# Patient Record
Sex: MALE | Race: WHITE | NOT HISPANIC OR LATINO | Employment: OTHER | URBAN - NONMETROPOLITAN AREA
[De-identification: names, ages, dates, MRNs, and addresses within clinical notes are randomized per-mention and may not be internally consistent; named-entity substitution may affect disease eponyms.]

---

## 2008-09-03 DEVICE — IMP LEAD PACING BIPOLAR CAPSUREFIX NOVUS 52CM 5076-52: Type: IMPLANTABLE DEVICE | Site: RIGHT ATRIUM | Status: NON-FUNCTIONAL

## 2017-01-21 ENCOUNTER — COMMUNICATION - GICH (OUTPATIENT)
Dept: INTERNAL MEDICINE | Facility: OTHER | Age: 72
End: 2017-01-21

## 2017-01-21 DIAGNOSIS — M10.9 GOUT: ICD-10-CM

## 2017-01-24 ENCOUNTER — ANTICOAGULATION - GICH (OUTPATIENT)
Dept: INTERNAL MEDICINE | Facility: OTHER | Age: 72
End: 2017-01-24

## 2017-01-24 DIAGNOSIS — I48.91 ATRIAL FIBRILLATION (H): ICD-10-CM

## 2017-01-24 DIAGNOSIS — Z79.01 LONG TERM CURRENT USE OF ANTICOAGULANT: ICD-10-CM

## 2017-01-24 DIAGNOSIS — I48.0 PAROXYSMAL ATRIAL FIBRILLATION (H): ICD-10-CM

## 2017-01-24 LAB — INR - HISTORICAL: 1.9

## 2017-02-01 ENCOUNTER — COMMUNICATION - GICH (OUTPATIENT)
Dept: INTERNAL MEDICINE | Facility: OTHER | Age: 72
End: 2017-02-01

## 2017-02-01 DIAGNOSIS — Z79.01 LONG TERM CURRENT USE OF ANTICOAGULANT: ICD-10-CM

## 2017-02-01 DIAGNOSIS — I48.0 PAROXYSMAL ATRIAL FIBRILLATION (H): ICD-10-CM

## 2017-02-03 ENCOUNTER — COMMUNICATION - GICH (OUTPATIENT)
Dept: INTERNAL MEDICINE | Facility: OTHER | Age: 72
End: 2017-02-03

## 2017-02-20 ENCOUNTER — AMBULATORY - GICH (OUTPATIENT)
Dept: SCHEDULING | Facility: OTHER | Age: 72
End: 2017-02-20

## 2017-02-24 ENCOUNTER — ANTICOAGULATION - GICH (OUTPATIENT)
Dept: INTERNAL MEDICINE | Facility: OTHER | Age: 72
End: 2017-02-24

## 2017-02-24 DIAGNOSIS — Z79.01 LONG TERM CURRENT USE OF ANTICOAGULANT: ICD-10-CM

## 2017-02-24 LAB — INR - HISTORICAL: 2.9

## 2017-03-04 ENCOUNTER — COMMUNICATION - GICH (OUTPATIENT)
Dept: INTERNAL MEDICINE | Facility: OTHER | Age: 72
End: 2017-03-04

## 2017-03-04 DIAGNOSIS — I50.33 ACUTE ON CHRONIC DIASTOLIC CONGESTIVE HEART FAILURE (H): ICD-10-CM

## 2017-03-04 DIAGNOSIS — I10 ESSENTIAL (PRIMARY) HYPERTENSION: ICD-10-CM

## 2017-03-20 ENCOUNTER — COMMUNICATION - GICH (OUTPATIENT)
Dept: INTERNAL MEDICINE | Facility: OTHER | Age: 72
End: 2017-03-20

## 2017-03-20 DIAGNOSIS — E78.00 PURE HYPERCHOLESTEROLEMIA: ICD-10-CM

## 2017-03-23 ENCOUNTER — OFFICE VISIT - GICH (OUTPATIENT)
Dept: FAMILY MEDICINE | Facility: OTHER | Age: 72
End: 2017-03-23

## 2017-03-23 ENCOUNTER — HISTORY (OUTPATIENT)
Dept: FAMILY MEDICINE | Facility: OTHER | Age: 72
End: 2017-03-23

## 2017-03-23 DIAGNOSIS — I10 ESSENTIAL (PRIMARY) HYPERTENSION: ICD-10-CM

## 2017-03-23 DIAGNOSIS — E78.00 PURE HYPERCHOLESTEROLEMIA: ICD-10-CM

## 2017-03-23 DIAGNOSIS — I50.33 ACUTE ON CHRONIC DIASTOLIC CONGESTIVE HEART FAILURE (H): ICD-10-CM

## 2017-03-23 DIAGNOSIS — M10.9 GOUT: ICD-10-CM

## 2017-03-23 DIAGNOSIS — I48.0 PAROXYSMAL ATRIAL FIBRILLATION (H): ICD-10-CM

## 2017-03-23 LAB
ABSOLUTE BASOPHILS - HISTORICAL: 0.2 THOU/CU MM
ABSOLUTE EOSINOPHILS - HISTORICAL: 0.4 THOU/CU MM
ABSOLUTE LYMPHOCYTES - HISTORICAL: 1.5 THOU/CU MM (ref 0.9–2.9)
ABSOLUTE MONOCYTES - HISTORICAL: 0.7 THOU/CU MM
ABSOLUTE NEUTROPHILS - HISTORICAL: 4.1 THOU/CU MM (ref 1.7–7)
ANION GAP - HISTORICAL: 9 (ref 5–18)
BASOPHILS # BLD AUTO: 2.2 %
BUN SERPL-MCNC: 25 MG/DL (ref 7–25)
BUN/CREAT RATIO - HISTORICAL: 23
CALCIUM SERPL-MCNC: 8.7 MG/DL (ref 8.6–10.3)
CHLORIDE SERPLBLD-SCNC: 106 MMOL/L (ref 98–107)
CO2 SERPL-SCNC: 22 MMOL/L (ref 21–31)
CREAT SERPL-MCNC: 1.11 MG/DL (ref 0.7–1.3)
EOSINOPHIL NFR BLD AUTO: 5.4 %
ERYTHROCYTE [DISTWIDTH] IN BLOOD BY AUTOMATED COUNT: 13.9 % (ref 11.5–15.5)
GFR IF NOT AFRICAN AMERICAN - HISTORICAL: >60 ML/MIN/1.73M2
GLUCOSE SERPL-MCNC: 106 MG/DL (ref 70–105)
HCT VFR BLD AUTO: 48.5 % (ref 37–53)
HEMOGLOBIN: 15.6 G/DL (ref 13.5–17.5)
LYMPHOCYTES NFR BLD AUTO: 22.2 % (ref 20–44)
MCH RBC QN AUTO: 30.5 PG (ref 26–34)
MCHC RBC AUTO-ENTMCNC: 32.1 G/DL (ref 32–36)
MCV RBC AUTO: 95 FL (ref 80–100)
MONOCYTES NFR BLD AUTO: 10.7 %
NEUTROPHILS NFR BLD AUTO: 59.5 % (ref 42–72)
PLATELET # BLD AUTO: 191 THOU/CU MM (ref 140–440)
PMV BLD: 9 FL (ref 6.5–11)
POTASSIUM SERPL-SCNC: 4.5 MMOL/L (ref 3.5–5.1)
RED BLOOD COUNT - HISTORICAL: 5.1 MIL/CU MM (ref 4.3–5.9)
SODIUM SERPL-SCNC: 137 MMOL/L (ref 133–143)
URATE SERPL-MCNC: 5.8 MG/DL (ref 4.4–7.6)
WHITE BLOOD COUNT - HISTORICAL: 6.9 THOU/CU MM (ref 4.5–11)

## 2017-04-04 ENCOUNTER — ANTICOAGULATION - GICH (OUTPATIENT)
Dept: INTERNAL MEDICINE | Facility: OTHER | Age: 72
End: 2017-04-04

## 2017-04-04 DIAGNOSIS — Z79.01 LONG TERM CURRENT USE OF ANTICOAGULANT: ICD-10-CM

## 2017-04-04 DIAGNOSIS — I48.91 ATRIAL FIBRILLATION (H): ICD-10-CM

## 2017-04-04 LAB — INR - HISTORICAL: 1.6

## 2017-04-25 ENCOUNTER — ANTICOAGULATION - GICH (OUTPATIENT)
Dept: INTERNAL MEDICINE | Facility: OTHER | Age: 72
End: 2017-04-25

## 2017-04-25 DIAGNOSIS — Z79.01 LONG TERM CURRENT USE OF ANTICOAGULANT: ICD-10-CM

## 2017-04-25 DIAGNOSIS — I48.91 ATRIAL FIBRILLATION (H): ICD-10-CM

## 2017-04-25 LAB — INR - HISTORICAL: 2

## 2017-05-24 ENCOUNTER — ANTICOAGULATION - GICH (OUTPATIENT)
Dept: INTERNAL MEDICINE | Facility: OTHER | Age: 72
End: 2017-05-24

## 2017-05-24 DIAGNOSIS — I48.91 ATRIAL FIBRILLATION (H): ICD-10-CM

## 2017-05-24 DIAGNOSIS — Z79.01 LONG TERM CURRENT USE OF ANTICOAGULANT: ICD-10-CM

## 2017-05-24 LAB — INR - HISTORICAL: 2.2

## 2017-06-07 ENCOUNTER — COMMUNICATION - GICH (OUTPATIENT)
Dept: FAMILY MEDICINE | Facility: OTHER | Age: 72
End: 2017-06-07

## 2017-06-07 DIAGNOSIS — I48.20 CHRONIC ATRIAL FIBRILLATION (H): ICD-10-CM

## 2017-06-20 ENCOUNTER — AMBULATORY - GICH (OUTPATIENT)
Dept: SCHEDULING | Facility: OTHER | Age: 72
End: 2017-06-20

## 2017-06-28 ENCOUNTER — ANTICOAGULATION - GICH (OUTPATIENT)
Dept: INTERNAL MEDICINE | Facility: OTHER | Age: 72
End: 2017-06-28

## 2017-06-28 DIAGNOSIS — I48.91 ATRIAL FIBRILLATION (H): ICD-10-CM

## 2017-06-28 DIAGNOSIS — Z79.01 LONG TERM CURRENT USE OF ANTICOAGULANT: ICD-10-CM

## 2017-06-28 LAB — INR - HISTORICAL: 2.7

## 2017-07-03 ENCOUNTER — COMMUNICATION - GICH (OUTPATIENT)
Dept: INTERNAL MEDICINE | Facility: OTHER | Age: 72
End: 2017-07-03

## 2017-07-03 DIAGNOSIS — I48.20 CHRONIC ATRIAL FIBRILLATION (H): ICD-10-CM

## 2017-07-22 ENCOUNTER — COMMUNICATION - GICH (OUTPATIENT)
Dept: INTERNAL MEDICINE | Facility: OTHER | Age: 72
End: 2017-07-22

## 2017-07-22 DIAGNOSIS — I10 ESSENTIAL (PRIMARY) HYPERTENSION: ICD-10-CM

## 2017-07-22 DIAGNOSIS — I48.0 PAROXYSMAL ATRIAL FIBRILLATION (H): ICD-10-CM

## 2017-07-24 ENCOUNTER — COMMUNICATION - GICH (OUTPATIENT)
Dept: INTERNAL MEDICINE | Facility: OTHER | Age: 72
End: 2017-07-24

## 2017-07-24 DIAGNOSIS — Z79.01 LONG TERM CURRENT USE OF ANTICOAGULANT: ICD-10-CM

## 2017-07-24 DIAGNOSIS — I48.0 PAROXYSMAL ATRIAL FIBRILLATION (H): ICD-10-CM

## 2017-07-28 ENCOUNTER — ANTICOAGULATION - GICH (OUTPATIENT)
Dept: INTERNAL MEDICINE | Facility: OTHER | Age: 72
End: 2017-07-28

## 2017-07-28 DIAGNOSIS — Z79.01 LONG TERM CURRENT USE OF ANTICOAGULANT: ICD-10-CM

## 2017-07-28 DIAGNOSIS — I48.91 ATRIAL FIBRILLATION (H): ICD-10-CM

## 2017-07-28 LAB — INR - HISTORICAL: 2.1

## 2017-09-11 ENCOUNTER — ANTICOAGULATION - GICH (OUTPATIENT)
Dept: INTERNAL MEDICINE | Facility: OTHER | Age: 72
End: 2017-09-11

## 2017-09-11 DIAGNOSIS — I48.91 ATRIAL FIBRILLATION (H): ICD-10-CM

## 2017-09-11 DIAGNOSIS — Z79.01 LONG TERM CURRENT USE OF ANTICOAGULANT: ICD-10-CM

## 2017-09-11 LAB — INR - HISTORICAL: 2.6

## 2017-10-19 ENCOUNTER — ANTICOAGULATION - GICH (OUTPATIENT)
Dept: INTERNAL MEDICINE | Facility: OTHER | Age: 72
End: 2017-10-19

## 2017-10-19 DIAGNOSIS — Z79.01 LONG TERM CURRENT USE OF ANTICOAGULANT: ICD-10-CM

## 2017-10-19 DIAGNOSIS — I48.91 ATRIAL FIBRILLATION (H): ICD-10-CM

## 2017-10-19 LAB — INR - HISTORICAL: 2.4

## 2017-12-21 ENCOUNTER — COMMUNICATION - GICH (OUTPATIENT)
Dept: INTERNAL MEDICINE | Facility: OTHER | Age: 72
End: 2017-12-21

## 2017-12-21 DIAGNOSIS — I48.0 PAROXYSMAL ATRIAL FIBRILLATION (H): ICD-10-CM

## 2017-12-21 DIAGNOSIS — Z79.01 LONG TERM CURRENT USE OF ANTICOAGULANT: ICD-10-CM

## 2017-12-28 ENCOUNTER — ANTICOAGULATION - GICH (OUTPATIENT)
Dept: INTERNAL MEDICINE | Facility: OTHER | Age: 72
End: 2017-12-28

## 2017-12-28 DIAGNOSIS — I48.91 ATRIAL FIBRILLATION (H): ICD-10-CM

## 2017-12-28 DIAGNOSIS — Z79.01 LONG TERM CURRENT USE OF ANTICOAGULANT: ICD-10-CM

## 2017-12-28 LAB — INR - HISTORICAL: 3

## 2017-12-28 NOTE — PATIENT INSTRUCTIONS
Patient Information     Patient Name MRN Kg Harding [id number] Male 1945      Patient Instructions by Cheri Prater RN at 2017 12:30 PM     Author:  Cheri Prater RN Service:  (none) Author Type:  NURS- Registered Nurse     Filed:  2017 11:21 AM Encounter Date:  2017 Status:  Signed     :  Cheri Prater RN (NURS- Registered Nurse)            2017 Details    Sun Mon Tue Wed Thu Fri Sat          1               2                 3               4               5               6               7               8               9                 10               11      6 mg   See details      12      4 mg         13      6 mg         14      4 mg         15      6 mg         16      4 mg           17      4 mg         18      6 mg         19      4 mg         20      6 mg         21      4 mg         22      6 mg         23      4 mg           24      4 mg         25      6 mg         26      4 mg         27      6 mg         28      4 mg         29      6 mg         30      4 mg          Date Details    This INR check               How to take your warfarin dose     To take:  4 mg Take one of the 4 mg tablets.    To take:  6 mg Take one and a half of the 4 mg tablets.           2017 Details    Sun Mon Tue Wed Thu Fri Sat     1      4 mg         2      6 mg         3      4 mg         4      6 mg         5      4 mg         6      6 mg         7      4 mg           8      4 mg         9      6 mg         10      4 mg         11      6 mg         12      4 mg         13      6 mg         14      4 mg           15      4 mg         16      6 mg         17               18               19               20               21                 22               23               24               25               26               27               28                 29               30               31                    Date Details   No additional details     Date of next INR:  10/16/2017         How to take your warfarin dose     To take:  4 mg Take one of the 4 mg tablets.    To take:  6 mg Take one and a half of the 4 mg tablets.             Description          Continue same Warfarin dose and recheck in 1 month. VIOLA YEN RN ....................  9/11/2017   11:21 AM          Anticoagulation Summary as of 9/11/2017     INR goal 2.0-3.0    Today's INR 2.6    Next INR check 10/16/2017          Call your Anticoagulation Clinic at Dept: [phone]   if:   1. Any medications are started, stopped, or there is a change in dose.  2. You experience any bleeding that is not easily stopped or if it is recurrent.  3. You notice an increase in bruising or any bruising that does not heal.  4. You are scheduled for surgery, colonoscopy, dental extraction or any other procedure where you may need to stop your Coumadin (warfarin).

## 2017-12-28 NOTE — TELEPHONE ENCOUNTER
Patient Information     Patient Name MRN Kg Harding [id number] Male 1945      Telephone Encounter by Aurelio Solano RN at 2017 10:22 AM     Author:  Aurelio Solano RN Service:  (none) Author Type:  NURS- Registered Nurse     Filed:  2017 10:23 AM Encounter Date:  7/3/2017 Status:  Signed     :  Aurelio Solano RN (NURS- Registered Nurse)            Medication is not on refill protocol. Unable to complete prescription refill per RN Medication Refill Policy.................... AURELIO SOLANO RN ....................  2017   10:22 AM        This is a Refill request from: [facility]  Name of Medication: tambocor 150 mg  Quantity requested: 60  Last fill date: 17  Last visit with REMI PARKER was on: 01/15/2016 in [facility]  PCP:  Remi Parker MD  Controlled Substance Agreement:  na   Diagnosis r/t this medication request: atrial fibrillation

## 2017-12-28 NOTE — TELEPHONE ENCOUNTER
Patient Information     Patient Name MRN Sex Kg Curtis [id number] Male 1945      Telephone Encounter by Viola Yen RN at 2017  2:11 PM     Author:  Viola Yen RN Service:  (none) Author Type:  NURS- Registered Nurse     Filed:  2017  2:15 PM Encounter Date:  2017 Status:  Signed     :  Viola Yen RN (NURS- Registered Nurse)            Anticoagulant    Office visit in the past 12 months.    Last visit with Dr. Vasquez for medication manaqement was on: 17 [facility]  Next visit with REMI CISNEROS is on: No future appointment listed with this provider  Next visit with Internal Medicine is on: No future appointment listed in this department    Lab tests:  PT/INR at least monthly    INR (no units)    Date Value   2017 2.7 (H)     HEMOGLOBIN                (g/dL)    Date Value   2017 15.6     Prescription refilled per RN Medication Refill Policy.................... VIOLA YEN RN ....................  2017   2:13 PM

## 2017-12-28 NOTE — TELEPHONE ENCOUNTER
Patient Information     Patient Name MRN Sex Kg Curtis [id number] Male 1945      Telephone Encounter by Shellie Schmidt RN at 2017 10:45 AM     Author:  Shellie Schmidt RN Service:  (none) Author Type:  NURS- Registered Nurse     Filed:  2017 10:50 AM Encounter Date:  2017 Status:  Signed     :  Shellie Schmidt RN (NURS- Registered Nurse)            Has not seen PCP in over 1 year. To Caleb Parker MD for consideration.    This is a Refill request from: [employer]   Name of Medication: Flecainide  Quantity requested: 60  Last fill date: 2016  Due for refill: yes  Last visit with BRIANNA BROWNE was on: 2016 in [facility]  PCP:  Caleb Parker MD  Controlled Substance Agreement:  n/a   Diagnosis r/t this medication request: Chronic A Fib     Medication not on RN refill protocol. Unable to complete prescription refill per RN Medication Refill Policy.................... Shellie Schmidt RN ....................  2017   10:47 AM

## 2017-12-29 NOTE — PATIENT INSTRUCTIONS
Patient Information     Patient Name MRN Kg Harding [id number] Male 1945      Patient Instructions by Emani Pereira RN at 2017  1:30 PM     Author:  Emani Pereira RN Service:  (none) Author Type:  NURS- Registered Nurse     Filed:  2017 12:35 PM Encounter Date:  2017 Status:  Signed     :  Emani Pereira RN (NURS- Registered Nurse)            2017 Details    Sun Mon Tue Wed Thu Fri Sat         1               2               3                 4               5               6               7               8               9               10                 11               12               13               14               15               16               17                 18               19               20               21               22               23               24                 25               26               27               28      6 mg   See details      29      4 mg         30      6 mg           Date Details    This INR check               How to take your warfarin dose     To take:  4 mg Take one of the 4 mg tablets.    To take:  6 mg Take one and a half of the 4 mg tablets.           2017 Details    Sun Mon Tue Wed Thu Fri Sat           1      4 mg           2      4 mg         3      6 mg         4      4 mg         5      6 mg         6      4 mg         7      6 mg         8      4 mg           9      4 mg         10      6 mg         11      4 mg         12      6 mg         13      4 mg         14      6 mg         15      4 mg           16      4 mg         17      6 mg         18      4 mg         19      6 mg         20      4 mg         21      6 mg         22      4 mg           23      4 mg         24      6 mg         25      4 mg         26      6 mg         27               28               29                 30               31                     Date Details   No additional details    Date of next INR:   7/26/2017         How to take your warfarin dose     To take:  4 mg Take one of the 4 mg tablets.    To take:  6 mg Take one and a half of the 4 mg tablets.             Description          Continue same Warfarin dose and recheck in 1 month. Emani Pereira RN    6/28/2017  12:35 PM                Anticoagulation Summary as of 6/28/2017     INR goal 2.0-3.0    Today's INR 2.7    Next INR check 7/26/2017          Call your Anticoagulation Clinic at Dept: [phone]   if:   1. Any medications are started, stopped, or there is a change in dose.  2. You experience any bleeding that is not easily stopped or if it is recurrent.  3. You notice an increase in bruising or any bruising that does not heal.  4. You are scheduled for surgery, colonoscopy, dental extraction or any other procedure where you may need to stop your Coumadin (warfarin).

## 2017-12-29 NOTE — PATIENT INSTRUCTIONS
Patient Information     Patient Name MRN Kg Harding [id number] Male 1945      Patient Instructions by Cheri Prater RN at 10/19/2017 12:15 PM     Author:  Cheri Prater RN Service:  (none) Author Type:  NURS- Registered Nurse     Filed:  10/19/2017 11:42 AM Encounter Date:  10/19/2017 Status:  Signed     :  Cheri Prater RN (NURS- Registered Nurse)            2017 Details    Sun Mon Tue Wed Thu Fri Sat     1               2               3               4               5               6               7                 8               9               10               11               12               13               14                 15               16               17               18               19      4 mg   See details      20      6 mg         21      4 mg           22      4 mg         23      6 mg         24      4 mg         25      6 mg         26      4 mg         27      6 mg         28      4 mg           29      4 mg         30      6 mg         31      4 mg              Date Details   10/19 This INR check               How to take your warfarin dose     To take:  4 mg Take one of the 4 mg tablets.    To take:  6 mg Take one and a half of the 4 mg tablets.           2017 Details    Sun Mon Tue Wed Thu Fri Sat        1      6 mg         2      4 mg         3      6 mg         4      4 mg           5      4 mg         6      6 mg         7      4 mg         8      6 mg         9      4 mg         10      6 mg         11      4 mg           12      4 mg         13      6 mg         14      4 mg         15      6 mg         16      4 mg         17      6 mg         18      4 mg           19      4 mg         20      6 mg         21      4 mg         22      6 mg         23      4 mg         24      6 mg         25      4 mg           26      4 mg         27      6 mg         28      4 mg         29      6 mg         30      4 mg            Date  Details   No additional details            How to take your warfarin dose     To take:  4 mg Take one of the 4 mg tablets.    To take:  6 mg Take one and a half of the 4 mg tablets.           December 2017 Details    Sun Mon Tue Wed Thu Fri Sat          1      6 mg         2      4 mg           3      4 mg         4      6 mg         5      4 mg         6      6 mg         7      4 mg         8      6 mg         9      4 mg           10      4 mg         11      6 mg         12      4 mg         13      6 mg         14      4 mg         15               16                 17               18               19               20               21               22               23                 24               25               26               27               28               29               30                 31                      Date Details   No additional details    Date of next INR:  12/14/2017         How to take your warfarin dose     To take:  4 mg Take one of the 4 mg tablets.    To take:  6 mg Take one and a half of the 4 mg tablets.             Description          Continue same Warfarin dose and recheck in 8 weeks. VIOLA YEN RN ....................  10/19/2017   11:42 AM  Reviewed reasons to check INR between appointments, such as medication changes.  New Warfarin/Coumadin handbook given to the patient.          Anticoagulation Summary as of 10/19/2017     INR goal 2.0-3.0    Today's INR 2.4    Next INR check 12/14/2017          Call your Anticoagulation Clinic at Dept: [phone]   if:   1. Any medications are started, stopped, or there is a change in dose.  2. You experience any bleeding that is not easily stopped or if it is recurrent.  3. You notice an increase in bruising or any bruising that does not heal.  4. You are scheduled for surgery, colonoscopy, dental extraction or any other procedure where you may need to stop your Coumadin (warfarin).

## 2017-12-29 NOTE — PATIENT INSTRUCTIONS
Patient Information     Patient Name MRN Kg Harding [id number] Male 1945      Patient Instructions by Cheri Prater RN at 2017  8:45 AM     Author:  Cheri Prater RN Service:  (none) Author Type:  NURS- Registered Nurse     Filed:  2017  8:12 AM Encounter Date:  2017 Status:  Signed     :  Cheri Prater RN (NURS- Registered Nurse)            2017 Details    Sun Mon Tue Wed Thu Fri Sat           1                 2               3               4               5               6               7               8                 9               10               11               12               13               14               15                 16               17               18               19               20               21               22                 23               24               25               26               27               28      6 mg   See details      29      4 mg           30      4 mg         31      6 mg               Date Details    This INR check               How to take your warfarin dose     To take:  4 mg Take one of the 4 mg tablets.    To take:  6 mg Take one and a half of the 4 mg tablets.           2017 Details    Sun Mon Tue Wed Thu Fri Sat       1      4 mg         2      6 mg         3      4 mg         4      6 mg         5      4 mg           6      4 mg         7      6 mg         8      4 mg         9      6 mg         10      4 mg         11      6 mg         12      4 mg           13      4 mg         14      6 mg         15      4 mg         16      6 mg         17      4 mg         18      6 mg         19      4 mg           20      4 mg         21      6 mg         22      4 mg         23      6 mg         24      4 mg         25      6 mg         26                 27               28               29               30               31                  Date Details   No additional details    Date  of next INR:  8/25/2017         How to take your warfarin dose     To take:  4 mg Take one of the 4 mg tablets.    To take:  6 mg Take one and a half of the 4 mg tablets.             Description          Continue same Warfarin dose and recheck in 1 month. VIOLA YEN RN ....................  7/28/2017   8:12 AM          Anticoagulation Summary as of 7/28/2017     INR goal 2.0-3.0    Today's INR 2.1    Next INR check 8/25/2017          Call your Anticoagulation Clinic at Dept: [phone]   if:   1. Any medications are started, stopped, or there is a change in dose.  2. You experience any bleeding that is not easily stopped or if it is recurrent.  3. You notice an increase in bruising or any bruising that does not heal.  4. You are scheduled for surgery, colonoscopy, dental extraction or any other procedure where you may need to stop your Coumadin (warfarin).

## 2018-01-03 NOTE — TELEPHONE ENCOUNTER
Patient Information     Patient Name MRN Kg Harding [id number] Male 1945      Telephone Encounter by Jailyn Becerra RN at 2/3/2017 11:55 AM     Author:  Jailyn Becerra RN Service:  (none) Author Type:  NURS- Registered Nurse     Filed:  2/3/2017 12:02 PM Encounter Date:  2/3/2017 Status:  Signed     :  Jailyn Becerra RN (NURS- Registered Nurse)            Ernestina calling from [facility] to inform us of a correction for Kg's Warfarin Rx.  The current order states he should be taking 6 mg x 3 days and 4 g x 4 days.  After checking his last INR appt he should be taking 6 mg x 3 days and 4 mg x 4 days.  Informed Ernestina of this and she corrected Rx on the pharmacy side. Jailyn Becerra RN 2/3/2017 12:01 PM

## 2018-01-03 NOTE — TELEPHONE ENCOUNTER
Patient Information     Patient Name MRN Kg Harding [id number] Male 1945      Telephone Encounter by Aurelio Solano RN at 3/20/2017  2:13 PM     Author:  Aurelio Solano RN Service:  (none) Author Type:  NURS- Registered Nurse     Filed:  3/20/2017  2:18 PM Encounter Date:  3/20/2017 Status:  Signed     :  Aurelio Solano RN (NURS- Registered Nurse)            Statins    Office visit in the past 12 months.    Last visit with REMI CISNEROS was on: 01/15/2016 in [facility]  Next visit with REMI CISNEROS is on: No future appointment listed with this provider  Next visit with Internal Medicine is on: No future appointment listed in this department    Lab testing requirements:  Lipids annually.  Repeat lipids 6-8 weeks after dosage or drug change.    Last Lipids:  Chol: 156    1/15/2016  T    1/15/2016  HDL:   42    1/15/2016  LDL:  76    1/15/2016  LDL DIRECT:  No results found in past 5 years    .    Concommitant use of fibrates and statins-If it is an addition to the medication list, review note and/or discuss with provider.  If already on medication list, refill.  Patient had a canceled appointment on 17, needs to schedule appointment for refills.    Max refills 12 months from last office visit.    Unable to complete prescription refill per RN Medication Refill Policy.................... AURELIO SOLANO RN ....................  3/20/2017   2:18 PM

## 2018-01-03 NOTE — TELEPHONE ENCOUNTER
Patient Information     Patient Name MRN Kg Harding [id number] Male 1945      Telephone Encounter by Agata Gutierrez RN at 3/6/2017 10:22 AM     Author:  Agata Gutierrez RN Service:  (none) Author Type:  NURS- Registered Nurse     Filed:  3/6/2017 10:24 AM Encounter Date:  3/4/2017 Status:  Signed     :  Agata Gutierrez RN (NURS- Registered Nurse)            Office visit in the past 12 months or per provider note.    Last visit with REMI CISNEROS was on: 01/15/2016 in [facility]  Next visit with REMI CISNEROS is on: No future appointment listed with this provider  Next visit with Internal Medicine is on: No future appointment listed in this department    Lab test requirements:  Annual potassium level.  POTASSIUM (mmol/L)    Date Value   01/15/2016 4.5       Max refill for 12 months from last office visit or per provider note.    Due for exam and lab.  Limited refill per protocol and letter mailed.  AGATA GUTIERREZ RN ........   3/6/2017    10:22 AM

## 2018-01-03 NOTE — PATIENT INSTRUCTIONS
Patient Information     Patient Name MRN Kg Harding [id number] Male 1945      Patient Instructions by Cheri Prater RN at 2017 12:00 PM     Author:  Cheri Prater RN Service:  (none) Author Type:  NURS- Registered Nurse     Filed:  2017 11:32 AM Encounter Date:  2017 Status:  Signed     :  Cheri Prater RN (NURS- Registered Nurse)            2017 Details    Sun Mon Tue Wed Thu Fri Sat        1               2               3               4                 5               6               7               8               9               10               11                 12               13               14               15               16               17               18                 19               20               21               22               23               24      6 mg   See details      25      4 mg           26      4 mg         27      6 mg         28      4 mg              Date Details    This INR check               How to take your warfarin dose     To take:  4 mg Take one of the 4 mg tablets.    To take:  6 mg Take one and a half of the 4 mg tablets.           2017 Details    Sun Mon Tue Wed Thu Fri Sat        1      6 mg         2      4 mg         3      6 mg         4      4 mg           5      4 mg         6      6 mg         7      4 mg         8      6 mg         9      4 mg         10      6 mg         11      4 mg           12      4 mg         13      6 mg         14      4 mg         15      6 mg         16      4 mg         17      6 mg         18      4 mg           19      4 mg         20      6 mg         21      4 mg         22      6 mg         23      4 mg         24      6 mg         25                 26               27               28               29               30               31                 Date Details   No additional details    Date of next INR:  3/24/2017         How to take your  warfarin dose     To take:  4 mg Take one of the 4 mg tablets.    To take:  6 mg Take one and a half of the 4 mg tablets.             Description          Increase dose and recheck in one month.  VIOLA YEN RN ....................  2/24/2017   11:32 AM          Anticoagulation Summary as of 2/24/2017     INR goal 2.0-3.0    Today's INR 2.9    Next INR check 3/24/2017          Call your Anticoagulation Clinic at Dept: [phone]   if:   1. Any medications are started, stopped, or there is a change in dose.  2. You experience any bleeding that is not easily stopped or if it is recurrent.  3. You notice an increase in bruising or any bruising that does not heal.  You are scheduled for surgery, colonoscopy, dental extraction or any other procedure where you may need to stop your Coumadin (warfarin).

## 2018-01-03 NOTE — PATIENT INSTRUCTIONS
Patient Information     Patient Name MRN Kg Harding [id number] Male 1945      Patient Instructions by Emani Pereira RN at 2017  9:00 AM     Author:  Emani Pereira RN Service:  (none) Author Type:  NURS- Registered Nurse     Filed:  2017  8:24 AM Encounter Date:  2017 Status:  Signed     :  Emani Pereira RN (NURS- Registered Nurse)            2017 Details    Sun Mon Tue Wed Thu Fri Sat     1               2               3               4               5               6               7                 8               9               10               11               12               13               14                 15               16               17               18               19               20               21                 22               23               24      4 mg   See details      25      6 mg         26      4 mg         27      6 mg         28      4 mg           29      4 mg         30      6 mg         31      4 mg              Date Details    This INR check               How to take your warfarin dose     To take:  4 mg Take one of the 4 mg tablets.    To take:  6 mg Take one and a half of the 4 mg tablets.           2017 Details    Sun Mon Tue Wed Thu Fri Sat        1      6 mg         2      4 mg         3      6 mg         4      4 mg           5      4 mg         6      6 mg         7      4 mg         8      6 mg         9      4 mg         10      6 mg         11      4 mg           12      4 mg         13      6 mg         14      4 mg         15               16               17               18                 19               20               21               22               23               24               25                 26               27               28                    Date Details   No additional details    Date of next INR:  2017         How to take your warfarin dose     To take:  4  mg Take one of the 4 mg tablets.    To take:  6 mg Take one and a half of the 4 mg tablets.             Description          Increase dose and recheck in 3 weeks. .............Emani Pereira RN.......... 1/24/2017  8:23 AM            Anticoagulation Summary as of 1/24/2017     INR goal 2.0-3.0    Today's INR 1.9    Next INR check 2/14/2017          Call your Anticoagulation Clinic at Dept: [phone]   if:   1. Any medications are started, stopped, or there is a change in dose.  2. You experience any bleeding that is not easily stopped or if it is recurrent.  3. You notice an increase in bruising or any bruising that does not heal.  You are scheduled for surgery, colonoscopy, dental extraction or any other procedure where you may need to stop your Coumadin (warfarin).

## 2018-01-03 NOTE — TELEPHONE ENCOUNTER
Patient Information     Patient Name MRN Kg Harding [id number] Male 1945      Telephone Encounter by Aurelio Solano RN at 2017  8:31 AM     Author:  Aurelio Solano RN Service:  (none) Author Type:  NURS- Registered Nurse     Filed:  2017  8:37 AM Encounter Date:  2017 Status:  Signed     :  Aurelio Solano RN (NURS- Registered Nurse)            Anticoagulant    Office visit in the past 12 months.    Last visit with REMI CISNEROS was on: 01/15/2016 in [facility]  Next visit with REMI CISNEROS is on: No future appointment listed with this provider  Next visit with Internal Medicine is on: No future appointment listed in this department    Lab tests:  PT/INR at least monthly    INR (no units)    Date Value   2017 1.9 (H)     HEMOGLOBIN                (g/dL)    Date Value   01/15/2016 17.0     No components found for: CBC    Patient is due for appointment, letter sent.   Max refills 6 months.  Prescription refilled per RN Medication Refill Policy.................... AURELIO SOLANO RN ....................  2017   8:31 AM

## 2018-01-03 NOTE — TELEPHONE ENCOUNTER
Patient Information     Patient Name MRN Kg Harding [id number] Male 1945      Telephone Encounter by Aurelio Solano RN at 2017  9:26 AM     Author:  Aurelio Solano RN Service:  (none) Author Type:  NURS- Registered Nurse     Filed:  2017  9:27 AM Encounter Date:  2017 Status:  Signed     :  Aurelio Solano RN (NURS- Registered Nurse)            GOUT    Office visit in the past 12 months or per provider note.    Last visit with REMI CISNEROS was on: 01/15/2016 in [facility]  Next visit with REMI CISNEROS is on: No future appointment listed with this provider  Next visit with Internal Medicine is on: 2017 in [facility]    Max refill for 12 months from last office visit or per provider note.  Prescription refilled per RN Medication Refill Policy.................... AURELIO SOLANO RN ....................  2017   9:26 AM

## 2018-01-04 NOTE — PATIENT INSTRUCTIONS
Patient Information     Patient Name MRN Kg Harding [id number] Male 1945      Patient Instructions by Emani Pereira RN at 2017  9:45 AM     Author:  Emani Pereira RN Service:  (none) Author Type:  NURS- Registered Nurse     Filed:  2017  9:10 AM Encounter Date:  2017 Status:  Signed     :  Emani Pereira RN (NURS- Registered Nurse)            2017 Details    Sun  Thu Fri Sat           1                 2               3               4      4 mg   See details      5      6 mg         6      4 mg         7      6 mg         8      4 mg           9      4 mg         10      6 mg         11      4 mg         12      6 mg         13      4 mg         14      6 mg         15      4 mg           16      4 mg         17      6 mg         18      4 mg         19               20               21               22                 23               24               25               26               27               28               29                 30                      Date Details    This INR check       Date of next INR:  2017         How to take your warfarin dose     To take:  4 mg Take one of the 4 mg tablets.    To take:  6 mg Take one and a half of the 4 mg tablets.             Description          Continue same Warfarin dose and recheck in 2 weeks.  Emani Pereira RN   2017    9:09 AM            Anticoagulation Summary as of 2017     INR goal 2.0-3.0    Today's INR 1.6    Next INR check 2017          Call your Anticoagulation Clinic at Dept: [phone]   if:   1. Any medications are started, stopped, or there is a change in dose.  2. You experience any bleeding that is not easily stopped or if it is recurrent.  3. You notice an increase in bruising or any bruising that does not heal.  4. You are scheduled for surgery, colonoscopy, dental extraction or any other procedure where you may need to stop your Coumadin  (warfarin).

## 2018-01-04 NOTE — NURSING NOTE
Patient Information     Patient Name MRN Kg Harding [id number] Male 1945      Nursing Note by Geovanna Singer at 3/23/2017 10:45 AM     Author:  Geovanna Singer Service:  (none) Author Type:  (none)     Filed:  3/23/2017 10:18 AM Encounter Date:  3/23/2017 Status:  Signed     :  Geovanna Singer            Needs to be seen for refill of medication simvastatin  Geovanna Singer ....................  3/23/2017   10:12 AM  \

## 2018-01-04 NOTE — PATIENT INSTRUCTIONS
Patient Information     Patient Name MRN Kg Harding [id number] Male 1945      Patient Instructions by Emani Pereira RN at 2017  8:30 AM     Author:  Emani Pereira RN Service:  (none) Author Type:  NURS- Registered Nurse     Filed:  2017  8:01 AM Encounter Date:  2017 Status:  Signed     :  Emani Pereira RN (NURS- Registered Nurse)            2017 Details    Sun Mon Tue Wed Thu Fri Sat           1                 2               3               4               5               6               7               8                 9               10               11               12               13               14               15                 16               17               18               19               20               21               22                 23               24               25      4 mg   See details      26      6 mg         27      4 mg         28      6 mg         29      4 mg           30      4 mg                Date Details    This INR check               How to take your warfarin dose     To take:  4 mg Take one of the 4 mg tablets.    To take:  6 mg Take one and a half of the 4 mg tablets.           May 2017 Details    Sun Mon Tue Wed Thu Fri Sat      1      6 mg         2      4 mg         3      6 mg         4      4 mg         5      6 mg         6      4 mg           7      4 mg         8      6 mg         9      4 mg         10      6 mg         11      4 mg         12      6 mg         13      4 mg           14      4 mg         15      6 mg         16      4 mg         17      6 mg         18      4 mg         19      6 mg         20      4 mg           21      4 mg         22      6 mg         23      4 mg         24               25               26               27                 28               29               30               31                   Date Details   No additional details    Date of next INR:   5/23/2017         How to take your warfarin dose     To take:  4 mg Take one of the 4 mg tablets.    To take:  6 mg Take one and a half of the 4 mg tablets.             Description          Continue same Warfarin dose and recheck in 1 month.  Emani Pereira RN   4/25/2017    8:00 AM              Anticoagulation Summary as of 4/25/2017     INR goal 2.0-3.0    Today's INR 2.0    Next INR check 5/23/2017          Call your Anticoagulation Clinic at Dept: [phone]   if:   1. Any medications are started, stopped, or there is a change in dose.  2. You experience any bleeding that is not easily stopped or if it is recurrent.  3. You notice an increase in bruising or any bruising that does not heal.  4. You are scheduled for surgery, colonoscopy, dental extraction or any other procedure where you may need to stop your Coumadin (warfarin).

## 2018-01-04 NOTE — PROGRESS NOTES
Patient Information     Patient Name MRN Sex Kg Curtis [id number] Male 1945      Progress Notes by Delmar Vasquez MD at 3/23/2017 10:45 AM     Author:  Delmar Vasquez MD Service:  (none) Author Type:  Physician     Filed:  3/23/2017  1:47 PM Encounter Date:  3/23/2017 Status:  Signed     :  Delmar Vasquez MD (Physician)            SUBJECTIVE:    Kg Ferraro is a 72 y.o. male who presents for follow up lipids, atrial fibrillation and hypertension with med refills requested.    HPI    He is feeling good.  Says the 2nd dose of toprol has stopped his pal.  Rarely now feels the fluttering,  Will last just a few minutes now, perhaps once to twice a month.  Has a follow up with primary care physician in 3 weeks.  Has not met with Cardiology for over 1 year per patient.  Last labs here were over 1 year ago.  No lightheadedness, chest pain or dizziness.  Is on warfarin, no known bleeding complications.  Patient says he has no lower extremity edema at all.  Has stopped his lasix.    He takes allopurinol for gout as well as kidney stones.  Says he has no acute distress neither for well over 1 year.    Allergies      Allergen   Reactions     Pcn [Penicillins]  Hives     Rash or hives - can't remember    ,   Current Outpatient Prescriptions on File Prior to Visit       Medication  Sig Dispense Refill     Blood Pressure Monitor Medium Cuff 1 Device 0     flecainide (TAMBOCOR) 150 mg tablet 1 tablet twice daily for chronic atrial fibrillation 60 tablet 11     warfarin (COUMADIN) 4 mg tablet Take 6 mg x 3 days and 4g x 4 days/week 300 tablet 0     No current facility-administered medications on file prior to visit.    ,   Past Medical History     Diagnosis  Date     ANXIETY 2010     ARTHRITIS, RHEUMATOID, CCP POSITIVE 2011     Atrial fibrillation (HC) 3/8/2012     AV BLOCK, COMPLETE      DEGENERATIVE ARTHRITIS 2010     DEGENERATIVE JOINT DISEASE, RIGHT HIP 2010      HEMATURIA UNSPECIFIED 7/10/2012     HYPERTENSION      PACEMAKER, PERMANENT 9/3/2008     PSVT      RENAL CALCULUS      RENAL FAILURE, ACUTE      RENAL FAILURE, ACUTE      SICK SINUS SYNDROME     and   Past Surgical History       Procedure   Laterality Date     Nasal surgery        Repeated nasoseptal repair       Ureteral stents        Ureteral stents for stones       Atrial fibrillation   2003     Catheter ablation for atrial fibrillation, failed        Hip replacement   12/1/2010     right hip replacement,  [place]         REVIEW OF SYSTEMS:  Review of Systems   Constitutional: Negative for chills and fever.   Respiratory: Negative for cough and shortness of breath.    Cardiovascular: Positive for palpitations. Negative for chest pain and leg swelling.   Gastrointestinal: Negative for abdominal pain, blood in stool and melena.   Neurological: Negative for headaches.       OBJECTIVE:  /78  Resp 16  Wt 115.2 kg (254 lb)  BMI 39.4 kg/m2    EXAM:   Physical Exam   Constitutional: He is oriented to person, place, and time and well-developed, well-nourished, and in no distress. No distress.   Cardiovascular: Normal rate, regular rhythm and normal heart sounds.  Exam reveals no gallop and no friction rub.    No murmur heard.  Pulmonary/Chest: Effort normal. No respiratory distress. He has no wheezes. He has no rales.   Musculoskeletal:   No lower extremity edema   Neurological: He is alert and oriented to person, place, and time.   Skin: He is not diaphoretic.   Psychiatric: Memory, affect and judgment normal.       ASSESSMENT/PLAN:    ICD-10-CM    1. Paroxysmal atrial fibrillation (HC) I48.0 metoprolol succinate (TOPROL XL) 100 mg Sustained-Release tablet      CBC AND DIFFERENTIAL   2. HYPERCHOLESTEROLEMIA E78.00 simvastatin (ZOCOR) 40 mg tablet   3. Gout, unspecified cause, unspecified chronicity, unspecified site M10.9 allopurinol (ZYLOPRIM) 100 mg tablet      URIC ACID   4. Acute on chronic diastolic congestive  heart failure (HC) I50.33 potassium chloride (KLOR-CON M20) 20 mEq Extended-Release tablet   5. Essential hypertension I10 potassium chloride (KLOR-CON M20) 20 mEq Extended-Release tablet   6. Hypertension I10 metoprolol succinate (TOPROL XL) 100 mg Sustained-Release tablet      BASIC METABOLIC PANEL        Plan:  Is in normal sinus rhythm on exam currently.  Medications refilled, no changes indicated since he is doing well.  BP is mildly elevated, but still under 140/90.    Delmar Vasquez MD ....................  3/23/2017   10:26 AM

## 2018-01-05 NOTE — PATIENT INSTRUCTIONS
Patient Information     Patient Name MRN Kg Harding [id number] Male 1945      Patient Instructions by Jailyn Becerra RN at 2017 10:00 AM     Author:  Jailyn Becerra RN Service:  (none) Author Type:  NURS- Registered Nurse     Filed:  2017  9:17 AM Encounter Date:  2017 Status:  Signed     :  Jailyn Becerra RN (NURS- Registered Nurse)            May 2017 Details    Sun Mon Tue Wed Thu Fri Sat      1               2               3               4               5               6                 7               8               9               10               11               12               13                 14               15               16               17               18               19               20                 21               22               23               24      6 mg   See details      25      4 mg         26      6 mg         27      4 mg           28      4 mg         29      6 mg         30      4 mg         31      6 mg             Date Details    This INR check               How to take your warfarin dose     To take:  4 mg Take one of the 4 mg tablets.    To take:  6 mg Take one and a half of the 4 mg tablets.           2017 Details    Sun Mon Tue Wed Thu Fri Sat         1      4 mg         2      6 mg         3      4 mg           4      4 mg         5      6 mg         6      4 mg         7      6 mg         8      4 mg         9      6 mg         10      4 mg           11      4 mg         12      6 mg         13      4 mg         14      6 mg         15      4 mg         16      6 mg         17      4 mg           18      4 mg         19      6 mg         20      4 mg         21      6 mg         22               23               24                 25               26               27               28               29               30                 Date Details   No additional details    Date of next INR:   6/21/2017         How to take your warfarin dose     To take:  4 mg Take one of the 4 mg tablets.    To take:  6 mg Take one and a half of the 4 mg tablets.             Description          Continue same Warfarin dose and recheck in 1 month.  Jailyn Becerra RN 5/24/2017 9:16 AM              Anticoagulation Summary as of 5/24/2017     INR goal 2.0-3.0    Today's INR 2.2    Next INR check 6/21/2017          Call your Anticoagulation Clinic at Dept: [phone]   if:   1. Any medications are started, stopped, or there is a change in dose.  2. You experience any bleeding that is not easily stopped or if it is recurrent.  3. You notice an increase in bruising or any bruising that does not heal.  4. You are scheduled for surgery, colonoscopy, dental extraction or any other procedure where you may need to stop your Coumadin (warfarin).

## 2018-01-18 PROBLEM — I44.2 AV BLOCK, COMPLETE (H): Status: ACTIVE | Noted: 2018-01-18

## 2018-01-18 PROBLEM — E78.00 HYPERCHOLESTEROLEMIA: Status: ACTIVE | Noted: 2018-01-18

## 2018-01-18 PROBLEM — N20.0 RENAL CALCULUS: Status: ACTIVE | Noted: 2018-01-18

## 2018-01-18 PROBLEM — I50.9 CHF (CONGESTIVE HEART FAILURE) (H): Status: ACTIVE | Noted: 2018-01-18

## 2018-01-18 PROBLEM — I49.5 SICK SINUS SYNDROME (H): Status: ACTIVE | Noted: 2018-01-18

## 2018-01-18 PROBLEM — I47.10 PAROXYSMAL SUPRAVENTRICULAR TACHYCARDIA (H): Status: ACTIVE | Noted: 2018-01-18

## 2018-01-18 PROBLEM — I10 HYPERTENSION: Status: ACTIVE | Noted: 2018-01-18

## 2018-01-18 RX ORDER — BLOOD PRESSURE TEST KIT-MEDIUM
KIT MISCELLANEOUS
COMMUNITY
Start: 2014-12-11 | End: 2022-11-23

## 2018-01-18 RX ORDER — WARFARIN SODIUM 4 MG/1
TABLET ORAL
COMMUNITY
Start: 2017-12-21 | End: 2018-03-14

## 2018-01-18 RX ORDER — METOPROLOL SUCCINATE 100 MG/1
TABLET, EXTENDED RELEASE ORAL
COMMUNITY
Start: 2017-03-23 | End: 2018-04-06

## 2018-01-18 RX ORDER — SIMVASTATIN 40 MG
TABLET ORAL
COMMUNITY
Start: 2017-03-23 | End: 2018-03-17

## 2018-01-18 RX ORDER — POTASSIUM CHLORIDE 1500 MG/1
TABLET, EXTENDED RELEASE ORAL
COMMUNITY
Start: 2017-03-23 | End: 2018-06-01

## 2018-01-18 RX ORDER — FLECAINIDE ACETATE 150 MG/1
TABLET ORAL
COMMUNITY
Start: 2017-07-06 | End: 2018-07-03

## 2018-01-18 RX ORDER — ALLOPURINOL 100 MG/1
TABLET ORAL
COMMUNITY
Start: 2017-03-23 | End: 2018-05-04

## 2018-01-26 VITALS — DIASTOLIC BLOOD PRESSURE: 78 MMHG | WEIGHT: 254 LBS | RESPIRATION RATE: 16 BRPM | SYSTOLIC BLOOD PRESSURE: 138 MMHG

## 2018-02-01 DIAGNOSIS — I48.91 ATRIAL FIBRILLATION (H): Primary | ICD-10-CM

## 2018-02-11 PROBLEM — I48.91 UNSPECIFIED ATRIAL FIBRILLATION: Status: ACTIVE | Noted: 2018-02-11

## 2018-02-12 NOTE — PATIENT INSTRUCTIONS
Patient Information     Patient Name MRN Kg Harding [id number] Male 1945      Patient Instructions by Emani Pereira RN at 2017  8:45 AM     Author:  Emani Pereira RN Service:  (none) Author Type:  NURS- Registered Nurse     Filed:  2017  7:57 AM Encounter Date:  2017 Status:  Signed     :  Emani Pereira RN (NURS- Registered Nurse)            2017 Details    Sun   Fri Sat          1               2                 3               4               5               6               7               8               9                 10               11               12               13               14               15               16                 17               18               19               20               21               22               23                 24               25               26               27               28      4 mg   See details      29      6 mg         30      4 mg           31      4 mg                Date Details    This INR check               How to take your warfarin dose     To take:  4 mg Take one of the 4 mg tablets.    To take:  6 mg Take one and a half of the 4 mg tablets.           2018 Details    Sun  Fri Sat      1      6 mg         2      4 mg         3      6 mg         4      4 mg         5      6 mg         6      4 mg           7      4 mg         8      6 mg         9      4 mg         10      6 mg         11      4 mg         12      6 mg         13      4 mg           14      4 mg         15      6 mg         16      4 mg         17      6 mg         18      4 mg         19      6 mg         20      4 mg           21      4 mg         22      6 mg         23      4 mg         24      6 mg         25      4 mg         26      6 mg         27      4 mg           28      4 mg         29      6 mg         30      4 mg         31      6 mg             Date Details    No additional details            How to take your warfarin dose     To take:  4 mg Take one of the 4 mg tablets.    To take:  6 mg Take one and a half of the 4 mg tablets.           February 2018 Details    Sun Mon Tue Wed Thu Fri Sat         1      4 mg         2      6 mg         3      4 mg           4      4 mg         5      6 mg         6      4 mg         7      6 mg         8      4 mg         9      6 mg         10      4 mg           11      4 mg         12      6 mg         13      4 mg         14      6 mg         15      4 mg         16      6 mg         17      4 mg           18      4 mg         19      6 mg         20      4 mg         21      6 mg         22      4 mg         23               24                 25               26               27               28                   Date Details   No additional details    Date of next INR:  2/22/2018         How to take your warfarin dose     To take:  4 mg Take one of the 4 mg tablets.    To take:  6 mg Take one and a half of the 4 mg tablets.             Description          Continue same Warfarin dose and recheck in 8 weeks. Emani Pereira RN    12/28/2017  7:56 AM            Anticoagulation Summary as of 12/28/2017     INR goal 2.0-3.0    Today's INR 3.0    Next INR check 2/22/2018          Call your Anticoagulation Clinic at Dept: [phone]   if:   1. Any medications are started, stopped, or there is a change in dose.  2. You experience any bleeding that is not easily stopped or if it is recurrent.  3. You notice an increase in bruising or any bruising that does not heal.  4. You are scheduled for surgery, colonoscopy, dental extraction or any other procedure where you may need to stop your Coumadin (warfarin).

## 2018-02-12 NOTE — TELEPHONE ENCOUNTER
Patient Information     Patient Name MRN Kg Harding [id number] Male 1945      Telephone Encounter by Emani Pereira RN at 2017  1:29 PM     Author:  Emani Pereira RN Service:  (none) Author Type:  NURS- Registered Nurse     Filed:  2017  1:33 PM Encounter Date:  2017 Status:  Signed     :  Emani Pereira RN (NURS- Registered Nurse)            Anticoagulant    Office visit in the past 12 months.    Last visit with REMI CISNEROS was on: 01/15/2016 in [facility]  Next visit with REMI CISNEROS is on: No future appointment listed with this provider  Next visit with Internal Medicine is on: 2017 in [facility]    Lab tests:  PT/INR at least monthly    INR (no units)    Date Value   10/19/2017 2.4 (H)     HEMOGLOBIN                (g/dL)    Date Value   2017 15.6           Prescription refilled per RN Medication Refill Policy.................... Emani Pereira RN ....................  2017   1:29 PM

## 2018-02-28 ENCOUNTER — ANTICOAGULATION THERAPY VISIT (OUTPATIENT)
Dept: ANTICOAGULATION | Facility: OTHER | Age: 73
End: 2018-02-28
Attending: INTERNAL MEDICINE
Payer: MEDICARE

## 2018-02-28 DIAGNOSIS — Z79.01 LONG-TERM (CURRENT) USE OF ANTICOAGULANTS: ICD-10-CM

## 2018-02-28 LAB — INR POINT OF CARE: 5.2 (ref 2–3)

## 2018-02-28 PROCEDURE — 99207 ZZC NO CHARGE NURSE ONLY: CPT

## 2018-02-28 PROCEDURE — 36416 COLLJ CAPILLARY BLOOD SPEC: CPT | Mod: ZL

## 2018-02-28 PROCEDURE — 85610 PROTHROMBIN TIME: CPT | Mod: QW,ZL

## 2018-02-28 NOTE — MR AVS SNAPSHOT
Kg Ferraro   2/28/2018 11:30 AM   Anticoagulation Therapy Visit    Description:  73 year old male   Provider:  [facility]   Department:  [facility]           INR as of 2/28/2018     Today's INR 5.2!      Anticoagulation Summary as of 2/28/2018     INR goal 2.0-3.0   Today's INR 5.2!   Full instructions 2/28: Hold; Otherwise 6 mg on Mon, Wed, Fri; 4 mg all other days   Next INR check 3/7/2018    Indications   Unspecified atrial fibrillation [I48.91]  Long-term (current) use of anticoagulants [Z79.01] [Z79.01]         Description     Hold dose today then resume and recheck in 1 week. Emani Pereira RN    2/28/2018  10:40 AM        Your next Anticoagulation Clinic appointment(s)     Feb 28, 2018 11:30 AM CST   Anticoagulation Visit with [facility]   [facility] ([facility])    [address]  [facility] [address]   [phone]              February 2018 Details    Sun Mon Tue Wed Thu Fri Sat         1               2               3                 4               5               6               7               8               9               10                 11               12               13               14               15               16               17                 18               19               20               21               22               23               24                 25               26               27               28      Hold   See details          Date Details   02/28 This INR check               How to take your warfarin dose     Hold Do not take your warfarin dose. See the Details table to the right for additional instructions.                March 2018 Details    Sun Mon Tue Wed Thu Fri Sat         1      4 mg         2      6 mg         3      4 mg           4      4 mg         5      6 mg         6      4 mg         7            8               9               10                 11               12                13               14               15               16               17                 18               19               20               21               22               23               24                 25               26               27               28               29               30               31                Date Details   No additional details    Date of next INR:  3/7/2018         How to take your warfarin dose     To take:  4 mg Take 1 of the 4 mg tablets.    To take:  6 mg Take 1.5 of the 4 mg tablets.

## 2018-03-07 ENCOUNTER — ANTICOAGULATION THERAPY VISIT (OUTPATIENT)
Dept: ANTICOAGULATION | Facility: OTHER | Age: 73
End: 2018-03-07
Attending: INTERNAL MEDICINE
Payer: MEDICARE

## 2018-03-07 DIAGNOSIS — Z79.01 LONG-TERM (CURRENT) USE OF ANTICOAGULANTS: ICD-10-CM

## 2018-03-07 LAB — INR POINT OF CARE: 3.1 (ref 2–3)

## 2018-03-07 PROCEDURE — 36416 COLLJ CAPILLARY BLOOD SPEC: CPT | Mod: ZL

## 2018-03-07 PROCEDURE — 85610 PROTHROMBIN TIME: CPT | Mod: QW,ZL

## 2018-03-07 PROCEDURE — 99207 ZZC NO CHARGE NURSE ONLY: CPT

## 2018-03-07 NOTE — PROGRESS NOTES
ANTICOAGULATION FOLLOW-UP CLINIC VISIT    Patient Name:  Kg Ferraro  Date:  3/7/2018  Contact Type:  Face to Face    SUBJECTIVE:     Patient Findings     Positives No Problem Findings           OBJECTIVE    INR Protime   Date Value Ref Range Status   03/07/2018 3.1 (A) 2 - 3 Final       ASSESSMENT / PLAN  INR assessment THER    Recheck INR In: 2 WEEKS    INR Location Clinic      Anticoagulation Summary as of 3/7/2018     INR goal 2.0-3.0   Today's INR 3.1!   Maintenance plan 6 mg (4 mg x 1.5) on Mon, Wed, Fri; 4 mg (4 mg x 1) all other days   Full instructions 6 mg on Mon, Wed, Fri; 4 mg all other days   Weekly total 34 mg   No change documented Emani Pereira RN   Next INR check 3/21/2018   Priority INR   Target end date Indefinite    Indications   Unspecified atrial fibrillation [I48.91]  Long-term (current) use of anticoagulants [Z79.01] [Z79.01]         Anticoagulation Episode Summary     INR check location     Preferred lab     Send INR reminders to  INR    Comments       Anticoagulation Care Providers     Provider Role Specialty Phone number    Caleb Parker MD [facility] [phone]            See the Encounter Report to view Anticoagulation Flowsheet and Dosing Calendar (Go to Encounters tab in chart review, and find the Anticoagulation Therapy Visit)        Emani Pereira RN

## 2018-03-07 NOTE — MR AVS SNAPSHOT
Kg Ferraro   3/7/2018 8:45 AM   Anticoagulation Therapy Visit    Description:  73 year old male   Provider:  [facility]   Department:  [facility]           INR as of 3/7/2018     Today's INR 3.1!      Anticoagulation Summary as of 3/7/2018     INR goal 2.0-3.0   Today's INR 3.1!   Full instructions 6 mg on Mon, Wed, Fri; 4 mg all other days   Next INR check 3/21/2018    Indications   Unspecified atrial fibrillation [I48.91]  Long-term (current) use of anticoagulants [Z79.01] [Z79.01]         Description     Continue same dose and recheck in 2-3 weeks. ...............Emani Pereira RN    3/7/2018    8:08 AM      Your next Anticoagulation Clinic appointment(s)     Mar 07, 2018  8:45 AM CST   Anticoagulation Visit with [facility]   [facility] ([facility])    [address]  [facility] [address]   [phone]              March 2018 Details    Sun Mon Tue Wed Thu Fri Sat         1               2               3                 4               5               6               7      6 mg   See details      8      4 mg         9      6 mg         10      4 mg           11      4 mg         12      6 mg         13      4 mg         14      6 mg         15      4 mg         16      6 mg         17      4 mg           18      4 mg         19      6 mg         20      4 mg         21            22               23               24                 25               26               27               28               29               30               31                Date Details   03/07 This INR check       Date of next INR:  3/21/2018         How to take your warfarin dose     To take:  4 mg Take 1 of the 4 mg tablets.    To take:  6 mg Take 1.5 of the 4 mg tablets.

## 2018-03-14 DIAGNOSIS — Z79.01 ANTICOAGULATION MONITORING, INR RANGE 2-3: ICD-10-CM

## 2018-03-14 DIAGNOSIS — I48.91 ATRIAL FIBRILLATION (H): Primary | ICD-10-CM

## 2018-03-14 RX ORDER — WARFARIN SODIUM 4 MG/1
TABLET ORAL
Qty: 100 TABLET | Refills: 0 | Status: SHIPPED | OUTPATIENT
Start: 2018-03-14 | End: 2018-04-11

## 2018-03-14 NOTE — LETTER
Kg Ferraro     [facility] [address]      March 14, 2018      Dear Kg:    A refill of Warfarin has be sent to your pharmacy.   This letter is to remind you that you need to see your primary doctor yearly for your prescription refills.  It has been over one year since you have seen Dr. Parker.    Please call the clinic at your convenience to schedule a medication review appointment with your primary care provider.  Thank you!    We appreciate the opportunity to participate in your health care.    Sincerely,      RN refill nurse  [facility]

## 2018-03-17 DIAGNOSIS — E78.00 HYPERCHOLESTEROLEMIA: Primary | ICD-10-CM

## 2018-03-17 NOTE — LETTER
March 21, 2018      Kg Ferraro     [facility] [address]        Dear Kg,       This letter is to remind you that you are due for your annual exam and labs with Caleb Parker. Your last comprehensive visit was more than 12 months ago.    A LIMITED refill of simvastatin has been called into your pharmacy. Additional refills require you to complete this appointment.    Please call the clinic at [phone] to schedule your appointment.    If you should require additional refills before your scheduled appointment, please contact your pharmacy and we will refill your medication until the date of your appointment.    If you are no longer seeing Caleb Parker for primary care, please call to let us know. Doing so will remove you from our call/contact list.      Thank you for choosing [facility] for your health care needs.    Sincerely,    Refill RN  [facility]        Sincerely,        Delmar Vasquez MD

## 2018-03-21 ENCOUNTER — ANTICOAGULATION THERAPY VISIT (OUTPATIENT)
Dept: ANTICOAGULATION | Facility: OTHER | Age: 73
End: 2018-03-21
Attending: INTERNAL MEDICINE
Payer: MEDICARE

## 2018-03-21 DIAGNOSIS — Z79.01 LONG-TERM (CURRENT) USE OF ANTICOAGULANTS: ICD-10-CM

## 2018-03-21 LAB — INR POINT OF CARE: 3.5 (ref 2–3)

## 2018-03-21 PROCEDURE — 85610 PROTHROMBIN TIME: CPT | Mod: QW,ZL

## 2018-03-21 PROCEDURE — 36416 COLLJ CAPILLARY BLOOD SPEC: CPT | Mod: ZL

## 2018-03-21 RX ORDER — SIMVASTATIN 40 MG
TABLET ORAL
Qty: 90 TABLET | Refills: 0 | Status: SHIPPED | OUTPATIENT
Start: 2018-03-21 | End: 2018-06-19

## 2018-03-21 NOTE — MR AVS SNAPSHOT
Kg Ferraro   3/21/2018 11:15 AM   Anticoagulation Therapy Visit    Description:  73 year old male   Provider:  [facility]   Department:  [facility]           INR as of 3/21/2018     Today's INR 3.5!      Anticoagulation Summary as of 3/21/2018     INR goal 2.0-3.0   Today's INR 3.5!   Full instructions 6 mg on Mon, Fri; 4 mg all other days   Next INR check 4/11/2018    Indications   Unspecified atrial fibrillation [I48.91]  Long-term (current) use of anticoagulants [Z79.01] [Z79.01]         Description     Decrease dose and recheck in 3 weeks.  ..............Emani Pereira RN.............  3/21/2018    10:08 AM        Your next Anticoagulation Clinic appointment(s)     Mar 21, 2018 11:15 AM CDT   Anticoagulation Visit with [facility] 1   [facility] ([facility])    [address]  [facility] [address]   [phone]              March 2018 Details    Sun Mon Tue Wed Thu Fri Sat         1               2               3                 4               5               6               7               8               9               10                 11               12               13               14               15               16               17                 18               19               20               21      4 mg   See details      22      4 mg         23      6 mg         24      4 mg           25      4 mg         26      6 mg         27      4 mg         28      4 mg         29      4 mg         30      6 mg         31      4 mg          Date Details   03/21 This INR check               How to take your warfarin dose     To take:  4 mg Take 1 of the 4 mg tablets.    To take:  6 mg Take 1.5 of the 4 mg tablets.           April 2018 Details    Sun Mon Tue Wed Thu Fri Sat     1      4 mg         2      6 mg         3      4 mg         4      4 mg         5      4 mg         6      6 mg         7      4 mg           8      4  mg         9      6 mg         10      4 mg         11            12               13               14                 15               16               17               18               19               20               21                 22               23               24               25               26               27               28                 29               30                     Date Details   No additional details    Date of next INR:  4/11/2018         How to take your warfarin dose     To take:  4 mg Take 1 of the 4 mg tablets.    To take:  6 mg Take 1.5 of the 4 mg tablets.

## 2018-03-21 NOTE — TELEPHONE ENCOUNTER
Medication is being filled for 1 time refill only due to:  Patient needs labs .  Lipids ordered and letter sent today..   Raya Jones RN.............................3/21/2018 1:42 PM

## 2018-04-06 DIAGNOSIS — I10 HYPERTENSION: ICD-10-CM

## 2018-04-06 DIAGNOSIS — I48.0 PAROXYSMAL ATRIAL FIBRILLATION (H): Primary | ICD-10-CM

## 2018-04-10 RX ORDER — METOPROLOL SUCCINATE 100 MG/1
TABLET, EXTENDED RELEASE ORAL
Qty: 270 TABLET | Refills: 0 | Status: SHIPPED | OUTPATIENT
Start: 2018-04-10 | End: 2018-07-03

## 2018-04-10 NOTE — TELEPHONE ENCOUNTER
Medication is being filled for 1 time refill only due to:  Patient needs to be seen because it has been more than one year since last visit. patient has appt scheduled with Caleb Parker for 4/25/18    Agata Gutierrez RN on 4/10/2018 at 2:22 PM

## 2018-04-11 ENCOUNTER — ANTICOAGULATION THERAPY VISIT (OUTPATIENT)
Dept: ANTICOAGULATION | Facility: OTHER | Age: 73
End: 2018-04-11
Attending: INTERNAL MEDICINE
Payer: MEDICARE

## 2018-04-11 DIAGNOSIS — Z79.01 ANTICOAGULATION MONITORING, INR RANGE 2-3: ICD-10-CM

## 2018-04-11 DIAGNOSIS — I48.91 ATRIAL FIBRILLATION (H): ICD-10-CM

## 2018-04-11 DIAGNOSIS — Z79.01 LONG-TERM (CURRENT) USE OF ANTICOAGULANTS: ICD-10-CM

## 2018-04-11 LAB — INR POINT OF CARE: 3.1 (ref 2–3)

## 2018-04-11 PROCEDURE — 36416 COLLJ CAPILLARY BLOOD SPEC: CPT | Mod: ZL

## 2018-04-11 PROCEDURE — 85610 PROTHROMBIN TIME: CPT | Mod: QW,ZL

## 2018-04-11 RX ORDER — WARFARIN SODIUM 4 MG/1
TABLET ORAL
Qty: 100 TABLET | Refills: 0 | COMMUNITY
Start: 2018-04-11 | End: 2018-07-16 | Stop reason: DRUGHIGH

## 2018-04-11 NOTE — PROGRESS NOTES
ANTICOAGULATION FOLLOW-UP CLINIC VISIT    Patient Name:  Kg Ferraro  Date:  4/11/2018  Contact Type:  Face to Face    SUBJECTIVE:     Patient Findings     Positives No Problem Findings           OBJECTIVE    INR Protime   Date Value Ref Range Status   04/11/2018 3.1 (A) 2.0 - 3.0 Final       ASSESSMENT / PLAN  INR assessment THER    Recheck INR In: 4 WEEKS    INR Location Clinic      Anticoagulation Summary as of 4/11/2018     INR goal 2.0-3.0   Today's INR 3.1!   Maintenance plan 6 mg (4 mg x 1.5) on Mon, Fri; 4 mg (4 mg x 1) all other days   Full instructions 6 mg on Mon, Fri; 4 mg all other days   Weekly total 32 mg   No change documented Cheri Prater RN   Plan last modified Emani Pereira RN (3/21/2018)   Next INR check 5/9/2018   Priority INR   Target end date Indefinite    Indications   Unspecified atrial fibrillation [I48.91]  Long-term (current) use of anticoagulants [Z79.01] [Z79.01]         Anticoagulation Episode Summary     INR check location     Preferred lab     Send INR reminders to  INR    Comments       Anticoagulation Care Providers     Provider Role Specialty Phone number    Caleb Parker MD [facility] Internal Medicine [phone]            See the Encounter Report to view Anticoagulation Flowsheet and Dosing Calendar (Go to Encounters tab in chart review, and find the Anticoagulation Therapy Visit)        Cheri Prater RN

## 2018-05-04 DIAGNOSIS — M10.9 GOUT, UNSPECIFIED CAUSE, UNSPECIFIED CHRONICITY, UNSPECIFIED SITE: Primary | ICD-10-CM

## 2018-05-08 RX ORDER — ALLOPURINOL 100 MG/1
TABLET ORAL
Qty: 180 TABLET | Refills: 3 | Status: SHIPPED | OUTPATIENT
Start: 2018-05-08 | End: 2018-07-03

## 2018-05-08 NOTE — TELEPHONE ENCOUNTER
Hi ,  Routing refill request to provider for review/approval because:  Labs out of range: Protocol requires normal creatinine, uric acid equal or greater than 6 and a current CBC and ALT on file.    Please review and advise or sign if appropriate, pt last seen 03/23/2017 by . Pt last visit with primary appears to have been 01/2016  Raya Jones RN.............................5/8/2018 11:21 AM

## 2018-05-15 ENCOUNTER — ANTICOAGULATION THERAPY VISIT (OUTPATIENT)
Dept: ANTICOAGULATION | Facility: OTHER | Age: 73
End: 2018-05-15
Attending: INTERNAL MEDICINE
Payer: MEDICARE

## 2018-05-15 DIAGNOSIS — Z79.01 LONG-TERM (CURRENT) USE OF ANTICOAGULANTS: ICD-10-CM

## 2018-05-15 LAB — INR POINT OF CARE: 3.3 (ref 0.86–1.14)

## 2018-05-15 PROCEDURE — 85610 PROTHROMBIN TIME: CPT | Mod: QW

## 2018-05-15 NOTE — MR AVS SNAPSHOT
Kg Ferraro   5/15/2018 10:15 AM   Anticoagulation Therapy Visit    Description:  73 year old male   Provider:  [facility]   Department:  [facility]           INR as of 5/15/2018     Today's INR 3.3!      Anticoagulation Summary as of 5/15/2018     INR goal 2.0-3.0   Today's INR 3.3!   Full instructions 6 mg on Mon; 4 mg all other days   Next INR check 6/12/2018    Indications   Unspecified atrial fibrillation [I48.91]  Long-term (current) use of anticoagulants [Z79.01] [Z79.01]         Description     Decrease dose and recheck in 4 weeks.  ..............Emani Pereira RN.............  5/15/2018    9:27 AM        Your next Anticoagulation Clinic appointment(s)     May 15, 2018 10:15 AM CDT   Anticoagulation Visit with [facility] 1   [facility] ([facility])    [address]  [place] [address]   [phone]              May 2018 Details    Sun Mon Tue Wed Thu Fri Sat       1               2               3               4               5                 6               7               8               9               10               11               12                 13               14               15      4 mg   See details      16      4 mg         17      4 mg         18      4 mg         19      4 mg           20      4 mg         21      6 mg         22      4 mg         23      4 mg         24      4 mg         25      4 mg         26      4 mg           27      4 mg         28      6 mg         29      4 mg         30      4 mg         31      4 mg            Date Details   05/15 This INR check               How to take your warfarin dose     To take:  4 mg Take 1 of the 4 mg tablets.    To take:  6 mg Take 1.5 of the 4 mg tablets.           June 2018 Details    Sun Mon Tue Wed Thu Fri Sat          1      4 mg         2      4 mg           3      4 mg         4      6 mg         5      4 mg         6      4 mg         7      4  mg         8      4 mg         9      4 mg           10      4 mg         11      6 mg         12            13               14               15               16                 17               18               19               20               21               22               23                 24               25               26               27               28               29               30                Date Details   No additional details    Date of next INR:  6/12/2018         How to take your warfarin dose     To take:  4 mg Take 1 of the 4 mg tablets.    To take:  6 mg Take 1.5 of the 4 mg tablets.

## 2018-05-15 NOTE — PROGRESS NOTES
ANTICOAGULATION FOLLOW-UP CLINIC VISIT    Patient Name:  Kg Ferraro  Date:  5/15/2018  Contact Type:  Face to Face    SUBJECTIVE:     Patient Findings     Positives Other complaints (says he still has problems with pain in his left shoulder takes tylenol on/off)           OBJECTIVE    INR Protime   Date Value Ref Range Status   05/15/2018 3.3 (A) 0.86 - 1.14 Final       ASSESSMENT / PLAN  INR assessment SUPRA    Recheck INR In: 4 WEEKS    INR Location Clinic      Anticoagulation Summary as of 5/15/2018     INR goal 2.0-3.0   Today's INR 3.3!   Maintenance plan 6 mg (4 mg x 1.5) on Mon; 4 mg (4 mg x 1) all other days   Full instructions 6 mg on Mon; 4 mg all other days   Weekly total 30 mg   Plan last modified Emani Pereira RN (5/15/2018)   Next INR check 6/12/2018   Priority INR   Target end date Indefinite    Indications   Unspecified atrial fibrillation [I48.91]  Long-term (current) use of anticoagulants [Z79.01] [Z79.01]         Anticoagulation Episode Summary     INR check location     Preferred lab     Send INR reminders to  INR    Comments       Anticoagulation Care Providers     Provider Role Specialty Phone number    [facility] MD [facility] [phone]            See the Encounter Report to view Anticoagulation Flowsheet and Dosing Calendar (Go to Encounters tab in chart review, and find the Anticoagulation Therapy Visit)        Emani Pereira RN

## 2018-06-01 DIAGNOSIS — I50.33 ACUTE ON CHRONIC DIASTOLIC CONGESTIVE HEART FAILURE (H): Primary | ICD-10-CM

## 2018-06-01 DIAGNOSIS — I10 ESSENTIAL HYPERTENSION: ICD-10-CM

## 2018-06-06 RX ORDER — POTASSIUM CHLORIDE 1500 MG/1
TABLET, EXTENDED RELEASE ORAL
Qty: 90 TABLET | Refills: 3 | Status: SHIPPED | OUTPATIENT
Start: 2018-06-06 | End: 2018-07-03

## 2018-06-06 NOTE — TELEPHONE ENCOUNTER
This is a Refill request from: [facility]  Name of Medication and Dose:  potassium chloride (KLOR-CON M20) 20 mEq Extended-Release tablet    Quantity requested:  90  Last fill date: 3/23/2017  Last Office Visit:  3/23/2017  Narcotic Agreement Signed:  [place]  PCP:  Caleb Parker MD   Associated Diagnosis: Acute on chronic diastolic congestive heart failure (HC), Essential hypertension    Lydia Hammer LPN Supervisor 6/6/2018   12:29 PM

## 2018-06-12 DIAGNOSIS — Z79.01 ANTICOAGULATION MONITORING, INR RANGE 2-3: ICD-10-CM

## 2018-06-12 DIAGNOSIS — I48.91 ATRIAL FIBRILLATION (H): Primary | ICD-10-CM

## 2018-06-12 RX ORDER — WARFARIN SODIUM 4 MG/1
TABLET ORAL
Qty: 100 TABLET | Refills: 1 | Status: SHIPPED | OUTPATIENT
Start: 2018-06-12 | End: 2018-07-16

## 2018-06-12 NOTE — TELEPHONE ENCOUNTER
Prescription approved per [facility] [place].  Emani Pereira RN    6/12/2018  4:00 PM

## 2018-06-18 ENCOUNTER — ANTICOAGULATION THERAPY VISIT (OUTPATIENT)
Dept: ANTICOAGULATION | Facility: OTHER | Age: 73
End: 2018-06-18
Attending: INTERNAL MEDICINE
Payer: MEDICARE

## 2018-06-18 DIAGNOSIS — Z79.01 LONG-TERM (CURRENT) USE OF ANTICOAGULANTS: ICD-10-CM

## 2018-06-18 LAB — INR POINT OF CARE: 2.4 (ref 0.86–1.14)

## 2018-06-18 PROCEDURE — 36416 COLLJ CAPILLARY BLOOD SPEC: CPT | Mod: ZL

## 2018-06-18 NOTE — MR AVS SNAPSHOT
Kg Ferraro   6/18/2018 9:15 AM   Anticoagulation Therapy Visit    Description:  73 year old male   Provider:  [facility]   Department:  [facility]           INR as of 6/18/2018     Today's INR 2.4      Anticoagulation Summary as of 6/18/2018     INR goal 2.0-3.0   Today's INR 2.4   Full warfarin instructions 6 mg on Mon; 4 mg all other days   Next INR check 7/16/2018    Indications   Unspecified atrial fibrillation [I48.91]  Long-term (current) use of anticoagulants [Z79.01] [Z79.01]         Description     Continue same Warfarin dose and recheck in 1 month.  Emani Pereira RN   6/18/2018    8:11 AM      Your next Anticoagulation Clinic appointment(s)     Jun 18, 2018  9:15 AM CDT   Anticoagulation Visit with [facility]   [facility] ([facility])    [address]  [facility] [address]   [phone]              June 2018 Details    Sun Mon Tue Wed Thu Fri Sat          1               2                 3               4               5               6               7               8               9                 10               11               12               13               14               15               16                 17               18      6 mg   See details      19      4 mg         20      4 mg         21      4 mg         22      4 mg         23      4 mg           24      4 mg         25      6 mg         26      4 mg         27      4 mg         28      4 mg         29      4 mg         30      4 mg          Date Details   06/18 This INR check               How to take your warfarin dose     To take:  4 mg Take 1 of the 4 mg tablets.    To take:  6 mg Take 1.5 of the 4 mg tablets.           July 2018 Details    Sun Mon Tue Wed Thu Fri Sat     1      4 mg         2      6 mg         3      4 mg         4      4 mg         5      4 mg         6      4 mg         7      4 mg           8      4 mg         9      6 mg          10      4 mg         11      4 mg         12      4 mg         13      4 mg         14      4 mg           15      4 mg         16            17               18               19               20               21                 22               23               24               25               26               27               28                 29               30               31                    Date Details   No additional details    Date of next INR:  7/16/2018         How to take your warfarin dose     To take:  4 mg Take 1 of the 4 mg tablets.    To take:  6 mg Take 1.5 of the 4 mg tablets.

## 2018-06-18 NOTE — PROGRESS NOTES
ANTICOAGULATION FOLLOW-UP CLINIC VISIT    Patient Name:  Kg Ferraro  Date:  6/18/2018  Contact Type:  Face to Face    SUBJECTIVE:     Patient Findings     Positives No Problem Findings           OBJECTIVE    INR Protime   Date Value Ref Range Status   06/18/2018 2.4 (A) 0.86 - 1.14 Final       ASSESSMENT / PLAN  INR assessment THER    Recheck INR In: 4 WEEKS    INR Location Clinic      Anticoagulation Summary as of 6/18/2018     INR goal 2.0-3.0   Today's INR 2.4   Warfarin maintenance plan 6 mg (4 mg x 1.5) on Mon; 4 mg (4 mg x 1) all other days   Full warfarin instructions 6 mg on Mon; 4 mg all other days   Weekly warfarin total 30 mg   No change documented Emani Pereira RN   Plan last modified Emani Pereira RN (5/15/2018)   Next INR check 7/16/2018   Priority INR   Target end date Indefinite    Indications   Unspecified atrial fibrillation [I48.91]  Long-term (current) use of anticoagulants [Z79.01] [Z79.01]         Anticoagulation Episode Summary     INR check location     Preferred lab     Send INR reminders to  INR    Comments       Anticoagulation Care Providers     Provider Role Specialty Phone number    Calbe Parker MD Responsible Internal Medicine [phone]            See the Encounter Report to view Anticoagulation Flowsheet and Dosing Calendar (Go to Encounters tab in chart review, and find the Anticoagulation Therapy Visit)        Emani Pereira RN

## 2018-06-19 DIAGNOSIS — E78.00 HYPERCHOLESTEROLEMIA: ICD-10-CM

## 2018-06-19 RX ORDER — SIMVASTATIN 40 MG
TABLET ORAL
Qty: 90 TABLET | Refills: 0 | Status: SHIPPED | OUTPATIENT
Start: 2018-06-19 | End: 2018-06-20

## 2018-06-19 NOTE — LETTER
June 19, 2018      Kg Ferraro     [facility] [address]        Dear Kg,     This letter is to remind you that you are due for your annual exam. Your last comprehensive visit was more than 12 months ago.    A LIMITED refill of Simvastatin has been called into your pharmacy. Additional refills require you to complete this appointment.    Please call the clinic at [phone] to schedule your appointment.    If you should require additional refills before your scheduled appointment, please contact your pharmacy and we will refill your medication until the date of your appointment.    If you are no longer seeing Caleb Parker for primary care, please call to let us know. Doing so will remove you from our call/contact list.      Thank you for choosing [facility] for your health care needs.    Sincerely,    Refill RN  [facility]

## 2018-06-19 NOTE — TELEPHONE ENCOUNTER
Medication is being filled for 1 time refill only due to:  Patient needs to be seen because it has been more than one year since last visit.   Patient is due for medication management appointment. Limited refill provided at this time. [employer] message and/or letter sent for reminder to patient. Prescription refilled per RN Medication Refill Policy.................... Raya Jones ....................  6/19/2018   11:50 AM

## 2018-06-20 DIAGNOSIS — E78.00 HYPERCHOLESTEROLEMIA: ICD-10-CM

## 2018-06-20 RX ORDER — SIMVASTATIN 40 MG
40 TABLET ORAL DAILY
Qty: 90 TABLET | Refills: 0 | Status: SHIPPED | OUTPATIENT
Start: 2018-06-20 | End: 2018-07-03

## 2018-07-03 ENCOUNTER — OFFICE VISIT (OUTPATIENT)
Dept: INTERNAL MEDICINE | Facility: OTHER | Age: 73
End: 2018-07-03
Attending: INTERNAL MEDICINE
Payer: MEDICARE

## 2018-07-03 VITALS
BODY MASS INDEX: 38.53 KG/M2 | DIASTOLIC BLOOD PRESSURE: 76 MMHG | WEIGHT: 248.38 LBS | HEART RATE: 80 BPM | SYSTOLIC BLOOD PRESSURE: 124 MMHG

## 2018-07-03 DIAGNOSIS — L57.0 ACTINIC KERATOSIS: ICD-10-CM

## 2018-07-03 DIAGNOSIS — E78.2 MIXED HYPERLIPIDEMIA: ICD-10-CM

## 2018-07-03 DIAGNOSIS — I10 ESSENTIAL HYPERTENSION: ICD-10-CM

## 2018-07-03 DIAGNOSIS — M10.9 GOUT, UNSPECIFIED CAUSE, UNSPECIFIED CHRONICITY, UNSPECIFIED SITE: ICD-10-CM

## 2018-07-03 DIAGNOSIS — I47.10 PAROXYSMAL SUPRAVENTRICULAR TACHYCARDIA (H): Primary | ICD-10-CM

## 2018-07-03 DIAGNOSIS — I48.0 PAROXYSMAL ATRIAL FIBRILLATION (H): ICD-10-CM

## 2018-07-03 DIAGNOSIS — I50.33 ACUTE ON CHRONIC DIASTOLIC CONGESTIVE HEART FAILURE (H): ICD-10-CM

## 2018-07-03 PROCEDURE — 99214 OFFICE O/P EST MOD 30 MIN: CPT | Mod: 25 | Performed by: INTERNAL MEDICINE

## 2018-07-03 PROCEDURE — G0463 HOSPITAL OUTPT CLINIC VISIT: HCPCS

## 2018-07-03 PROCEDURE — 17110 DESTRUCTION B9 LES UP TO 14: CPT | Performed by: INTERNAL MEDICINE

## 2018-07-03 PROCEDURE — 17000 DESTRUCT PREMALG LESION: CPT | Performed by: INTERNAL MEDICINE

## 2018-07-03 PROCEDURE — G0463 HOSPITAL OUTPT CLINIC VISIT: HCPCS | Mod: 25

## 2018-07-03 RX ORDER — ALLOPURINOL 100 MG/1
100 TABLET ORAL 2 TIMES DAILY
Qty: 180 TABLET | Refills: 3 | Status: SHIPPED | OUTPATIENT
Start: 2018-07-03 | End: 2019-08-05

## 2018-07-03 RX ORDER — SIMVASTATIN 40 MG
40 TABLET ORAL DAILY
Qty: 90 TABLET | Refills: 3 | Status: SHIPPED | OUTPATIENT
Start: 2018-07-03 | End: 2019-09-18

## 2018-07-03 RX ORDER — METOPROLOL SUCCINATE 100 MG/1
TABLET, EXTENDED RELEASE ORAL
Qty: 270 TABLET | Refills: 3 | Status: SHIPPED | OUTPATIENT
Start: 2018-07-03 | End: 2019-07-05

## 2018-07-03 RX ORDER — FLECAINIDE ACETATE 150 MG/1
150 TABLET ORAL 2 TIMES DAILY
Qty: 180 TABLET | Refills: 3 | Status: SHIPPED | OUTPATIENT
Start: 2018-07-03 | End: 2019-07-04

## 2018-07-03 RX ORDER — POTASSIUM CHLORIDE 1500 MG/1
20 TABLET, EXTENDED RELEASE ORAL DAILY
Qty: 90 TABLET | Refills: 3 | Status: SHIPPED | OUTPATIENT
Start: 2018-07-03 | End: 2019-09-03

## 2018-07-03 ASSESSMENT — ENCOUNTER SYMPTOMS
ROS GI COMMENTS: + OBESITY
ABDOMINAL PAIN: 0
MYALGIAS: 0
ARTHRALGIAS: 1
FATIGUE: 0
NAUSEA: 0
VOMITING: 0
DIARRHEA: 0
PALPITATIONS: 0
DIZZINESS: 0
HEMATURIA: 0
COUGH: 0
WHEEZING: 0
LIGHT-HEADEDNESS: 0
CHILLS: 0
DYSURIA: 0
CONFUSION: 0
FEVER: 0
AGITATION: 0
BRUISES/BLEEDS EASILY: 0
EYE PAIN: 0

## 2018-07-03 ASSESSMENT — PAIN SCALES - GENERAL: PAINLEVEL: NO PAIN (0)

## 2018-07-03 ASSESSMENT — ANXIETY QUESTIONNAIRES
IF YOU CHECKED OFF ANY PROBLEMS ON THIS QUESTIONNAIRE, HOW DIFFICULT HAVE THESE PROBLEMS MADE IT FOR YOU TO DO YOUR WORK, TAKE CARE OF THINGS AT HOME, OR GET ALONG WITH OTHER PEOPLE: NOT DIFFICULT AT ALL
6. BECOMING EASILY ANNOYED OR IRRITABLE: NOT AT ALL
7. FEELING AFRAID AS IF SOMETHING AWFUL MIGHT HAPPEN: NOT AT ALL
5. BEING SO RESTLESS THAT IT IS HARD TO SIT STILL: NOT AT ALL
2. NOT BEING ABLE TO STOP OR CONTROL WORRYING: NOT AT ALL
3. WORRYING TOO MUCH ABOUT DIFFERENT THINGS: NOT AT ALL
1. FEELING NERVOUS, ANXIOUS, OR ON EDGE: NOT AT ALL
GAD7 TOTAL SCORE: 0

## 2018-07-03 ASSESSMENT — PATIENT HEALTH QUESTIONNAIRE - PHQ9: 5. POOR APPETITE OR OVEREATING: NOT AT ALL

## 2018-07-03 NOTE — PROGRESS NOTES
Nursing Notes:   Lima Cordova, LPN  7/3/2018 10:41 AM  Signed  Patient presents to the clinic for medication management.      Lima Cordova LPN 7/3/2018 10:28 AM      Nursing note reviewed with patient.  Accurracy and completeness verified.   Mr. Ferraro is a 73 year old male who:  Patient presents with:  Recheck Medication    HPI     ICD-10-CM    1. Paroxysmal supraventricular tachycardia (H) I47.1 flecainide acetate 150 MG TABS   2. Paroxysmal atrial fibrillation (H) I48.0 metoprolol succinate (TOPROL-XL) 100 MG 24 hr tablet   3. Essential hypertension I10 metoprolol succinate (TOPROL-XL) 100 MG 24 hr tablet     potassium chloride SA (KLOR-CON) 20 MEQ CR tablet   4. Acute on chronic diastolic congestive heart failure (H) I50.33 potassium chloride SA (KLOR-CON) 20 MEQ CR tablet   5. Gout, unspecified cause, unspecified chronicity, unspecified site M10.9 allopurinol (ZYLOPRIM) 100 MG tablet   6. Mixed hyperlipidemia E78.2 simvastatin (ZOCOR) 40 MG tablet   7. Actinic keratosis L57.0 DESTRUCT PREMALIGNANT LESION, FIRST     Paroxysmal supraventricular tachycardia with history of paroxysmal atrial fibrillation.  Doing well with current medication regimen.  Continue flecainide.  Continue metoprolol.    Hypertension, well controlled with current medication regimen.  Needs metoprolol and potassium refills.    Chronic diastolic heart failure, currently stable.  Continue potassium replacement.    Gout, stable, continue allopurinol.    Hyperlipidemia, continue simvastatin.    Actinic keratoses, has had a lesion on his nose that he has noticed now for the past couple of months.  Examination today is consistent with actinic keratoses.  Treatment options discussed.  He would like to proceed with cryotherapy.  See below.    Functional Capacity: > or about 4 METS.   Reports that he can climb a flight of stairs without any chest pain/heaviness.     + some shortness of breath on exertion if going fast (jogging) or walking up  deyanira noted - reports stable.     No orthopnea/paroxysmal nocturnal dyspnea  Review of Systems   Constitutional: Negative for chills, fatigue and fever.   HENT: Negative for congestion and hearing loss.    Eyes: Negative for pain and visual disturbance.   Respiratory: Negative for cough and wheezing.    Cardiovascular: Negative for chest pain and palpitations.   Gastrointestinal: Negative for abdominal pain, diarrhea, nausea and vomiting.        + obesity   Endocrine: Negative for cold intolerance and heat intolerance.   Genitourinary: Negative for dysuria and hematuria.   Musculoskeletal: Positive for arthralgias. Negative for myalgias.   Skin: Positive for rash. Negative for pallor.        Tip of nose, skin lesion noted about 2 months ago - non-healing.    Allergic/Immunologic: Negative for immunocompromised state.   Neurological: Negative for dizziness and light-headedness.   Hematological: Does not bruise/bleed easily.   Psychiatric/Behavioral: Negative for agitation and confusion.      PRASAD:   PRASAD-7 SCORE 7/3/2018   Total Score 0     PHQ9:  PHQ-9 SCORE 6/9/2016 7/3/2018   Total Score 0 0       I have personally reviewed the past medical history, past surgical history, medications, allergies, family and social history as listed below, on 7/3/2018.    Allergies   Allergen Reactions     Penicillins Hives     Rash or hives - can't remember       Current Outpatient Prescriptions   Medication Sig Dispense Refill     allopurinol (ZYLOPRIM) 100 MG tablet Take 1 tablet (100 mg) by mouth 2 times daily 180 tablet 3     Blood Pressure Monitoring (RA BLOOD PRESSURE CUFF MONITOR) MISC Medium Cuff       flecainide acetate 150 MG TABS Take 1 tablet (150 mg) by mouth 2 times daily 180 tablet 3     metoprolol succinate (TOPROL-XL) 100 MG 24 hr tablet 100 mg in the am and 200 mg in the pm by mouth 270 tablet 3     potassium chloride SA (KLOR-CON) 20 MEQ CR tablet Take 1 tablet (20 mEq) by mouth daily 90 tablet 3     simvastatin  (ZOCOR) 40 MG tablet Take 1 tablet (40 mg) by mouth daily 90 tablet 3     warfarin (COUMADIN) 4 MG tablet Take 6 mg x 1 day and 4 mg x 6 days/week or as directed by [facility] clinic 100 tablet 1     warfarin (COUMADIN) 4 MG tablet Take 4 mg x 6 days and 6 mg x 1 day/week or as directed by [facility] clinic 100 tablet 0     [DISCONTINUED] flecainide acetate 150 MG TABS        [DISCONTINUED] metoprolol succinate (TOPROL-XL) 100 MG 24 hr tablet TAKE ONE TABLET BY MOUTH IN THE MORNING AND TWO IN THE EVENING 270 tablet 0     [DISCONTINUED] simvastatin (ZOCOR) 40 MG tablet Take 1 tablet (40 mg) by mouth daily 90 tablet 0        Patient Active Problem List    Diagnosis Date Noted     Essential hypertension 07/03/2018     Priority: Medium     Long-term (current) use of anticoagulants [Z79.01] 02/28/2018     Priority: Medium     Unspecified atrial fibrillation 02/11/2018     Priority: Medium     AV block, 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     Priority: Medium     Overview:   paced       CHF (congestive heart failure) (H) 01/18/2018     Priority: Medium     Mixed hyperlipidemia 01/18/2018     Priority: Medium     Paroxysmal supraventricular tachycardia (H) 01/18/2018     Priority: Medium     Renal calculus 01/18/2018     Priority: Medium     Sick sinus syndrome (H) 01/18/2018     Priority: Medium     CKD (chronic kidney disease) stage 3, GFR 30-59 ml/min 01/15/2016     Priority: Medium     Health care maintenance 01/15/2016     Priority: Medium     Anticoagulation monitoring, INR range 2-3 08/07/2015     Priority: Medium     Biatrial enlargement 04/10/2014     Priority: Medium     LVH (left ventricular hypertrophy) 04/10/2014     Priority: Medium     Bilateral leg edema 12/23/2013     Priority: Medium     Warfarin anticoagulation 12/23/2013     Priority: Medium     Hematuria 07/10/2012     Priority: Medium     Atrial fibrillation (H) 03/08/2012     Priority: Medium     Rheumatoid arthritis (H) 05/19/2011     Priority: Medium     Overview:    Acute       Degenerative arthritis 09/14/2010     Priority: Medium     Anxiety state 05/11/2010     Priority: Medium     Osteoarthrosis, unspecified whether generalized or localized, pelvic region and thigh 05/11/2010     Priority: Medium     Cardiac pacemaker in situ 09/03/2008     Priority: Medium     Overview:   [employer]       Past Medical History:   Diagnosis Date     Acute kidney failure (H)     No Comments Provided     Acute kidney failure (H)     No Comments Provided     Atrial fibrillation (H)     3/8/2012     Atrioventricular block, complete (H)     No Comments Provided     Calculus of kidney     No Comments Provided     Essential (primary) hypertension     No Comments Provided     Generalized anxiety disorder     5/11/2010     Hematuria     7/10/2012     Osteoarthritis     9/14/2010     Osteoarthritis of hip     5/11/2010     Presence of cardiac pacemaker     9/3/2008     Rheumatoid arthritis (H)     5/19/2011     Sick sinus syndrome (H)     No Comments Provided     Supraventricular tachycardia (H)     No Comments Provided     Past Surgical History:   Procedure Laterality Date     ARTHROPLASTY HIP      12/1/2010,right hip replacement,  [facility]     OTHER SURGICAL HISTORY      60667,NASAL SURGERY,Repeated nasoseptal repair     OTHER SURGICAL HISTORY      899637,OTHER,Ureteral stents for stones     OTHER SURGICAL HISTORY      2003,[id number],OTHER,Catheter ablation for atrial fibrillation, failed     Social History     Social History     Marital status:      Spouse name: N/A     Number of children: N/A     Years of education: N/A     Social History Main Topics     Smoking status: Former Smoker     Packs/day: 1.00     Types: Cigarettes     Quit date: 1/1/1980     Smokeless tobacco: Former User     Quit date: 1/1/1980     Alcohol use 0.0 oz/week     Drug use: None      Comment: Drug use: No     Sexual activity: Not Asked     Other Topics Concern     None     Social History Narrative    Lives in [place]  "and works here at [employer]     Smoked age [age].  Coffee - none.  Soda - 4-5 daily.  Alcohol - none.  Exercise - none.  Works regularly.    Preloaded 11-     Family History   Problem Relation Age of Onset     HEART DISEASE Mother      Heart Disease,heart failure.     HEART DISEASE Father      Heart Disease,heart failure.       EXAM:   Vitals:    07/03/18 1046   BP: 124/76   BP Location: Left arm   Patient Position: Sitting   Cuff Size: Adult Regular   Pulse: 80   Weight: 248 lb 6 oz (112.7 kg)       Current Pain Score: No Pain (0)     BP Readings from Last 3 Encounters:   07/03/18 124/76   03/23/17 138/78   06/09/16 112/70    Wt Readings from Last 3 Encounters:   07/03/18 248 lb 6 oz (112.7 kg)   03/23/17 254 lb (115.2 kg)   06/09/16 249 lb (112.9 kg)      Estimated body mass index is 38.53 kg/(m^2) as calculated from the following:    Height as of 6/9/16: 5' 7.32\" (1.71 m).    Weight as of this encounter: 248 lb 6 oz (112.7 kg).     Physical Exam   Constitutional: He appears well-developed and well-nourished. No distress.   HENT:   Head: Normocephalic and atraumatic.   Eyes: Conjunctivae are normal. No scleral icterus.   Neck: No thyromegaly present.   Cardiovascular: Normal rate and regular rhythm.    Pulmonary/Chest: Effort normal. No respiratory distress. He has no wheezes.   Abdominal: Soft. There is no tenderness.   Musculoskeletal: He exhibits deformity. He exhibits no tenderness.   + rheumatoid arthritis joint degeneration noted   Lymphadenopathy:     He has no cervical adenopathy.   Neurological: He is alert. No cranial nerve deficit.   Skin: Skin is warm and dry.   actinic keratosis x1 on tip of nose.     PROCEDURE:  Reviewed risks and benefits of cryotherapy. After informed consent was obtained, patient elected to proceed. These 1 lesions were treated today.  Performed cryotherapy to #1 lesions x 2 rounds of 30 second freeze/thaw cycles.  Tolerated well. No obvious complications.  Return for signs " of infection.    The patient and I reviewed safe sun practices today: the importance of staying out of the sun; especially between 10AM-2PM, wearing sun screen SPF > 30, and protective clothing.  We also discussed the ABC's of skin cancers including: changes in size, uniformity, borders, coloration, bleeding, or any irregularity or changes. If these occur, seek medical attention.  The patient should have a complete skin exam by a medical professional every 6-12 months, and any questionable/suspicious, or changing lesions should be removed.     Psychiatric: He has a normal mood and affect.      INVESTIGATIONS:  Results for orders placed or performed in visit on 06/18/18   INR point of care   Result Value Ref Range    INR [facility] 2.4 (A) 0.86 - 1.14       ASSESSMENT AND PLAN:  Problem List Items Addressed This Visit        Endocrine    Mixed hyperlipidemia    Relevant Medications    simvastatin (ZOCOR) 40 MG tablet       Circulatory    Atrial fibrillation (H)    Relevant Medications    metoprolol succinate (TOPROL-XL) 100 MG 24 hr tablet    CHF (congestive heart failure) (H)    Relevant Medications    potassium chloride SA (KLOR-CON) 20 MEQ CR tablet    Paroxysmal supraventricular tachycardia (H) - Primary    Relevant Medications    flecainide acetate 150 MG TABS    Essential hypertension    Relevant Medications    metoprolol succinate (TOPROL-XL) 100 MG 24 hr tablet    potassium chloride SA (KLOR-CON) 20 MEQ CR tablet      Other Visit Diagnoses     Gout, unspecified cause, unspecified chronicity, unspecified site        Relevant Medications    allopurinol (ZYLOPRIM) 100 MG tablet    Actinic keratosis        Relevant Orders    DESTRUCT PREMALIGNANT LESION, FIRST (Completed)        reviewed diet, exercise and weight control, recommended sodium restriction  -- Expected clinical course discussed    -- Medications and their side effects discussed    Patient Instructions   Glad you are doing well!    Medications refilled.        What to Expect Following Cryosurgery (Liquid Nitrogen)   What is Cryosurgery?   Cryosurgery is a technique for removing skin lesions that primarily involve the surface of the skin, such as warts, seborrheic keratosis, or actinic keratosis. It is a quick method of removing the lesion with minimal scarring.   The liquid nitrogen needs to be applied long enough tofreeze the affected skin. By freezing the skin, a blister is created underneath the lesion. Ideally, as the new skin forms underneath the blister, the abnormal skin on the roof of the blister peels off. Occasionally, if thelesion is very thick (such as a large wart), only the surface is blistered off. The base or residual lesion may need to be frozen at another visit.   It takes about one to two weeks for the scab to fall off, which is whenthe new layer of skin has formed under the blister. Areas of thinner skin, such as the face, may heal a little faster.      What to Expect Over the Next Few Weeks     During Treatment - Area beingtreated will sting, burn, and then possibly itch.     Immediately After Treatment - Area will be red, sore, and swollen.     Next Day - Blister or blood blister has formed, tenderness starts to subside. Apply aBand-Aid if   necessary.     7 Days - Surface is dark red/brown and scab-like. Apply Vaseline  or an antibacterial ointment, such as Polysporin , if necessary.     2 to 4 Weeks - The surface startsto peel off. This may be encouraged gently during bathing, when the scab is softened.     No makeup shouldbe applied until area is fully healed.      Howto Take Care of the Skin after Cryosurgery     ABand-Aid can be used for larger blisters or blisters in areas that are more likely to be traumatized -such as fingers and toes. If the area becomes dry or crusted, an ointment (Vaseline , Bacitracin , or Polysporin ) can also be applied.     Cleanse area with a mild cleanser and cool water.     Pat the area dry with a lint-free  cloth and apply an ointment (Vaseline , Bacitracin , or Polysporin ).     Avoid glycolic acids, Vitamin C, scrubs, Tretinoin (Retin-A), and Retinol creams for 7 to 10 days.     If approved by your Provider, you may bathe, swim, exercise, and otherwise follow all of your normalactivities.     The area may get wet while bathing, but swimming or hot tub use should be avoided for oneweek following a treatment or while the skin is open.     Within 24 hours, you can expect the area to beswollen and/or blistered. The blister may not be visible to the naked eye.     Within one week, theswelling goes down. The top becomes dark red and scab-like. The scab will loosen over the next weeks, and should fall off within one month.      Adverse Effects     The most common adverse effects are pain, swelling/blistering, potential for infection, and pale discoloration of the skin after it heals.      Blisters        Anytime a blister surfaces, whether from ill-fitting shoes, an oven burn, or liquid nitrogen cryosurgery, it will be a bit painful. For most patients, the pain is a temporary stingwith some discomfort periodically over the next day as the blister forms.     The goal is to achieve ablister. This means, most commonly, patients will have a blister form following treatment. Sometimes, the blister is so thin that it can't be seen and may have minimal swelling. Occasionally, a blood blister forms that canbe quite dramatic but is harmless.     Rarely, the blister may become infected. When this happens, theblister becomes unusually tender, the fluid becomes cloudy, and the redness around it becomes more extensive (and may even form streaks). If this happens, contact our office.     Some lesions, especially those on theface, may leave a slight pale discoloration.     True scarring,involving deeper layers of the skin is unlikely.       Return in approximately 1 year, or sooner as needed for follow-up with Dr. Parker.    Clinic  : [phone]  Appointment line: [phone]      Caleb Parker MD  Internal Medicine  [facility]

## 2018-07-03 NOTE — MR AVS SNAPSHOT
After Visit Summary   7/3/2018    Kg Ferraro    MRN: [id number]           Patient Information     Date Of Birth          1945        Visit Information        Provider Department      7/3/2018 10:40 AM Caleb Parker MD [facility]        Today's Diagnoses     Paroxysmal supraventricular tachycardia (H)    -  1    Paroxysmal atrial fibrillation (H)        Essential hypertension        Acute on chronic diastolic congestive heart failure (H)        Gout, unspecified cause, unspecified chronicity, unspecified site        Mixed hyperlipidemia        Actinic keratosis          Care Instructions    Glad you are doing well!    Medications refilled.       What to Expect Following Cryosurgery (Liquid Nitrogen)   What is Cryosurgery?   Cryosurgery is a technique for removing skin lesions that primarily involve the surface of the skin, such as warts, seborrheic keratosis, or actinic keratosis. It is a quick method of removing the lesion with minimal scarring.   The liquid nitrogen needs to be applied long enough tofreeze the affected skin. By freezing the skin, a blister is created underneath the lesion. Ideally, as the new skin forms underneath the blister, the abnormal skin on the roof of the blister peels off. Occasionally, if thelesion is very thick (such as a large wart), only the surface is blistered off. The base or residual lesion may need to be frozen at another visit.   It takes about one to two weeks for the scab to fall off, which is whenthe new layer of skin has formed under the blister. Areas of thinner skin, such as the face, may heal a little faster.      What to Expect Over the Next Few Weeks     During Treatment - Area beingtreated will sting, burn, and then possibly itch.     Immediately After Treatment - Area will be red, sore, and swollen.     Next Day - Blister or blood blister has formed, tenderness starts to subside. Apply aBand-Aid if   necessary.     7  Days - Surface is dark red/brown and scab-like. Apply Vaseline  or an antibacterial ointment, such as Polysporin , if necessary.     2 to 4 Weeks - The surface startsto peel off. This may be encouraged gently during bathing, when the scab is softened.     No makeup shouldbe applied until area is fully healed.      Howto Take Care of the Skin after Cryosurgery     ABand-Aid can be used for larger blisters or blisters in areas that are more likely to be traumatized -such as fingers and toes. If the area becomes dry or crusted, an ointment (Vaseline , Bacitracin , or Polysporin ) can also be applied.     Cleanse area with a mild cleanser and cool water.     Pat the area dry with a lint-free cloth and apply an ointment (Vaseline , Bacitracin , or Polysporin ).     Avoid glycolic acids, Vitamin C, scrubs, Tretinoin (Retin-A), and Retinol creams for 7 to 10 days.     If approved by your Provider, you may bathe, swim, exercise, and otherwise follow all of your normalactivities.     The area may get wet while bathing, but swimming or hot tub use should be avoided for oneweek following a treatment or while the skin is open.     Within 24 hours, you can expect the area to beswollen and/or blistered. The blister may not be visible to the naked eye.     Within one week, theswelling goes down. The top becomes dark red and scab-like. The scab will loosen over the next weeks, and should fall off within one month.      Adverse Effects     The most common adverse effects are pain, swelling/blistering, potential for infection, and pale discoloration of the skin after it heals.      Blisters        Anytime a blister surfaces, whether from ill-fitting shoes, an oven burn, or liquid nitrogen cryosurgery, it will be a bit painful. For most patients, the pain is a temporary stingwith some discomfort periodically over the next day as the blister forms.     The goal is to achieve ablister. This means, most commonly, patients will have a  blister form following treatment. Sometimes, the blister is so thin that it can't be seen and may have minimal swelling. Occasionally, a blood blister forms that canbe quite dramatic but is harmless.     Rarely, the blister may become infected. When this happens, theblister becomes unusually tender, the fluid becomes cloudy, and the redness around it becomes more extensive (and may even form streaks). If this happens, contact our office.     Some lesions, especially those on theface, may leave a slight pale discoloration.     True scarring,involving deeper layers of the skin is unlikely.       Return in approximately 1 year, or sooner as needed for follow-up with Dr. Parker.    Clinic : [phone]  Appointment line: [phone]            Follow-ups after your visit        Follow-up notes from your care team     Return in about 1 year (around 7/3/2019) for - Annual follow-up.      Your next 10 appointments already scheduled     Jul 03, 2018 10:40 AM CDT   Office Visit with Caleb Parker MD   [facility] ([facility])    [address]  [facility] [id number] [phone]           Bring a current list of meds and any records pertaining to this visit. For Physicals, please bring immunization records and any forms needing to be filled out. Please arrive 10 minutes early to complete paperwork.            Jul 16, 2018  8:30 AM CDT   Anticoagulation Visit with S[facility] COAG 1   [facility] ([facility])    [address]  [place] [id number] [phone]              Who to contact     If you have questions or need follow up information about today's clinic visit or your schedule please contact [facility] directly at [phone].  Normal or non-critical lab and imaging results will be communicated to you by MyChart, letter or phone within 4 business days after the clinic has  received the results. If you do not hear from us within 7 days, please contact the clinic through orderbolt or phone. If you have a critical or abnormal lab result, we will notify you by phone as soon as possible.  Submit refill requests through orderbolt or call your pharmacy and they will forward the refill request to us. Please allow 3 business days for your refill to be completed.          Additional Information About Your Visit        Care EveryWhere ID     This is your Care EveryWhere ID. This could be used by other organizations to access your [place] medical records  [id number]         Blood Pressure from Last 3 Encounters:   03/23/17 138/78   06/09/16 112/70   01/15/16 126/82    Weight from Last 3 Encounters:   03/23/17 254 lb (115.2 kg)   06/09/16 249 lb (112.9 kg)   01/15/16 241 lb (109.3 kg)              We Performed the Following     DESTRUCT PREMALIGNANT LESION, FIRST          Today's Medication Changes          These changes are accurate as of 7/3/18 10:35 AM.  If you have any questions, ask your nurse or doctor.               These medicines have changed or have updated prescriptions.        Dose/Directions    allopurinol 100 MG tablet   Commonly known as:  ZYLOPRIM   This may have changed:  See the new instructions.   Used for:  Gout, unspecified cause, unspecified chronicity, unspecified site   Changed by:  Caleb Parker MD        Dose:  100 mg   Take 1 tablet (100 mg) by mouth 2 times daily   Quantity:  180 tablet   Refills:  3       flecainide acetate 150 MG Tabs   This may have changed:    - how much to take  - when to take this   Used for:  Paroxysmal supraventricular tachycardia (H)   Changed by:  Caleb Parker MD        Dose:  150 mg   Take 1 tablet (150 mg) by mouth 2 times daily   Quantity:  180 tablet   Refills:  3       metoprolol succinate 100 MG 24 hr tablet   Commonly known as:  TOPROL-XL   This may have changed:  See the new instructions.   Used for:  Paroxysmal atrial fibrillation  (H), Essential hypertension   Changed by:  Caleb Parker MD        100 mg in the am and 200 mg in the pm by mouth   Quantity:  270 tablet   Refills:  3       potassium chloride SA 20 MEQ CR tablet   Commonly known as:  KLOR-CON   This may have changed:  See the new instructions.   Used for:  Acute on chronic diastolic congestive heart failure (H), Essential hypertension   Changed by:  Caleb Parker MD        Dose:  20 mEq   Take 1 tablet (20 mEq) by mouth daily   Quantity:  90 tablet   Refills:  3            Where to get your medicines      These medications were sent to [facility] [address] [facility] [address] [address]     Phone:  [phone]     allopurinol 100 MG tablet    flecainide acetate 150 MG Tabs    metoprolol succinate 100 MG 24 hr tablet    potassium chloride SA 20 MEQ CR tablet    simvastatin 40 MG tablet                Primary Care Provider Office Phone # Fax #    Caleb Parker [phone]       [address] [facility] [address]        Equal Access to Services     [facility] AH: Hadii charlene ku hadasho Soomaali, waaxda luqadaha, qaybta kaalmada adeegyada, yuriy juarez . So [facility] [phone].    ATENCIÓN: Si habla español, tiene a rubio disposición servicios gratuitos de asistencia lingüística. Llame al [phone].    We comply with applicable federal civil rights laws and [place] laws. We do not discriminate on the basis of race, color, national origin, age, disability, sex, sexual orientation, or gender identity.            Thank you!     Thank you for choosing [facility]  for your care. Our goal is always to provide you with excellent care. Hearing back from our patients is one way we can continue to improve our services. Please take a few minutes to complete the written survey that you may receive in the mail after your visit with us. Thank you!              Your Updated Medication List - Protect others around you: Learn how to safely use, store and throw away your medicines at www.disposemymeds.org.          This list is accurate as of 7/3/18 10:35 AM.  Always use your most recent med list.                   Brand Name Dispense Instructions for use Diagnosis    allopurinol 100 MG tablet    ZYLOPRIM    180 tablet    Take 1 tablet (100 mg) by mouth 2 times daily    Gout, unspecified cause, unspecified chronicity, unspecified site       flecainide acetate 150 MG Tabs     180 tablet    Take 1 tablet (150 mg) by mouth 2 times daily    Paroxysmal supraventricular tachycardia (H)       metoprolol succinate 100 MG 24 hr tablet    TOPROL-XL    270 tablet    100 mg in the am and 200 mg in the pm by mouth    Paroxysmal atrial fibrillation (H), Essential hypertension       potassium chloride SA 20 MEQ CR tablet    KLOR-CON    90 tablet    Take 1 tablet (20 mEq) by mouth daily    Acute on chronic diastolic congestive heart failure (H), Essential hypertension       RA BLOOD PRESSURE CUFF MONITOR Misc      Medium Cuff        simvastatin 40 MG tablet    ZOCOR    90 tablet    Take 1 tablet (40 mg) by mouth daily    Mixed hyperlipidemia       * warfarin 4 MG tablet    COUMADIN    100 tablet    Take 4 mg x 6 days and 6 mg x 1 day/week or as directed by protime clinic    Anticoagulation monitoring, INR range 2-3, Atrial fibrillation (H)       * warfarin 4 MG tablet    COUMADIN    100 tablet    Take 6 mg x 1 day and 4 mg x 6 days/week or as directed by protime clinic    Anticoagulation monitoring, INR range 2-3, Atrial fibrillation (H)       * Notice:  This list has 2 medication(s) that are the same as other medications prescribed for you. Read the directions carefully, and ask your doctor or other care provider to review them with you.

## 2018-07-03 NOTE — PATIENT INSTRUCTIONS
Glad you are doing well!    Medications refilled.       What to Expect Following Cryosurgery (Liquid Nitrogen)   What is Cryosurgery?   Cryosurgery is a technique for removing skin lesions that primarily involve the surface of the skin, such as warts, seborrheic keratosis, or actinic keratosis. It is a quick method of removing the lesion with minimal scarring.   The liquid nitrogen needs to be applied long enough tofreeze the affected skin. By freezing the skin, a blister is created underneath the lesion. Ideally, as the new skin forms underneath the blister, the abnormal skin on the roof of the blister peels off. Occasionally, if thelesion is very thick (such as a large wart), only the surface is blistered off. The base or residual lesion may need to be frozen at another visit.   It takes about one to two weeks for the scab to fall off, which is whenthe new layer of skin has formed under the blister. Areas of thinner skin, such as the face, may heal a little faster.      What to Expect Over the Next Few Weeks     During Treatment - Area beingtreated will sting, burn, and then possibly itch.     Immediately After Treatment - Area will be red, sore, and swollen.     Next Day - Blister or blood blister has formed, tenderness starts to subside. Apply aBand-Aid if   necessary.     7 Days - Surface is dark red/brown and scab-like. Apply Vaseline  or an antibacterial ointment, such as Polysporin , if necessary.     2 to 4 Weeks - The surface startsto peel off. This may be encouraged gently during bathing, when the scab is softened.     No makeup shouldbe applied until area is fully healed.      Howto Take Care of the Skin after Cryosurgery     ABand-Aid can be used for larger blisters or blisters in areas that are more likely to be traumatized -such as fingers and toes. If the area becomes dry or crusted, an ointment (Vaseline , Bacitracin , or Polysporin ) can also be applied.     Cleanse area with a mild cleanser and  cool water.     Pat the area dry with a lint-free cloth and apply an ointment (Vaseline , Bacitracin , or Polysporin ).     Avoid glycolic acids, Vitamin C, scrubs, Tretinoin (Retin-A), and Retinol creams for 7 to 10 days.     If approved by your Provider, you may bathe, swim, exercise, and otherwise follow all of your normalactivities.     The area may get wet while bathing, but swimming or hot tub use should be avoided for oneweek following a treatment or while the skin is open.     Within 24 hours, you can expect the area to beswollen and/or blistered. The blister may not be visible to the naked eye.     Within one week, theswelling goes down. The top becomes dark red and scab-like. The scab will loosen over the next weeks, and should fall off within one month.      Adverse Effects     The most common adverse effects are pain, swelling/blistering, potential for infection, and pale discoloration of the skin after it heals.      Blisters        Anytime a blister surfaces, whether from ill-fitting shoes, an oven burn, or liquid nitrogen cryosurgery, it will be a bit painful. For most patients, the pain is a temporary stingwith some discomfort periodically over the next day as the blister forms.     The goal is to achieve ablister. This means, most commonly, patients will have a blister form following treatment. Sometimes, the blister is so thin that it can't be seen and may have minimal swelling. Occasionally, a blood blister forms that canbe quite dramatic but is harmless.     Rarely, the blister may become infected. When this happens, theblister becomes unusually tender, the fluid becomes cloudy, and the redness around it becomes more extensive (and may even form streaks). If this happens, contact our office.     Some lesions, especially those on theface, may leave a slight pale discoloration.     True scarring,involving deeper layers of the skin is unlikely.       Return in approximately 1 year, or sooner as  needed for follow-up with Dr. Parker.    Clinic : [phone]  Appointment line: [phone]

## 2018-07-03 NOTE — NURSING NOTE
Patient presents to the clinic for medication management.      Lima Cordova LPN 7/3/2018 10:28 AM

## 2018-07-04 ASSESSMENT — PATIENT HEALTH QUESTIONNAIRE - PHQ9: SUM OF ALL RESPONSES TO PHQ QUESTIONS 1-9: 0

## 2018-07-04 ASSESSMENT — ANXIETY QUESTIONNAIRES: GAD7 TOTAL SCORE: 0

## 2018-07-16 ENCOUNTER — ANTICOAGULATION THERAPY VISIT (OUTPATIENT)
Dept: ANTICOAGULATION | Facility: OTHER | Age: 73
End: 2018-07-16
Attending: INTERNAL MEDICINE
Payer: MEDICARE

## 2018-07-16 DIAGNOSIS — Z79.01 ANTICOAGULATION MONITORING, INR RANGE 2-3: ICD-10-CM

## 2018-07-16 DIAGNOSIS — I48.91 ATRIAL FIBRILLATION, UNSPECIFIED TYPE (H): ICD-10-CM

## 2018-07-16 DIAGNOSIS — Z79.01 LONG-TERM (CURRENT) USE OF ANTICOAGULANTS: ICD-10-CM

## 2018-07-16 LAB — INR POINT OF CARE: 3.3 (ref 0.86–1.14)

## 2018-07-16 PROCEDURE — 85610 PROTHROMBIN TIME: CPT | Mod: QW,ZL

## 2018-07-16 RX ORDER — WARFARIN SODIUM 4 MG/1
TABLET ORAL
Qty: 100 TABLET | Refills: 1 | COMMUNITY
Start: 2018-07-16 | End: 2018-12-07

## 2018-07-16 NOTE — MR AVS SNAPSHOT
Kg Ferraro   7/16/2018 11:45 AM   Anticoagulation Therapy Visit    Description:  73 year old male   Provider:  SIX[facility] ANTI COAG 2   Department:  [facility]           INR as of 7/16/2018     Today's INR 3.3!      Anticoagulation Summary as of 7/16/2018     INR goal 2.0-3.0   Today's INR 3.3!   Full warfarin instructions 4 mg every day   Next INR check 8/13/2018    Indications   Unspecified atrial fibrillation [I48.91]  Long-term (current) use of anticoagulants [Z79.01] [Z79.01]         Description     Decrease dose and recheck in 4 weeks.  ..............Emani Pereira RN.............  7/16/2018    11:13 AM        Your next Anticoagulation Clinic appointment(s)     Jul 16, 2018 11:45 AM CDT   Anticoagulation Visit with SIXTO ANTI COAG 2   [facility] ([facility])    [address]   [facility] [address]   [phone]              July 2018 Details    Sun Mon Tue Wed Thu Fri Sat     1               2               3               4               5               6               7                 8               9               10               11               12               13               14                 15               16      4 mg   See details      17      4 mg         18      4 mg         19      4 mg         20      4 mg         21      4 mg           22      4 mg         23      4 mg         24      4 mg         25      4 mg         26      4 mg         27      4 mg         28      4 mg           29      4 mg         30      4 mg         31      4 mg              Date Details   07/16 This INR check               How to take your warfarin dose     To take:  4 mg Take 1 of the 4 mg tablets.           August 2018 Details    Sun Mon Tue Wed Thu Fri Sat        1      4 mg         2      4 mg         3      4 mg         4      4 mg           5      4 mg         6      4 mg         7      4 mg         8      4 mg         9      4 mg         10      4  mg         11      4 mg           12      4 mg         13            14               15               16               17               18                 19               20               21               22               23               24               25                 26               27               28               29               30               31                 Date Details   No additional details    Date of next INR:  8/13/2018         How to take your warfarin dose     To take:  4 mg Take 1 of the 4 mg tablets.

## 2018-07-16 NOTE — PROGRESS NOTES
ANTICOAGULATION FOLLOW-UP CLINIC VISIT    Patient Name:  Kg Ferraro  Date:  7/16/2018  Contact Type:  Face to Face    SUBJECTIVE:     Patient Findings     Positives OTC meds (continues to take tylenol of shoulder pain)           OBJECTIVE    INR Protime   Date Value Ref Range Status   07/16/2018 3.3 (A) 0.86 - 1.14 Final       ASSESSMENT / PLAN  INR assessment SUPRA    Recheck INR In: 4 WEEKS    INR Location Clinic      Anticoagulation Summary as of 7/16/2018     INR goal 2.0-3.0   Today's INR 3.3!   Warfarin maintenance plan 4 mg (4 mg x 1) every day   Full warfarin instructions 4 mg every day   Weekly warfarin total 28 mg   Plan last modified Emani Pereira, TOM (7/16/2018)   Next INR check 8/13/2018   Priority INR   Target end date Indefinite    Indications   Unspecified atrial fibrillation [I48.91]  Long-term (current) use of anticoagulants [Z79.01] [Z79.01]         Anticoagulation Episode Summary     INR check location     Preferred lab     Send INR reminders to  INR    Comments       Anticoagulation Care Providers     Provider Role Specialty Phone number    Caleb Parker MD [facility] Internal Medicine [phone]            See the Encounter Report to view Anticoagulation Flowsheet and Dosing Calendar (Go to Encounters tab in chart review, and find the Anticoagulation Therapy Visit)        Emani Pereira, RN

## 2018-07-23 NOTE — PROGRESS NOTES
Patient Information     Patient Name  Kg Ferraro MRN  [id number] Sex  Male   1945      Letter by Caleb Parker MD at      Author:  Caleb Parker MD Service:  (none) Author Type:  (none)    Filed:   Encounter Date:  2017 Status:  (Other)           Kg Ferraro  [address] [place] [id number]          2017    Dear Mr. Ferraro:    A refill of     warfarin (COUMADIN) 4 mg tablet has been called into your pharmacy.    Additional refills require an appointment with Caleb Parker MD  Please call the clinic at [phone] to schedule your appointment.    Thank you,    The Refill Nurse  [facility]

## 2018-07-23 NOTE — PROGRESS NOTES
Patient Information     Patient Name  Kg Ferraro MRN  [id number] Sex  Male   1945      Letter by Caleb Parker MD at      Author:  Caleb Parker MD Service:  (none) Author Type:  (none)    Filed:   Encounter Date:  3/4/2017 Status:  (Other)           Kg Ferraro  [address]  [facility] [address]          2017    Dear Mr. Ferraro:    A refill of        KLOR-CON M20 20 mEq Extended-Release tablet has been called into your pharmacy.    Additional refills require an office visit with Caleb Parker MD and annual labs .   Please call the clinic at [phone] to schedule your appointment.    If you should require additional refills before your scheduled appointment, please contact your pharmacy and we will refill your medication until that date.      Thank you,    The Refill Nurse  [facility]

## 2018-07-23 NOTE — PROGRESS NOTES
Patient Information     Patient Name  Kg Ferraro MRN  [id number] Sex  Male   1945      Letter by Delmar Vasquez MD at      Author:  Delmar Vasquez MD Service:  (none) Author Type:  (none)    Filed:   Encounter Date:  3/23/2017 Status:  (Other)           Kg Ferraro  [address]  [facility] [address]          2017    Dear Mr. Ferraro:    Following are the tests completed during your last clinic visit.  The results of these tests are normal and require no further attention unless otherwise noted.    Results for orders placed or performed in visit on 17      URIC ACID      Result  Value Ref Range    URIC ACID 5.8 4.4 - 7.6 mg/dL   BASIC METABOLIC PANEL      Result  Value Ref Range    SODIUM 137 133 - 143 mmol/L    POTASSIUM 4.5 3.5 - 5.1 mmol/L    CHLORIDE 106 98 - 107 mmol/L    CO2,TOTAL 22 21 - 31 mmol/L    ANION GAP 9 5 - 18                    GLUCOSE 106 (H) 70 - 105 mg/dL    CALCIUM 8.7 8.6 - 10.3 mg/dL    BUN 25 7 - 25 mg/dL    CREATININE 1.11 0.70 - 1.30 mg/dL    BUN/CREAT RATIO           23                    GFR if African American >60 >60 ml/min/1.73m2    GFR if not African American >60 >60 ml/min/1.73m2   CBC WITH AUTO DIFFERENTIAL      Result  Value Ref Range    WHITE BLOOD COUNT         6.9 4.5 - 11.0 thou/cu mm    RED BLOOD COUNT           5.10 4.30 - 5.90 mil/cu mm    HEMOGLOBIN                15.6 13.5 - 17.5 g/dL    HEMATOCRIT                48.5 37.0 - 53.0 %    MCV                       95 80 - 100 fL    MCH                       30.5 26.0 - 34.0 pg    MCHC                      32.1 32.0 - 36.0 g/dL    RDW                       13.9 11.5 - 15.5 %    PLATELET COUNT            191 140 - 440 thou/cu mm    MPV                       9.0 6.5 - 11.0 fL    NEUTROPHILS               59.5 42.0 - 72.0 %    LYMPHOCYTES               22.2 20.0 - 44.0 %    MONOCYTES                 10.7 <12.0 %    EOSINOPHILS               5.4 <8.0 %    BASOPHILS                 2.2 <3.0 %     ABSOLUTE NEUTROPHILS      4.1 1.7 - 7.0 thou/cu mm    ABSOLUTE LYMPHOCYTES      1.5 0.9 - 2.9 thou/cu mm    ABSOLUTE MONOCYTES        0.7 <0.9 thou/cu mm    ABSOLUTE EOSINOPHILS      0.4 <0.5 thou/cu mm    ABSOLUTE BASOPHILS        0.2 <0.3 thou/cu mm         If you have any further questions or problems contact my office at  [phone].    Thank you,    Delmar Vasquez MD

## 2018-08-15 ENCOUNTER — ANTICOAGULATION THERAPY VISIT (OUTPATIENT)
Dept: ANTICOAGULATION | Facility: OTHER | Age: 73
End: 2018-08-15
Attending: INTERNAL MEDICINE
Payer: MEDICARE

## 2018-08-15 DIAGNOSIS — Z79.01 LONG-TERM (CURRENT) USE OF ANTICOAGULANTS: ICD-10-CM

## 2018-08-15 LAB — INR POINT OF CARE: 2.4 (ref 2–3)

## 2018-08-15 PROCEDURE — 36416 COLLJ CAPILLARY BLOOD SPEC: CPT | Mod: ZL

## 2018-08-15 NOTE — MR AVS SNAPSHOT
Kg Ferraro   8/15/2018 10:45 AM   Anticoagulation Therapy Visit    Description:  73 year old male   Provider:  [facility] 2   Department:  [facility]           INR as of 8/15/2018     Today's INR 2.4      Anticoagulation Summary as of 8/15/2018     INR goal 2.0-3.0   Today's INR 2.4   Full warfarin instructions 4 mg every day   Next INR check 9/26/2018    Indications   Unspecified atrial fibrillation [I48.91]  Long-term (current) use of anticoagulants [Z79.01] [Z79.01]         Description     Continue same dose of Coumadin/Warfarinand recheck INR in 6 weeks.  Cheri Prater ....................  8/15/2018   9:57 AM          Your next Anticoagulation Clinic appointment(s)     Aug 15, 2018 10:45 AM CDT   Anticoagulation Visit with [facility]AG 2   [facility] ([facility])    [address]   [facility] [address]   [phone]              August 2018 Details    Sun Mon Tue Wed Thu Fri Sat        1               2               3               4                 5               6               7               8               9               10               11                 12               13               14               15      4 mg   See details      16      4 mg         17      4 mg         18      4 mg           19      4 mg         20      4 mg         21      4 mg         22      4 mg         23      4 mg         24      4 mg         25      4 mg           26      4 mg         27      4 mg         28      4 mg         29      4 mg         30      4 mg         31      4 mg           Date Details   08/15 This INR check               How to take your warfarin dose     To take:  4 mg Take 1 of the 4 mg tablets.           September 2018 Details    Sun Mon Tue Wed Thu Fri Sat           1      4 mg           2      4 mg         3      4 mg         4      4 mg         5      4 mg         6      4 mg         7      4 mg         8      4 mg            9      4 mg         10      4 mg         11      4 mg         12      4 mg         13      4 mg         14      4 mg         15      4 mg           16      4 mg         17      4 mg         18      4 mg         19      4 mg         20      4 mg         21      4 mg         22      4 mg           23      4 mg         24      4 mg         25      4 mg         26            27               28               29                 30                      Date Details   No additional details    Date of next INR:  9/26/2018         How to take your warfarin dose     To take:  4 mg Take 1 of the 4 mg tablets.

## 2018-08-15 NOTE — PROGRESS NOTES
ANTICOAGULATION FOLLOW-UP CLINIC VISIT    Patient Name:  Kg Ferraro  Date:  8/15/2018  Contact Type:  Face to Face    SUBJECTIVE:     Patient Findings     Positives No Problem Findings           OBJECTIVE    INR Protime   Date Value Ref Range Status   08/15/2018 2.4 2.0 - 3.0 Final       ASSESSMENT / PLAN  INR assessment THER    Recheck INR In: 6 WEEKS    INR Location Clinic      Anticoagulation Summary as of 8/15/2018     INR goal 2.0-3.0   Today's INR 2.4   Warfarin maintenance plan 4 mg (4 mg x 1) every day   Full warfarin instructions 4 mg every day   Weekly warfarin total 28 mg   No change documented Cheri Prater RN   Plan last modified Emani Pereira RN (7/16/2018)   Next INR check 9/26/2018   Priority INR   Target end date Indefinite    Indications   Unspecified atrial fibrillation [I48.91]  Long-term (current) use of anticoagulants [Z79.01] [Z79.01]         Anticoagulation Episode Summary     INR check location     Preferred lab     Send INR reminders to  INR    Comments       Anticoagulation Care Providers     Provider Role Specialty Phone number    Caleb Parker MD [facility] Internal Medicine [phone]            See the Encounter Report to view Anticoagulation Flowsheet and Dosing Calendar (Go to Encounters tab in chart review, and find the Anticoagulation Therapy Visit)        Cheri Prater RN

## 2018-10-03 ENCOUNTER — ANTICOAGULATION THERAPY VISIT (OUTPATIENT)
Dept: ANTICOAGULATION | Facility: OTHER | Age: 73
End: 2018-10-03
Payer: MEDICARE

## 2018-10-03 LAB — INR POINT OF CARE: 3.5 (ref 0.86–1.14)

## 2018-10-03 PROCEDURE — 85610 PROTHROMBIN TIME: CPT | Mod: QW,ZL

## 2018-10-03 NOTE — MR AVS SNAPSHOT
Kg Ferraro   10/3/2018 2:45 PM   Anticoagulation Therapy Visit    Description:  73 year old male   Provider:  [facility] ANTI [facility]AG 1   Department:  [facility]           INR as of 10/3/2018     Today's INR 3.5!      Anticoagulation Summary as of 10/3/2018     INR goal 2.0-3.0   Today's INR 3.5!   Full warfarin instructions 2 mg on Wed; 4 mg all other days   Next INR check 10/17/2018    Indications   Unspecified atrial fibrillation [I48.91]  Long-term (current) use of anticoagulants [Z79.01] [Z79.01]         Description     Decrease dose and recheck in 2 weeks.  ..............Emani Pereira RN.............  10/3/2018    12:59 PM        Your next Anticoagulation Clinic appointment(s)     Oct 03, 2018  2:45 PM CDT   Anticoagulation Visit with [facility] ANTI COAG 1   [facility] ([facility])    [address]  [facility] [address]   [phone]              October 2018 Details    Sun Mon Tue Wed Thu Fri Sat      1               2               3      2 mg   See details      4      4 mg         5      4 mg         6      4 mg           7      4 mg         8      4 mg         9      4 mg         10      2 mg         11      4 mg         12      4 mg         13      4 mg           14      4 mg         15      4 mg         16      4 mg         17            18               19               20                 21               22               23               24               25               26               27                 28               29               30               31                   Date Details   10/03 This INR check       Date of next INR:  10/17/2018         How to take your warfarin dose     To take:  2 mg Take 0.5 of a 4 mg tablet.    To take:  4 mg Take 1 of the 4 mg tablets.

## 2018-10-03 NOTE — PROGRESS NOTES
ANTICOAGULATION FOLLOW-UP CLINIC VISIT    Patient Name:  Kg Ferraro  Date:  10/3/2018  Contact Type:  Face to Face    SUBJECTIVE:     Patient Findings     Positives Unexplained INR or factor level change           OBJECTIVE    INR Protime   Date Value Ref Range Status   10/03/2018 3.5 (A) 0.86 - 1.14 Final       ASSESSMENT / PLAN  INR assessment SUPRA    Recheck INR In: 2 WEEKS    INR Location Clinic      Anticoagulation Summary as of 10/3/2018     INR goal 2.0-3.0   Today's INR 3.5!   Warfarin maintenance plan 2 mg (4 mg x 0.5) on Wed; 4 mg (4 mg x 1) all other days   Full warfarin instructions 2 mg on Wed; 4 mg all other days   Weekly warfarin total 26 mg   Plan last modified Emani Pereira, RN (10/3/2018)   Next INR check 10/17/2018   Priority INR   Target end date Indefinite    Indications   Unspecified atrial fibrillation [I48.91]  Long-term (current) use of anticoagulants [Z79.01] [Z79.01]         Anticoagulation Episode Summary     INR check location     Preferred lab     Send INR reminders to  INR    Comments       Anticoagulation Care Providers     Provider Role Specialty Phone number    Caleb Parker MD [facility] Internal Medicine [phone]            See the Encounter Report to view Anticoagulation Flowsheet and Dosing Calendar (Go to Encounters tab in chart review, and find the Anticoagulation Therapy Visit)        Emani Pereira, RN

## 2018-10-19 ENCOUNTER — ANTICOAGULATION THERAPY VISIT (OUTPATIENT)
Dept: ANTICOAGULATION | Facility: OTHER | Age: 73
End: 2018-10-19
Attending: INTERNAL MEDICINE
Payer: MEDICARE

## 2018-10-19 LAB — INR POINT OF CARE: 1.8 (ref 0.86–1.14)

## 2018-10-19 PROCEDURE — 85610 PROTHROMBIN TIME: CPT | Mod: QW

## 2018-10-19 NOTE — MR AVS SNAPSHOT
Kg Ferraro   10/19/2018 1:45 PM   Anticoagulation Therapy Visit    Description:  73 year old male   Provider:  [facility] ANTI COAG 1   Department:  [facility]           INR as of 10/19/2018     Today's INR 1.8!      Anticoagulation Summary as of 10/19/2018     INR goal 2.0-3.0   Today's INR 1.8!   Full warfarin instructions 4 mg every day   Next INR check 11/16/2018    Indications   Unspecified atrial fibrillation [I48.91]  Long-term (current) use of anticoagulants [Z79.01] [Z79.01]         Description     Increase dose and recheck in 4 weeks. .............Emani Pereira RN.......... 10/19/2018  11:16 AM          Your next Anticoagulation Clinic appointment(s)     Oct 19, 2018  1:45 PM CDT   Anticoagulation Visit with SIXTO ANTI COAG 1   [facility] ([facility])    [address]  [facility] [address]   [phone]              October 2018 Details    Sun Mon Tue Wed Thu Fri Sat      1               2               3               4               5               6                 7               8               9               10               11               12               13                 14               15               16               17               18               19      4 mg   See details      20      4 mg           21      4 mg         22      4 mg         23      4 mg         24      4 mg         25      4 mg         26      4 mg         27      4 mg           28      4 mg         29      4 mg         30      4 mg         31      4 mg             Date Details   10/19 This INR check               How to take your warfarin dose     To take:  4 mg Take 1 of the 4 mg tablets.           November 2018 Details    Sun Mon Tue Wed Thu Fri Sat         1      4 mg         2      4 mg         3      4 mg           4      4 mg         5      4 mg         6      4 mg         7      4 mg         8      4 mg         9      4 mg         10      4 mg            11      4 mg         12      4 mg         13      4 mg         14      4 mg         15      4 mg         16            17                 18               19               20               21               22               23               24                 25               26               27               28               29               30                 Date Details   No additional details    Date of next INR:  11/16/2018         How to take your warfarin dose     To take:  4 mg Take 1 of the 4 mg tablets.

## 2018-10-19 NOTE — PROGRESS NOTES
ANTICOAGULATION FOLLOW-UP CLINIC VISIT    Patient Name:  Kg Ferraro  Date:  10/19/2018  Contact Type:  Face to Face    SUBJECTIVE:     Patient Findings     Positives Unexplained INR or factor level change           OBJECTIVE    INR Protime   Date Value Ref Range Status   10/19/2018 1.8 (A) 0.86 - 1.14 Final       ASSESSMENT / PLAN  INR assessment SUB    Recheck INR In: 4 WEEKS    INR Location Clinic      Anticoagulation Summary as of 10/19/2018     INR goal 2.0-3.0   Today's INR 1.8!   Warfarin maintenance plan 4 mg (4 mg x 1) every day   Full warfarin instructions 4 mg every day   Weekly warfarin total 28 mg   Plan last modified Emani Pereira, TOM (10/19/2018)   Next INR check 11/16/2018   Priority INR   Target end date Indefinite    Indications   Unspecified atrial fibrillation [I48.91]  Long-term (current) use of anticoagulants [Z79.01] [Z79.01]         Anticoagulation Episode Summary     INR check location     Preferred lab     Send INR reminders to  INR    Comments       Anticoagulation Care Providers     Provider Role Specialty Phone number    Caleb Parker MD [facility] Internal Medicine [phone]            See the Encounter Report to view Anticoagulation Flowsheet and Dosing Calendar (Go to Encounters tab in chart review, and find the Anticoagulation Therapy Visit)        Emani Pereira, RN

## 2018-11-20 ENCOUNTER — ANTICOAGULATION THERAPY VISIT (OUTPATIENT)
Dept: ANTICOAGULATION | Facility: OTHER | Age: 73
End: 2018-11-20
Payer: MEDICARE

## 2018-11-20 LAB — INR POINT OF CARE: 2.9 (ref 0.86–1.14)

## 2018-11-20 PROCEDURE — 85610 PROTHROMBIN TIME: CPT | Mod: QW

## 2018-11-20 NOTE — MR AVS SNAPSHOT
Kg Ferraro   11/20/2018 1:30 PM   Anticoagulation Therapy Visit    Description:  73 year old male   Provider:  [facility] COAG 1   Department:  [facility]           INR as of 11/20/2018     Today's INR 2.9      Anticoagulation Summary as of 11/20/2018     INR goal 2.0-3.0   Today's INR 2.9   Full warfarin instructions 4 mg every day   Next INR check 1/8/2019    Indications   Unspecified atrial fibrillation [I48.91]  Long-term (current) use of anticoagulants [Z79.01] [Z79.01]         Description     Continue same dose of Warfarin/Coumadin and recheck INR in 6 weeks.   Emani Pereira RN    11/20/2018  10:27 AM        Your next Anticoagulation Clinic appointment(s)     Nov 20, 2018  1:30 PM CST   Anticoagulation Visit with GH ANTI COAG 1   [facility] ([facility])    [address]  [facility] [place] [address]   [phone]              November 2018 Details    Sun Mon Tue Wed Thu Fri Sat         1               2               3                 4               5               6               7               8               9               10                 11               12               13               14               15               16               17                 18               19               20      4 mg   See details      21      4 mg         22      4 mg         23      4 mg         24      4 mg           25      4 mg         26      4 mg         27      4 mg         28      4 mg         29      4 mg         30      4 mg           Date Details   11/20 This INR check               How to take your warfarin dose     To take:  4 mg Take 1 of the 4 mg tablets.           December 2018 Details    Sun Mon Tue Wed Thu Fri Sat           1      4 mg           2      4 mg         3      4 mg         4      4 mg         5      4 mg         6      4 mg         7      4 mg         8      4 mg           9      4 mg         10      4 mg         11      4  mg         12      4 mg         13      4 mg         14      4 mg         15      4 mg           16      4 mg         17      4 mg         18      4 mg         19      4 mg         20      4 mg         21      4 mg         22      4 mg           23      4 mg         24      4 mg         25      4 mg         26      4 mg         27      4 mg         28      4 mg         29      4 mg           30      4 mg         31      4 mg               Date Details   No additional details            How to take your warfarin dose     To take:  4 mg Take 1 of the 4 mg tablets.           January 2019 Details    Sun Mon Tue Wed Thu Fri Sat       1      4 mg         2      4 mg         3      4 mg         4      4 mg         5      4 mg           6      4 mg         7      4 mg         8            9               10               11               12                 13               14               15               16               17               18               19                 20               21               22               23               24               25               26                 27               28               29               30               31                  Date Details   No additional details    Date of next INR:  1/8/2019         How to take your warfarin dose     To take:  4 mg Take 1 of the 4 mg tablets.

## 2018-11-20 NOTE — PROGRESS NOTES
ANTICOAGULATION FOLLOW-UP CLINIC VISIT    Patient Name:  Kg Ferraro  Date:  11/20/2018  Contact Type:  Face to Face    SUBJECTIVE:     Patient Findings     Positives No Problem Findings           OBJECTIVE    INR Protime   Date Value Ref Range Status   11/20/2018 2.9 (A) 0.86 - 1.14 Final       ASSESSMENT / PLAN  INR assessment THER    Recheck INR In: 6 WEEKS    INR Location Clinic      Anticoagulation Summary as of 11/20/2018     INR goal 2.0-3.0   Today's INR 2.9   Warfarin maintenance plan 4 mg (4 mg x 1) every day   Full warfarin instructions 4 mg every day   Weekly warfarin total 28 mg   No change documented Emani Pereira RN   Plan last modified Emani Pereira RN (10/19/2018)   Next INR check 1/8/2019   Priority INR   Target end date Indefinite    Indications   Unspecified atrial fibrillation [I48.91]  Long-term (current) use of anticoagulants [Z79.01] [Z79.01]         Anticoagulation Episode Summary     INR check location     Preferred lab     Send INR reminders to  INR    Comments       Anticoagulation Care Providers     Provider Role Specialty Phone number    Caleb Parker MD [facility] Internal Medicine [phone]            See the Encounter Report to view Anticoagulation Flowsheet and Dosing Calendar (Go to Encounters tab in chart review, and find the Anticoagulation Therapy Visit)        Emani Pereira RN

## 2018-12-07 DIAGNOSIS — I48.91 ATRIAL FIBRILLATION, UNSPECIFIED TYPE (H): ICD-10-CM

## 2018-12-07 DIAGNOSIS — Z79.01 ANTICOAGULATION MONITORING, INR RANGE 2-3: ICD-10-CM

## 2018-12-07 RX ORDER — WARFARIN SODIUM 4 MG/1
TABLET ORAL
Qty: 90 TABLET | Refills: 1 | Status: SHIPPED | OUTPATIENT
Start: 2018-12-07 | End: 2019-08-22

## 2018-12-07 NOTE — TELEPHONE ENCOUNTER
Prescription approved per [facility] Refill Protocol.  Emani Pereira RN    12/7/2018  4:11 PM

## 2019-02-11 ENCOUNTER — ANTICOAGULATION THERAPY VISIT (OUTPATIENT)
Dept: ANTICOAGULATION | Facility: OTHER | Age: 74
End: 2019-02-11
Attending: INTERNAL MEDICINE
Payer: MEDICARE

## 2019-02-11 DIAGNOSIS — I48.91 ATRIAL FIBRILLATION, UNSPECIFIED TYPE (H): Primary | ICD-10-CM

## 2019-02-11 LAB — INR POINT OF CARE: 2.6 (ref 0.86–1.14)

## 2019-02-11 PROCEDURE — 85610 PROTHROMBIN TIME: CPT | Mod: QW,ZL

## 2019-02-11 NOTE — PROGRESS NOTES
ANTICOAGULATION FOLLOW-UP CLINIC VISIT    Patient Name:  Kg Ferraro  Date:  2019  Contact Type:  Face to Face    SUBJECTIVE:     Patient Findings     Positives:   No Problem Findings           OBJECTIVE    INR Protime   Date Value Ref Range Status   2019 2.6 (A) 0.86 - 1.14 Final       ASSESSMENT / PLAN  INR assessment THER    Recheck INR In: 6 WEEKS    INR Location Clinic      Anticoagulation Summary  As of 2019    INR goal:   2.0-3.0   TTR:   67.0 % (6.4 y)   INR used for dosin.6 (2019)   Warfarin maintenance plan:   4 mg (4 mg x 1) every day   Full warfarin instructions:   4 mg every day   Weekly warfarin total:   28 mg   No change documented:   Emani Pereira RN   Plan last modified:   Emani Pereira RN (10/19/2018)   Next INR check:   3/25/2019   Priority:   INR   Target end date:   Indefinite    Indications    Unspecified atrial fibrillation [I48.91]  Long-term (current) use of anticoagulants [Z79.01] [Z79.01]             Anticoagulation Episode Summary     INR check location:       Preferred lab:       Send INR reminders to:    INR    Comments:         Anticoagulation Care Providers     Provider Role Specialty Phone number    Caleb Parker MD [facility] Internal Medicine [phone]            See the Encounter Report to view Anticoagulation Flowsheet and Dosing Calendar (Go to Encounters tab in chart review, and find the Anticoagulation Therapy Visit)        Emani Pereira RN

## 2019-04-15 ENCOUNTER — ANTICOAGULATION THERAPY VISIT (OUTPATIENT)
Dept: ANTICOAGULATION | Facility: OTHER | Age: 74
End: 2019-04-15
Attending: INTERNAL MEDICINE
Payer: MEDICARE

## 2019-04-15 LAB — INR POINT OF CARE: 2.2 (ref 0.86–1.14)

## 2019-04-15 PROCEDURE — 85610 PROTHROMBIN TIME: CPT | Mod: QW,ZL

## 2019-04-15 NOTE — PROGRESS NOTES
ANTICOAGULATION FOLLOW-UP CLINIC VISIT    Patient Name:  Kg Ferraro  Date:  4/15/2019  Contact Type:  Face to Face    SUBJECTIVE:     Patient Findings     Comments:   The patient was assessed for diet, medication, and activity level changes, missed or extra doses, bruising or bleeding, with no problem findings.             OBJECTIVE    INR Protime   Date Value Ref Range Status   04/15/2019 2.2 (A) 0.86 - 1.14 Final       ASSESSMENT / PLAN  INR assessment THER    Recheck INR In: 6 WEEKS    INR Location Clinic      Anticoagulation Summary  As of 4/15/2019    INR goal:   2.0-3.0   TTR:   67.9 % (6.6 y)   INR used for dosin.2 (4/15/2019)   Warfarin maintenance plan:   4 mg (4 mg x 1) every day   Full warfarin instructions:   4 mg every day   Weekly warfarin total:   28 mg   No change documented:   Emani Pereira RN   Plan last modified:   Emani Pereira RN (10/19/2018)   Next INR check:   2019   Priority:   INR   Target end date:   Indefinite    Indications    Unspecified atrial fibrillation [I48.91]  Long-term (current) use of anticoagulants [Z79.01] [Z79.01]             Anticoagulation Episode Summary     INR check location:       Preferred lab:       Send INR reminders to:    INR    Comments:         Anticoagulation Care Providers     Provider Role Specialty Phone number    Caleb Parker MD [facility] Internal Medicine [phone]            See the Encounter Report to view Anticoagulation Flowsheet and Dosing Calendar (Go to Encounters tab in chart review, and find the Anticoagulation Therapy Visit)        Emani Pereira RN

## 2019-06-02 DIAGNOSIS — Z79.01 ANTICOAGULATION MONITORING, INR RANGE 2-3: ICD-10-CM

## 2019-06-02 DIAGNOSIS — I48.91 ATRIAL FIBRILLATION (H): ICD-10-CM

## 2019-06-03 ENCOUNTER — ANTICOAGULATION THERAPY VISIT (OUTPATIENT)
Dept: ANTICOAGULATION | Facility: OTHER | Age: 74
End: 2019-06-03
Attending: INTERNAL MEDICINE
Payer: MEDICARE

## 2019-06-03 LAB — INR POINT OF CARE: 3.2 (ref 0.86–1.14)

## 2019-06-03 PROCEDURE — 85610 PROTHROMBIN TIME: CPT | Mod: QW,ZL

## 2019-06-03 RX ORDER — WARFARIN SODIUM 4 MG/1
TABLET ORAL
Qty: 90 TABLET | Refills: 0 | Status: SHIPPED | OUTPATIENT
Start: 2019-06-03 | End: 2019-09-18

## 2019-06-03 NOTE — TELEPHONE ENCOUNTER
"Requested Prescriptions   Pending Prescriptions Disp Refills     warfarin (COUMADIN) 4 MG tablet [Pharmacy Med Name: WARFARIN 4MG        TAB] 100 tablet 1     Sig: TAKE ONE AND ONE-HALF (6 MG) ON DAY 1 AND ONE TABLET (4 MG) THE OTHER 6 DAYS OF THE WEEK OR AS DIRECTED BY [facility]       Vitamin K Antagonists Failed - 6/3/2019  2:22 PM        Failed - INR is within goal in the past 6 weeks     Confirm INR is within goal in the past 6 weeks.     Recent Labs   Lab Test 06/03/19   INR 3.2*                       Passed - Recent (12 mo) or future (30 days) visit within the authorizing provider's specialty     Patient had office visit in the last 12 months or has a visit in the next 30 days with authorizing provider or within the authorizing provider's specialty.  See \"Patient Info\" tab in inbasket, or \"Choose Columns\" in Meds & Orders section of the refill encounter.              Passed - Medication is active on med list        Passed - Patient is 18 years of age or older        Prescription approved per [facility] Refill Protocol.    "

## 2019-06-03 NOTE — PROGRESS NOTES
ANTICOAGULATION FOLLOW-UP CLINIC VISIT    Patient Name:  Kg Ferraro  Date:  6/3/2019  Contact Type:  Face to Face    SUBJECTIVE:  Patient Findings     Comments:   Patient has had previous supra therapeutic INR and always comes back in range with this same dose. Patient denies any identifiable changes that caused the supratherapeutic INR.           Clinical Outcomes     Negatives:   Major bleeding event, Thromboembolic event, Anticoagulation-related hospital admission, Anticoagulation-related ED visit, Anticoagulation-related fatality    Comments:   Patient has had previous supra therapeutic INR and always comes back in range with this same dose. Patient denies any identifiable changes that caused the supratherapeutic INR.              OBJECTIVE    INR Protime   Date Value Ref Range Status   06/03/2019 3.2 (A) 0.86 - 1.14 Final       ASSESSMENT / PLAN  INR assessment THER    Recheck INR In: 6 WEEKS    INR Location Clinic      Anticoagulation Summary  As of 6/3/2019    INR goal:   2.0-3.0   TTR:   68.1 % (6.7 y)   INR used for dosing:   3.2! (6/3/2019)   Warfarin maintenance plan:   4 mg (4 mg x 1) every day   Full warfarin instructions:   4 mg every day   Weekly warfarin total:   28 mg   No change documented:   Emani Pereira RN   Plan last modified:   Emani Pereira RN (10/19/2018)   Next INR check:   7/15/2019   Priority:   INR   Target end date:   Indefinite    Indications    Unspecified atrial fibrillation [I48.91]  Long-term (current) use of anticoagulants [Z79.01] [Z79.01]             Anticoagulation Episode Summary     INR check location:       Preferred lab:       Send INR reminders to:    INR    Comments:         Anticoagulation Care Providers     Provider Role Specialty Phone number    Claeb Parker MD [facility] Internal Medicine [phone]            See the Encounter Report to view Anticoagulation Flowsheet and Dosing Calendar (Go to Encounters tab in chart review, and find the  Anticoagulation Therapy Visit)        Emani Pereira, RN

## 2019-07-04 DIAGNOSIS — I47.10 PAROXYSMAL SUPRAVENTRICULAR TACHYCARDIA (H): ICD-10-CM

## 2019-07-05 DIAGNOSIS — I10 ESSENTIAL HYPERTENSION: ICD-10-CM

## 2019-07-05 DIAGNOSIS — I48.0 PAROXYSMAL ATRIAL FIBRILLATION (H): ICD-10-CM

## 2019-07-05 NOTE — LETTER
July 10, 2019      Kg Mazariegosmelissa     [facility] [address]        Dear Mr. Ferraro,    Your pharmacy has requested a refill of Metoprolol.  A 90-day refill of this medication has been sent.      According to our records, you are due for annual medication management and labs with Dr. Caleb Parker. Your last annual review was on 7/3/2018.      Your health is very important to us. Please contact our scheduling line at [phone] to set up this appointment at your earliest convenience, and before your next medication refills are needed.     Thank you for choosing [facility] for your health care needs.     Sincerely,        The Refill Nurse  [facility]

## 2019-07-09 RX ORDER — FLECAINIDE ACETATE 150 MG/1
TABLET ORAL
Qty: 180 TABLET | Refills: 0 | Status: SHIPPED | OUTPATIENT
Start: 2019-07-09 | End: 2019-09-18

## 2019-07-09 NOTE — TELEPHONE ENCOUNTER
"Refill request for: Flecainide 150 mg tablet   From: [facility]  Rx written date: 07/03/2018 #180 with 3 refills  LOV: 07/03/2018 with PCP  Next appt: none noted  Protocol:   Anti Arrhythmic Agents Protocol Failed   Lipid Panel on file in past year    CBC on file in past year    ALT on file in past year    Serum Creatinine on file in past year    Medication needs approval from authorizing provider    Serum Sodium on file in past year    Serum Potassium on file in past year    Recent (6 mo) or future (30 days) visit with authorizing provider's specialty       Per LOV with PCP, \"Paroxysmal supraventricular tachycardia with history of paroxysmal atrial fibrillation.  Doing well with current medication regimen.  Continue flecainide.  Continue metoprolol.\" Pt due for annual f/u.     Last BMP and CBC: 03/23/2017.     Medication requires approval from authorizing provider. PCP is out of clinic this week, routing to covering provider for review. Maxine Lawton RN on 7/9/2019 at 7:45 AM           "

## 2019-07-10 RX ORDER — METOPROLOL SUCCINATE 100 MG/1
TABLET, EXTENDED RELEASE ORAL
Qty: 270 TABLET | Refills: 0 | Status: SHIPPED | OUTPATIENT
Start: 2019-07-10 | End: 2019-09-18

## 2019-07-10 NOTE — TELEPHONE ENCOUNTER
Prescription approved per [facility] [place].  Patient is now due for annual medication management and labs. Reminder letter sent, and shawn refill given.  Jailyn Mooney RN on 7/10/2019 at 9:51 AM

## 2019-07-11 ENCOUNTER — ANTICOAGULATION THERAPY VISIT (OUTPATIENT)
Dept: ANTICOAGULATION | Facility: OTHER | Age: 74
End: 2019-07-11
Attending: INTERNAL MEDICINE
Payer: MEDICARE

## 2019-07-11 DIAGNOSIS — Z79.01 LONG TERM CURRENT USE OF ANTICOAGULANT THERAPY: ICD-10-CM

## 2019-07-11 LAB — INR POINT OF CARE: 2.9 (ref 0.86–1.14)

## 2019-07-11 PROCEDURE — 85610 PROTHROMBIN TIME: CPT | Mod: QW,ZL

## 2019-07-11 NOTE — PROGRESS NOTES
ANTICOAGULATION FOLLOW-UP CLINIC VISIT    Patient Name:  Kg Ferraro  Date:  2019  Contact Type:  Face to Face    SUBJECTIVE:  Patient Findings         Clinical Outcomes     Negatives:   Major bleeding event, Thromboembolic event, Anticoagulation-related hospital admission, Anticoagulation-related ED visit, Anticoagulation-related fatality           OBJECTIVE    INR Protime   Date Value Ref Range Status   2019 2.9 (A) 0.86 - 1.14 Final       ASSESSMENT / PLAN  INR assessment THER    Recheck INR In: 6 WEEKS    INR Location Clinic      Anticoagulation Summary  As of 2019    INR goal:   2.0-3.0   TTR:   67.6 % (6.8 y)   INR used for dosin.9 (2019)   Warfarin maintenance plan:   4 mg (4 mg x 1) every day   Full warfarin instructions:   4 mg every day   Weekly warfarin total:   28 mg   No change documented:   Emani Pereira RN   Plan last modified:   Emani Pereira RN (10/19/2018)   Next INR check:   2019   Priority:   INR   Target end date:   Indefinite    Indications    Unspecified atrial fibrillation [I48.91]  Long-term (current) use of anticoagulants [Z79.01] [Z79.01]             Anticoagulation Episode Summary     INR check location:       Preferred lab:       Send INR reminders to:    INR    Comments:         Anticoagulation Care Providers     Provider Role Specialty Phone number    Caleb Parker MD [facility] Internal Medicine [phone]            See the Encounter Report to view Anticoagulation Flowsheet and Dosing Calendar (Go to Encounters tab in chart review, and find the Anticoagulation Therapy Visit)        Emani Pereira RN                 
04-Nov-2018
Yes

## 2019-08-05 DIAGNOSIS — M10.9 GOUT, UNSPECIFIED CAUSE, UNSPECIFIED CHRONICITY, UNSPECIFIED SITE: ICD-10-CM

## 2019-08-05 NOTE — LETTER
August 9, 2019      Kg Ferraro     [facility] [id number]      A refill request was received from your pharmacy for Allopurinol.    Additional refills require an office visit with Dr. Parker for annual review and labs.    Please call [phone] to schedule appointment.      Sincerely,      Refill Nurse

## 2019-08-09 RX ORDER — ALLOPURINOL 100 MG/1
TABLET ORAL
Qty: 180 TABLET | Refills: 3 | Status: SHIPPED | OUTPATIENT
Start: 2019-08-09 | End: 2019-09-18

## 2019-08-09 NOTE — TELEPHONE ENCOUNTER
Routing refill request to provider for review/approval because:   CBC on file in past 12 months    ALT on file in past 12 months    Has Uric Acid on file in past 12 months and value is less than 6    Recent (12 mo) or future (30 days) visit within the authorizing provider's specialty    Normal serum creatinine on file in the past 12 months     LOV: 7/3/18    Letter sent patient is due for annual review and labs.  Dr. Parker out of office will route to covering team let for review and consideration    Kerry Mcclain RN on 8/9/2019 at 7:43 AM

## 2019-08-22 ENCOUNTER — ANTICOAGULATION THERAPY VISIT (OUTPATIENT)
Dept: ANTICOAGULATION | Facility: OTHER | Age: 74
End: 2019-08-22
Attending: INTERNAL MEDICINE
Payer: MEDICARE

## 2019-08-22 DIAGNOSIS — Z79.01 LONG TERM CURRENT USE OF ANTICOAGULANT THERAPY: ICD-10-CM

## 2019-08-22 LAB — INR POINT OF CARE: 2.3 (ref 0.86–1.14)

## 2019-08-22 PROCEDURE — 85610 PROTHROMBIN TIME: CPT | Mod: QW,ZL

## 2019-08-22 NOTE — PROGRESS NOTES
ANTICOAGULATION FOLLOW-UP CLINIC VISIT    Patient Name:  Kg Ferraro  Date:  2019  Contact Type:  Face to Face    SUBJECTIVE:  Patient Findings         Clinical Outcomes     Negatives:   Major bleeding event, Thromboembolic event, Anticoagulation-related hospital admission, Anticoagulation-related ED visit, Anticoagulation-related fatality           OBJECTIVE    INR Protime   Date Value Ref Range Status   2019 2.3 (A) 0.86 - 1.14 Final       ASSESSMENT / PLAN  INR assessment THER    Recheck INR In: 6 WEEKS    INR Location Clinic      Anticoagulation Summary  As of 2019    INR goal:   2.0-3.0   TTR:   68.1 % (7 y)   INR used for dosin.3 (2019)   Warfarin maintenance plan:   4 mg (4 mg x 1) every day   Full warfarin instructions:   4 mg every day   Weekly warfarin total:   28 mg   Plan last modified:   Emani Pereira RN (10/19/2018)   Next INR check:   10/3/2019   Priority:   INR   Target end date:   Indefinite    Indications    Unspecified atrial fibrillation [I48.91]  Long-term (current) use of anticoagulants [Z79.01] [Z79.01]             Anticoagulation Episode Summary     INR check location:       Preferred lab:       Send INR reminders to:    INR    Comments:         Anticoagulation Care Providers     Provider Role Specialty Phone number    Caleb Parker MD [facility] Internal Medicine [phone]            See the Encounter Report to view Anticoagulation Flowsheet and Dosing Calendar (Go to Encounters tab in chart review, and find the Anticoagulation Therapy Visit)        Cheri Prater RN

## 2019-09-03 DIAGNOSIS — I50.33 ACUTE ON CHRONIC DIASTOLIC CONGESTIVE HEART FAILURE (H): ICD-10-CM

## 2019-09-03 DIAGNOSIS — I10 ESSENTIAL HYPERTENSION: ICD-10-CM

## 2019-09-07 RX ORDER — POTASSIUM CHLORIDE 1500 MG/1
TABLET, EXTENDED RELEASE ORAL
Qty: 90 TABLET | Refills: 0 | Status: SHIPPED | OUTPATIENT
Start: 2019-09-07 | End: 2019-09-18

## 2019-09-07 NOTE — TELEPHONE ENCOUNTER
Prescription approved per [facility] Refill Protocol.  Patient is now due for annual medication management and labs. Has visit scheduled on 9-18-19. Esperanza refill given. Jaiyln Mooney RN on 9/7/2019 at 9:26 AM

## 2019-09-14 NOTE — PROGRESS NOTES
"Nursing Notes:   Lima Cordova LPN  9/18/2019  9:19 AM  Signed  Patient presents to the clinic for medication refills, refills and labs pending.      Chief Complaint   Patient presents with     Recheck Medication       Initial /66 (BP Location: Right arm, Patient Position: Sitting, Cuff Size: Adult Regular)   Pulse 64   Temp 97.8  F (36.6  C) (Tympanic)   Resp 16   Ht 1.702 m (5' 7\")   Wt 109.8 kg (242 lb 2 oz)   BMI 37.92 kg/m    Estimated body mass index is 37.92 kg/m  as calculated from the following:    Height as of this encounter: 1.702 m (5' 7\").    Weight as of this encounter: 109.8 kg (242 lb 2 oz).  Medication Reconciliation: complete    Lima Cordova LPN          Nursing note reviewed with patient.  Accuracy and completeness verified.   Mr. Ferraro is a 74 year old male who:  Patient presents with:  Recheck Medication      ICD-10-CM    1. Essential hypertension I10 metoprolol succinate ER (TOPROL-XL) 100 MG 24 hr tablet     potassium chloride ER (K-DUR/KLOR-CON M) 20 MEQ CR tablet   2. Paroxysmal atrial fibrillation (H) I48.0 metoprolol succinate ER (TOPROL-XL) 100 MG 24 hr tablet   3. Warfarin anticoagulation Z79.01    4. Mixed hyperlipidemia E78.2 simvastatin (ZOCOR) 40 MG tablet   5. CKD (chronic kidney disease) stage 3, GFR 30-59 ml/min (H) N18.3    6. Cardiac pacemaker in situ Z95.0    7. Chronic heart failure with preserved ejection fraction (H) I50.32 potassium chloride ER (K-DUR/KLOR-CON M) 20 MEQ CR tablet   8. Paroxysmal supraventricular tachycardia (H) I47.1 flecainide (TAMBOCOR) 150 MG tablet   9. Hyperuricemia E79.0 allopurinol (ZYLOPRIM) 100 MG tablet     HPI  Hypertension, currently well controlled.  Tolerating medication regimen.  No side effects reported.  Declines lab work.  Refill sent to pharmacy.    Paroxysmal atrial fibrillation, heart rates are controlled with metoprolol.  Takes flecainide.  Currently on warfarin.  Doing well.  No excessive bruising or bleeding " issues.    Mixed hyperlipidemia, doing well with simvastatin.  No side effects.  Declines lab work.  Needs refills.    Stage III chronic kidney disease, had been stable.  Advised to avoid NSAID use.  Declines labs today.    Has a cardiac pacemaker.  No problems noted.    Heart failure with preserved ejection fraction, chronic, continues on heart rate control, blood pressure control.  Not currently on any diuretics.    Supraventricular tachycardia, paroxysmal.  Controlled with flecainide.  No recent issues.    Hyperuricemia, arthritis pain.  Continues on allopurinol.  States no recent gout attacks.  Refill sent to pharmacy.    Functional Capacity: > or about 4 METS.   Review of Systems   Constitutional: Negative for chills and fever.   HENT: Negative for congestion and hearing loss.    Eyes: Negative for visual disturbance.   Respiratory: Positive for shortness of breath (  + minimal, with exertion). Negative for cough and wheezing.    Cardiovascular: Negative for chest pain and palpitations.   Gastrointestinal: Negative for abdominal pain, diarrhea, nausea and vomiting.        + abdominal obesity   Endocrine: Negative for cold intolerance and heat intolerance.   Genitourinary: Negative for dysuria and hematuria.   Musculoskeletal: Positive for arthralgias and gait problem. Negative for myalgias.   Skin: Negative for rash and wound.   Allergic/Immunologic: Negative for immunocompromised state.   Neurological: Negative for dizziness and light-headedness.   Hematological: Does not bruise/bleed easily.   Psychiatric/Behavioral: Negative for agitation and confusion.        PRASAD:   PRASAD-7 SCORE 7/3/2018 9/18/2019   Total Score 0 0     PHQ9:  PHQ-9 SCORE 6/9/2016 7/3/2018 9/18/2019   PHQ-9 Total Score 0 0 0       I have personally reviewed the past medical history, past surgical history, medications, allergies, family and social history as listed below.     Allergies   Allergen Reactions     Penicillins Hives     Rash or  hives - can't remember       Current Outpatient Medications   Medication Sig Dispense Refill     allopurinol (ZYLOPRIM) 100 MG tablet Take 1 tablet (100 mg) by mouth 2 times daily 60 tablet 11     flecainide (TAMBOCOR) 150 MG tablet Take 1 tablet (150 mg) by mouth 2 times daily 60 tablet 11     metoprolol succinate ER (TOPROL-XL) 100 MG 24 hr tablet TAKE 1 TABLET (100 MG) BY MOUTH IN THE MORNING AND 2 TABLETS (200 MG) IN THE EVENING 90 tablet 11     potassium chloride ER (K-DUR/KLOR-CON M) 20 MEQ CR tablet Take 1 tablet (20 mEq) by mouth daily 30 tablet 11     simvastatin (ZOCOR) 40 MG tablet Take 1 tablet (40 mg) by mouth daily 30 tablet 11     Blood Pressure Monitoring (RA BLOOD PRESSURE CUFF MONITOR) MISC Medium Cuff       warfarin (COUMADIN) 4 MG tablet TAKE 4 MG DAILY OR AS DIRECTED BY PROTIME CLINIC 90 tablet 0        Patient Active Problem List    Diagnosis Date Noted     Hyperuricemia 09/18/2019     Priority: Medium     Essential hypertension 07/03/2018     Priority: Medium     Long-term (current) use of anticoagulants [Z79.01] 02/28/2018     Priority: Medium     Paroxysmal atrial fibrillation (H) 02/11/2018     Priority: Medium     AV block, 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     Priority: Medium     Overview:   paced       Chronic heart failure with preserved ejection fraction (H) 01/18/2018     Priority: Medium     Mixed hyperlipidemia 01/18/2018     Priority: Medium     Paroxysmal supraventricular tachycardia (H) 01/18/2018     Priority: Medium     Renal calculus 01/18/2018     Priority: Medium     Sick sinus syndrome (H) 01/18/2018     Priority: Medium     CKD (chronic kidney disease) stage 3, GFR 30-59 ml/min (H) 01/15/2016     Priority: Medium     Health care maintenance 01/15/2016     Priority: Medium     Anticoagulation monitoring, INR range 2-3 08/07/2015     Priority: Medium     Biatrial enlargement 04/10/2014     Priority: Medium     LVH (left ventricular hypertrophy) 04/10/2014     Priority: Medium      Bilateral leg edema 12/23/2013     Priority: Medium     Warfarin anticoagulation 12/23/2013     Priority: Medium     Hematuria 07/10/2012     Priority: Medium     Rheumatoid arthritis (H) 05/19/2011     Priority: Medium     Overview:   Acute       Degenerative arthritis 09/14/2010     Priority: Medium     Anxiety state 05/11/2010     Priority: Medium     Osteoarthrosis, unspecified whether generalized or localized, pelvic region and thigh 05/11/2010     Priority: Medium     Cardiac pacemaker in situ 09/03/2008     Priority: Medium     Overview:   [employer]       Past Medical History:   Diagnosis Date     Acute kidney failure (H)     No Comments Provided     Acute kidney failure (H)     No Comments Provided     Atrial fibrillation (H)     3/8/2012     Atrioventricular block, complete (H)     No Comments Provided     Calculus of kidney     No Comments Provided     Essential (primary) hypertension     No Comments Provided     Generalized anxiety disorder     5/11/2010     Hematuria     7/10/2012     Osteoarthritis     9/14/2010     Osteoarthritis of hip     5/11/2010     Presence of cardiac pacemaker     9/3/2008     Rheumatoid arthritis (H)     5/19/2011     Sick sinus syndrome (H)     No Comments Provided     Supraventricular tachycardia (H)     No Comments Provided     Past Surgical History:   Procedure Laterality Date     ARTHROPLASTY HIP      12/1/2010,right hip replacement,  [place]     OTHER SURGICAL HISTORY      25841,NASAL SURGERY,Repeated nasoseptal repair     OTHER SURGICAL HISTORY      409030,OTHER,Ureteral stents for stones     OTHER SURGICAL HISTORY      2003,512498,OTHER,Catheter ablation for atrial fibrillation, failed     Social History     Socioeconomic History     Marital status:      Spouse name: None     Number of children: None     Years of education: None     Highest education level: None   Occupational History     None   Social Needs     Financial resource strain: None     Food  "insecurity:     Worry: None     Inability: None     Transportation needs:     Medical: None     Non-medical: None   Tobacco Use     Smoking status: Former Smoker     Packs/day: 1.00     Types: Cigarettes     Last attempt to quit: 1980     Years since quittin.7     Smokeless tobacco: Former User     Quit date: 1980   Substance and Sexual Activity     Alcohol use: Yes     Alcohol/week: 0.0 oz     Comment: 1-2 beers per month     Drug use: Never     Sexual activity: Not Currently   Lifestyle     Physical activity:     Days per week: None     Minutes per session: None     Stress: None   Relationships     Social connections:     Talks on phone: None     Gets together: None     Attends [employer] service: None     Active member of club or organization: None     Attends meetings of clubs or organizations: None     Relationship status: None     Intimate partner violence:     Fear of current or ex partner: None     Emotionally abused: None     Physically abused: None     Forced sexual activity: None   Other Topics Concern     None   Social History Narrative    Lives in [place] and works here at  [employer]     Smoked age [age].  Coffee - none.  Soda - 4-5 daily.  Alcohol - none.  Exercise - none.  Works regularly.    Preloaded 2012     Family History   Problem Relation Age of Onset     Heart Disease Mother         Heart Disease,heart failure.     Heart Disease Father         Heart Disease,heart failure.       EXAM:   Vitals:    19 0906   BP: 130/66   BP Location: Right arm   Patient Position: Sitting   Cuff Size: Adult Regular   Pulse: 64   Resp: 16   Temp: 97.8  F (36.6  C)   TempSrc: Tympanic   Weight: 109.8 kg (242 lb 2 oz)   Height: 1.702 m (5' 7\")       Current Pain Score: No Pain (0)     BP Readings from Last 3 Encounters:   19 130/66   18 124/76   17 138/78      Wt Readings from Last 3 Encounters:   19 109.8 kg (242 lb 2 oz)   18 112.7 kg (248 lb 6 oz)   17 " "115.2 kg (254 lb)      Estimated body mass index is 37.92 kg/m  as calculated from the following:    Height as of this encounter: 1.702 m (5' 7\").    Weight as of this encounter: 109.8 kg (242 lb 2 oz).     Physical Exam   Constitutional: He appears well-developed and well-nourished. No distress.   HENT:   Head: Normocephalic and atraumatic.   Eyes: Conjunctivae are normal. No scleral icterus.   Neck: Neck supple.   Cardiovascular: Normal rate and regular rhythm.   Pulmonary/Chest: Effort normal and breath sounds normal.   Abdominal: Soft. There is no tenderness.   + abdominal obesity   Musculoskeletal: He exhibits no deformity.   Lymphadenopathy:     He has no cervical adenopathy.   Neurological: He is alert.   Skin: Skin is warm and dry. No rash noted. He is not diaphoretic.   Psychiatric: He has a normal mood and affect.        Procedures   INVESTIGATIONS:  Results for orders placed or performed in visit on 08/22/19   INR point of care   Result Value Ref Range    INR Protime 2.3 (A) 0.86 - 1.14       ASSESSMENT AND PLAN:  Problem List Items Addressed This Visit        Endocrine    Mixed hyperlipidemia    Relevant Medications    simvastatin (ZOCOR) 40 MG tablet    Hyperuricemia    Relevant Medications    allopurinol (ZYLOPRIM) 100 MG tablet       Circulatory    Chronic heart failure with preserved ejection fraction (H)    Relevant Medications    potassium chloride ER (K-DUR/KLOR-CON M) 20 MEQ CR tablet    Paroxysmal supraventricular tachycardia (H)    Relevant Medications    flecainide (TAMBOCOR) 150 MG tablet    Paroxysmal atrial fibrillation (H)    Relevant Medications    metoprolol succinate ER (TOPROL-XL) 100 MG 24 hr tablet    Essential hypertension - Primary    Relevant Medications    metoprolol succinate ER (TOPROL-XL) 100 MG 24 hr tablet    potassium chloride ER (K-DUR/KLOR-CON M) 20 MEQ CR tablet       Urinary    CKD (chronic kidney disease) stage 3, GFR 30-59 ml/min (H)       Hematologic    Warfarin " anticoagulation       Other    Cardiac pacemaker in situ        reviewed diet, exercise and weight control, recommended sodium restriction, cardiovascular risk and specific lipid/LDL goals reviewed  -- Expected clinical course discussed    -- Medications and their side effects discussed    Patient Instructions   Medications refilled.     Glad you are doing well.     Blood pressures are well controlled.     Return in approximately 1 year, or sooner as needed for follow-up with Dr. Parker.  - Annual Follow-up / Physical    Clinic : [phone]  Appointment line: [phone]     Caleb Parker MD  Internal Medicine  [facility]     Portions of this note were dictated using speech recognition software. The note has been proofread but errors in the text may have been overlooked. Please contact me if there are any concerns regarding the accuracy of the dictation.

## 2019-09-17 DIAGNOSIS — I10 ESSENTIAL HYPERTENSION: ICD-10-CM

## 2019-09-17 DIAGNOSIS — I47.10 PAROXYSMAL SUPRAVENTRICULAR TACHYCARDIA (H): ICD-10-CM

## 2019-09-17 DIAGNOSIS — I48.0 PAROXYSMAL ATRIAL FIBRILLATION (H): ICD-10-CM

## 2019-09-17 DIAGNOSIS — E78.2 MIXED HYPERLIPIDEMIA: ICD-10-CM

## 2019-09-17 DIAGNOSIS — I50.33 ACUTE ON CHRONIC DIASTOLIC CONGESTIVE HEART FAILURE (H): ICD-10-CM

## 2019-09-17 RX ORDER — SIMVASTATIN 40 MG
40 TABLET ORAL DAILY
Qty: 90 TABLET | Refills: 3 | Status: CANCELLED | OUTPATIENT
Start: 2019-09-17

## 2019-09-17 RX ORDER — METOPROLOL SUCCINATE 100 MG/1
TABLET, EXTENDED RELEASE ORAL
Qty: 270 TABLET | Refills: 3 | Status: CANCELLED | OUTPATIENT
Start: 2019-09-17

## 2019-09-17 RX ORDER — POTASSIUM CHLORIDE 1500 MG/1
20 TABLET, EXTENDED RELEASE ORAL DAILY
Qty: 90 TABLET | Refills: 3 | Status: CANCELLED | OUTPATIENT
Start: 2019-09-17

## 2019-09-17 RX ORDER — FLECAINIDE ACETATE 150 MG/1
150 TABLET ORAL 2 TIMES DAILY
Qty: 180 TABLET | Refills: 3 | Status: CANCELLED | OUTPATIENT
Start: 2019-09-17

## 2019-09-18 ENCOUNTER — ANTICOAGULATION THERAPY VISIT (OUTPATIENT)
Dept: ANTICOAGULATION | Facility: OTHER | Age: 74
End: 2019-09-18
Attending: INTERNAL MEDICINE
Payer: MEDICARE

## 2019-09-18 ENCOUNTER — OFFICE VISIT (OUTPATIENT)
Dept: INTERNAL MEDICINE | Facility: OTHER | Age: 74
End: 2019-09-18
Attending: INTERNAL MEDICINE
Payer: MEDICARE

## 2019-09-18 VITALS
BODY MASS INDEX: 38 KG/M2 | TEMPERATURE: 97.8 F | WEIGHT: 242.13 LBS | HEIGHT: 67 IN | DIASTOLIC BLOOD PRESSURE: 66 MMHG | SYSTOLIC BLOOD PRESSURE: 130 MMHG | HEART RATE: 64 BPM | RESPIRATION RATE: 16 BRPM

## 2019-09-18 DIAGNOSIS — Z79.01 ANTICOAGULATION MONITORING, INR RANGE 2-3: ICD-10-CM

## 2019-09-18 DIAGNOSIS — N18.30 CKD (CHRONIC KIDNEY DISEASE) STAGE 3, GFR 30-59 ML/MIN (H): ICD-10-CM

## 2019-09-18 DIAGNOSIS — I48.0 PAROXYSMAL ATRIAL FIBRILLATION (H): ICD-10-CM

## 2019-09-18 DIAGNOSIS — Z79.01 LONG TERM CURRENT USE OF ANTICOAGULANT THERAPY: ICD-10-CM

## 2019-09-18 DIAGNOSIS — Z95.0 CARDIAC PACEMAKER IN SITU: ICD-10-CM

## 2019-09-18 DIAGNOSIS — I10 ESSENTIAL HYPERTENSION: Primary | ICD-10-CM

## 2019-09-18 DIAGNOSIS — I48.91 ATRIAL FIBRILLATION (H): ICD-10-CM

## 2019-09-18 DIAGNOSIS — I47.10 PAROXYSMAL SUPRAVENTRICULAR TACHYCARDIA (H): ICD-10-CM

## 2019-09-18 DIAGNOSIS — E79.0 HYPERURICEMIA: ICD-10-CM

## 2019-09-18 DIAGNOSIS — E78.2 MIXED HYPERLIPIDEMIA: ICD-10-CM

## 2019-09-18 DIAGNOSIS — Z79.01 WARFARIN ANTICOAGULATION: ICD-10-CM

## 2019-09-18 DIAGNOSIS — I50.32 CHRONIC HEART FAILURE WITH PRESERVED EJECTION FRACTION (H): ICD-10-CM

## 2019-09-18 LAB — INR POINT OF CARE: 2.3 (ref 0.86–1.14)

## 2019-09-18 PROCEDURE — G0463 HOSPITAL OUTPT CLINIC VISIT: HCPCS

## 2019-09-18 PROCEDURE — 85610 PROTHROMBIN TIME: CPT | Mod: QW,ZL

## 2019-09-18 PROCEDURE — 99214 OFFICE O/P EST MOD 30 MIN: CPT | Performed by: INTERNAL MEDICINE

## 2019-09-18 RX ORDER — SIMVASTATIN 40 MG
40 TABLET ORAL DAILY
Qty: 30 TABLET | Refills: 11 | Status: SHIPPED | OUTPATIENT
Start: 2019-09-18 | End: 2020-05-28

## 2019-09-18 RX ORDER — ALLOPURINOL 100 MG/1
100 TABLET ORAL 2 TIMES DAILY
Qty: 60 TABLET | Refills: 11 | Status: SHIPPED | OUTPATIENT
Start: 2019-09-18 | End: 2020-05-28

## 2019-09-18 RX ORDER — POTASSIUM CHLORIDE 1500 MG/1
20 TABLET, EXTENDED RELEASE ORAL DAILY
Qty: 30 TABLET | Refills: 11 | Status: SHIPPED | OUTPATIENT
Start: 2019-09-18 | End: 2020-05-28

## 2019-09-18 RX ORDER — FLECAINIDE ACETATE 150 MG/1
150 TABLET ORAL 2 TIMES DAILY
Qty: 60 TABLET | Refills: 11 | Status: SHIPPED | OUTPATIENT
Start: 2019-09-18 | End: 2020-05-28

## 2019-09-18 RX ORDER — WARFARIN SODIUM 4 MG/1
TABLET ORAL
Qty: 90 TABLET | Refills: 0
Start: 2019-09-18 | End: 2020-05-15

## 2019-09-18 RX ORDER — METOPROLOL SUCCINATE 100 MG/1
TABLET, EXTENDED RELEASE ORAL
Qty: 90 TABLET | Refills: 11 | Status: SHIPPED | OUTPATIENT
Start: 2019-09-18 | End: 2020-05-28

## 2019-09-18 ASSESSMENT — PATIENT HEALTH QUESTIONNAIRE - PHQ9
5. POOR APPETITE OR OVEREATING: NOT AT ALL
SUM OF ALL RESPONSES TO PHQ QUESTIONS 1-9: 0

## 2019-09-18 ASSESSMENT — MIFFLIN-ST. JEOR: SCORE: 1786.9

## 2019-09-18 ASSESSMENT — ENCOUNTER SYMPTOMS
ABDOMINAL PAIN: 0
AGITATION: 0
DYSURIA: 0
FEVER: 0
LIGHT-HEADEDNESS: 0
HEMATURIA: 0
PALPITATIONS: 0
MYALGIAS: 0
CHILLS: 0
WOUND: 0
DIARRHEA: 0
VOMITING: 0
ARTHRALGIAS: 1
WHEEZING: 0
CONFUSION: 0
ROS GI COMMENTS: + ABDOMINAL OBESITY
SHORTNESS OF BREATH: 1
COUGH: 0
DIZZINESS: 0
BRUISES/BLEEDS EASILY: 0
NAUSEA: 0

## 2019-09-18 ASSESSMENT — ANXIETY QUESTIONNAIRES
IF YOU CHECKED OFF ANY PROBLEMS ON THIS QUESTIONNAIRE, HOW DIFFICULT HAVE THESE PROBLEMS MADE IT FOR YOU TO DO YOUR WORK, TAKE CARE OF THINGS AT HOME, OR GET ALONG WITH OTHER PEOPLE: NOT DIFFICULT AT ALL
6. BECOMING EASILY ANNOYED OR IRRITABLE: NOT AT ALL
1. FEELING NERVOUS, ANXIOUS, OR ON EDGE: NOT AT ALL
5. BEING SO RESTLESS THAT IT IS HARD TO SIT STILL: NOT AT ALL
7. FEELING AFRAID AS IF SOMETHING AWFUL MIGHT HAPPEN: NOT AT ALL
GAD7 TOTAL SCORE: 0
2. NOT BEING ABLE TO STOP OR CONTROL WORRYING: NOT AT ALL
3. WORRYING TOO MUCH ABOUT DIFFERENT THINGS: NOT AT ALL

## 2019-09-18 ASSESSMENT — PAIN SCALES - GENERAL: PAINLEVEL: NO PAIN (0)

## 2019-09-18 NOTE — NURSING NOTE
"Patient presents to the clinic for medication refills, refills and labs pending.      Chief Complaint   Patient presents with     Recheck Medication       Initial /66 (BP Location: Right arm, Patient Position: Sitting, Cuff Size: Adult Regular)   Pulse 64   Temp 97.8  F (36.6  C) (Tympanic)   Resp 16   Ht 1.702 m (5' 7\")   Wt 109.8 kg (242 lb 2 oz)   BMI 37.92 kg/m   Estimated body mass index is 37.92 kg/m  as calculated from the following:    Height as of this encounter: 1.702 m (5' 7\").    Weight as of this encounter: 109.8 kg (242 lb 2 oz).  Medication Reconciliation: complete    Lima Cordova LPN          "

## 2019-09-18 NOTE — PROGRESS NOTES
ANTICOAGULATION FOLLOW-UP CLINIC VISIT    Patient Name:  Kg Ferraro  Date:  2019  Contact Type:  Face to Face    SUBJECTIVE:  Patient Findings         Clinical Outcomes     Negatives:   Major bleeding event, Thromboembolic event, Anticoagulation-related hospital admission, Anticoagulation-related ED visit, Anticoagulation-related fatality           OBJECTIVE    INR Protime   Date Value Ref Range Status   2019 2.3 (A) 0.86 - 1.14 Final       ASSESSMENT / PLAN  INR assessment THER    Recheck INR In: 6 WEEKS    INR Location Clinic      Anticoagulation Summary  As of 2019    INR goal:   2.0-3.0   TTR:   68.5 % (7 y)   INR used for dosin.3 (2019)   Warfarin maintenance plan:   4 mg (4 mg x 1) every day   Full warfarin instructions:   4 mg every day   Weekly warfarin total:   28 mg   No change documented:   Emani Pereira RN   Plan last modified:   Emani Pereira RN (10/19/2018)   Next INR check:   10/30/2019   Priority:   INR   Target end date:   Indefinite    Indications    Paroxysmal atrial fibrillation (H) [I48.0]  Long-term (current) use of anticoagulants [Z79.01] [Z79.01]             Anticoagulation Episode Summary     INR check location:       Preferred lab:       Send INR reminders to:    INR    Comments:         Anticoagulation Care Providers     Provider Role Specialty Phone number    Caleb Parker MD [facility] Internal Medicine [phone]            See the Encounter Report to view Anticoagulation Flowsheet and Dosing Calendar (Go to Encounters tab in chart review, and find the Anticoagulation Therapy Visit)        Emani Pereira RN

## 2019-09-19 ASSESSMENT — ANXIETY QUESTIONNAIRES: GAD7 TOTAL SCORE: 0

## 2019-10-03 DIAGNOSIS — Z79.01 ANTICOAGULATION MONITORING, INR RANGE 2-3: ICD-10-CM

## 2019-10-03 DIAGNOSIS — I48.91 ATRIAL FIBRILLATION (H): ICD-10-CM

## 2019-10-03 RX ORDER — WARFARIN SODIUM 4 MG/1
TABLET ORAL
Qty: 90 TABLET | Refills: 1 | Status: SHIPPED | OUTPATIENT
Start: 2019-10-03 | End: 2020-04-03

## 2019-10-03 NOTE — TELEPHONE ENCOUNTER
"Requested Prescriptions   Pending Prescriptions Disp Refills     warfarin ANTICOAGULANT (COUMADIN) 4 MG tablet [Pharmacy Med Name: WARFARIN 4MG        TAB] 90 tablet 0     Sig: TAKE 1 TABLET BY MOUTH ONCE DAILY OR  AS  DIRECTED  BY  [facility]  CLINIC       Vitamin K Antagonists Failed - 10/3/2019  9:45 AM        Failed - INR is within goal in the past 6 weeks     Confirm INR is within goal in the past 6 weeks.     Recent Labs   Lab Test 09/18/19   INR 2.3*                       Passed - Recent (12 mo) or future (30 days) visit within the authorizing provider's specialty     Patient has had an office visit with the authorizing provider or a provider within the authorizing providers department within the previous 12 mos or has a future within next 30 days. See \"Patient Info\" tab in inbasket, or \"Choose Columns\" in Meds & Orders section of the refill encounter.              Passed - Medication is active on med list        Passed - Patient is 18 years of age or older      INR within goal 09/18/19  Prescription approved per [facility] Refill Protocol.      "

## 2019-10-15 NOTE — MR AVS SNAPSHOT
Kg Ferraro   4/11/2018 9:00 AM   Anticoagulation Therapy Visit    Description:  73 year old male   Provider:  [facility]   Department:  [facility]           INR as of 4/11/2018     Today's INR 3.1!      Anticoagulation Summary as of 4/11/2018     INR goal 2.0-3.0   Today's INR 3.1!   Full instructions 6 mg on Mon, Fri; 4 mg all other days   Next INR check 5/9/2018    Indications   Unspecified atrial fibrillation [I48.91]  Long-term (current) use of anticoagulants [Z79.01] [Z79.01]         Description     Continue same dose of Coumadin/Warfarinand recheck INR in 4 weeks.   Cheri Prater ....................  4/11/2018   8:04 AM        Your next Anticoagulation Clinic appointment(s)     Apr 11, 2018  9:00 AM CDT   Anticoagulation Visit with [facility]   [facility] ([facility])    [address]  [place] [address]   [phone]              April 2018 Details    Sun Mon Tue Wed Thu Fri Sat     1               2               3               4               5               6               7                 8               9               10               11      4 mg   See details      12      4 mg         13      6 mg         14      4 mg           15      4 mg         16      6 mg         17      4 mg         18      4 mg         19      4 mg         20      6 mg         21      4 mg           22      4 mg         23      6 mg         24      4 mg         25      4 mg         26      4 mg         27      6 mg         28      4 mg           29      4 mg         30      6 mg               Date Details   04/11 This INR check               How to take your warfarin dose     To take:  4 mg Take 1 of the 4 mg tablets.    To take:  6 mg Take 1.5 of the 4 mg tablets.           May 2018 Details    Sun Mon Tue Wed Thu Fri Sat       1      4 mg         2      4 mg         3      4 mg         4      6 mg         5      4 mg           6      4 mg          7      6 mg         8      4 mg         9            10               11               12                 13               14               15               16               17               18               19                 20               21               22               23               24               25               26                 27               28               29               30               31                  Date Details   No additional details    Date of next INR:  5/9/2018         How to take your warfarin dose     To take:  4 mg Take 1 of the 4 mg tablets.    To take:  6 mg Take 1.5 of the 4 mg tablets.            Age appropriate/Speaking Coherently

## 2019-10-31 ENCOUNTER — ANTICOAGULATION THERAPY VISIT (OUTPATIENT)
Dept: ANTICOAGULATION | Facility: OTHER | Age: 74
End: 2019-10-31
Attending: INTERNAL MEDICINE
Payer: MEDICARE

## 2019-10-31 DIAGNOSIS — Z79.01 LONG TERM CURRENT USE OF ANTICOAGULANT THERAPY: ICD-10-CM

## 2019-10-31 DIAGNOSIS — I48.0 PAROXYSMAL ATRIAL FIBRILLATION (H): ICD-10-CM

## 2019-10-31 LAB — INR POINT OF CARE: 2.9 (ref 0.86–1.14)

## 2019-10-31 PROCEDURE — 85610 PROTHROMBIN TIME: CPT | Mod: QW,ZL

## 2019-11-20 NOTE — TELEPHONE ENCOUNTER
Last INR on 03/07/18:  3.1  Last office visit:  03/23/17.  Will be due for office visit in near future.  Attempted to call patient with no answer.  Will refill x one and send letter.  Prescription approved per [facility] Refill Protocol.     Oculoplastic Clinic New Patient    Patient: Rosario Anguiano MRN# [id number]   YOB: 1953 Age: 65 year old   Date of Visit: Nov 20, 2019    CC: Droopy eyelids obstructing vision.              HPI:     Chief Complaint(s) and History of Present Illness(es)     Droopy Eye Lid Evaluation     Laterality: right upper lid and left upper lid    Severity: moderate    Onset: chronic    Duration: years    Course: gradually worsening          Comments     Patient is referred by Dr. Aries Dobbs for eyelid evaluation. Patient   reports using ATs a few times a week for dry eyes.         Rosario Anguiano is a 65 year old female who has noted gradual onset of droopy eyelids over the past years. The droopy eyelid is interfering with activities of daily living including driving, and reading. The patient denies double vision, variability of the eyelid position, or dry eye symptoms.     EXAM:     MRD1: 2 right eye and 1.5 left eye   Dermatochalasis with excess skin touching eyelashes worse on the left due to brow ptosis  Brow ptosis with brow resting below superior orbital rim     VISUAL FIELD:  Right eye untaped:20 degrees Right eye taped:50 degrees  Left eye untaped:24 degrees Left eye taped:50 degrees    Assessment & Plan     Rosario Anguiano is a 65 year old female with the following diagnoses:   1. Dermatochalasis of both upper eyelids    2. Brow ptosis, bilateral         Both upper eyelid blepharoplasty (N&CF) and Internal browpexy both    ANTICOAGULATION:    Supplements (fish Oil, vitamin E): Fishoil    Can hold prior to surgery         PHOTOS DEMONSTRATE:      Significant dermatochalasis with lids resting on eyelashes and obstructing visual axis  Mild Blepharoptosis  Brow ptosis with thicker brow skin and hairs below the lateral superior orbital rim    Attending Physician Attestation:  Complete documentation of historical and exam elements from today's encounter can be found in the full encounter summary report (not  reduplicated in this progress note).  I personally obtained the chief complaint(s) and history of present illness.  I confirmed and edited as necessary the review of systems, past medical/surgical history, family history, social history, and examination findings as documented by others; and I examined the patient myself.  I personally reviewed the relevant tests, images, and reports as documented above.  I formulated and edited as necessary the assessment and plan and discussed the findings and management plan with the patient and family. - Jeb Harding MD        Today with Rosario Anguiano, I reviewed the indications, risks, benefits, and alternatives of the proposed surgical procedure including, but not limited to, failure obtain the desired result  and need for additional surgery, bleeding, infection, loss of vision, loss of the eye, and the remote possibility of permanent damage to any organ system or death with the use of anesthesia.  I provided multiple opportunities for the questions, answered all questions to the best of my ability, and confirmed that my answers and my discussion were understood.

## 2019-12-13 ENCOUNTER — ANTICOAGULATION THERAPY VISIT (OUTPATIENT)
Dept: ANTICOAGULATION | Facility: OTHER | Age: 74
End: 2019-12-13
Attending: INTERNAL MEDICINE
Payer: MEDICARE

## 2019-12-13 DIAGNOSIS — I48.0 PAROXYSMAL ATRIAL FIBRILLATION (H): ICD-10-CM

## 2019-12-13 DIAGNOSIS — Z79.01 LONG TERM CURRENT USE OF ANTICOAGULANT THERAPY: ICD-10-CM

## 2019-12-13 LAB — INR POINT OF CARE: 2.8 (ref 0.86–1.14)

## 2019-12-13 PROCEDURE — 85610 PROTHROMBIN TIME: CPT | Mod: QW,ZL

## 2019-12-13 NOTE — PROGRESS NOTES
ANTICOAGULATION FOLLOW-UP CLINIC VISIT    Patient Name:  Kg Ferraro  Date:  2019  Contact Type:  Face to Face    SUBJECTIVE:  Patient Findings         Clinical Outcomes     Negatives:   Major bleeding event, Thromboembolic event, Anticoagulation-related hospital admission, Anticoagulation-related ED visit, Anticoagulation-related fatality           OBJECTIVE    INR Protime   Date Value Ref Range Status   2019 2.8 (A) 0.86 - 1.14 Final       ASSESSMENT / PLAN  INR assessment THER    Recheck INR In: 6 WEEKS    INR Location Clinic      Anticoagulation Summary  As of 2019    INR goal:   2.0-3.0   TTR:   90.4 % (1 y)   INR used for dosin.8 (2019)   Warfarin maintenance plan:   4 mg (4 mg x 1) every day   Full warfarin instructions:   4 mg every day   Weekly warfarin total:   28 mg   No change documented:   Cheri Prater RN   Plan last modified:   Emani Pereira RN (10/19/2018)   Next INR check:   2020   Priority:   INR   Target end date:   Indefinite    Indications    Paroxysmal atrial fibrillation (H) [I48.0]  Long-term (current) use of anticoagulants [Z79.01] [Z79.01]             Anticoagulation Episode Summary     INR check location:       Preferred lab:       Send INR reminders to:    INR    Comments:         Anticoagulation Care Providers     Provider Role Specialty Phone number    Caleb Parker MD [facility] Internal Medicine [phone]            See the Encounter Report to view Anticoagulation Flowsheet and Dosing Calendar (Go to Encounters tab in chart review, and find the Anticoagulation Therapy Visit)        Cheri Prater RN

## 2020-01-27 ENCOUNTER — ANTICOAGULATION THERAPY VISIT (OUTPATIENT)
Dept: ANTICOAGULATION | Facility: OTHER | Age: 75
End: 2020-01-27
Attending: INTERNAL MEDICINE
Payer: MEDICARE

## 2020-01-27 DIAGNOSIS — I48.0 PAROXYSMAL ATRIAL FIBRILLATION (H): ICD-10-CM

## 2020-01-27 DIAGNOSIS — Z79.01 LONG TERM CURRENT USE OF ANTICOAGULANT THERAPY: ICD-10-CM

## 2020-01-27 LAB — INR POINT OF CARE: 2.7 (ref 0.86–1.14)

## 2020-01-27 PROCEDURE — 85610 PROTHROMBIN TIME: CPT | Mod: QW,ZL

## 2020-01-27 NOTE — PROGRESS NOTES
ANTICOAGULATION FOLLOW-UP CLINIC VISIT    Patient Name:  Kg Ferraro  Date:  2020  Contact Type:  Face to Face    SUBJECTIVE:  Patient Findings         Clinical Outcomes     Negatives:   Major bleeding event, Thromboembolic event, Anticoagulation-related hospital admission, Anticoagulation-related ED visit, Anticoagulation-related fatality           OBJECTIVE    INR Protime   Date Value Ref Range Status   2020 2.7 (A) 0.86 - 1.14 Final       ASSESSMENT / PLAN  INR assessment THER    Recheck INR In: 6 WEEKS    INR Location Clinic      Anticoagulation Summary  As of 2020    INR goal:   2.0-3.0   TTR:   90.4 % (1 y)   INR used for dosin.7 (2020)   Warfarin maintenance plan:   4 mg (4 mg x 1) every day   Full warfarin instructions:   4 mg every day   Weekly warfarin total:   28 mg   No change documented:   Emani Pereira RN   Plan last modified:   Emani Pereira RN (10/19/2018)   Next INR check:   3/9/2020   Priority:   Maintenance   Target end date:   Indefinite    Indications    Paroxysmal atrial fibrillation (H) [I48.0]  Long-term (current) use of anticoagulants [Z79.01] [Z79.01]             Anticoagulation Episode Summary     INR check location:       Preferred lab:       Send INR reminders to:    INR    Comments:         Anticoagulation Care Providers     Provider Role Specialty Phone number    Caleb Parker MD [facility] Internal Medicine [phone]            See the Encounter Report to view Anticoagulation Flowsheet and Dosing Calendar (Go to Encounters tab in chart review, and find the Anticoagulation Therapy Visit)        Emani Pereira RN

## 2020-03-20 ENCOUNTER — ANTICOAGULATION THERAPY VISIT (OUTPATIENT)
Dept: ANTICOAGULATION | Facility: OTHER | Age: 75
End: 2020-03-20
Attending: INTERNAL MEDICINE
Payer: MEDICARE

## 2020-03-20 DIAGNOSIS — I48.0 PAROXYSMAL ATRIAL FIBRILLATION (H): Primary | ICD-10-CM

## 2020-03-20 DIAGNOSIS — Z79.01 LONG TERM CURRENT USE OF ANTICOAGULANT THERAPY: ICD-10-CM

## 2020-03-20 LAB — INR POINT OF CARE: 2.7 (ref 0.86–1.14)

## 2020-03-20 PROCEDURE — 85610 PROTHROMBIN TIME: CPT | Mod: QW,ZL

## 2020-03-20 NOTE — PROGRESS NOTES
ANTICOAGULATION FOLLOW-UP CLINIC VISIT    Patient Name:  Kg Ferraro  Date:  3/20/2020  Contact Type:  Face to Face    SUBJECTIVE:  Patient Findings         Clinical Outcomes     Negatives:   Major bleeding event, Thromboembolic event, Anticoagulation-related hospital admission, Anticoagulation-related ED visit, Anticoagulation-related fatality           OBJECTIVE    INR Protime   Date Value Ref Range Status   2020 2.7 (A) 0.86 - 1.14 Final       ASSESSMENT / PLAN  INR assessment THER    Recheck INR In: 6 WEEKS    INR Location Clinic      Anticoagulation Summary  As of 3/20/2020    INR goal:   2.0-3.0   TTR:   90.4 % (1 y)   INR used for dosin.7 (3/20/2020)   Warfarin maintenance plan:   4 mg (4 mg x 1) every day   Full warfarin instructions:   4 mg every day   Weekly warfarin total:   28 mg   No change documented:   Cheri Prater RN   Plan last modified:   Emani Pereira RN (10/19/2018)   Next INR check:   2020   Priority:   Maintenance   Target end date:   Indefinite    Indications    Paroxysmal atrial fibrillation (H) [I48.0]  Long-term (current) use of anticoagulants [Z79.01] [Z79.01]             Anticoagulation Episode Summary     INR check location:       Preferred lab:       Send INR reminders to:    INR    Comments:         Anticoagulation Care Providers     Provider Role Specialty Phone number    Caleb Parker MD Referring Internal Medicine [phone]            See the Encounter Report to view Anticoagulation Flowsheet and Dosing Calendar (Go to Encounters tab in chart review, and find the Anticoagulation Therapy Visit)        Cheri Prater RN

## 2020-04-03 DIAGNOSIS — I48.91 ATRIAL FIBRILLATION (H): ICD-10-CM

## 2020-04-03 DIAGNOSIS — Z79.01 ANTICOAGULATION MONITORING, INR RANGE 2-3: ICD-10-CM

## 2020-04-03 RX ORDER — WARFARIN SODIUM 4 MG/1
TABLET ORAL
Qty: 90 TABLET | Refills: 1 | Status: SHIPPED | OUTPATIENT
Start: 2020-04-03 | End: 2020-09-08

## 2020-04-03 NOTE — TELEPHONE ENCOUNTER
Last INR 2.7:  03/20/2020.  Prescription approved per [facility] Refill Protocol.

## 2020-05-15 ENCOUNTER — ANTICOAGULATION THERAPY VISIT (OUTPATIENT)
Dept: ANTICOAGULATION | Facility: OTHER | Age: 75
End: 2020-05-15
Attending: INTERNAL MEDICINE
Payer: MEDICARE

## 2020-05-15 DIAGNOSIS — I48.0 PAROXYSMAL ATRIAL FIBRILLATION (H): ICD-10-CM

## 2020-05-15 DIAGNOSIS — Z79.01 LONG TERM CURRENT USE OF ANTICOAGULANT THERAPY: ICD-10-CM

## 2020-05-15 LAB — INR POINT OF CARE: 2.5 (ref 0.86–1.14)

## 2020-05-15 PROCEDURE — 36416 COLLJ CAPILLARY BLOOD SPEC: CPT | Mod: ZL

## 2020-05-15 NOTE — PROGRESS NOTES
ANTICOAGULATION FOLLOW-UP CLINIC VISIT    Patient Name:  Kg Ferraro  Date:  5/15/2020  Contact Type:  Face to Face    SUBJECTIVE:  Patient Findings         Clinical Outcomes     Negatives:   Major bleeding event, Thromboembolic event, Anticoagulation-related hospital admission, Anticoagulation-related ED visit, Anticoagulation-related fatality           OBJECTIVE    Recent labs: (last 7 days)     05/15/20   INR 2.5*       ASSESSMENT / PLAN  INR assessment THER    Recheck INR In: 8 WEEKS    INR Location Clinic      Anticoagulation Summary  As of 5/15/2020    INR goal:   2.0-3.0   TTR:   90.4 % (1 y)   INR used for dosin.5 (5/15/2020)   Warfarin maintenance plan:   4 mg (4 mg x 1) every day   Full warfarin instructions:   4 mg every day   Weekly warfarin total:   28 mg   Plan last modified:   Emani Pereira RN (10/19/2018)   Next INR check:   7/10/2020   Priority:   Maintenance   Target end date:   Indefinite    Indications    Paroxysmal atrial fibrillation (H) [I48.0]  Long-term (current) use of anticoagulants [Z79.01] [Z79.01]             Anticoagulation Episode Summary     INR check location:       Preferred lab:       Send INR reminders to:   [facility]    Comments:         Anticoagulation Care Providers     Provider Role Specialty Phone number    Caleb Parker MD Referring Internal Medicine [phone]            See the Encounter Report to view Anticoagulation Flowsheet and Dosing Calendar (Go to Encounters tab in chart review, and find the Anticoagulation Therapy Visit)        Cheri Prater RN

## 2020-05-20 ENCOUNTER — HOSPITAL ENCOUNTER (INPATIENT)
Facility: OTHER | Age: 75
LOS: 2 days | Discharge: HOME | DRG: 259 | End: 2020-05-22
Attending: FAMILY MEDICINE | Admitting: INTERNAL MEDICINE
Payer: MEDICARE

## 2020-05-20 DIAGNOSIS — Z79.01 LONG TERM (CURRENT) USE OF ANTICOAGULANTS: ICD-10-CM

## 2020-05-20 DIAGNOSIS — R00.1 BRADYCARDIA: Primary | ICD-10-CM

## 2020-05-20 DIAGNOSIS — Z45.018 PACEMAKER END OF LIFE: ICD-10-CM

## 2020-05-20 DIAGNOSIS — I48.20 CHRONIC ATRIAL FIBRILLATION (H): ICD-10-CM

## 2020-05-20 DIAGNOSIS — Z87.891 PERSONAL HISTORY OF TOBACCO USE, PRESENTING HAZARDS TO HEALTH: ICD-10-CM

## 2020-05-20 DIAGNOSIS — Z79.01 WARFARIN ANTICOAGULATION: ICD-10-CM

## 2020-05-20 DIAGNOSIS — Z95.0 CARDIAC PACEMAKER IN SITU: ICD-10-CM

## 2020-05-20 DIAGNOSIS — I10 ESSENTIAL (PRIMARY) HYPERTENSION: ICD-10-CM

## 2020-05-20 DIAGNOSIS — Z79.899 ENCOUNTER FOR LONG-TERM (CURRENT) USE OF OTHER MEDICATIONS: ICD-10-CM

## 2020-05-20 DIAGNOSIS — M06.9 RHEUMATOID ARTHRITIS, INVOLVING UNSPECIFIED SITE, UNSPECIFIED RHEUMATOID FACTOR PRESENCE: ICD-10-CM

## 2020-05-20 DIAGNOSIS — I49.5 SICK SINUS SYNDROME (H): ICD-10-CM

## 2020-05-20 PROBLEM — I48.0 PAROXYSMAL ATRIAL FIBRILLATION (H): Status: ACTIVE | Noted: 2018-02-11

## 2020-05-20 PROBLEM — I50.32 CHRONIC HEART FAILURE WITH PRESERVED EJECTION FRACTION (H): Status: ACTIVE | Noted: 2018-01-18

## 2020-05-20 LAB
ALBUMIN SERPL-MCNC: 3.4 G/DL (ref 3.5–5.7)
ALP SERPL-CCNC: 81 U/L (ref 34–104)
ALT SERPL W P-5'-P-CCNC: 27 U/L (ref 7–52)
ANION GAP SERPL CALCULATED.3IONS-SCNC: 9 MMOL/L (ref 3–14)
AST SERPL W P-5'-P-CCNC: 35 U/L (ref 13–39)
BASOPHILS # BLD AUTO: 0.1 10E9/L (ref 0–0.2)
BASOPHILS NFR BLD AUTO: 0.6 %
BILIRUB SERPL-MCNC: 0.7 MG/DL (ref 0.3–1)
BUN SERPL-MCNC: 42 MG/DL (ref 7–25)
CALCIUM SERPL-MCNC: 8.4 MG/DL (ref 8.6–10.3)
CHLORIDE SERPL-SCNC: 107 MMOL/L (ref 98–107)
CO2 SERPL-SCNC: 24 MMOL/L (ref 21–31)
CREAT SERPL-MCNC: 2.3 MG/DL (ref 0.7–1.3)
DIFFERENTIAL METHOD BLD: NORMAL
EOSINOPHIL # BLD AUTO: 0.2 10E9/L (ref 0–0.7)
EOSINOPHIL NFR BLD AUTO: 1.5 %
ERYTHROCYTE [DISTWIDTH] IN BLOOD BY AUTOMATED COUNT: 14.7 % (ref 10–15)
GFR SERPL CREATININE-BSD FRML MDRD: 28 ML/MIN/{1.73_M2}
GLUCOSE SERPL-MCNC: 128 MG/DL (ref 70–105)
HCT VFR BLD AUTO: 48.2 % (ref 40–53)
HGB BLD-MCNC: 15.5 G/DL (ref 13.3–17.7)
IMM GRANULOCYTES # BLD: 0.1 10E9/L (ref 0–0.4)
IMM GRANULOCYTES NFR BLD: 0.7 %
INR PPP: 2.77 (ref 0–1.3)
LYMPHOCYTES # BLD AUTO: 1.9 10E9/L (ref 0.8–5.3)
LYMPHOCYTES NFR BLD AUTO: 19.6 %
MCH RBC QN AUTO: 31.3 PG (ref 26.5–33)
MCHC RBC AUTO-ENTMCNC: 32.2 G/DL (ref 31.5–36.5)
MCV RBC AUTO: 97 FL (ref 78–100)
MONOCYTES # BLD AUTO: 1.1 10E9/L (ref 0–1.3)
MONOCYTES NFR BLD AUTO: 11.3 %
NEUTROPHILS # BLD AUTO: 6.5 10E9/L (ref 1.6–8.3)
NEUTROPHILS NFR BLD AUTO: 66.3 %
PLATELET # BLD AUTO: 179 10E9/L (ref 150–450)
POTASSIUM SERPL-SCNC: 5.5 MMOL/L (ref 3.5–5.1)
PROT SERPL-MCNC: 6.6 G/DL (ref 6.4–8.9)
RBC # BLD AUTO: 4.95 10E12/L (ref 4.4–5.9)
SARS-COV-2 PCR COMMENT: NORMAL
SARS-COV-2 RNA SPEC QL NAA+PROBE: NEGATIVE
SARS-COV-2 RNA SPEC QL NAA+PROBE: NORMAL
SODIUM SERPL-SCNC: 140 MMOL/L (ref 134–144)
SPECIMEN SOURCE: NORMAL
SPECIMEN SOURCE: NORMAL
TROPONIN I SERPL-MCNC: 12.3 PG/ML
WBC # BLD AUTO: 9.8 10E9/L (ref 4–11)

## 2020-05-20 PROCEDURE — 99285 EMERGENCY DEPT VISIT HI MDM: CPT | Mod: Z6 | Performed by: FAMILY MEDICINE

## 2020-05-20 PROCEDURE — 96365 THER/PROPH/DIAG IV INF INIT: CPT | Performed by: FAMILY MEDICINE

## 2020-05-20 PROCEDURE — 80053 COMPREHEN METABOLIC PANEL: CPT | Performed by: FAMILY MEDICINE

## 2020-05-20 PROCEDURE — 36415 COLL VENOUS BLD VENIPUNCTURE: CPT | Performed by: FAMILY MEDICINE

## 2020-05-20 PROCEDURE — 99285 EMERGENCY DEPT VISIT HI MDM: CPT | Mod: 25 | Performed by: FAMILY MEDICINE

## 2020-05-20 PROCEDURE — 99223 1ST HOSP IP/OBS HIGH 75: CPT | Mod: AI | Performed by: INTERNAL MEDICINE

## 2020-05-20 PROCEDURE — 93005 ELECTROCARDIOGRAM TRACING: CPT | Performed by: FAMILY MEDICINE

## 2020-05-20 PROCEDURE — 87635 SARS-COV-2 COVID-19 AMP PRB: CPT | Performed by: FAMILY MEDICINE

## 2020-05-20 PROCEDURE — 25000128 H RX IP 250 OP 636: Performed by: INTERNAL MEDICINE

## 2020-05-20 PROCEDURE — 84484 ASSAY OF TROPONIN QUANT: CPT | Performed by: FAMILY MEDICINE

## 2020-05-20 PROCEDURE — 25000128 H RX IP 250 OP 636: Performed by: FAMILY MEDICINE

## 2020-05-20 PROCEDURE — 85610 PROTHROMBIN TIME: CPT | Performed by: FAMILY MEDICINE

## 2020-05-20 PROCEDURE — 20000006 ZZH R&B ICU - GICH

## 2020-05-20 PROCEDURE — 85025 COMPLETE CBC W/AUTO DIFF WBC: CPT | Performed by: FAMILY MEDICINE

## 2020-05-20 PROCEDURE — 93010 ELECTROCARDIOGRAM REPORT: CPT | Performed by: INTERNAL MEDICINE

## 2020-05-20 PROCEDURE — 96361 HYDRATE IV INFUSION ADD-ON: CPT | Performed by: FAMILY MEDICINE

## 2020-05-20 PROCEDURE — 25800030 ZZH RX IP 258 OP 636: Performed by: INTERNAL MEDICINE

## 2020-05-20 RX ORDER — AMOXICILLIN 250 MG
1 CAPSULE ORAL 2 TIMES DAILY PRN
Status: DISCONTINUED | OUTPATIENT
Start: 2020-05-20 | End: 2020-05-22 | Stop reason: HOSPADM

## 2020-05-20 RX ORDER — AMOXICILLIN 250 MG
2 CAPSULE ORAL 2 TIMES DAILY PRN
Status: DISCONTINUED | OUTPATIENT
Start: 2020-05-20 | End: 2020-05-22 | Stop reason: HOSPADM

## 2020-05-20 RX ORDER — ONDANSETRON 4 MG/1
4 TABLET, ORALLY DISINTEGRATING ORAL EVERY 6 HOURS PRN
Status: DISCONTINUED | OUTPATIENT
Start: 2020-05-20 | End: 2020-05-22 | Stop reason: HOSPADM

## 2020-05-20 RX ORDER — ACETAMINOPHEN 325 MG/1
650 TABLET ORAL EVERY 4 HOURS PRN
Status: DISCONTINUED | OUTPATIENT
Start: 2020-05-20 | End: 2020-05-22 | Stop reason: HOSPADM

## 2020-05-20 RX ORDER — ALLOPURINOL 100 MG/1
100 TABLET ORAL 2 TIMES DAILY
Status: DISCONTINUED | OUTPATIENT
Start: 2020-05-21 | End: 2020-05-22 | Stop reason: HOSPADM

## 2020-05-20 RX ORDER — ONDANSETRON 2 MG/ML
4 INJECTION INTRAMUSCULAR; INTRAVENOUS EVERY 6 HOURS PRN
Status: DISCONTINUED | OUTPATIENT
Start: 2020-05-20 | End: 2020-05-22 | Stop reason: HOSPADM

## 2020-05-20 RX ORDER — PROCHLORPERAZINE MALEATE 5 MG
5 TABLET ORAL EVERY 6 HOURS PRN
Status: DISCONTINUED | OUTPATIENT
Start: 2020-05-20 | End: 2020-05-22 | Stop reason: HOSPADM

## 2020-05-20 RX ORDER — LIDOCAINE 40 MG/G
CREAM TOPICAL
Status: DISCONTINUED | OUTPATIENT
Start: 2020-05-20 | End: 2020-05-22 | Stop reason: HOSPADM

## 2020-05-20 RX ORDER — PROCHLORPERAZINE 25 MG
12.5 SUPPOSITORY, RECTAL RECTAL EVERY 12 HOURS PRN
Status: DISCONTINUED | OUTPATIENT
Start: 2020-05-20 | End: 2020-05-22 | Stop reason: HOSPADM

## 2020-05-20 RX ORDER — CALCIUM GLUCONATE 94 MG/ML
1 INJECTION, SOLUTION INTRAVENOUS ONCE
Status: COMPLETED | OUTPATIENT
Start: 2020-05-20 | End: 2020-05-20

## 2020-05-20 RX ORDER — NALOXONE HYDROCHLORIDE 0.4 MG/ML
.1-.4 INJECTION, SOLUTION INTRAMUSCULAR; INTRAVENOUS; SUBCUTANEOUS
Status: DISCONTINUED | OUTPATIENT
Start: 2020-05-20 | End: 2020-05-22 | Stop reason: HOSPADM

## 2020-05-20 RX ORDER — HYDRALAZINE HYDROCHLORIDE 20 MG/ML
10-20 INJECTION INTRAMUSCULAR; INTRAVENOUS EVERY 4 HOURS PRN
Status: DISCONTINUED | OUTPATIENT
Start: 2020-05-20 | End: 2020-05-22 | Stop reason: HOSPADM

## 2020-05-20 RX ORDER — SODIUM CHLORIDE 9 MG/ML
INJECTION, SOLUTION INTRAVENOUS CONTINUOUS
Status: DISCONTINUED | OUTPATIENT
Start: 2020-05-20 | End: 2020-05-22

## 2020-05-20 RX ADMIN — HYDRALAZINE HYDROCHLORIDE 10 MG: 20 INJECTION INTRAMUSCULAR; INTRAVENOUS at 19:16

## 2020-05-20 RX ADMIN — SODIUM CHLORIDE: 9 INJECTION, SOLUTION INTRAVENOUS at 19:29

## 2020-05-20 RX ADMIN — CALCIUM GLUCONATE 1 G: 98 INJECTION, SOLUTION INTRAVENOUS at 16:20

## 2020-05-20 RX ADMIN — SODIUM CHLORIDE 1000 ML: 9 INJECTION, SOLUTION INTRAVENOUS at 16:41

## 2020-05-20 ASSESSMENT — ENCOUNTER SYMPTOMS
SHORTNESS OF BREATH: 0
BACK PAIN: 0
ADENOPATHY: 0
WOUND: 0
BRUISES/BLEEDS EASILY: 0
CHEST TIGHTNESS: 0
CONFUSION: 0
ABDOMINAL PAIN: 0
HEMATURIA: 0
CHILLS: 0
FEVER: 0

## 2020-05-20 ASSESSMENT — ACTIVITIES OF DAILY LIVING (ADL): ADLS_ACUITY_SCORE: 17

## 2020-05-20 ASSESSMENT — MIFFLIN-ST. JEOR
SCORE: 1680.63
SCORE: 1817.62

## 2020-05-20 NOTE — H&P
[facility]    History and Physical  Hospitalist       Date of Admission:  5/20/2020    Assessment & Plan   Kg Ferraro is a 75 year old male who presents with bradycardia with slow ventricular response and failed pacemaker.    Principal Problem:    Sick sinus syndrome (H)    Assessment: Status post pacemaker with [employer] device in 2008.  After discussion with [employer] device coordinators at [facility] unable to interrogate the device due to a completely failed generator.  Given his stable hemodynamics and likelihood that pacemaker has been completely off for a prolonged period, discussed options with patient regarding staying at [facility] for generator change in a.m. versus emergent transfer to tertiary care center for generator change in the event of acute decompensation of his heart rate.  Patient has opted to stay at [facility] with full knowledge of our limitations with generator change in a.m. as noted by Dr. Brewster.    Plan:   -Telemetry in ICU overnight  -Appreciate general surgery consult for generator change in a.m.    Active Problems:    Chronic heart failure with preserved ejection fraction (H)    Assessment: Chronic and stable without evidence of decompensation    Plan:   -Strict I's and O's and daily weights      CKD (chronic kidney disease) stage 3, GFR 30-59 ml/min (H)    Assessment: Acute on chronic with baseline creatinine of 1.5.  Likely prerenal given poor p.o. intake.    Plan:   -1 L IV fluid bolus in ER  -Maintenance fluids overnight  -Repeat BMP in a.m.  -Avoid NSAIDs and nephrotoxins      Paroxysmal atrial fibrillation (H)    Assessment: Chronic, currently rate controlled and chronically anticoagulated.    Plan:   -Hold home metoprolol  -Hold home flecainide for 2 doses and will not need reloading when starting tomorrow evening  -Hold Coumadin dose tonight per general surgery preference  -Recheck INR in a.m.  -Will not actively reverse after  discussion with general surgery      Essential hypertension    Assessment: Stable    Plan:   -Hold home regimen as above    FEN: npo at 5am diet, normal saline at 100 mL/hr, mg/k replacement protocol  PPX:  coumadin    Code Status: No Order    Luis Arroyo    Primary Care Physician   Caleb Parker    Chief Complaint   palpitations    History is obtained from the patient and chart review.    History of Present Illness   Kg Ferraro is a 75 year old male who presents with palpitations.  Patient is been in his normal state of health but has developed increasing palpitations over the last number of days.  He is also felt lightheaded with rapid changes in body position.  He presented to the ER today, there was noted to have atrial fibrillation with rates in the 40s but stable blood pressures.  Patient stated that his prior low limit on his pacer was set in the 50s and was noted that he had no appreciable pacer spikes on telemetry.  Device interrogation was attempted but unsuccessful and [employer] was contacted and device placement was confirmed in 2008 both by patient and [employer].  Given this it was felt that his generator has completely failed and requires replacement.    Patient received calcium gluconate in the ER and subsequently admitted to the intensive care unit for close monitoring until generator change in a.m.    Past Medical History    I have reviewed this patient's medical history and updated it with pertinent information if needed.   Past Medical History:   Diagnosis Date     Acute kidney failure (H)     No Comments Provided     Acute kidney failure (H)     No Comments Provided     Atrial fibrillation (H)     3/8/2012     Atrioventricular block, complete (H)     No Comments Provided     Calculus of kidney     No Comments Provided     Essential (primary) hypertension     No Comments Provided     Generalized anxiety disorder     5/11/2010     Hematuria     7/10/2012     Osteoarthritis     9/14/2010      Osteoarthritis of hip     5/11/2010     Presence of cardiac pacemaker     9/3/2008     Rheumatoid arthritis (H)     5/19/2011     Sick sinus syndrome (H)     No Comments Provided     Supraventricular tachycardia (H)     No Comments Provided       Past Surgical History   I have reviewed this patient's surgical history and updated it with pertinent information if needed.  Past Surgical History:   Procedure Laterality Date     ARTHROPLASTY HIP      12/1/2010,right hip replacement,  [place]     OTHER SURGICAL HISTORY      97212,NASAL SURGERY,Repeated nasoseptal repair     OTHER SURGICAL HISTORY      940616,OTHER,Ureteral stents for stones     OTHER SURGICAL HISTORY      2003,600000,OTHER,Catheter ablation for atrial fibrillation, failed       Prior to Admission Medications   Prior to Admission Medications   Prescriptions Last Dose Informant Patient Reported? Taking?   Blood Pressure Monitoring (RA BLOOD PRESSURE CUFF MONITOR) MISC Unknown at Unknown time  Yes No   Sig: Medium Cuff   allopurinol (ZYLOPRIM) 100 MG tablet 5/20/2020 at Unknown time  No Yes   Sig: Take 1 tablet (100 mg) by mouth 2 times daily   flecainide (TAMBOCOR) 150 MG tablet 5/20/2020 at Unknown time  No Yes   Sig: Take 1 tablet (150 mg) by mouth 2 times daily   metoprolol succinate ER (TOPROL-XL) 100 MG 24 hr tablet 5/20/2020 at Unknown time  No Yes   Sig: TAKE 1 TABLET (100 MG) BY MOUTH IN THE MORNING AND 2 TABLETS (200 MG) IN THE EVENING   potassium chloride ER (K-DUR/KLOR-CON M) 20 MEQ CR tablet 5/19/2020 at Unknown time  No Yes   Sig: Take 1 tablet (20 mEq) by mouth daily   simvastatin (ZOCOR) 40 MG tablet 5/19/2020 at Unknown time  No Yes   Sig: Take 1 tablet (40 mg) by mouth daily   warfarin ANTICOAGULANT (COUMADIN) 4 MG tablet 5/19/2020 at Unknown time  No Yes   Sig: TAKE 1 TABLET BY MOUTH ONCE DAILY OR  AS  DIRECTED  BY  [facility]      Facility-Administered Medications: None     Allergies   Allergies   Allergen Reactions     Penicillins  Hives     Rash or hives - can't remember       Social History   I have reviewed this patient's social history and updated it with pertinent information if needed. Kg Ferraro  reports that he quit smoking about 40 years ago. His smoking use included cigarettes. He smoked 1.00 pack per day. He quit smokeless tobacco use about 40 years ago. He reports current alcohol use. He reports that he does not use drugs.    Family History   I have reviewed this patient's family history and updated it with pertinent information if needed.   Family History   Problem Relation Age of Onset     Heart Disease Mother         Heart Disease,heart failure.     Heart Disease Father         Heart Disease,heart failure.       Review of Systems     REVIEW OF SYSTEMS:    Constitutional: normal energy and appetite, no recent sick contacts, he felt feverish yesterday but he thinks his fevers broke and he is been back to his baseline since that time.  Eyes: no changes in vision  Ears, nose, mouth, throat, and face: no mouth sores, dysphagia, or odynophagia  Respiratory: no shortness of breath, cough, or wheezing.   Cardiovascular: no chest pain, orthopnea, increased lower extremity edema, or syncope.   Gastrointestinal: no constipation, diarrhea, nausea, vomiting or abdominal pain.  Genitourinary: no dysuria, hematuria, urgency or frequency.   Musculoskeletal: no pain to extremities or falls.   Endocrine: not a known diabetic.     Physical Exam   Temp: 96  F (35.6  C) Temp src: Oral BP: (!) 185/85 Pulse: (!) 49 Heart Rate: (!) 4 Resp: 20 SpO2: 97 % O2 Device: None (Room air)    Vital Signs with Ranges  Temp:  [96  F (35.6  C)-97.1  F (36.2  C)] 96  F (35.6  C)  Pulse:  [42-49] 49  Heart Rate:  [4-47] 4  Resp:  [10-27] 20  BP: (123-203)/() 185/85  SpO2:  [95 %-97 %] 97 %  219 lbs 12.78 oz    Exam:  GENERAL: Talkative, in no apparent distress.  Head: normocephalic and atraumatic  Eyes: anicteric and non-injected sclera  Nose: no  rhinorrhea or epistaxis.   Throat: moist mucous membranes with no active oral lesions.  NECK: Supple, jugular venous distension not present.  CARDIOVASCULAR: Irregularly irregular rhythm with bradycardic rate, no murmurs, rubs, or gallops. Normal S1/S2. No lower extremity edema.   RESPIRATORY: clear to auscultation bilaterally, no wheezes, no crackles.    GI: soft, non-tender, non-distended, normoactive bowel sounds.  MUSCULOSKELETAL: warm and well perfused, 2+ dorsalis pedis pulses.    SKIN: no pallor, jaundice or rashes.  NEUROLOGY: AAOx3, follows commands, speech and language without focal deficits.     Data   Data reviewed today:  I personally reviewed the EKG tracing showing Irregular Sj irregular rate and rhythm with narrow QRS consistent with atrial fibrillation with slow ventricular response, no other ST-T wave inversions elevations or depressions..  Recent Labs   Lab 05/20/20  1520 05/15/20   WBC 9.8  --    HGB 15.5  --    MCV 97  --      --    INR 2.77* 2.5*     --    POTASSIUM 5.5*  --    CHLORIDE 107  --    CO2 24  --    BUN 42*  --    CR 2.30*  --    ANIONGAP 9  --    GAGE 8.4*  --    *  --    ALBUMIN 3.4*  --    PROTTOTAL 6.6  --    BILITOTAL 0.7  --    ALKPHOS 81  --    ALT 27  --    AST 35  --        No results found for this or any previous visit (from the past 24 hour(s)).

## 2020-05-20 NOTE — PROGRESS NOTES
Arranged for [employer] Rep Clay Pierce-phone [phone] to be at [facility] for generator change around 3 on 5/21/20. Discussed with Dr. Galeana-he will be available for the gen change. Catrina STEVENS RN OR director is aware of the patient as well. Patient with sick sinus and a-fib. Is on coumadin. Will avoid reversal and just hold coumadin today and recheck inr in am. Npo after 6 am for planned procedure. Patient is COVID negative.

## 2020-05-20 NOTE — PROGRESS NOTES
"    Patient admitted to room 916 at approximately 1815 via bed from emergency room. Patient was accompanied by nurse.     Verbal SBAR report received from Vanessa SANTOS prior to patient arrival.     Patient trasferred to bed via self. Patient alert and oriented X 4. The patient is not having any pain. 0-10 Pain Scale: 0. Admission vital signs: Blood pressure (!) 185/85, pulse (!) 49, temperature 96  F (35.6  C), temperature source Oral, resp. rate 20, height 1.702 m (5' 7\"), weight 99.7 kg (219 lb 12.8 oz), SpO2 97 %. Patient was oriented to plan of care, tv, telephone.     Risk Assessment    The following safety risks were identified during admission: fall. Yellow risk band applied: YES.                Chandni Charles RN    "

## 2020-05-20 NOTE — PHARMACY-ADMISSION MEDICATION HISTORY
Pharmacy -- Admission Medication Reconciliation IN PROGRESS 24 hour note    Prior to admission (PTA) medications were reviewed and the patient's PTA medication list was updated.    Sources Consulted: [employer], anticoagulation notes  Patient still getting settled    The pharmacist will continue to gather information in morning.    Suzanne Mares, [facility], 5/20/2020,  6:45 PM

## 2020-05-20 NOTE — ED PROVIDER NOTES
History     Chief Complaint   Patient presents with     Hypotension     Generalized Weakness     Bradycardia     HPI  Kg Ferraro is a 75 year old male who comes into the emergency department with a report of low blood pressure after being seen by a nurse in the [place] clinic.  Very weak upon arrival with low heart rate but a low blood pressure was not seen.  He does not endorse any chest pain nausea vomiting or fevers.  History of a pacemaker that he states he does not think is working nor is he had it checked in quite some time.  Sees Dr. Clay Knox, is unsure of what cardiologist he sees.  Reviewed nurses notes below, similar history is related to me.  He did tell the nurse that he had a brief episode of his right arm not working very well early this morning but that is resolved now.  No headache visual changes or strokelike symptoms.  Patient presents from clinic due to reports of low BP and low. Patient also reports feeling more weak, legs are giving out and patient notes R arm not working right. States he noticed these symptoms yesterday. Patient almost fell getting out of vehicle due to legs giving out. BP stable upon initial assessment, HR 40s. Has pacemaker.  Allergies:  Allergies   Allergen Reactions     Penicillins Hives     Rash or hives - can't remember       Problem List:    Patient Active Problem List    Diagnosis Date Noted     Hyperuricemia 09/18/2019     Priority: Medium     Essential hypertension 07/03/2018     Priority: Medium     Long-term (current) use of anticoagulants [Z79.01] 02/28/2018     Priority: Medium     Paroxysmal atrial fibrillation (H) 02/11/2018     Priority: Medium     AV block, 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     Priority: Medium     Overview:   paced       Chronic heart failure with preserved ejection fraction (H) 01/18/2018     Priority: Medium     Mixed hyperlipidemia 01/18/2018     Priority: Medium     Paroxysmal supraventricular tachycardia (H) 01/18/2018     Priority:  Medium     Renal calculus 01/18/2018     Priority: Medium     Sick sinus syndrome (H) 01/18/2018     Priority: Medium     CKD (chronic kidney disease) stage 3, GFR 30-59 ml/min (H) 01/15/2016     Priority: Medium     Health care maintenance 01/15/2016     Priority: Medium     Anticoagulation monitoring, INR range 2-3 08/07/2015     Priority: Medium     Biatrial enlargement 04/10/2014     Priority: Medium     LVH (left ventricular hypertrophy) 04/10/2014     Priority: Medium     Bilateral leg edema 12/23/2013     Priority: Medium     Warfarin anticoagulation 12/23/2013     Priority: Medium     Hematuria 07/10/2012     Priority: Medium     Rheumatoid arthritis (H) 05/19/2011     Priority: Medium     Overview:   Acute       Degenerative arthritis 09/14/2010     Priority: Medium     Anxiety state 05/11/2010     Priority: Medium     Osteoarthrosis, unspecified whether generalized or localized, pelvic region and thigh 05/11/2010     Priority: Medium     Cardiac pacemaker in situ 09/03/2008     Priority: Medium     Overview:   [employer]          Past Medical History:    Past Medical History:   Diagnosis Date     Acute kidney failure (H)      Acute kidney failure (H)      Atrial fibrillation (H)      Atrioventricular block, complete (H)      Calculus of kidney      Essential (primary) hypertension      Generalized anxiety disorder      Hematuria      Osteoarthritis      Osteoarthritis of hip      Presence of cardiac pacemaker      Rheumatoid arthritis (H)      Sick sinus syndrome (H)      Supraventricular tachycardia (H)        Past Surgical History:    Past Surgical History:   Procedure Laterality Date     ARTHROPLASTY HIP      12/1/2010,right hip replacement,  [facility]     OTHER SURGICAL HISTORY      56783,NASAL SURGERY,Repeated nasoseptal repair     OTHER SURGICAL HISTORY      [id number],OTHER,Ureteral stents for stones     OTHER SURGICAL HISTORY      2003,571071,OTHER,Catheter ablation for atrial fibrillation, failed  "      Family History:    Family History   Problem Relation Age of Onset     Heart Disease Mother         Heart Disease,heart failure.     Heart Disease Father         Heart Disease,heart failure.       Social History:  Marital Status:   [5]  Social History     Tobacco Use     Smoking status: Former Smoker     Packs/day: 1.00     Types: Cigarettes     Last attempt to quit: 1980     Years since quittin.4     Smokeless tobacco: Former User     Quit date: 1980   Substance Use Topics     Alcohol use: Yes     Alcohol/week: 0.0 standard drinks     Comment: 1-2 beers per month     Drug use: Never        Medications:    allopurinol (ZYLOPRIM) 100 MG tablet  flecainide (TAMBOCOR) 150 MG tablet  metoprolol succinate ER (TOPROL-XL) 100 MG 24 hr tablet  potassium chloride ER (K-DUR/KLOR-CON M) 20 MEQ CR tablet  simvastatin (ZOCOR) 40 MG tablet  warfarin ANTICOAGULANT (COUMADIN) 4 MG tablet  Blood Pressure Monitoring (RA BLOOD PRESSURE CUFF MONITOR) MISC          Review of Systems   Constitutional: Negative for chills and fever.   HENT: Negative for congestion.    Eyes: Negative for visual disturbance.   Respiratory: Negative for chest tightness and shortness of breath.    Cardiovascular: Negative for chest pain.   Gastrointestinal: Negative for abdominal pain.   Genitourinary: Negative for hematuria.   Musculoskeletal: Negative for back pain.   Skin: Negative for rash and wound.   Neurological: Negative for syncope.   Hematological: Negative for adenopathy. Does not bruise/bleed easily.   Psychiatric/Behavioral: Negative for confusion.       Physical Exam   BP: 123/89  Pulse: (!) 44  Heart Rate: (!) 45  Temp: 97.1  F (36.2  C)  Resp: 16  Height: 170.2 cm (5' 7\")  Weight: 113.4 kg (250 lb)  SpO2: 97 %      Physical Exam  Vitals signs and nursing note reviewed.   Constitutional:       General: He is not in acute distress.     Appearance: He is not diaphoretic.   HENT:      Head: Atraumatic.   Eyes:      " General: No scleral icterus.     Pupils: Pupils are equal, round, and reactive to light.   Cardiovascular:      Rate and Rhythm: Bradycardia present.      Heart sounds: Normal heart sounds.   Pulmonary:      Effort: No respiratory distress.      Breath sounds: Normal breath sounds.   Abdominal:      General: Bowel sounds are normal.      Palpations: Abdomen is soft.      Tenderness: There is no abdominal tenderness.   Musculoskeletal:         General: No tenderness.   Skin:     General: Skin is warm.      Findings: No rash.         ED Course   When I noticed his mildly elevated potassium I did order calcium due to the bradycardia     Procedures          EKG: Rate in the 40s, slow A. Fib      Results for orders placed or performed during the hospital encounter of 05/20/20 (from the past 24 hour(s))   CBC with platelets differential   Result Value Ref Range    WBC 9.8 4.0 - 11.0 10e9/L    RBC Count 4.95 4.4 - 5.9 10e12/L    Hemoglobin 15.5 13.3 - 17.7 g/dL    Hematocrit 48.2 40.0 - 53.0 %    MCV 97 78 - 100 fl    MCH 31.3 26.5 - 33.0 pg    MCHC 32.2 31.5 - 36.5 g/dL    RDW 14.7 10.0 - 15.0 %    Platelet Count 179 150 - 450 10e9/L    Diff Method Automated Method     % Neutrophils 66.3 %    % Lymphocytes 19.6 %    % Monocytes 11.3 %    % Eosinophils 1.5 %    % Basophils 0.6 %    % Immature Granulocytes 0.7 %    Absolute Neutrophil 6.5 1.6 - 8.3 10e9/L    Absolute Lymphocytes 1.9 0.8 - 5.3 10e9/L    Absolute Monocytes 1.1 0.0 - 1.3 10e9/L    Absolute Eosinophils 0.2 0.0 - 0.7 10e9/L    Absolute Basophils 0.1 0.0 - 0.2 10e9/L    Abs Immature Granulocytes 0.1 0 - 0.4 10e9/L   Comprehensive metabolic panel   Result Value Ref Range    Sodium 140 134 - 144 mmol/L    Potassium 5.5 (H) 3.5 - 5.1 mmol/L    Chloride 107 98 - 107 mmol/L    Carbon Dioxide 24 21 - 31 mmol/L    Anion Gap 9 3 - 14 mmol/L    Glucose 128 (H) 70 - 105 mg/dL    Urea Nitrogen 42 (H) 7 - 25 mg/dL    Creatinine 2.30 (H) 0.70 - 1.30 mg/dL    GFR Estimate 28  (L) >60 mL/min/[1.73_m2]    GFR Estimate If Black 34 (L) >60 mL/min/[1.73_m2]    Calcium 8.4 (L) 8.6 - 10.3 mg/dL    Bilirubin Total 0.7 0.3 - 1.0 mg/dL    Albumin 3.4 (L) 3.5 - 5.7 g/dL    Protein Total 6.6 6.4 - 8.9 g/dL    Alkaline Phosphatase 81 34 - 104 U/L    ALT 27 7 - 52 U/L    AST 35 13 - 39 U/L   Troponin GH   Result Value Ref Range    Troponin 12.3 <34.0 pg/mL   INR   Result Value Ref Range    INR 2.77 (H) 0 - 1.3   Asymptomatic COVID-19 Virus (Coronavirus) by PCR    Specimen: Nasopharyngeal   Result Value Ref Range    COVID-19 Virus PCR to [facility] - Source Nasopharyngeal     COVID-19 Virus PCR to [facility] - Result       Test received-See reflex to [facility] test SARS CoV2 (COVID-19) Virus RT-PCR       Medications   0.9% sodium chloride BOLUS (1,000 mLs Intravenous New Bag 5/20/20 1641)   hydrALAZINE (APRESOLINE) injection 10-20 mg (has no administration in time range)   calcium gluconate 10 % injection 1 g (0 g Intravenous Stopped 5/20/20 1639)       Assessments & Plan (with Medical Decision Making)     I have reviewed the nursing notes.    I have reviewed the findings, diagnosis, plan and need for follow up with the patient.    New Prescriptions    No medications on file       Final diagnoses:   Bradycardia   Chronic atrial fibrillation   Cardiac pacemaker in situ     Patient stable over the course of his ER stay.  I discussed with Dr. Orr who came down to evaluate the patient.  We were able to interrogate the pacemaker due to need a generator.  Able to get the device by tomorrow and our surgeon excepted patient as well so were able to keep him here admit to the ICU on telemetry.  5/20/2020   [facility]     Samuel Morillo MD  05/20/20 0797

## 2020-05-20 NOTE — ED TRIAGE NOTES
Patient presents from clinic due to reports of low BP and low. Patient also reports feeling more weak, legs are giving out and patient notes R arm not working right. States he noticed these symptoms yesterday. Patient almost fell getting out of vehicle due to legs giving out. BP stable upon initial assessment, HR 40s. Has pacemaker. Vanessa Reilly RN on 5/20/2020 at 2:48 PM

## 2020-05-21 ENCOUNTER — DOCUMENTATION ONLY (OUTPATIENT)
Dept: OTHER | Facility: CLINIC | Age: 75
End: 2020-05-21

## 2020-05-21 ENCOUNTER — APPOINTMENT (OUTPATIENT)
Dept: GENERAL RADIOLOGY | Facility: OTHER | Age: 75
DRG: 259 | End: 2020-05-21
Attending: SPECIALIST
Payer: MEDICARE

## 2020-05-21 ENCOUNTER — ANESTHESIA (OUTPATIENT)
Dept: SURGERY | Facility: OTHER | Age: 75
DRG: 259 | End: 2020-05-21
Payer: MEDICARE

## 2020-05-21 ENCOUNTER — ANESTHESIA EVENT (OUTPATIENT)
Dept: SURGERY | Facility: OTHER | Age: 75
DRG: 259 | End: 2020-05-21
Payer: MEDICARE

## 2020-05-21 ENCOUNTER — APPOINTMENT (OUTPATIENT)
Dept: GENERAL RADIOLOGY | Facility: OTHER | Age: 75
DRG: 259 | End: 2020-05-21
Attending: SURGERY
Payer: MEDICARE

## 2020-05-21 PROBLEM — Z45.018 PACEMAKER END OF LIFE: Status: ACTIVE | Noted: 2020-05-20

## 2020-05-21 PROBLEM — I48.20 CHRONIC ATRIAL FIBRILLATION (H): Status: ACTIVE | Noted: 2020-05-20

## 2020-05-21 LAB
ANION GAP SERPL CALCULATED.3IONS-SCNC: 7 MMOL/L (ref 3–14)
BUN SERPL-MCNC: 40 MG/DL (ref 7–25)
CALCIUM SERPL-MCNC: 8.9 MG/DL (ref 8.6–10.3)
CHLORIDE SERPL-SCNC: 109 MMOL/L (ref 98–107)
CO2 SERPL-SCNC: 24 MMOL/L (ref 21–31)
CREAT SERPL-MCNC: 1.72 MG/DL (ref 0.7–1.3)
ERYTHROCYTE [DISTWIDTH] IN BLOOD BY AUTOMATED COUNT: 14.6 % (ref 10–15)
GFR SERPL CREATININE-BSD FRML MDRD: 39 ML/MIN/{1.73_M2}
GLUCOSE SERPL-MCNC: 103 MG/DL (ref 70–105)
HCT VFR BLD AUTO: 45.1 % (ref 40–53)
HGB BLD-MCNC: 14.3 G/DL (ref 13.3–17.7)
INR PPP: 2.71 (ref 0–1.3)
INTERPRETATION ECG - MUSE: NORMAL
MCH RBC QN AUTO: 30.9 PG (ref 26.5–33)
MCHC RBC AUTO-ENTMCNC: 31.7 G/DL (ref 31.5–36.5)
MCV RBC AUTO: 97 FL (ref 78–100)
PLATELET # BLD AUTO: 154 10E9/L (ref 150–450)
POTASSIUM SERPL-SCNC: 4.6 MMOL/L (ref 3.5–5.1)
RBC # BLD AUTO: 4.63 10E12/L (ref 4.4–5.9)
SODIUM SERPL-SCNC: 140 MMOL/L (ref 134–144)
WBC # BLD AUTO: 8.5 10E9/L (ref 4–11)

## 2020-05-21 PROCEDURE — 25000132 ZZH RX MED GY IP 250 OP 250 PS 637: Mod: GY | Performed by: INTERNAL MEDICINE

## 2020-05-21 PROCEDURE — 25800030 ZZH RX IP 258 OP 636: Performed by: NURSE ANESTHETIST, CERTIFIED REGISTERED

## 2020-05-21 PROCEDURE — 85610 PROTHROMBIN TIME: CPT | Performed by: INTERNAL MEDICINE

## 2020-05-21 PROCEDURE — 20000006 ZZH R&B ICU - GICH

## 2020-05-21 PROCEDURE — 40000985 XR CHEST PORT 1 VW

## 2020-05-21 PROCEDURE — 25800030 ZZH RX IP 258 OP 636: Performed by: INTERNAL MEDICINE

## 2020-05-21 PROCEDURE — 0JH606Z INSERTION OF PACEMAKER, DUAL CHAMBER INTO CHEST SUBCUTANEOUS TISSUE AND FASCIA, OPEN APPROACH: ICD-10-PCS | Performed by: SURGERY

## 2020-05-21 PROCEDURE — 36000058 ZZH SURGERY LEVEL 3 EA 15 ADDTL MIN: Performed by: SURGERY

## 2020-05-21 PROCEDURE — 36415 COLL VENOUS BLD VENIPUNCTURE: CPT | Performed by: INTERNAL MEDICINE

## 2020-05-21 PROCEDURE — 0JPT0PZ REMOVAL OF CARDIAC RHYTHM RELATED DEVICE FROM TRUNK SUBCUTANEOUS TISSUE AND FASCIA, OPEN APPROACH: ICD-10-PCS | Performed by: SURGERY

## 2020-05-21 PROCEDURE — 25000128 H RX IP 250 OP 636: Performed by: INTERNAL MEDICINE

## 2020-05-21 PROCEDURE — 99100 ANES PT EXTEME AGE<1 YR&>70: CPT | Performed by: NURSE ANESTHETIST, CERTIFIED REGISTERED

## 2020-05-21 PROCEDURE — 27210794 ZZH OR GENERAL SUPPLY STERILE: Performed by: SURGERY

## 2020-05-21 PROCEDURE — 99232 SBSQ HOSP IP/OBS MODERATE 35: CPT | Performed by: INTERNAL MEDICINE

## 2020-05-21 PROCEDURE — 85027 COMPLETE CBC AUTOMATED: CPT | Performed by: INTERNAL MEDICINE

## 2020-05-21 PROCEDURE — C1785 PMKR, DUAL, RATE-RESP: HCPCS | Performed by: SURGERY

## 2020-05-21 PROCEDURE — 37000009 ZZH ANESTHESIA TECHNICAL FEE, EACH ADDTL 15 MIN: Performed by: SURGERY

## 2020-05-21 PROCEDURE — 80048 BASIC METABOLIC PNL TOTAL CA: CPT | Performed by: INTERNAL MEDICINE

## 2020-05-21 PROCEDURE — 36000056 ZZH SURGERY LEVEL 3 1ST 30 MIN: Performed by: SURGERY

## 2020-05-21 PROCEDURE — 25000125 ZZHC RX 250: Performed by: NURSE ANESTHETIST, CERTIFIED REGISTERED

## 2020-05-21 PROCEDURE — 02WA3MZ REVISION OF CARDIAC LEAD IN HEART, PERCUTANEOUS APPROACH: ICD-10-PCS | Performed by: SURGERY

## 2020-05-21 PROCEDURE — 40000280 XR SURGERY CARM FLUORO GREATER THAN 5 MIN

## 2020-05-21 PROCEDURE — 27211024 ZZHC OR SUPPLY OTHER OPNP: Performed by: SURGERY

## 2020-05-21 PROCEDURE — 25000125 ZZHC RX 250: Performed by: SURGERY

## 2020-05-21 PROCEDURE — 25000128 H RX IP 250 OP 636: Performed by: NURSE ANESTHETIST, CERTIFIED REGISTERED

## 2020-05-21 PROCEDURE — C1898 LEAD, PMKR, OTHER THAN TRANS: HCPCS | Performed by: SURGERY

## 2020-05-21 PROCEDURE — 33228 REMV&REPLC PM GEN DUAL LEAD: CPT | Performed by: SURGERY

## 2020-05-21 PROCEDURE — 33228 REMV&REPLC PM GEN DUAL LEAD: CPT | Performed by: NURSE ANESTHETIST, CERTIFIED REGISTERED

## 2020-05-21 PROCEDURE — 37000008 ZZH ANESTHESIA TECHNICAL FEE, 1ST 30 MIN: Performed by: SURGERY

## 2020-05-21 PROCEDURE — 93010 ELECTROCARDIOGRAM REPORT: CPT | Performed by: INTERNAL MEDICINE

## 2020-05-21 DEVICE — IMPLANTABLE DEVICE: Type: IMPLANTABLE DEVICE | Site: CHEST | Status: FUNCTIONAL

## 2020-05-21 RX ORDER — PROPOFOL 10 MG/ML
INJECTION, EMULSION INTRAVENOUS CONTINUOUS PRN
Status: DISCONTINUED | OUTPATIENT
Start: 2020-05-21 | End: 2020-05-21

## 2020-05-21 RX ORDER — MAGNESIUM HYDROXIDE 1200 MG/15ML
LIQUID ORAL PRN
Status: DISCONTINUED | OUTPATIENT
Start: 2020-05-21 | End: 2020-05-21 | Stop reason: HOSPADM

## 2020-05-21 RX ORDER — SODIUM CHLORIDE, SODIUM LACTATE, POTASSIUM CHLORIDE, CALCIUM CHLORIDE 600; 310; 30; 20 MG/100ML; MG/100ML; MG/100ML; MG/100ML
INJECTION, SOLUTION INTRAVENOUS CONTINUOUS PRN
Status: DISCONTINUED | OUTPATIENT
Start: 2020-05-21 | End: 2020-05-21

## 2020-05-21 RX ORDER — ONDANSETRON 2 MG/ML
4 INJECTION INTRAMUSCULAR; INTRAVENOUS EVERY 30 MIN PRN
Status: CANCELLED | OUTPATIENT
Start: 2020-05-21

## 2020-05-21 RX ORDER — CEFAZOLIN SODIUM 2 G/100ML
2 INJECTION, SOLUTION INTRAVENOUS
Status: CANCELLED | OUTPATIENT
Start: 2020-05-21

## 2020-05-21 RX ORDER — CEFAZOLIN SODIUM 1 G/50ML
1 INJECTION, SOLUTION INTRAVENOUS SEE ADMIN INSTRUCTIONS
Status: CANCELLED | OUTPATIENT
Start: 2020-05-21

## 2020-05-21 RX ORDER — NALOXONE HYDROCHLORIDE 0.4 MG/ML
.1-.4 INJECTION, SOLUTION INTRAMUSCULAR; INTRAVENOUS; SUBCUTANEOUS
Status: CANCELLED | OUTPATIENT
Start: 2020-05-21 | End: 2020-05-22

## 2020-05-21 RX ORDER — FENTANYL CITRATE 50 UG/ML
25-50 INJECTION, SOLUTION INTRAMUSCULAR; INTRAVENOUS
Status: CANCELLED | OUTPATIENT
Start: 2020-05-21

## 2020-05-21 RX ORDER — SODIUM CHLORIDE, SODIUM LACTATE, POTASSIUM CHLORIDE, CALCIUM CHLORIDE 600; 310; 30; 20 MG/100ML; MG/100ML; MG/100ML; MG/100ML
INJECTION, SOLUTION INTRAVENOUS CONTINUOUS
Status: CANCELLED | OUTPATIENT
Start: 2020-05-21

## 2020-05-21 RX ORDER — EPHEDRINE SULFATE 50 MG/ML
INJECTION, SOLUTION INTRAMUSCULAR; INTRAVENOUS; SUBCUTANEOUS PRN
Status: DISCONTINUED | OUTPATIENT
Start: 2020-05-21 | End: 2020-05-21

## 2020-05-21 RX ORDER — FENTANYL CITRATE 50 UG/ML
INJECTION, SOLUTION INTRAMUSCULAR; INTRAVENOUS PRN
Status: DISCONTINUED | OUTPATIENT
Start: 2020-05-21 | End: 2020-05-21

## 2020-05-21 RX ORDER — CEFAZOLIN SODIUM 1 G/3ML
INJECTION, POWDER, FOR SOLUTION INTRAMUSCULAR; INTRAVENOUS PRN
Status: DISCONTINUED | OUTPATIENT
Start: 2020-05-21 | End: 2020-05-21

## 2020-05-21 RX ORDER — BUPIVACAINE HYDROCHLORIDE AND EPINEPHRINE 5; 5 MG/ML; UG/ML
INJECTION, SOLUTION EPIDURAL; INTRACAUDAL; PERINEURAL PRN
Status: DISCONTINUED | OUTPATIENT
Start: 2020-05-21 | End: 2020-05-21 | Stop reason: HOSPADM

## 2020-05-21 RX ORDER — HYDROMORPHONE HYDROCHLORIDE 1 MG/ML
.3-.5 INJECTION, SOLUTION INTRAMUSCULAR; INTRAVENOUS; SUBCUTANEOUS EVERY 10 MIN PRN
Status: CANCELLED | OUTPATIENT
Start: 2020-05-21

## 2020-05-21 RX ORDER — MEPERIDINE HYDROCHLORIDE 50 MG/ML
12.5 INJECTION INTRAMUSCULAR; INTRAVENOUS; SUBCUTANEOUS
Status: CANCELLED | OUTPATIENT
Start: 2020-05-21

## 2020-05-21 RX ORDER — ACETAMINOPHEN 500 MG
1000 TABLET ORAL DAILY PRN
COMMUNITY

## 2020-05-21 RX ORDER — LIDOCAINE HYDROCHLORIDE 20 MG/ML
INJECTION, SOLUTION INFILTRATION; PERINEURAL PRN
Status: DISCONTINUED | OUTPATIENT
Start: 2020-05-21 | End: 2020-05-21

## 2020-05-21 RX ORDER — ONDANSETRON 4 MG/1
4 TABLET, ORALLY DISINTEGRATING ORAL EVERY 30 MIN PRN
Status: CANCELLED | OUTPATIENT
Start: 2020-05-21

## 2020-05-21 RX ADMIN — FENTANYL CITRATE 50 MCG: 50 INJECTION, SOLUTION INTRAMUSCULAR; INTRAVENOUS at 14:50

## 2020-05-21 RX ADMIN — SODIUM CHLORIDE, POTASSIUM CHLORIDE, SODIUM LACTATE AND CALCIUM CHLORIDE: 600; 310; 30; 20 INJECTION, SOLUTION INTRAVENOUS at 18:00

## 2020-05-21 RX ADMIN — SODIUM CHLORIDE: 9 INJECTION, SOLUTION INTRAVENOUS at 05:04

## 2020-05-21 RX ADMIN — SODIUM CHLORIDE, POTASSIUM CHLORIDE, SODIUM LACTATE AND CALCIUM CHLORIDE: 600; 310; 30; 20 INJECTION, SOLUTION INTRAVENOUS at 15:44

## 2020-05-21 RX ADMIN — PHENYLEPHRINE HYDROCHLORIDE 100 MCG: 10 INJECTION INTRAVENOUS at 18:08

## 2020-05-21 RX ADMIN — SODIUM CHLORIDE, POTASSIUM CHLORIDE, SODIUM LACTATE AND CALCIUM CHLORIDE: 600; 310; 30; 20 INJECTION, SOLUTION INTRAVENOUS at 14:50

## 2020-05-21 RX ADMIN — PROPOFOL 130 MCG/KG/MIN: 10 INJECTION, EMULSION INTRAVENOUS at 14:50

## 2020-05-21 RX ADMIN — Medication 10 MG: at 15:55

## 2020-05-21 RX ADMIN — Medication 10 MG: at 17:11

## 2020-05-21 RX ADMIN — PHENYLEPHRINE HYDROCHLORIDE 200 MCG: 10 INJECTION INTRAVENOUS at 18:44

## 2020-05-21 RX ADMIN — Medication 10 MG: at 15:26

## 2020-05-21 RX ADMIN — PHENYLEPHRINE HYDROCHLORIDE 100 MCG: 10 INJECTION INTRAVENOUS at 19:06

## 2020-05-21 RX ADMIN — PHENYLEPHRINE HYDROCHLORIDE 200 MCG: 10 INJECTION INTRAVENOUS at 16:13

## 2020-05-21 RX ADMIN — ALLOPURINOL 100 MG: 100 TABLET ORAL at 09:46

## 2020-05-21 RX ADMIN — CEFAZOLIN 2 G: 1 INJECTION, POWDER, FOR SOLUTION INTRAMUSCULAR; INTRAVENOUS at 18:12

## 2020-05-21 RX ADMIN — Medication 10 MG: at 16:06

## 2020-05-21 RX ADMIN — Medication 10 MG: at 18:26

## 2020-05-21 RX ADMIN — Medication 10 MG: at 18:19

## 2020-05-21 RX ADMIN — PHENYLEPHRINE HYDROCHLORIDE 200 MCG: 10 INJECTION INTRAVENOUS at 18:29

## 2020-05-21 RX ADMIN — HYDRALAZINE HYDROCHLORIDE 10 MG: 20 INJECTION INTRAMUSCULAR; INTRAVENOUS at 13:38

## 2020-05-21 RX ADMIN — ALLOPURINOL 100 MG: 100 TABLET ORAL at 23:28

## 2020-05-21 RX ADMIN — Medication 10 MG: at 16:13

## 2020-05-21 RX ADMIN — PHENYLEPHRINE HYDROCHLORIDE 100 MCG: 10 INJECTION INTRAVENOUS at 16:06

## 2020-05-21 RX ADMIN — LIDOCAINE HYDROCHLORIDE 60 MG: 20 INJECTION, SOLUTION INFILTRATION; PERINEURAL at 14:50

## 2020-05-21 RX ADMIN — SODIUM CHLORIDE: 9 INJECTION, SOLUTION INTRAVENOUS at 19:54

## 2020-05-21 RX ADMIN — PHENYLEPHRINE HYDROCHLORIDE 100 MCG: 10 INJECTION INTRAVENOUS at 15:23

## 2020-05-21 RX ADMIN — Medication 10 MG: at 18:16

## 2020-05-21 RX ADMIN — Medication 10 MG: at 18:05

## 2020-05-21 RX ADMIN — PHENYLEPHRINE HYDROCHLORIDE 200 MCG: 10 INJECTION INTRAVENOUS at 14:46

## 2020-05-21 RX ADMIN — PHENYLEPHRINE HYDROCHLORIDE 200 MCG: 10 INJECTION INTRAVENOUS at 17:02

## 2020-05-21 RX ADMIN — PHENYLEPHRINE HYDROCHLORIDE 100 MCG: 10 INJECTION INTRAVENOUS at 15:19

## 2020-05-21 RX ADMIN — PHENYLEPHRINE HYDROCHLORIDE 200 MCG: 10 INJECTION INTRAVENOUS at 18:55

## 2020-05-21 RX ADMIN — HYDRALAZINE HYDROCHLORIDE 10 MG: 20 INJECTION INTRAMUSCULAR; INTRAVENOUS at 11:39

## 2020-05-21 RX ADMIN — PHENYLEPHRINE HYDROCHLORIDE 100 MCG: 10 INJECTION INTRAVENOUS at 14:45

## 2020-05-21 RX ADMIN — CEFAZOLIN 2 G: 1 INJECTION, POWDER, FOR SOLUTION INTRAMUSCULAR; INTRAVENOUS at 14:55

## 2020-05-21 RX ADMIN — PHENYLEPHRINE HYDROCHLORIDE 200 MCG: 10 INJECTION INTRAVENOUS at 18:26

## 2020-05-21 RX ADMIN — ACETAMINOPHEN 650 MG: 325 TABLET ORAL at 23:30

## 2020-05-21 RX ADMIN — Medication 10 MG: at 15:39

## 2020-05-21 ASSESSMENT — ACTIVITIES OF DAILY LIVING (ADL)
ADLS_ACUITY_SCORE: 17

## 2020-05-21 ASSESSMENT — ENCOUNTER SYMPTOMS: DYSRHYTHMIAS: 1

## 2020-05-21 NOTE — PLAN OF CARE
Pt had an uneventful night.  Denies pain, denied dizziness while standing at the bedside to use urinal.  PRN hydralazine used x1 with adequate results, SBP <180.  Continues to be sinus bradycardic with a 1st AVB, HR 40's.  Voiding.

## 2020-05-21 NOTE — PHARMACY-ADMISSION MEDICATION HISTORY
Pharmacy -- Admission Medication Reconciliation    Prior to admission (PTA) medications were reviewed and the patient's PTA medication list was updated.    Sources Consulted: Patient, SureScripts, Chart Notes    The reliability of this Medication Reconciliation is: Reliability: Reliable    The following significant changes were made:  Updated last home administration times, as reported by patient  -Added Acetaminophen    In addition, the patient's allergies were reviewed with the patient and left as follows:   Allergies: Penicillins    The pharmacist has reviewed with the patient that all personal medications should be removed from the building or locked in the belongings safe.  Patient shall only take medications ordered by the physician and administered by the nursing staff.       Medication barriers identified: none noted   Medication adherence concerns: none noted- fill history in SureScripts appears to be compliant with how patient's medications are prescribed. Patient last INR check 5/15 and his INR was therapeutic. Patient has been on Warfarin 4 mg daily with no recent changes, dose confirmed by patient (last home dose taken 5/19 in the evening).   Understanding of emergency medications: N/A    Ryan Melendez RPH, 5/21/2020,  8:17 AM `

## 2020-05-21 NOTE — PROGRESS NOTES
[facility]    Hospitalist Progress Note      Assessment & Plan   Kg Ferraro is a 75 year old male who was admitted on 5/20/2020.     Principal Problem:    Sick sinus syndrome (H)    Assessment: Status post pacemaker with [employer]tronic device in 2008.  After discussion with [employer] device coordinators at [facility] unable to interrogate the device due to a completely failed generator.  Given his stable hemodynamics and likelihood that pacemaker has been completely off for a prolonged period,  discussed risks and benefits of options with patient and he opted to stay current [place] for generator change.  Dr. Galeana to replace generator with [employer] rep today.     Plan:   -Telemetry in ICU until successful generator change in OR  -Appropriate to discharge postoperatively if no complications and recovering per postanesthesia protocol.  -Appreciate general surgery consult for generator change  -Follow-up with PCP for routine post hospital follow-up   -Cardiology referral for ongoing pacemaker interrogation     Active Problems:    Chronic heart failure with preserved ejection fraction (H)    Assessment: Chronic and stable without evidence of decompensation    Plan:   -Strict I's and O's and daily weights       CKD (chronic kidney disease) stage 3, GFR 30-59 ml/min (H)    Assessment: Acute kidney injury on admit improved with IV fluids and likely dehydrated at admit.     Plan:   -Avoid NSAIDs   -Repeat BMP at time of PCP follow-up.       Paroxysmal atrial fibrillation (H)    Assessment: Chronic, currently rate controlled and chronically anticoagulated.    Plan: Resume home metoprolol and flecainide at time of discharge and continue Coumadin per home routine.  Coumadin not reversed after discussion with general surgery.  Close INR follow-up as an outpatient given dose hold on 5/20.    Code Status: Full Code    Luis Arroyo    Interval History   Overnight no acute events and afebrile,  intermittent palpitations but no lightheadedness with standing quickly and no presyncopal symptoms.  No chest pain, nausea vomiting, increased lower extremity edema or other new complaints.    -Data reviewed today: I reviewed all new labs and imaging results over the last 24 hours. I personally reviewed patient's telemetry notable for atrial fibrillation with slow ventricular response with rates as low as the 30s overnight.    Physical Exam   Temp: 97.3  F (36.3  C) Temp src: Tympanic BP: (!) 170/91 Pulse: (!) 48 Heart Rate: (!) 48 Resp: 10 SpO2: 97 % O2 Device: None (Room air)    Vitals:    05/20/20 1448 05/20/20 1822   Weight: 113.4 kg (250 lb) 99.7 kg (219 lb 12.8 oz)     Vital Signs with Ranges  Temp:  [96  F (35.6  C)-97.9  F (36.6  C)] 97.3  F (36.3  C)  Pulse:  [42-50] 48  Heart Rate:  [4-52] 48  Resp:  [0-31] 10  BP: (110-203)/() 170/91  SpO2:  [91 %-98 %] 97 %  I/O last 3 completed shifts:  In: 1599.33 [I.V.:999.33; IV Piggyback:600]  Out: 925 [Urine:925]    Exam:   GENERAL: Talkative, in no apparent distress.  CARDIOVASCULAR: regular rate and rhythm, no murmurs, rubs, or gallops. Normal S1/S2. No lower extremity edema.   RESPIRATORY: clear to auscultation bilaterally, no wheezes, no crackles.   GI: soft, non-tender, non-distended, normoactive bowel sounds.  MUSCULOSKELETAL: warm and well perfused, 2+ dorsalis pedis pulses bilaterally.    SKIN: Pacemaker in left upper chest, well-healed.      Medications     - MEDICATION INSTRUCTIONS -       sodium chloride 100 mL/hr at 05/21/20 0504       allopurinol  100 mg Oral BID     sodium chloride (PF)  3 mL Intracatheter Q8H       Data   Recent Labs   Lab 05/21/20  0420 05/20/20  1520 05/15/20   WBC 8.5 9.8  --    HGB 14.3 15.5  --    MCV 97 97  --     179  --    INR 2.71* 2.77* 2.5*    140  --    POTASSIUM 4.6 5.5*  --    CHLORIDE 109* 107  --    CO2 24 24  --    BUN 40* 42*  --    CR 1.72* 2.30*  --    ANIONGAP 7 9  --    GAGE 8.9 8.4*  --    GLC  103 128*  --    ALBUMIN  --  3.4*  --    PROTTOTAL  --  6.6  --    BILITOTAL  --  0.7  --    ALKPHOS  --  81  --    ALT  --  27  --    AST  --  35  --        No results found for this or any previous visit (from the past 24 hour(s)).

## 2020-05-21 NOTE — ANESTHESIA PREPROCEDURE EVALUATION
Anesthesia Pre-Procedure Evaluation    Patient: Kg Ferraro   MRN: [id number] : 1945          Preoperative Diagnosis: Bradycardia [R00.1]  Chronic atrial fibrillation [I48.20]  Cardiac pacemaker in situ [Z95.0]  Warfarin anticoagulation [Z79.01]  Pacemaker end of life [Z45.018]    Procedure(s):  REPLACEMENT, PULSE GENERATOR, CARDIAC PACEMAKER    Past Medical History:   Diagnosis Date     Acute kidney failure (H)     No Comments Provided     Acute kidney failure (H)     No Comments Provided     Atrial fibrillation (H)     3/8/2012     Atrioventricular block, complete (H)     No Comments Provided     Calculus of kidney     No Comments Provided     Essential (primary) hypertension     No Comments Provided     Generalized anxiety disorder     2010     Hematuria     7/10/2012     Osteoarthritis     2010     Osteoarthritis of hip     2010     Presence of cardiac pacemaker     9/3/2008     Rheumatoid arthritis (H)     2011     Sick sinus syndrome (H)     No Comments Provided     Supraventricular tachycardia (H)     No Comments Provided     Past Surgical History:   Procedure Laterality Date     ARTHROPLASTY HIP      2010,right hip replacement,  [facility]     OTHER SURGICAL HISTORY      78682,NASAL SURGERY,Repeated nasoseptal repair     OTHER SURGICAL HISTORY      785855,OTHER,Ureteral stents for stones     OTHER SURGICAL HISTORY      ,444541,OTHER,Catheter ablation for atrial fibrillation, failed       Anesthesia Evaluation     . Pt has had prior anesthetic.            ROS/MED HX    ENT/Pulmonary:  - neg pulmonary ROS     Neurologic:  - neg neurologic ROS     Cardiovascular:     (+) hypertension----. : . . [place], . pacemaker :. dysrhythmias a-fib and Sick Sinus Syndrome, Irregular Heartbeat/Palpitations, .       METS/Exercise Tolerance:  1 - Eating, dressing   Hematologic:  - neg hematologic  ROS       Musculoskeletal:  - neg musculoskeletal ROS       GI/Hepatic:        "  Renal/Genitourinary:     (+) chronic renal disease,       Endo:  - neg endo ROS       Psychiatric:     (+) psychiatric history anxiety and depression      Infectious Disease:  - neg infectious disease ROS       Malignancy:      - no malignancy   Other:    - neg other ROS                      Physical Exam  Normal systems: dental    Airway   Mallampati: III  TM distance: >3 FB  Neck ROM: limited  Comment: Full beard    Dental     Cardiovascular   Rhythm and rate: regular and normal      Pulmonary    breath sounds clear to auscultation            Lab Results   Component Value Date    WBC 8.5 05/21/2020    HGB 14.3 05/21/2020    HCT 45.1 05/21/2020     05/21/2020     05/21/2020    POTASSIUM 4.6 05/21/2020    CHLORIDE 109 (H) 05/21/2020    CO2 24 05/21/2020    BUN 40 (H) 05/21/2020    CR 1.72 (H) 05/21/2020     05/21/2020    GAGE 8.9 05/21/2020    MAG 2.0 07/12/2015    ALBUMIN 3.4 (L) 05/20/2020    PROTTOTAL 6.6 05/20/2020    ALT 27 05/20/2020    AST 35 05/20/2020    ALKPHOS 81 05/20/2020    BILITOTAL 0.7 05/20/2020    PTT 46 (H) 07/12/2015    INR 2.71 (H) 05/21/2020       Preop Vitals  BP Readings from Last 3 Encounters:   05/21/20 (!) 170/91   09/18/19 130/66   07/03/18 124/76    Pulse Readings from Last 3 Encounters:   05/21/20 (!) 48   09/18/19 64   07/03/18 80      Resp Readings from Last 3 Encounters:   05/21/20 10   09/18/19 16   03/23/17 16    SpO2 Readings from Last 3 Encounters:   05/21/20 97%   12/11/14 94%   12/23/13 97%      Temp Readings from Last 1 Encounters:   05/21/20 97.3  F (36.3  C) (Tympanic)    Ht Readings from Last 1 Encounters:   05/20/20 1.702 m (5' 7\")      Wt Readings from Last 1 Encounters:   05/20/20 99.7 kg (219 lb 12.8 oz)    Estimated body mass index is 34.43 kg/m  as calculated from the following:    Height as of this encounter: 1.702 m (5' 7\").    Weight as of this encounter: 99.7 kg (219 lb 12.8 oz).       Anesthesia Plan      History & Physical Review      ASA " Status:  3 .        Plan for MAC with Intravenous and Propofol induction. Reason for MAC:  Chronic cardiopulmonary disease (G9)           Postoperative Care      Consents  Anesthetic plan, risks, benefits and alternatives discussed with:  Patient..                 David Kellerman, APRN CRNA

## 2020-05-21 NOTE — PROGRESS NOTES
Visipaque 320, 100 mL was handed to Jitendra AGGARWAL/TALHA Per Dr. KRISTINA Galeana. Aware of GFR 39, below protocol limits to allow for adm via IV, also per Dr. KRISTINA Galeana to proceed. ignacio

## 2020-05-21 NOTE — INTERVAL H&P NOTE
I saw and examined Kg Ferraro.  I have reviewed the history and physical and find no changes to the patient's medical status or condition with the exceptions noted below.     Ambrocio Galeana MD   2:38 PM 5/21/2020

## 2020-05-22 VITALS
OXYGEN SATURATION: 100 % | DIASTOLIC BLOOD PRESSURE: 71 MMHG | HEIGHT: 67 IN | BODY MASS INDEX: 34.5 KG/M2 | RESPIRATION RATE: 16 BRPM | WEIGHT: 219.8 LBS | TEMPERATURE: 97.8 F | HEART RATE: 80 BPM | SYSTOLIC BLOOD PRESSURE: 131 MMHG

## 2020-05-22 LAB
ANION GAP SERPL CALCULATED.3IONS-SCNC: 10 MMOL/L (ref 3–14)
BUN SERPL-MCNC: 35 MG/DL (ref 7–25)
CALCIUM SERPL-MCNC: 7.6 MG/DL (ref 8.6–10.3)
CHLORIDE SERPL-SCNC: 108 MMOL/L (ref 98–107)
CO2 SERPL-SCNC: 23 MMOL/L (ref 21–31)
CREAT SERPL-MCNC: 1.5 MG/DL (ref 0.7–1.3)
GFR SERPL CREATININE-BSD FRML MDRD: 45 ML/MIN/{1.73_M2}
GLUCOSE SERPL-MCNC: 97 MG/DL (ref 70–105)
INR PPP: 2.19 (ref 0–1.3)
INTERPRETATION ECG - MUSE: NORMAL
POTASSIUM SERPL-SCNC: 4.8 MMOL/L (ref 3.5–5.1)
SODIUM SERPL-SCNC: 141 MMOL/L (ref 134–144)

## 2020-05-22 PROCEDURE — 99239 HOSP IP/OBS DSCHRG MGMT >30: CPT | Performed by: INTERNAL MEDICINE

## 2020-05-22 PROCEDURE — 85610 PROTHROMBIN TIME: CPT | Performed by: INTERNAL MEDICINE

## 2020-05-22 PROCEDURE — 36415 COLL VENOUS BLD VENIPUNCTURE: CPT | Performed by: INTERNAL MEDICINE

## 2020-05-22 PROCEDURE — 25800030 ZZH RX IP 258 OP 636: Performed by: INTERNAL MEDICINE

## 2020-05-22 PROCEDURE — 80048 BASIC METABOLIC PNL TOTAL CA: CPT | Performed by: INTERNAL MEDICINE

## 2020-05-22 PROCEDURE — 25000132 ZZH RX MED GY IP 250 OP 250 PS 637: Mod: GY | Performed by: INTERNAL MEDICINE

## 2020-05-22 RX ORDER — WARFARIN SODIUM 2 MG/1
4 TABLET ORAL
Status: DISCONTINUED | OUTPATIENT
Start: 2020-05-22 | End: 2020-05-22

## 2020-05-22 RX ORDER — WARFARIN SODIUM 2 MG/1
4 TABLET ORAL
Status: COMPLETED | OUTPATIENT
Start: 2020-05-22 | End: 2020-05-22

## 2020-05-22 RX ORDER — METOPROLOL SUCCINATE 100 MG/1
100 TABLET, EXTENDED RELEASE ORAL DAILY
Status: DISCONTINUED | OUTPATIENT
Start: 2020-05-22 | End: 2020-05-22

## 2020-05-22 RX ORDER — METOPROLOL SUCCINATE 100 MG/1
100 TABLET, EXTENDED RELEASE ORAL DAILY
Status: DISCONTINUED | OUTPATIENT
Start: 2020-05-22 | End: 2020-05-22 | Stop reason: HOSPADM

## 2020-05-22 RX ORDER — FLECAINIDE ACETATE 50 MG/1
150 TABLET ORAL 2 TIMES DAILY
Status: DISCONTINUED | OUTPATIENT
Start: 2020-05-22 | End: 2020-05-22 | Stop reason: HOSPADM

## 2020-05-22 RX ADMIN — WARFARIN SODIUM 4 MG: 2 TABLET ORAL at 08:35

## 2020-05-22 RX ADMIN — ALLOPURINOL 100 MG: 100 TABLET ORAL at 08:30

## 2020-05-22 RX ADMIN — SODIUM CHLORIDE: 9 INJECTION, SOLUTION INTRAVENOUS at 06:02

## 2020-05-22 RX ADMIN — METOPROLOL SUCCINATE 100 MG: 100 TABLET, EXTENDED RELEASE ORAL at 08:35

## 2020-05-22 RX ADMIN — FLECAINIDE ACETATE 150 MG: 50 TABLET ORAL at 08:12

## 2020-05-22 ASSESSMENT — ACTIVITIES OF DAILY LIVING (ADL)
ADLS_ACUITY_SCORE: 17

## 2020-05-22 NOTE — BRIEF OP NOTE
[facility]    Brief Operative Note    Pre-operative diagnosis: Bradycardia [R00.1]  Chronic atrial fibrillation [I48.20]  Cardiac pacemaker in situ [Z95.0]  Warfarin anticoagulation [Z79.01]  Pacemaker end of life [Z45.018]  Post-operative diagnosis Same as pre-operative diagnosis    Procedure: Procedure(s):  REPLACEMENT, PULSE GENERATOR, CARDIAC PACEMAKER AND ATRIAL LEAD PLACEMENT  Surgeon: Surgeon(s) and Role:     * Ambrocio Galeana MD - Primary  Anesthesia: Monitor Anesthesia Care   Estimated blood loss: 200 ml  Drains: None  Specimens: * No specimens in log *  Findings:   None.  Complications: None.  Implants:   Implant Name Type Inv. Item Serial No.  Lot No. LRB No. Used Action   [place] xt  DR Pearson Surescan Pacemaker    [id number] [employer]  Right 1 Implanted   IMP LEAD PACING BIPOLAR CAPSUREFIX NOVUS 52CM 5076-52 Leads IMP LEAD PACING BIPOLAR CAPSUREFIX NOVUS 52CM 5076-52 [id number] [employer]  Right 1 Implanted

## 2020-05-22 NOTE — PROGRESS NOTES
Patient transferred back to unit, still sedated on 10 L simple mask. At transfer he's is being ventricular paced at a rate of 60 bpm. Vital signs are otherwise stable.

## 2020-05-22 NOTE — ANESTHESIA CARE TRANSFER NOTE
Patient: Kg Ferraro    Procedure(s):  REPLACEMENT, PULSE GENERATOR, CARDIAC PACEMAKER AND ATRIAL LEAD PLACEMENT    Diagnosis: Bradycardia [R00.1]  Chronic atrial fibrillation [I48.20]  Cardiac pacemaker in situ [Z95.0]  Warfarin anticoagulation [Z79.01]  Pacemaker end of life [Z45.018]  Diagnosis Additional Information: No value filed.    Anesthesia Type:   MAC     Note:  Airway :Face Mask  Patient transferred to:ICU  ICU Handoff: Call for PAUSE to initiate/utilize ICU HANDOFF, Identified Patient, Identified Responsible Provider, Reviewed the Pertinent Medical History, Discussed Surgical Course, Reviewed Intra-OP Anesthesia Management and Issues during Anesthesia, Set Expectations for Post Procedure Period and Allowed Opportunity for Questions and Acknowledgement of Understanding      Vitals: (Last set prior to Anesthesia Care Transfer)    CRNA VITALS  5/21/2020 1857 - 5/21/2020 1943      5/21/2020             Temp:  95  F (35  C)    Resp Rate (set):  10                Electronically Signed By: ELDA Archer CRNA  May 21, 2020  7:43 PM

## 2020-05-22 NOTE — OP NOTE
Procedure Date: 05/21/2020      PREOPERATIVE DIAGNOSIS:  Bradycardia, end of pacemaker battery life, intermittent atrial fibrillation.      POSTOPERATIVE DIAGNOSES:  Bradycardia, end of pacemaker battery life, intermittent atrial fibrillation, impaired atrial lead from previous pacemaker.      PROCEDURE:  Pacemaker generator change, replacement of atrial lead.      SURGEON:  Ambrocio Galeana MD      ANESTHESIA:  MAC with local.      ESTIMATED BLOOD LOSS: 150 mL.      DESCRIPTION OF PROCEDURE:  The patient was taken to the OR and positioned supine on the operating table.  His right upper chest area was sterilely prepped and draped in the usual manner.  His previous incision was opened.  The capsule was then entered sharply with Metzenbaum scissors.  The leads were freed and the pacemaker device and delivered through the incision.  This was changed out for the new pacemaker device.  Upon sensing the pacemaker device we found that we had a high voltage threshold and intermittent loss of capture at a maximum voltage.  With this, we attempted to access the subclavian vein.  Multiple attempts to this were successful in blood return, but unsuccessful in threading the wire.  We then proceeded to access the base of the right neck with angiocatheter and accessed the junction of the internal jugular vein and the innominate.  We then passed a wire down this.  We then passed our sheath.  Initially, we did tunnel the lead over to the pacemaker pocket.  However, this made the lead difficult to manipulate.  We then proceeded to simply re-tunnel the lead at the finish of the procedure.  Over the course of about 2 hours, many attempts at positioning the atrial lead with low voltage and impedance values were unsuccessful.  We settled on an atrial position that had relatively high voltage and acceptable impedance values.  We then secured it here and tunneled it back to the pacemaker pocket.  This was secured to the device and tensioned  down.  The pacemaker leads were then placed  posterior to the device and then coiled in the pocket.  Pocket was then closed in layers with 3-0 Vicryl followed by 4-0 Monocryl suture.  At the end of the case, all suture, needle and instrument counts were correct.  The skin was covered with Exofin surgical glue.         HEIDI AL MD             D: 2020   T: 2020   MT: SABINA      Name:     ORTIZ JIMENEZ   MRN:      [id number]        Account:        [id number]   :      1945           Procedure Date: 2020      Document: [id number]

## 2020-05-22 NOTE — PROGRESS NOTES
Pacemaker RN present at bedside and notified that patient has been in A-fib since 0500. He stated that he will convey that to Dr. Galeana.

## 2020-05-22 NOTE — PROGRESS NOTES
Patient spontaneously went from ventricular pacing to atrial pacing with a rate of 59. 12 lead obtained,  prolonged TX interval present. Dr. Galeana notified. Will continue to monitor.

## 2020-05-22 NOTE — DISCHARGE SUMMARY
[facility]    Discharge Summary  Hospitalist    Date of Admission:  5/20/2020  Date of Discharge:  5/22/2020  Discharging Provider: Luis Arroyo  Date of Service (when I saw the patient): 05/22/20    Discharge Diagnoses   Principal Problem:    Sick sinus syndrome (H) (1/18/2018)  Active Problems:    Chronic heart failure with preserved ejection fraction (H) (1/18/2018)    CKD (chronic kidney disease) stage 3, GFR 30-59 ml/min (H) (1/15/2016)    Cardiac pacemaker in situ (9/3/2008)    Warfarin anticoagulation (12/23/2013)    Paroxysmal atrial fibrillation (H) (2/11/2018)    Essential hypertension (7/3/2018)    Bradycardia (5/20/2020)    Chronic atrial fibrillation (5/20/2020)    Pacemaker end of life (5/20/2020)      History of Present Illness   Kg Ferraro is an 75 year old male who presented with the above.  Patient has been in his normal state of health but has developed increasing palpitations over the last number of days.  He is also felt lightheaded with rapid changes in body position.  He presented to the ER today, there was noted to have atrial fibrillation with rates in the 40s but stable blood pressures.  Patient stated that his prior low limit on his pacer was set in the 50s and was noted that he had no appreciable pacer spikes on telemetry.  Device interrogation was attempted but unsuccessful and [employer] was contacted and device placement was confirmed in 2008 both by patient and [employer].  Given this it was felt that his generator has completely failed and requires replacement.     Patient received calcium gluconate in the ER and subsequently admitted to the intensive care unit for close monitoring until generator change in a.m.    Hospital Course   Kg Ferraro was admitted on 5/20/2020.  The following problems were addressed during his hospitalization:      Sick sinus syndrome (H): Status post pacemaker with [employer] device in 2008.  After discussion with [employer]  device coordinators at [facility] unable to interrogate the device due to a completely failed generator.  Given his stable hemodynamics and likelihood that pacemaker has been completely off for a prolonged period,  discussed risks and benefits of options with patient and he opted to stay current [place] for generator change.      Patient therefore underwent pacemaker generator change and replacement of atrial lead by Dr. Galeana on 5/21/2020.  Patient tolerated the procedure well, device was interrogated by [employer] rep on day of discharge and was functioning normally.  Patient was provided a home device for rhythm tracking, he was instructed regarding routine postprocedural activity restrictions and wound care.    He will resume his home flecainide and metoprolol.  His INR was not actively reversed per surgery request but he did have 2 doses held.  11 repeat INR check in the coming days to ensure stability as scheduled on day of discharge.    Pacer clinic referral was also placed at time of discharge for ongoing follow-up.       CKD (chronic kidney disease) stage 3, GFR 30-59 ml/min (H): Acute kidney injury on admit and improved with IV fluids and he was likely somewhat dehydrated.  Renal function returned to baseline by day of discharge.      Paroxysmal atrial fibrillation (H): As noted above to be chronic, currently rate controlled and chronically anticoagulated.     Luis Arroyo MD    Significant Results and Procedures   pending    Pending Results   These results will be followed up by pcp  Unresulted Labs Ordered in the Past 30 Days of this Admission     No orders found from 4/20/2020 to 5/21/2020.          Code Status   Full Code       Primary Care Physician   Caleb Parker    Physical Exam   Temp: 97.8  F (36.6  C) Temp src: Temporal BP: 131/71 Pulse: 80 Heart Rate: 89 Resp: 16 SpO2: 100 % O2 Device: Simple face mask    Vitals:    05/20/20 1448 05/20/20 1822   Weight: 113.4 kg (250 lb) 99.7 kg (219 lb  12.8 oz)     Vital Signs with Ranges  Temp:  [96.4  F (35.8  C)-97.8  F (36.6  C)] 97.8  F (36.6  C)  Pulse:  [] 80  Heart Rate:  [] 89  Resp:  [4-31] 16  BP: ()/() 131/71  SpO2:  [84 %-100 %] 100 %  I/O last 3 completed shifts:  In: 5028.33 [I.V.:5028.33]  Out: 1770 [Urine:1570; Blood:200]    Exam on day of discharge:   GENERAL: Talkative, in no apparent distress.  CARDIOVASCULAR: Irregularly irregular rate and rhythm, no murmurs, rubs, or gallops. Normal S1/S2. No lower extremity edema.   RESPIRATORY: clear to auscultation bilaterally, no wheezes, no crackles.   GI: soft, non-tender, non-distended, normoactive bowel sounds.  MUSCULOSKELETAL: warm and well perfused, 2+ dorsalis pedis pulses bilaterally.    SKIN: Pacemaker pocket site in right upper chest closed with subcutaneous sutures, well approximated with some surrounding ecchymosis, no erythema, no swelling.     Discharge Disposition   Discharged to home  Condition at discharge: Stable    Consultations This Hospital Stay   SURGERY GENERAL IP CONSULT  PHARMACY TO DOSE WARFARIN    Time Spent on this Encounter   I, Luis Arroyo MD, personally saw the patient today and spent greater than 30 minutes discharging this patient.    Discharge Orders      Follow-up and recommended labs and tests     May 28th at 1:20 PM with Dr. Parker.   Protime on Tuesday May 26th at 12:15 PM.     Reason for your hospital stay    Pacemaker generator change. Atrial lead placement.     Activity    Your activity upon discharge: No overhead work with right arm X 2 weeks.     Wound care and dressings    Instructions to care for your wound at home: may get incision wet in shower but do not soak or scrub.     Discharge Instructions    - resume taking your regular home medications this evening after leaving the hospital  - there are no new chronic medications you need to take     Full Code     Diet    Follow this diet upon discharge: Orders Placed This  Encounter  Regular Adult Diet     Discharge Medications   Current Discharge Medication List      CONTINUE these medications which have NOT CHANGED    Details   acetaminophen (TYLENOL) 500 MG tablet Take 1,000 mg by mouth daily as needed for mild pain      allopurinol (ZYLOPRIM) 100 MG tablet Take 1 tablet (100 mg) by mouth 2 times daily  Qty: 60 tablet, Refills: 11    Associated Diagnoses: Hyperuricemia      flecainide (TAMBOCOR) 150 MG tablet Take 1 tablet (150 mg) by mouth 2 times daily  Qty: 60 tablet, Refills: 11    Associated Diagnoses: Paroxysmal supraventricular tachycardia (H)      metoprolol succinate ER (TOPROL-XL) 100 MG 24 hr tablet TAKE 1 TABLET (100 MG) BY MOUTH IN THE MORNING AND 2 TABLETS (200 MG) IN THE EVENING  Qty: 90 tablet, Refills: 11    Associated Diagnoses: Paroxysmal atrial fibrillation (H); Essential hypertension      potassium chloride ER (K-DUR/KLOR-CON M) 20 MEQ CR tablet Take 1 tablet (20 mEq) by mouth daily  Qty: 30 tablet, Refills: 11    Associated Diagnoses: Essential hypertension; Chronic heart failure with preserved ejection fraction (H)      simvastatin (ZOCOR) 40 MG tablet Take 1 tablet (40 mg) by mouth daily  Qty: 30 tablet, Refills: 11    Associated Diagnoses: Mixed hyperlipidemia      warfarin ANTICOAGULANT (COUMADIN) 4 MG tablet TAKE 1 TABLET BY MOUTH ONCE DAILY OR  AS  DIRECTED  BY  PROTIME  CLINIC  Qty: 90 tablet, Refills: 1    Associated Diagnoses: Anticoagulation monitoring, INR range 2-3; Atrial fibrillation (H)      Blood Pressure Monitoring (RA BLOOD PRESSURE CUFF MONITOR) MISC Medium Cuff           Allergies   Allergies   Allergen Reactions     Penicillins Hives     Rash or hives - can't remember     Data   Most Recent 3 CBC's:  Recent Labs   Lab Test 05/21/20  0420 05/20/20  1520 03/23/17  1121   WBC 8.5 9.8  --    HGB 14.3 15.5 15.6   MCV 97 97 95    179 191      Most Recent 3 BMP's:  Recent Labs   Lab Test 05/22/20  0400 05/21/20  0420 05/20/20  1520   NA  141 140 140   POTASSIUM 4.8 4.6 5.5*   CHLORIDE 108* 109* 107   CO2 23 24 24   BUN 35* 40* 42*   CR 1.50* 1.72* 2.30*   ANIONGAP 10 7 9   GAGE 7.6* 8.9 8.4*   GLC 97 103 128*     Most Recent 2 LFT's:  Recent Labs   Lab Test 05/20/20  1520 01/15/16  1056   AST 35  --    ALT 27  --    ALKPHOS 81 93   BILITOTAL 0.7 0.7     Most Recent INR's and Anticoagulation Dosing History:  Anticoagulation Dose History     Recent Dosing and Labs Latest Ref Rng & Units 12/13/2019 1/27/2020 3/20/2020 5/15/2020 5/20/2020 5/21/2020 5/22/2020    Warfarin 2 mg - - - - - - - 4 mg    INR 0 - 1.3 - - - - 2.77(H) 2.71(H) 2.19(H)    INR 0.86 - 1.14 2.8(A) 2.7(A) 2.7(A) 2.5(A) - - -        Most Recent 3 Troponin's:No lab results found.  Most Recent Cholesterol Panel:  Recent Labs   Lab Test 01/15/16  1056   LDL 76   HDL 42   TRIG 188*     Most Recent 6 Bacteria Isolates From Any Culture (See EPIC Reports for Culture Details):No lab results found.  Most Recent TSH, T4 and A1c Labs:No lab results found.  Results for orders placed or performed during the hospital encounter of 05/20/20   XR Chest Port 1 View    Narrative    PROCEDURE:  XR CHEST PORT 1 VW    HISTORY:  s/p Atrial lead placement and pacemaker generator change..     COMPARISON:  None.    FINDINGS:   The heart is enlarged The pulmonary vasculature is normal.  The lungs  are clear. No pleural effusion or pneumothorax. A new pacing lead is  seen coursing over the distal right internal jugular vein. The tip of  this new lead is not well visualized on this underpenetrated chest  x-ray.                                                                           Impression    IMPRESSION:  Cardiomegaly. No complication of pacing lead insertion is  seen.      FLO TOM MD   XR Surgery MERT G/T 5 Min Fluoro    Narrative    This exam was marked as non-reportable because it will not be read by a   radiologist or a [facility] non-radiologist provider.

## 2020-05-22 NOTE — ANESTHESIA POSTPROCEDURE EVALUATION
Patient: Kg Ferraro    Procedure(s):  REPLACEMENT, PULSE GENERATOR, CARDIAC PACEMAKER AND ATRIAL LEAD PLACEMENT    Diagnosis:Bradycardia [R00.1]  Chronic atrial fibrillation [I48.20]  Cardiac pacemaker in situ [Z95.0]  Warfarin anticoagulation [Z79.01]  Pacemaker end of life [Z45.018]  Diagnosis Additional Information: No value filed.    Anesthesia Type:  MAC    Note:  Anesthesia Post Evaluation    Patient location during evaluation: ICU  Patient participation: Able to fully participate in evaluation  Level of consciousness: awake and alert  Pain management: adequate  Airway patency: patent  Cardiovascular status: acceptable  Respiratory status: acceptable  Hydration status: acceptable  PONV: none     Anesthetic complications: None          Last vitals:  Vitals:    05/21/20 1445 05/21/20 2000 05/21/20 2100   BP:  113/57 123/65   Pulse:  60 60   Resp: 21 20 14   Temp:  96.4  F (35.8  C) 96.4  F (35.8  C)   SpO2: 96% 98% 99%         Electronically Signed By: ELDA Archer CRNA  May 21, 2020  9:34 PM

## 2020-05-22 NOTE — PROGRESS NOTES
Dr. Arroyo here to see pt, pt ate most of breakfast, VSS, no new complaints, pt anxious to go home

## 2020-05-22 NOTE — PROGRESS NOTES
Patient presently in a-fib with a rate 80's to 90's for the first time since arrival to ICU. No change in BP. Will continue to monitor closely

## 2020-05-22 NOTE — PROGRESS NOTES
Discharge instructions given, pt verbalizes understanding of them, VSS, pt home with all of his personal belongings including all of [employer] equipment,

## 2020-05-26 ENCOUNTER — ANTICOAGULATION THERAPY VISIT (OUTPATIENT)
Dept: ANTICOAGULATION | Facility: OTHER | Age: 75
End: 2020-05-26
Attending: INTERNAL MEDICINE
Payer: MEDICARE

## 2020-05-26 ENCOUNTER — PATIENT OUTREACH (OUTPATIENT)
Dept: CARE COORDINATION | Facility: CLINIC | Age: 75
End: 2020-05-26

## 2020-05-26 DIAGNOSIS — I48.0 PAROXYSMAL ATRIAL FIBRILLATION (H): ICD-10-CM

## 2020-05-26 DIAGNOSIS — Z79.01 LONG TERM CURRENT USE OF ANTICOAGULANT THERAPY: ICD-10-CM

## 2020-05-26 LAB — INR POINT OF CARE: 2 (ref 0.86–1.14)

## 2020-05-26 PROCEDURE — 85610 PROTHROMBIN TIME: CPT | Mod: QW,ZL

## 2020-05-26 NOTE — PROGRESS NOTES
ANTICOAGULATION FOLLOW-UP CLINIC VISIT    Patient Name:  Kg Ferraro  Date:  2020  Contact Type:  Face to Face    SUBJECTIVE:  Patient Findings     Positives:   Missed doses, Hospital admission (d/c from hospital on  pacemaker failure)        Clinical Outcomes     Negatives:   Major bleeding event, Thromboembolic event, Anticoagulation-related hospital admission, Anticoagulation-related ED visit, Anticoagulation-related fatality           OBJECTIVE    Recent labs: (last 7 days)     20   INR 2.0*       ASSESSMENT / PLAN  INR assessment THER    Recheck INR In: 2 WEEKS    INR Location Clinic      Anticoagulation Summary  As of 2020    INR goal:   2.0-3.0   TTR:   91.0 % (12 mo)   INR used for dosin.0 (2020)   Warfarin maintenance plan:   4 mg (4 mg x 1) every day   Full warfarin instructions:   4 mg every day   Weekly warfarin total:   28 mg   Plan last modified:   Emani Pereira RN (10/19/2018)   Next INR check:   2020   Priority:   Maintenance   Target end date:   Indefinite    Indications    Paroxysmal atrial fibrillation (H) [I48.0]  Long-term (current) use of anticoagulants [Z79.01] [Z79.01]             Anticoagulation Episode Summary     INR check location:       Preferred lab:       Send INR reminders to:   [facility]    Comments:         Anticoagulation Care Providers     Provider Role Specialty Phone number    Caleb Parker MD [facility] Internal Medicine [phone]            See the Encounter Report to view Anticoagulation Flowsheet and Dosing Calendar (Go to Encounters tab in chart review, and find the Anticoagulation Therapy Visit)        Emani Pereira RN

## 2020-05-26 NOTE — PROGRESS NOTES
Clinic Care Coordination Contact  [facility]/Voicemail       Clinical Data: Care Coordinator Outreach  Outreach attempted x 2.  Left message on patient's voicemail with call back information and requested return call.  Plan: Care Coordinator will attempt again. Jailyn Mooney RN on 5/26/2020 at 10:15 AM      Patient has not called back. Message left reminding of this afternoon's follow up visit with PCP. Will close encounter. Jailyn Mooney RN on 5/28/2020 at 10:36 AM

## 2020-05-28 ENCOUNTER — OFFICE VISIT (OUTPATIENT)
Dept: INTERNAL MEDICINE | Facility: OTHER | Age: 75
End: 2020-05-28
Attending: INTERNAL MEDICINE
Payer: MEDICARE

## 2020-05-28 VITALS
BODY MASS INDEX: 37.28 KG/M2 | TEMPERATURE: 98.1 F | DIASTOLIC BLOOD PRESSURE: 86 MMHG | HEART RATE: 72 BPM | SYSTOLIC BLOOD PRESSURE: 138 MMHG | OXYGEN SATURATION: 96 % | WEIGHT: 238 LBS | RESPIRATION RATE: 16 BRPM

## 2020-05-28 DIAGNOSIS — I10 BENIGN ESSENTIAL HYPERTENSION: ICD-10-CM

## 2020-05-28 DIAGNOSIS — I49.5 SICK SINUS SYNDROME (H): ICD-10-CM

## 2020-05-28 DIAGNOSIS — Z79.01 WARFARIN ANTICOAGULATION: ICD-10-CM

## 2020-05-28 DIAGNOSIS — I47.10 PAROXYSMAL SUPRAVENTRICULAR TACHYCARDIA (H): ICD-10-CM

## 2020-05-28 DIAGNOSIS — I50.32 CHRONIC HEART FAILURE WITH PRESERVED EJECTION FRACTION (H): ICD-10-CM

## 2020-05-28 DIAGNOSIS — Z09 HOSPITAL DISCHARGE FOLLOW-UP: Primary | ICD-10-CM

## 2020-05-28 DIAGNOSIS — I48.0 PAROXYSMAL ATRIAL FIBRILLATION (H): ICD-10-CM

## 2020-05-28 DIAGNOSIS — E78.2 MIXED HYPERLIPIDEMIA: ICD-10-CM

## 2020-05-28 DIAGNOSIS — Z95.0 CARDIAC PACEMAKER IN SITU: ICD-10-CM

## 2020-05-28 DIAGNOSIS — E79.0 HYPERURICEMIA: ICD-10-CM

## 2020-05-28 PROCEDURE — G0463 HOSPITAL OUTPT CLINIC VISIT: HCPCS

## 2020-05-28 PROCEDURE — 99214 OFFICE O/P EST MOD 30 MIN: CPT | Performed by: INTERNAL MEDICINE

## 2020-05-28 PROCEDURE — G0463 HOSPITAL OUTPT CLINIC VISIT: HCPCS | Mod: 25

## 2020-05-28 RX ORDER — SIMVASTATIN 40 MG
40 TABLET ORAL DAILY
Qty: 90 TABLET | Refills: 3 | Status: SHIPPED | OUTPATIENT
Start: 2020-05-28 | End: 2021-06-29

## 2020-05-28 RX ORDER — POTASSIUM CHLORIDE 1500 MG/1
20 TABLET, EXTENDED RELEASE ORAL DAILY
Qty: 90 TABLET | Refills: 3 | Status: SHIPPED | OUTPATIENT
Start: 2020-05-28 | End: 2021-06-29

## 2020-05-28 RX ORDER — METOPROLOL SUCCINATE 100 MG/1
TABLET, EXTENDED RELEASE ORAL
Qty: 270 TABLET | Refills: 3 | Status: SHIPPED | OUTPATIENT
Start: 2020-05-28 | End: 2021-06-03

## 2020-05-28 RX ORDER — FLECAINIDE ACETATE 100 MG/1
100 TABLET ORAL 2 TIMES DAILY
Qty: 180 TABLET | Refills: 3 | Status: SHIPPED | OUTPATIENT
Start: 2020-05-28 | End: 2020-12-22 | Stop reason: ALTCHOICE

## 2020-05-28 RX ORDER — ALLOPURINOL 100 MG/1
100 TABLET ORAL 2 TIMES DAILY
Qty: 180 TABLET | Refills: 3 | Status: SHIPPED | OUTPATIENT
Start: 2020-05-28 | End: 2021-06-29

## 2020-05-28 ASSESSMENT — ENCOUNTER SYMPTOMS
LIGHT-HEADEDNESS: 0
FEVER: 0
MYALGIAS: 0
SHORTNESS OF BREATH: 1
DYSURIA: 0
ARTHRALGIAS: 1
CONFUSION: 0
WHEEZING: 0
ROS GI COMMENTS: + ABDOMINAL OBESITY
NAUSEA: 0
HEMATURIA: 0
DIARRHEA: 0
PALPITATIONS: 0
CHILLS: 0
AGITATION: 0
WOUND: 0
ABDOMINAL PAIN: 0
COUGH: 0
VOMITING: 0
BRUISES/BLEEDS EASILY: 1
DIZZINESS: 0

## 2020-05-28 ASSESSMENT — ANXIETY QUESTIONNAIRES
IF YOU CHECKED OFF ANY PROBLEMS ON THIS QUESTIONNAIRE, HOW DIFFICULT HAVE THESE PROBLEMS MADE IT FOR YOU TO DO YOUR WORK, TAKE CARE OF THINGS AT HOME, OR GET ALONG WITH OTHER PEOPLE: NOT DIFFICULT AT ALL
2. NOT BEING ABLE TO STOP OR CONTROL WORRYING: NOT AT ALL
1. FEELING NERVOUS, ANXIOUS, OR ON EDGE: NOT AT ALL
5. BEING SO RESTLESS THAT IT IS HARD TO SIT STILL: NOT AT ALL
3. WORRYING TOO MUCH ABOUT DIFFERENT THINGS: NOT AT ALL
GAD7 TOTAL SCORE: 0
7. FEELING AFRAID AS IF SOMETHING AWFUL MIGHT HAPPEN: NOT AT ALL
6. BECOMING EASILY ANNOYED OR IRRITABLE: NOT AT ALL

## 2020-05-28 ASSESSMENT — PATIENT HEALTH QUESTIONNAIRE - PHQ9
5. POOR APPETITE OR OVEREATING: NOT AT ALL
SUM OF ALL RESPONSES TO PHQ QUESTIONS 1-9: 0

## 2020-05-28 ASSESSMENT — PAIN SCALES - GENERAL: PAINLEVEL: MILD PAIN (2)

## 2020-05-28 NOTE — PROGRESS NOTES
"Nursing Notes:   Lima Guadarrama LPN  5/28/2020  1:18 PM  Signed  Patient presents to the clinic for Hospital follow up.    Chief Complaint   Patient presents with     Hospital F/U       Initial BP (!) 144/86 (BP Location: Right arm, Patient Position: Sitting, Cuff Size: Adult Regular)   Pulse 72   Temp 98.1  F (36.7  C) (Tympanic)   Resp 16   Wt 108 kg (238 lb)   SpO2 96%   BMI 37.28 kg/m   Estimated body mass index is 37.28 kg/m  as calculated from the following:    Height as of 5/20/20: 1.702 m (5' 7\").    Weight as of this encounter: 108 kg (238 lb).  Medication Reconciliation: complete    Lima Guadarrama LPN    Nursing note reviewed with patient.  Accuracy and completeness verified.   Mr. Ferraro is a 75 year old male who:  Patient presents with:  Hospital F/U      ICD-10-CM    1. Hospital discharge follow-up  Z09    2. Sick sinus syndrome (H)  I49.5    3. Cardiac pacemaker in situ  Z95.0    4. Paroxysmal atrial fibrillation (H)  I48.0 metoprolol succinate ER (TOPROL-XL) 100 MG 24 hr tablet     flecainide (TAMBOCOR) 100 MG tablet   5. Warfarin anticoagulation  Z79.01    6. Hyperuricemia  E79.0 allopurinol (ZYLOPRIM) 100 MG tablet   7. Mixed hyperlipidemia  E78.2 simvastatin (ZOCOR) 40 MG tablet   8. Chronic heart failure with preserved ejection fraction (H)  I50.32 potassium chloride ER (KLOR-CON M) 20 MEQ CR tablet     flecainide (TAMBOCOR) 100 MG tablet   9. Paroxysmal supraventricular tachycardia (H)  I47.1 metoprolol succinate ER (TOPROL-XL) 100 MG 24 hr tablet     flecainide (TAMBOCOR) 100 MG tablet   10. Benign essential hypertension  I10 metoprolol succinate ER (TOPROL-XL) 100 MG 24 hr tablet     HPI  Patient was hospitalized at [facility] from 5/20/2020 until 5/22/2020.  Diagnosis of sick sinus syndrome.  He was found to have a failed pacemaker generator and required generator replacement.  He got calcium gluconate in the emergency room and subsequently admitted to the hospital " overnight in intensive care.    Atrial lead was replaced and new generator was placed on 5/21/2020.    He was continued on home dosing of flecainide and metoprolol.  He had 2 doses of warfarin held in the hospital.    He will also require upcoming pacemaker recheck.    He was found to have acute kidney injury on admission and this improved with IV fluids.    Overall he reports doing quite well at this time.  He brought in his hardware for his pacemaker distance monitoring and wants to talk with 1 of the cardiology nurses about how to get this scheduled or how to download the information.    Continues on medications for Afib control, anticoagulation. Needs refills.     Hyperuricemia, doing well with allopurinol.  Needs refills.    Mixed hyperlipidemia, currently controlled.  Doing well with simvastatin.  Needs refills.    Chronic heart failure with preserved ejection fraction, currently controlled.  Continue current medications.  Needs refills.    History of paroxysmal supraventricular tachycardia, needs heart rate control.  Dosing indicates his flecainide is too high based on his renal function and age.  Computer indicates correct dosage should be 100 mg twice daily.  He would like to reduce dosing after he runs out of his current 150 mg twice daily prescription bottle.  Updated prescription sent to pharmacy today.    Hypertension, blood pressures currently controlled.  Changes as noted above.  Refill sent to pharmacy.    Functional Capacity: > or about 4 METS.   Review of Systems   Constitutional: Negative for chills and fever.   HENT: Negative for congestion and hearing loss.    Eyes: Negative for visual disturbance.   Respiratory: Positive for shortness of breath (  + minimal, with exertion). Negative for cough and wheezing.    Cardiovascular: Negative for chest pain and palpitations.        Right chest wall bruising after pacemaker generator change   Gastrointestinal: Negative for abdominal pain, diarrhea,  "nausea and vomiting.        + abdominal obesity   Endocrine: Negative for cold intolerance and heat intolerance.   Genitourinary: Negative for dysuria and hematuria.   Musculoskeletal: Positive for arthralgias and gait problem. Negative for myalgias.   Skin: Negative for rash and wound.   Allergic/Immunologic: Negative for immunocompromised state.   Neurological: Negative for dizziness and light-headedness.   Hematological: Bruises/bleeds easily.   Psychiatric/Behavioral: Negative for agitation and confusion.        Reviewed and updated as needed this visit by Provider   Tobacco  Allergies  Meds  Problems  Med Hx  Surg Hx  Fam Hx         EXAM:   Vitals:    05/28/20 1253   BP: 138/86   BP Location: Right arm   Patient Position: Sitting   Cuff Size: Adult Regular   Pulse: 72   Resp: 16   Temp: 98.1  F (36.7  C)   TempSrc: Tympanic   SpO2: 96%   Weight: 108 kg (238 lb)       Current Pain Score: Mild Pain (2)     BP Readings from Last 3 Encounters:   05/28/20 138/86   05/22/20 131/71   09/18/19 130/66      Wt Readings from Last 3 Encounters:   05/28/20 108 kg (238 lb)   05/20/20 99.7 kg (219 lb 12.8 oz)   09/18/19 109.8 kg (242 lb 2 oz)      Estimated body mass index is 37.28 kg/m  as calculated from the following:    Height as of 5/20/20: 1.702 m (5' 7\").    Weight as of this encounter: 108 kg (238 lb).     Physical Exam  Constitutional:       General: He is not in acute distress.     Appearance: He is well-developed. He is not diaphoretic.   HENT:      Head: Normocephalic and atraumatic.   Eyes:      General: No scleral icterus.     Conjunctiva/sclera: Conjunctivae normal.   Neck:      Musculoskeletal: Neck supple.   Cardiovascular:      Rate and Rhythm: Normal rate and regular rhythm.   Pulmonary:      Effort: Pulmonary effort is normal.      Breath sounds: Normal breath sounds.   Abdominal:      Palpations: Abdomen is soft.      Tenderness: There is no abdominal tenderness.      Comments: + abdominal obesity "   Musculoskeletal:         General: No deformity.   Lymphadenopathy:      Cervical: No cervical adenopathy.   Skin:     General: Skin is warm and dry.      Findings: Bruising (right upper chest wall at pacemaker site) present. No rash.   Neurological:      Mental Status: He is alert and oriented to person, place, and time. Mental status is at baseline.   Psychiatric:         Mood and Affect: Mood normal.         Behavior: Behavior normal.          Procedures   INVESTIGATIONS:  - Labs reviewed in Epic     ASSESSMENT AND PLAN:  Problem List Items Addressed This Visit        Endocrine    Mixed hyperlipidemia    Relevant Medications    simvastatin (ZOCOR) 40 MG tablet    Hyperuricemia    Relevant Medications    allopurinol (ZYLOPRIM) 100 MG tablet       Circulatory    Chronic heart failure with preserved ejection fraction (H)    Relevant Medications    potassium chloride ER (KLOR-CON M) 20 MEQ CR tablet    flecainide (TAMBOCOR) 100 MG tablet    Paroxysmal supraventricular tachycardia (H)    Relevant Medications    metoprolol succinate ER (TOPROL-XL) 100 MG 24 hr tablet    flecainide (TAMBOCOR) 100 MG tablet    Sick sinus syndrome (H)    Paroxysmal atrial fibrillation (H)    Relevant Medications    metoprolol succinate ER (TOPROL-XL) 100 MG 24 hr tablet    flecainide (TAMBOCOR) 100 MG tablet       Hematologic    Warfarin anticoagulation       Other    Cardiac pacemaker in situ      Other Visit Diagnoses     Hospital discharge follow-up    -  Primary    Benign essential hypertension        Relevant Medications    metoprolol succinate ER (TOPROL-XL) 100 MG 24 hr tablet        reviewed diet, exercise and weight control, recommended sodium restriction  -- Expected clinical course discussed    -- Medications and their side effects discussed    Patient Instructions   Glad you are doing well!    Routine pacemaker follow-up advised.     Return as needed for follow-up with Dr. Parker.    Clinic :  [phone]  Appointment line: [phone]     Caleb Parker MD   Internal Medicine  [facility]     Portions of this note were dictated using speech recognition software. The note has been proofread but errors in the text may have been overlooked. Please contact me if there are any concerns regarding the accuracy of the dictation.

## 2020-05-28 NOTE — PATIENT INSTRUCTIONS
Glad you are doing well!    Routine pacemaker follow-up advised.     1. Hospital discharge follow-up  2. Sick sinus syndrome (H)  3. Cardiac pacemaker in situ    4. Paroxysmal atrial fibrillation (H)  - metoprolol succinate ER (TOPROL-XL) 100 MG 24 hr tablet; Take 1 tablet (100 mg) by mouth every morning AND 2 tablets (200 mg) every evening.  Dispense: 270 tablet; Refill: 3    When out of 150 mg tablets of Flecainide... reduce down to 100 mg twice daily.   - flecainide (TAMBOCOR) 100 MG tablet; Take 1 tablet (100 mg) by mouth 2 times daily -- Dose Reduced 5/28/2020 -- start when out of your 150 mg tablets  Dispense: 180 tablet; Refill: 3    5. Warfarin anticoagulation    6. Hyperuricemia  - allopurinol (ZYLOPRIM) 100 MG tablet; Take 1 tablet (100 mg) by mouth 2 times daily  Dispense: 180 tablet; Refill: 3    7. Mixed hyperlipidemia  - simvastatin (ZOCOR) 40 MG tablet; Take 1 tablet (40 mg) by mouth daily  Dispense: 90 tablet; Refill: 3    8. Chronic heart failure with preserved ejection fraction (H)  9. Paroxysmal supraventricular tachycardia (H)  - metoprolol succinate ER (TOPROL-XL) 100 MG 24 hr tablet; Take 1 tablet (100 mg) by mouth every morning AND 2 tablets (200 mg) every evening.  Dispense: 270 tablet; Refill: 3  - flecainide (TAMBOCOR) 100 MG tablet; Take 1 tablet (100 mg) by mouth 2 times daily -- Dose Reduced 5/28/2020 -- start when out of your 150 mg tablets  Dispense: 180 tablet; Refill: 3    10. Benign essential hypertension  - metoprolol succinate ER (TOPROL-XL) 100 MG 24 hr tablet; Take 1 tablet (100 mg) by mouth every morning AND 2 tablets (200 mg) every evening.  Dispense: 270 tablet; Refill: 3      Return as needed for follow-up with Dr. Parker.    Clinic : [phone]  Appointment line: [phone]

## 2020-05-28 NOTE — NURSING NOTE
"Patient presents to the clinic for Hospital follow up.    Chief Complaint   Patient presents with     Hospital F/U       Initial BP (!) 144/86 (BP Location: Right arm, Patient Position: Sitting, Cuff Size: Adult Regular)   Pulse 72   Temp 98.1  F (36.7  C) (Tympanic)   Resp 16   Wt 108 kg (238 lb)   SpO2 96%   BMI 37.28 kg/m   Estimated body mass index is 37.28 kg/m  as calculated from the following:    Height as of 5/20/20: 1.702 m (5' 7\").    Weight as of this encounter: 108 kg (238 lb).  Medication Reconciliation: complete    Lima Guadarrama LPN    "

## 2020-05-29 ASSESSMENT — ANXIETY QUESTIONNAIRES: GAD7 TOTAL SCORE: 0

## 2020-06-09 ENCOUNTER — ANTICOAGULATION THERAPY VISIT (OUTPATIENT)
Dept: ANTICOAGULATION | Facility: OTHER | Age: 75
End: 2020-06-09
Attending: INTERNAL MEDICINE
Payer: MEDICARE

## 2020-06-09 DIAGNOSIS — Z79.01 LONG TERM CURRENT USE OF ANTICOAGULANT THERAPY: ICD-10-CM

## 2020-06-09 DIAGNOSIS — I48.0 PAROXYSMAL ATRIAL FIBRILLATION (H): ICD-10-CM

## 2020-06-09 LAB — INR POINT OF CARE: 2.4 (ref 0.86–1.14)

## 2020-06-09 PROCEDURE — 85610 PROTHROMBIN TIME: CPT | Mod: QW,ZL

## 2020-06-09 NOTE — PROGRESS NOTES
ANTICOAGULATION FOLLOW-UP CLINIC VISIT    Patient Name:  Kg Ferraro  Date:  2020  Contact Type:  Face to Face    SUBJECTIVE:  Patient Findings         Clinical Outcomes     Negatives:   Major bleeding event, Thromboembolic event, Anticoagulation-related hospital admission, Anticoagulation-related ED visit, Anticoagulation-related fatality           OBJECTIVE    Recent labs: (last 7 days)     20   INR 2.4*       ASSESSMENT / PLAN  INR assessment THER    Recheck INR In: 6 WEEKS    INR Location Clinic      Anticoagulation Summary  As of 2020    INR goal:   2.0-3.0   TTR:   94.9 % (12 mo)   INR used for dosin.4 (2020)   Warfarin maintenance plan:   4 mg (4 mg x 1) every day   Full warfarin instructions:   4 mg every day   Weekly warfarin total:   28 mg   No change documented:   Emani Pereira RN   Plan last modified:   Emani Pereira RN (10/19/2018)   Next INR check:   2020   Priority:   Maintenance   Target end date:   Indefinite    Indications    Paroxysmal atrial fibrillation (H) [I48.0]  Long-term (current) use of anticoagulants [Z79.01] [Z79.01]             Anticoagulation Episode Summary     INR check location:       Preferred lab:       Send INR reminders to:   [facility]    Comments:         Anticoagulation Care Providers     Provider Role Specialty Phone number    Caleb Parker MD [facility] Internal Medicine [phone]            See the Encounter Report to view Anticoagulation Flowsheet and Dosing Calendar (Go to Encounters tab in chart review, and find the Anticoagulation Therapy Visit)        Emani Pereira RN

## 2020-06-19 ENCOUNTER — ANCILLARY PROCEDURE (OUTPATIENT)
Dept: CARDIOLOGY | Facility: CLINIC | Age: 75
End: 2020-06-19
Attending: INTERNAL MEDICINE
Payer: MEDICARE

## 2020-06-19 DIAGNOSIS — I49.5 SICK SINUS SYNDROME (H): ICD-10-CM

## 2020-06-19 PROCEDURE — 93294 REM INTERROG EVL PM/LDLS PM: CPT | Mod: ZP | Performed by: INTERNAL MEDICINE

## 2020-06-19 PROCEDURE — 93296 REM INTERROG EVL PM/IDS: CPT | Mod: ZF

## 2020-06-24 LAB
MDC_IDC_EPISODE_DTM: NORMAL
MDC_IDC_EPISODE_DURATION: 10 S
MDC_IDC_EPISODE_DURATION: 109 S
MDC_IDC_EPISODE_DURATION: 118 S
MDC_IDC_EPISODE_DURATION: 119 S
MDC_IDC_EPISODE_DURATION: 125 S
MDC_IDC_EPISODE_DURATION: 125 S
MDC_IDC_EPISODE_DURATION: 13 S
MDC_IDC_EPISODE_DURATION: 13 S
MDC_IDC_EPISODE_DURATION: 133 S
MDC_IDC_EPISODE_DURATION: 137 S
MDC_IDC_EPISODE_DURATION: 139 S
MDC_IDC_EPISODE_DURATION: 145 S
MDC_IDC_EPISODE_DURATION: 147 S
MDC_IDC_EPISODE_DURATION: 1493 S
MDC_IDC_EPISODE_DURATION: 16 S
MDC_IDC_EPISODE_DURATION: 17 S
MDC_IDC_EPISODE_DURATION: 171 S
MDC_IDC_EPISODE_DURATION: 18 S
MDC_IDC_EPISODE_DURATION: 18 S
MDC_IDC_EPISODE_DURATION: 184 S
MDC_IDC_EPISODE_DURATION: 187 S
MDC_IDC_EPISODE_DURATION: 23 S
MDC_IDC_EPISODE_DURATION: 24 S
MDC_IDC_EPISODE_DURATION: 24 S
MDC_IDC_EPISODE_DURATION: 263 S
MDC_IDC_EPISODE_DURATION: 27 S
MDC_IDC_EPISODE_DURATION: 282 S
MDC_IDC_EPISODE_DURATION: 297 S
MDC_IDC_EPISODE_DURATION: 326 S
MDC_IDC_EPISODE_DURATION: 426 S
MDC_IDC_EPISODE_DURATION: 44 S
MDC_IDC_EPISODE_DURATION: 44 S
MDC_IDC_EPISODE_DURATION: 447 S
MDC_IDC_EPISODE_DURATION: 492 S
MDC_IDC_EPISODE_DURATION: 51 S
MDC_IDC_EPISODE_DURATION: 54 S
MDC_IDC_EPISODE_DURATION: 56 S
MDC_IDC_EPISODE_DURATION: 56 S
MDC_IDC_EPISODE_DURATION: 57 S
MDC_IDC_EPISODE_DURATION: 59 S
MDC_IDC_EPISODE_DURATION: 60 S
MDC_IDC_EPISODE_DURATION: 661 S
MDC_IDC_EPISODE_DURATION: 7 S
MDC_IDC_EPISODE_DURATION: 76 S
MDC_IDC_EPISODE_DURATION: 77 S
MDC_IDC_EPISODE_DURATION: 78 S
MDC_IDC_EPISODE_DURATION: 8 S
MDC_IDC_EPISODE_DURATION: 89 S
MDC_IDC_EPISODE_DURATION: 9 S
MDC_IDC_EPISODE_DURATION: 97 S
MDC_IDC_EPISODE_ID: 2835
MDC_IDC_EPISODE_ID: 2836
MDC_IDC_EPISODE_ID: 2837
MDC_IDC_EPISODE_ID: 2838
MDC_IDC_EPISODE_ID: 2839
MDC_IDC_EPISODE_ID: 2840
MDC_IDC_EPISODE_ID: 2841
MDC_IDC_EPISODE_ID: 2842
MDC_IDC_EPISODE_ID: 2843
MDC_IDC_EPISODE_ID: 2844
MDC_IDC_EPISODE_ID: 2845
MDC_IDC_EPISODE_ID: 2846
MDC_IDC_EPISODE_ID: 2847
MDC_IDC_EPISODE_ID: 2848
MDC_IDC_EPISODE_ID: 2849
MDC_IDC_EPISODE_ID: 2850
MDC_IDC_EPISODE_ID: 2851
MDC_IDC_EPISODE_ID: 2852
MDC_IDC_EPISODE_ID: 2853
MDC_IDC_EPISODE_ID: 2854
MDC_IDC_EPISODE_ID: 2855
MDC_IDC_EPISODE_ID: 2856
MDC_IDC_EPISODE_ID: 2857
MDC_IDC_EPISODE_ID: 2858
MDC_IDC_EPISODE_ID: 2859
MDC_IDC_EPISODE_ID: 2860
MDC_IDC_EPISODE_ID: 2861
MDC_IDC_EPISODE_ID: 2862
MDC_IDC_EPISODE_ID: 2863
MDC_IDC_EPISODE_ID: 2864
MDC_IDC_EPISODE_ID: 2865
MDC_IDC_EPISODE_ID: 2866
MDC_IDC_EPISODE_ID: 2867
MDC_IDC_EPISODE_ID: 2868
MDC_IDC_EPISODE_ID: 2869
MDC_IDC_EPISODE_ID: 2870
MDC_IDC_EPISODE_ID: 2871
MDC_IDC_EPISODE_ID: 2872
MDC_IDC_EPISODE_ID: 2873
MDC_IDC_EPISODE_ID: 2874
MDC_IDC_EPISODE_ID: 2875
MDC_IDC_EPISODE_ID: 2876
MDC_IDC_EPISODE_ID: 2877
MDC_IDC_EPISODE_ID: 2878
MDC_IDC_EPISODE_ID: 2879
MDC_IDC_EPISODE_ID: 2880
MDC_IDC_EPISODE_ID: 2881
MDC_IDC_EPISODE_ID: 2882
MDC_IDC_EPISODE_ID: 2883
MDC_IDC_EPISODE_ID: 2884
MDC_IDC_EPISODE_ID: 2885
MDC_IDC_EPISODE_TYPE: NORMAL
MDC_IDC_LEAD_IMPLANT_DT: NORMAL
MDC_IDC_LEAD_IMPLANT_DT: NORMAL
MDC_IDC_LEAD_LOCATION: NORMAL
MDC_IDC_LEAD_LOCATION: NORMAL
MDC_IDC_LEAD_LOCATION_DETAIL_1: NORMAL
MDC_IDC_LEAD_LOCATION_DETAIL_1: NORMAL
MDC_IDC_LEAD_MFG: NORMAL
MDC_IDC_LEAD_MFG: NORMAL
MDC_IDC_LEAD_MODEL: NORMAL
MDC_IDC_LEAD_MODEL: NORMAL
MDC_IDC_LEAD_POLARITY_TYPE: NORMAL
MDC_IDC_LEAD_POLARITY_TYPE: NORMAL
MDC_IDC_LEAD_SERIAL: NORMAL
MDC_IDC_LEAD_SERIAL: NORMAL
MDC_IDC_LEAD_SPECIAL_FUNCTION: NORMAL
MDC_IDC_LEAD_SPECIAL_FUNCTION: NORMAL
MDC_IDC_MSMT_BATTERY_DTM: NORMAL
MDC_IDC_MSMT_BATTERY_REMAINING_LONGEVITY: 145 MO
MDC_IDC_MSMT_BATTERY_RRT_TRIGGER: 2.62
MDC_IDC_MSMT_BATTERY_STATUS: NORMAL
MDC_IDC_MSMT_BATTERY_VOLTAGE: 3.2 V
MDC_IDC_MSMT_LEADCHNL_RA_IMPEDANCE_VALUE: 342 OHM
MDC_IDC_MSMT_LEADCHNL_RA_IMPEDANCE_VALUE: 437 OHM
MDC_IDC_MSMT_LEADCHNL_RA_SENSING_INTR_AMPL: 0.25 MV
MDC_IDC_MSMT_LEADCHNL_RA_SENSING_INTR_AMPL: 0.25 MV
MDC_IDC_MSMT_LEADCHNL_RV_IMPEDANCE_VALUE: 532 OHM
MDC_IDC_MSMT_LEADCHNL_RV_IMPEDANCE_VALUE: 570 OHM
MDC_IDC_MSMT_LEADCHNL_RV_PACING_THRESHOLD_AMPLITUDE: 1.25 V
MDC_IDC_MSMT_LEADCHNL_RV_PACING_THRESHOLD_PULSEWIDTH: 0.4 MS
MDC_IDC_MSMT_LEADCHNL_RV_SENSING_INTR_AMPL: 9.75 MV
MDC_IDC_MSMT_LEADCHNL_RV_SENSING_INTR_AMPL: 9.75 MV
MDC_IDC_PG_IMPLANT_DTM: NORMAL
MDC_IDC_PG_MFG: NORMAL
MDC_IDC_PG_MODEL: NORMAL
MDC_IDC_PG_SERIAL: NORMAL
MDC_IDC_PG_TYPE: NORMAL
MDC_IDC_SESS_CLINIC_NAME: NORMAL
MDC_IDC_SESS_DTM: NORMAL
MDC_IDC_SESS_TYPE: NORMAL
MDC_IDC_SET_BRADY_AT_MODE_SWITCH_RATE: 171 {BEATS}/MIN
MDC_IDC_SET_BRADY_HYSTRATE: NORMAL
MDC_IDC_SET_BRADY_LOWRATE: 60 {BEATS}/MIN
MDC_IDC_SET_BRADY_MAX_SENSOR_RATE: 130 {BEATS}/MIN
MDC_IDC_SET_BRADY_MAX_TRACKING_RATE: 130 {BEATS}/MIN
MDC_IDC_SET_BRADY_MODE: NORMAL
MDC_IDC_SET_BRADY_PAV_DELAY_LOW: 300 MS
MDC_IDC_SET_BRADY_SAV_DELAY_LOW: 270 MS
MDC_IDC_SET_LEADCHNL_RA_PACING_AMPLITUDE: 3.5 V
MDC_IDC_SET_LEADCHNL_RA_PACING_ANODE_ELECTRODE_1: NORMAL
MDC_IDC_SET_LEADCHNL_RA_PACING_ANODE_LOCATION_1: NORMAL
MDC_IDC_SET_LEADCHNL_RA_PACING_CAPTURE_MODE: NORMAL
MDC_IDC_SET_LEADCHNL_RA_PACING_CATHODE_ELECTRODE_1: NORMAL
MDC_IDC_SET_LEADCHNL_RA_PACING_CATHODE_LOCATION_1: NORMAL
MDC_IDC_SET_LEADCHNL_RA_PACING_POLARITY: NORMAL
MDC_IDC_SET_LEADCHNL_RA_PACING_PULSEWIDTH: 1 MS
MDC_IDC_SET_LEADCHNL_RA_SENSING_ANODE_ELECTRODE_1: NORMAL
MDC_IDC_SET_LEADCHNL_RA_SENSING_ANODE_LOCATION_1: NORMAL
MDC_IDC_SET_LEADCHNL_RA_SENSING_CATHODE_ELECTRODE_1: NORMAL
MDC_IDC_SET_LEADCHNL_RA_SENSING_CATHODE_LOCATION_1: NORMAL
MDC_IDC_SET_LEADCHNL_RA_SENSING_POLARITY: NORMAL
MDC_IDC_SET_LEADCHNL_RA_SENSING_SENSITIVITY: 0.15 MV
MDC_IDC_SET_LEADCHNL_RV_PACING_AMPLITUDE: 3.5 V
MDC_IDC_SET_LEADCHNL_RV_PACING_ANODE_ELECTRODE_1: NORMAL
MDC_IDC_SET_LEADCHNL_RV_PACING_ANODE_LOCATION_1: NORMAL
MDC_IDC_SET_LEADCHNL_RV_PACING_CAPTURE_MODE: NORMAL
MDC_IDC_SET_LEADCHNL_RV_PACING_CATHODE_ELECTRODE_1: NORMAL
MDC_IDC_SET_LEADCHNL_RV_PACING_CATHODE_LOCATION_1: NORMAL
MDC_IDC_SET_LEADCHNL_RV_PACING_POLARITY: NORMAL
MDC_IDC_SET_LEADCHNL_RV_PACING_PULSEWIDTH: 0.4 MS
MDC_IDC_SET_LEADCHNL_RV_SENSING_ANODE_ELECTRODE_1: NORMAL
MDC_IDC_SET_LEADCHNL_RV_SENSING_ANODE_LOCATION_1: NORMAL
MDC_IDC_SET_LEADCHNL_RV_SENSING_CATHODE_ELECTRODE_1: NORMAL
MDC_IDC_SET_LEADCHNL_RV_SENSING_CATHODE_LOCATION_1: NORMAL
MDC_IDC_SET_LEADCHNL_RV_SENSING_POLARITY: NORMAL
MDC_IDC_SET_LEADCHNL_RV_SENSING_SENSITIVITY: 0.9 MV
MDC_IDC_SET_ZONE_DETECTION_INTERVAL: 350 MS
MDC_IDC_SET_ZONE_DETECTION_INTERVAL: 400 MS
MDC_IDC_SET_ZONE_TYPE: NORMAL
MDC_IDC_STAT_BRADY_AP_VP_PERCENT: 37.71 %
MDC_IDC_STAT_BRADY_AP_VS_PERCENT: 22.84 %
MDC_IDC_STAT_BRADY_AS_VP_PERCENT: 15.43 %
MDC_IDC_STAT_BRADY_AS_VS_PERCENT: 24 %
MDC_IDC_STAT_BRADY_DTM_END: NORMAL
MDC_IDC_STAT_BRADY_DTM_START: NORMAL
MDC_IDC_STAT_BRADY_RA_PERCENT_PACED: 36.08 %
MDC_IDC_STAT_BRADY_RV_PERCENT_PACED: 55.4 %
MDC_IDC_STAT_EPISODE_RECENT_COUNT: 0
MDC_IDC_STAT_EPISODE_RECENT_COUNT: 0
MDC_IDC_STAT_EPISODE_RECENT_COUNT: 2878
MDC_IDC_STAT_EPISODE_RECENT_COUNT_DTM_END: NORMAL
MDC_IDC_STAT_EPISODE_RECENT_COUNT_DTM_START: NORMAL
MDC_IDC_STAT_EPISODE_TOTAL_COUNT: 0
MDC_IDC_STAT_EPISODE_TOTAL_COUNT: 0
MDC_IDC_STAT_EPISODE_TOTAL_COUNT: 2885
MDC_IDC_STAT_EPISODE_TOTAL_COUNT_DTM_END: NORMAL
MDC_IDC_STAT_EPISODE_TOTAL_COUNT_DTM_START: NORMAL
MDC_IDC_STAT_EPISODE_TYPE: NORMAL

## 2020-07-24 ENCOUNTER — ANTICOAGULATION THERAPY VISIT (OUTPATIENT)
Dept: ANTICOAGULATION | Facility: OTHER | Age: 75
End: 2020-07-24
Attending: INTERNAL MEDICINE
Payer: MEDICARE

## 2020-07-24 DIAGNOSIS — Z79.01 LONG TERM CURRENT USE OF ANTICOAGULANT THERAPY: ICD-10-CM

## 2020-07-24 DIAGNOSIS — I48.0 PAROXYSMAL ATRIAL FIBRILLATION (H): ICD-10-CM

## 2020-07-24 LAB — INR POINT OF CARE: 2.9 (ref 0.86–1.14)

## 2020-07-24 PROCEDURE — 36416 COLLJ CAPILLARY BLOOD SPEC: CPT | Mod: ZL

## 2020-07-24 NOTE — PROGRESS NOTES
ANTICOAGULATION FOLLOW-UP CLINIC VISIT    Patient Name:  Kg Ferraro  Date:  2020  Contact Type:  Face to Face    SUBJECTIVE:         OBJECTIVE    Recent labs: (last 7 days)     20   INR 2.9*       ASSESSMENT / PLAN  INR assessment THER    Recheck INR In: 6 WEEKS    INR Location Clinic      Anticoagulation Summary  As of 2020    INR goal:   2.0-3.0   TTR:   100.0 % (12 mo)   INR used for dosin.9 (2020)   Warfarin maintenance plan:   4 mg (4 mg x 1) every day   Full warfarin instructions:   4 mg every day   Weekly warfarin total:   28 mg   No change documented:   Cheri Prater RN   Plan last modified:   Emani Pereira RN (10/19/2018)   Next INR check:   2020   Priority:   Maintenance   Target end date:   Indefinite    Indications    Paroxysmal atrial fibrillation (H) [I48.0]  Long-term (current) use of anticoagulants [Z79.01] [Z79.01]             Anticoagulation Episode Summary     INR check location:       Preferred lab:       Send INR reminders to:   [facility]    Comments:         Anticoagulation Care Providers     Provider Role Specialty Phone number    Caleb Parker MD [facility] Internal Medicine [phone]            See the Encounter Report to view Anticoagulation Flowsheet and Dosing Calendar (Go to Encounters tab in chart review, and find the Anticoagulation Therapy Visit)        Cheri Pratre, TOM

## 2020-08-18 ENCOUNTER — HOSPITAL ENCOUNTER (OUTPATIENT)
Dept: CARDIOLOGY | Facility: OTHER | Age: 75
Discharge: HOME | End: 2020-08-18
Attending: INTERNAL MEDICINE | Admitting: INTERNAL MEDICINE
Payer: MEDICARE

## 2020-08-18 DIAGNOSIS — I49.5 SICK SINUS SYNDROME (H): ICD-10-CM

## 2020-08-18 LAB
MDC_IDC_EPISODE_DTM: NORMAL
MDC_IDC_EPISODE_DURATION: 117 S
MDC_IDC_EPISODE_DURATION: 126 S
MDC_IDC_EPISODE_DURATION: 1260 S
MDC_IDC_EPISODE_DURATION: 1287 S
MDC_IDC_EPISODE_DURATION: 133 S
MDC_IDC_EPISODE_DURATION: 14 S
MDC_IDC_EPISODE_DURATION: 145 S
MDC_IDC_EPISODE_DURATION: 147 S
MDC_IDC_EPISODE_DURATION: 148 S
MDC_IDC_EPISODE_DURATION: 163 S
MDC_IDC_EPISODE_DURATION: 164 S
MDC_IDC_EPISODE_DURATION: 1773 S
MDC_IDC_EPISODE_DURATION: 229 S
MDC_IDC_EPISODE_DURATION: 231 S
MDC_IDC_EPISODE_DURATION: 25 S
MDC_IDC_EPISODE_DURATION: 2642 S
MDC_IDC_EPISODE_DURATION: 277 S
MDC_IDC_EPISODE_DURATION: 290 S
MDC_IDC_EPISODE_DURATION: 297 S
MDC_IDC_EPISODE_DURATION: 30 S
MDC_IDC_EPISODE_DURATION: 324 S
MDC_IDC_EPISODE_DURATION: 38 S
MDC_IDC_EPISODE_DURATION: 4 S
MDC_IDC_EPISODE_DURATION: 41 S
MDC_IDC_EPISODE_DURATION: 43 S
MDC_IDC_EPISODE_DURATION: 431 S
MDC_IDC_EPISODE_DURATION: 4413 S
MDC_IDC_EPISODE_DURATION: 45 S
MDC_IDC_EPISODE_DURATION: 4593 S
MDC_IDC_EPISODE_DURATION: 471 S
MDC_IDC_EPISODE_DURATION: 475 S
MDC_IDC_EPISODE_DURATION: 487 S
MDC_IDC_EPISODE_DURATION: 49 S
MDC_IDC_EPISODE_DURATION: 49 S
MDC_IDC_EPISODE_DURATION: 50 S
MDC_IDC_EPISODE_DURATION: 50 S
MDC_IDC_EPISODE_DURATION: 5465 S
MDC_IDC_EPISODE_DURATION: 5858 S
MDC_IDC_EPISODE_DURATION: 61 S
MDC_IDC_EPISODE_DURATION: 62 S
MDC_IDC_EPISODE_DURATION: 63 S
MDC_IDC_EPISODE_DURATION: 7 S
MDC_IDC_EPISODE_DURATION: 71 S
MDC_IDC_EPISODE_DURATION: 74 S
MDC_IDC_EPISODE_DURATION: 75 S
MDC_IDC_EPISODE_DURATION: 793 S
MDC_IDC_EPISODE_DURATION: 8 S
MDC_IDC_EPISODE_DURATION: 805 S
MDC_IDC_EPISODE_DURATION: 87 S
MDC_IDC_EPISODE_DURATION: 99 S
MDC_IDC_EPISODE_ID: NORMAL
MDC_IDC_EPISODE_TYPE: NORMAL
MDC_IDC_LEAD_IMPLANT_DT: NORMAL
MDC_IDC_LEAD_IMPLANT_DT: NORMAL
MDC_IDC_LEAD_LOCATION: NORMAL
MDC_IDC_LEAD_LOCATION: NORMAL
MDC_IDC_LEAD_LOCATION_DETAIL_1: NORMAL
MDC_IDC_LEAD_LOCATION_DETAIL_1: NORMAL
MDC_IDC_LEAD_MFG: NORMAL
MDC_IDC_LEAD_MFG: NORMAL
MDC_IDC_LEAD_MODEL: NORMAL
MDC_IDC_LEAD_MODEL: NORMAL
MDC_IDC_LEAD_POLARITY_TYPE: NORMAL
MDC_IDC_LEAD_POLARITY_TYPE: NORMAL
MDC_IDC_LEAD_SERIAL: NORMAL
MDC_IDC_LEAD_SERIAL: NORMAL
MDC_IDC_LEAD_SPECIAL_FUNCTION: NORMAL
MDC_IDC_LEAD_SPECIAL_FUNCTION: NORMAL
MDC_IDC_MSMT_BATTERY_DTM: NORMAL
MDC_IDC_MSMT_BATTERY_REMAINING_LONGEVITY: 141 MO
MDC_IDC_MSMT_BATTERY_RRT_TRIGGER: 2.62
MDC_IDC_MSMT_BATTERY_STATUS: NORMAL
MDC_IDC_MSMT_BATTERY_VOLTAGE: 3.15 V
MDC_IDC_MSMT_LEADCHNL_RA_IMPEDANCE_VALUE: 380 OHM
MDC_IDC_MSMT_LEADCHNL_RA_IMPEDANCE_VALUE: 551 OHM
MDC_IDC_MSMT_LEADCHNL_RA_SENSING_INTR_AMPL: 0.3 MV
MDC_IDC_MSMT_LEADCHNL_RV_IMPEDANCE_VALUE: 570 OHM
MDC_IDC_MSMT_LEADCHNL_RV_IMPEDANCE_VALUE: 608 OHM
MDC_IDC_MSMT_LEADCHNL_RV_PACING_THRESHOLD_AMPLITUDE: 1 V
MDC_IDC_MSMT_LEADCHNL_RV_PACING_THRESHOLD_PULSEWIDTH: 0.4 MS
MDC_IDC_MSMT_LEADCHNL_RV_SENSING_INTR_AMPL: 12.6 MV
MDC_IDC_PG_IMPLANT_DTM: NORMAL
MDC_IDC_PG_MFG: NORMAL
MDC_IDC_PG_MODEL: NORMAL
MDC_IDC_PG_SERIAL: NORMAL
MDC_IDC_PG_TYPE: NORMAL
MDC_IDC_SESS_CLINIC_NAME: NORMAL
MDC_IDC_SESS_DTM: NORMAL
MDC_IDC_SESS_TYPE: NORMAL
MDC_IDC_SET_BRADY_AT_MODE_SWITCH_RATE: 171 {BEATS}/MIN
MDC_IDC_SET_BRADY_HYSTRATE: NORMAL
MDC_IDC_SET_BRADY_LOWRATE: 60 {BEATS}/MIN
MDC_IDC_SET_BRADY_MAX_SENSOR_RATE: 130 {BEATS}/MIN
MDC_IDC_SET_BRADY_MAX_TRACKING_RATE: 130 {BEATS}/MIN
MDC_IDC_SET_BRADY_MODE: NORMAL
MDC_IDC_SET_BRADY_PAV_DELAY_LOW: 300 MS
MDC_IDC_SET_BRADY_SAV_DELAY_LOW: 270 MS
MDC_IDC_SET_LEADCHNL_RA_PACING_AMPLITUDE: 3.5 V
MDC_IDC_SET_LEADCHNL_RA_PACING_ANODE_ELECTRODE_1: NORMAL
MDC_IDC_SET_LEADCHNL_RA_PACING_ANODE_LOCATION_1: NORMAL
MDC_IDC_SET_LEADCHNL_RA_PACING_CAPTURE_MODE: NORMAL
MDC_IDC_SET_LEADCHNL_RA_PACING_CATHODE_ELECTRODE_1: NORMAL
MDC_IDC_SET_LEADCHNL_RA_PACING_CATHODE_LOCATION_1: NORMAL
MDC_IDC_SET_LEADCHNL_RA_PACING_POLARITY: NORMAL
MDC_IDC_SET_LEADCHNL_RA_PACING_PULSEWIDTH: 1 MS
MDC_IDC_SET_LEADCHNL_RA_SENSING_ANODE_ELECTRODE_1: NORMAL
MDC_IDC_SET_LEADCHNL_RA_SENSING_ANODE_LOCATION_1: NORMAL
MDC_IDC_SET_LEADCHNL_RA_SENSING_CATHODE_ELECTRODE_1: NORMAL
MDC_IDC_SET_LEADCHNL_RA_SENSING_CATHODE_LOCATION_1: NORMAL
MDC_IDC_SET_LEADCHNL_RA_SENSING_POLARITY: NORMAL
MDC_IDC_SET_LEADCHNL_RA_SENSING_SENSITIVITY: 0.15 MV
MDC_IDC_SET_LEADCHNL_RV_PACING_AMPLITUDE: 3.5 V
MDC_IDC_SET_LEADCHNL_RV_PACING_ANODE_ELECTRODE_1: NORMAL
MDC_IDC_SET_LEADCHNL_RV_PACING_ANODE_LOCATION_1: NORMAL
MDC_IDC_SET_LEADCHNL_RV_PACING_CAPTURE_MODE: NORMAL
MDC_IDC_SET_LEADCHNL_RV_PACING_CATHODE_ELECTRODE_1: NORMAL
MDC_IDC_SET_LEADCHNL_RV_PACING_CATHODE_LOCATION_1: NORMAL
MDC_IDC_SET_LEADCHNL_RV_PACING_POLARITY: NORMAL
MDC_IDC_SET_LEADCHNL_RV_PACING_PULSEWIDTH: 0.4 MS
MDC_IDC_SET_LEADCHNL_RV_SENSING_ANODE_ELECTRODE_1: NORMAL
MDC_IDC_SET_LEADCHNL_RV_SENSING_ANODE_LOCATION_1: NORMAL
MDC_IDC_SET_LEADCHNL_RV_SENSING_CATHODE_ELECTRODE_1: NORMAL
MDC_IDC_SET_LEADCHNL_RV_SENSING_CATHODE_LOCATION_1: NORMAL
MDC_IDC_SET_LEADCHNL_RV_SENSING_POLARITY: NORMAL
MDC_IDC_SET_LEADCHNL_RV_SENSING_SENSITIVITY: 0.9 MV
MDC_IDC_SET_ZONE_DETECTION_INTERVAL: 350 MS
MDC_IDC_SET_ZONE_DETECTION_INTERVAL: 400 MS
MDC_IDC_SET_ZONE_TYPE: NORMAL
MDC_IDC_STAT_BRADY_AP_VP_PERCENT: 54.47 %
MDC_IDC_STAT_BRADY_AP_VS_PERCENT: 15.79 %
MDC_IDC_STAT_BRADY_AS_VP_PERCENT: 17.57 %
MDC_IDC_STAT_BRADY_AS_VS_PERCENT: 12.23 %
MDC_IDC_STAT_BRADY_DTM_END: NORMAL
MDC_IDC_STAT_BRADY_DTM_START: NORMAL
MDC_IDC_STAT_BRADY_RA_PERCENT_PACED: 31.68 %
MDC_IDC_STAT_BRADY_RV_PERCENT_PACED: 74.88 %
MDC_IDC_STAT_EPISODE_RECENT_COUNT: 0
MDC_IDC_STAT_EPISODE_RECENT_COUNT: 0
MDC_IDC_STAT_EPISODE_RECENT_COUNT: NORMAL
MDC_IDC_STAT_EPISODE_RECENT_COUNT_DTM_END: NORMAL
MDC_IDC_STAT_EPISODE_RECENT_COUNT_DTM_START: NORMAL
MDC_IDC_STAT_EPISODE_TOTAL_COUNT: 0
MDC_IDC_STAT_EPISODE_TOTAL_COUNT: 0
MDC_IDC_STAT_EPISODE_TOTAL_COUNT: NORMAL
MDC_IDC_STAT_EPISODE_TOTAL_COUNT_DTM_END: NORMAL
MDC_IDC_STAT_EPISODE_TOTAL_COUNT_DTM_START: NORMAL
MDC_IDC_STAT_EPISODE_TYPE: NORMAL

## 2020-08-18 PROCEDURE — 93280 PM DEVICE PROGR EVAL DUAL: CPT | Performed by: INTERNAL MEDICINE

## 2020-08-18 PROCEDURE — 93280 PM DEVICE PROGR EVAL DUAL: CPT | Mod: 26 | Performed by: INTERNAL MEDICINE

## 2020-08-18 NOTE — PATIENT INSTRUCTIONS
It was a pleasure to see you in clinic today.  Please do not hesitate to call with any questions or concerns.  You are scheduled for a remote transmission on 11/24/20.  We look forward to seeing you in clinic at your next device check in 6 months.    Bryant Cortés, RN  Electrophysiology Nurse Clinician  [facility]    During Business Hours Please Call:  [phone]  After Hours Please Call:  [phone] - select option #4 and ask for job code [phone]

## 2020-09-07 DIAGNOSIS — Z79.01 ANTICOAGULATION MONITORING, INR RANGE 2-3: ICD-10-CM

## 2020-09-07 DIAGNOSIS — I48.91 ATRIAL FIBRILLATION (H): ICD-10-CM

## 2020-09-08 RX ORDER — WARFARIN SODIUM 4 MG/1
TABLET ORAL
Qty: 90 TABLET | Refills: 1 | Status: SHIPPED | OUTPATIENT
Start: 2020-09-08 | End: 2021-03-15

## 2020-09-08 NOTE — TELEPHONE ENCOUNTER
"Requested Prescriptions   Pending Prescriptions Disp Refills     warfarin ANTICOAGULANT (COUMADIN) 4 MG tablet [Pharmacy Med Name: Warfarin Sodium 4 MG Oral Tablet] 90 tablet 0     Sig: TAKE 1 TABLET BY MOUTH ONCE DAILY OR  AS  DIRECTED  BY  [facility]  CLINIC.       Vitamin K Antagonists Failed - 9/7/2020 12:11 PM        Failed - INR is within goal in the past 6 weeks     Confirm INR is within goal in the past 6 weeks.     Recent Labs   Lab Test 07/24/20   INR 2.9*                       Passed - Recent (12 mo) or future (30 days) visit within the authorizing provider's specialty     Patient has had an office visit with the authorizing provider or a provider within the authorizing providers department within the previous 12 mos or has a future within next 30 days. See \"Patient Info\" tab in inbasket, or \"Choose Columns\" in Meds & Orders section of the refill encounter.              Passed - Medication is active on med list        Passed - Patient is 18 years of age or older           Prescription approved per [facility] Refill Protocol.    "

## 2020-10-08 ENCOUNTER — ANTICOAGULATION THERAPY VISIT (OUTPATIENT)
Dept: ANTICOAGULATION | Facility: OTHER | Age: 75
End: 2020-10-08
Attending: INTERNAL MEDICINE
Payer: MEDICARE

## 2020-10-08 DIAGNOSIS — I48.0 PAROXYSMAL ATRIAL FIBRILLATION (H): ICD-10-CM

## 2020-10-08 DIAGNOSIS — Z79.01 LONG TERM CURRENT USE OF ANTICOAGULANT THERAPY: ICD-10-CM

## 2020-10-08 LAB — INR POINT OF CARE: 2.5 (ref 0.86–1.14)

## 2020-10-08 PROCEDURE — 36416 COLLJ CAPILLARY BLOOD SPEC: CPT | Mod: ZL

## 2020-10-08 NOTE — PROGRESS NOTES
ANTICOAGULATION FOLLOW-UP CLINIC VISIT    Patient Name:  Kg Ferraro  Date:  10/8/2020  Contact Type:  Face to Face    SUBJECTIVE:  Patient Findings         Clinical Outcomes     Negatives:  Major bleeding event, Thromboembolic event, Anticoagulation-related hospital admission, Anticoagulation-related ED visit, Anticoagulation-related fatality           OBJECTIVE    Recent labs: (last 7 days)     10/08/20   INR 2.5*       ASSESSMENT / PLAN  INR assessment THER    Recheck INR In: 6 WEEKS    INR Location Clinic      Anticoagulation Summary  As of 10/8/2020    INR goal:  2.0-3.0   TTR:  100.0 % (12 mo)   INR used for dosin.5 (10/8/2020)   Warfarin maintenance plan:  4 mg (4 mg x 1) every day   Full warfarin instructions:  4 mg every day   Weekly warfarin total:  28 mg   No change documented:  Emani Pereira RN   Plan last modified:  Emani Pereira RN (10/19/2018)   Next INR check:  2020   Priority:  Maintenance   Target end date:  Indefinite    Indications    Paroxysmal atrial fibrillation (H) [I48.0]  Long-term (current) use of anticoagulants [Z79.01] [Z79.01]             Anticoagulation Episode Summary     INR check location:      Preferred lab:      Send INR reminders to:  [facility]    Comments:        Anticoagulation Care Providers     Provider Role Specialty Phone number    Caleb Parker MD [facility] Internal Medicine [phone]            See the Encounter Report to view Anticoagulation Flowsheet and Dosing Calendar (Go to Encounters tab in chart review, and find the Anticoagulation Therapy Visit)        Emani Pereira RN

## 2020-11-24 ENCOUNTER — ANCILLARY PROCEDURE (OUTPATIENT)
Dept: CARDIOLOGY | Facility: CLINIC | Age: 75
End: 2020-11-24
Attending: INTERNAL MEDICINE
Payer: MEDICARE

## 2020-11-24 DIAGNOSIS — I49.5 SICK SINUS SYNDROME (H): ICD-10-CM

## 2020-11-24 PROCEDURE — 93296 REM INTERROG EVL PM/IDS: CPT

## 2020-11-24 PROCEDURE — 93294 REM INTERROG EVL PM/LDLS PM: CPT | Performed by: INTERNAL MEDICINE

## 2020-12-04 LAB
MDC_IDC_EPISODE_DTM: NORMAL
MDC_IDC_EPISODE_DURATION: 10 S
MDC_IDC_EPISODE_DURATION: 1156 S
MDC_IDC_EPISODE_DURATION: 119 S
MDC_IDC_EPISODE_DURATION: 1206 S
MDC_IDC_EPISODE_DURATION: 128 S
MDC_IDC_EPISODE_DURATION: 137 S
MDC_IDC_EPISODE_DURATION: 140 S
MDC_IDC_EPISODE_DURATION: 146 S
MDC_IDC_EPISODE_DURATION: 15 S
MDC_IDC_EPISODE_DURATION: 1515 S
MDC_IDC_EPISODE_DURATION: 1592 S
MDC_IDC_EPISODE_DURATION: 177 S
MDC_IDC_EPISODE_DURATION: 18 S
MDC_IDC_EPISODE_DURATION: 188 S
MDC_IDC_EPISODE_DURATION: 192 S
MDC_IDC_EPISODE_DURATION: 202 S
MDC_IDC_EPISODE_DURATION: 214 S
MDC_IDC_EPISODE_DURATION: 215 S
MDC_IDC_EPISODE_DURATION: 22 S
MDC_IDC_EPISODE_DURATION: 23 S
MDC_IDC_EPISODE_DURATION: 24 S
MDC_IDC_EPISODE_DURATION: 2415 S
MDC_IDC_EPISODE_DURATION: 25 S
MDC_IDC_EPISODE_DURATION: 26 S
MDC_IDC_EPISODE_DURATION: 288 S
MDC_IDC_EPISODE_DURATION: 29 S
MDC_IDC_EPISODE_DURATION: 30 S
MDC_IDC_EPISODE_DURATION: 32 S
MDC_IDC_EPISODE_DURATION: 327 S
MDC_IDC_EPISODE_DURATION: 352 S
MDC_IDC_EPISODE_DURATION: 36 S
MDC_IDC_EPISODE_DURATION: 414 S
MDC_IDC_EPISODE_DURATION: 42 S
MDC_IDC_EPISODE_DURATION: 4293 S
MDC_IDC_EPISODE_DURATION: 44 S
MDC_IDC_EPISODE_DURATION: 447 S
MDC_IDC_EPISODE_DURATION: 51 S
MDC_IDC_EPISODE_DURATION: 5478 S
MDC_IDC_EPISODE_DURATION: 549 S
MDC_IDC_EPISODE_DURATION: 57 S
MDC_IDC_EPISODE_DURATION: 599 S
MDC_IDC_EPISODE_DURATION: 605 S
MDC_IDC_EPISODE_DURATION: 63 S
MDC_IDC_EPISODE_DURATION: 679 S
MDC_IDC_EPISODE_DURATION: 716 S
MDC_IDC_EPISODE_DURATION: 72 S
MDC_IDC_EPISODE_DURATION: 7541 S
MDC_IDC_EPISODE_DURATION: 76 S
MDC_IDC_EPISODE_DURATION: 84 S
MDC_IDC_EPISODE_DURATION: 84 S
MDC_IDC_EPISODE_ID: NORMAL
MDC_IDC_EPISODE_TYPE: NORMAL
MDC_IDC_LEAD_IMPLANT_DT: NORMAL
MDC_IDC_LEAD_IMPLANT_DT: NORMAL
MDC_IDC_LEAD_LOCATION: NORMAL
MDC_IDC_LEAD_LOCATION: NORMAL
MDC_IDC_LEAD_LOCATION_DETAIL_1: NORMAL
MDC_IDC_LEAD_LOCATION_DETAIL_1: NORMAL
MDC_IDC_LEAD_MFG: NORMAL
MDC_IDC_LEAD_MFG: NORMAL
MDC_IDC_LEAD_MODEL: NORMAL
MDC_IDC_LEAD_MODEL: NORMAL
MDC_IDC_LEAD_POLARITY_TYPE: NORMAL
MDC_IDC_LEAD_POLARITY_TYPE: NORMAL
MDC_IDC_LEAD_SERIAL: NORMAL
MDC_IDC_LEAD_SPECIAL_FUNCTION: NORMAL
MDC_IDC_LEAD_SPECIAL_FUNCTION: NORMAL
MDC_IDC_MSMT_BATTERY_DTM: NORMAL
MDC_IDC_MSMT_BATTERY_REMAINING_LONGEVITY: 49 MO
MDC_IDC_MSMT_BATTERY_RRT_TRIGGER: 2.62
MDC_IDC_MSMT_BATTERY_STATUS: NORMAL
MDC_IDC_MSMT_BATTERY_VOLTAGE: 3.01 V
MDC_IDC_MSMT_LEADCHNL_RA_IMPEDANCE_VALUE: 342 OHM
MDC_IDC_MSMT_LEADCHNL_RA_IMPEDANCE_VALUE: 437 OHM
MDC_IDC_MSMT_LEADCHNL_RA_SENSING_INTR_AMPL: 2.12 MV
MDC_IDC_MSMT_LEADCHNL_RA_SENSING_INTR_AMPL: 2.12 MV
MDC_IDC_MSMT_LEADCHNL_RV_IMPEDANCE_VALUE: 589 OHM
MDC_IDC_MSMT_LEADCHNL_RV_IMPEDANCE_VALUE: 608 OHM
MDC_IDC_MSMT_LEADCHNL_RV_PACING_THRESHOLD_AMPLITUDE: 2.25 V
MDC_IDC_MSMT_LEADCHNL_RV_PACING_THRESHOLD_PULSEWIDTH: 0.4 MS
MDC_IDC_MSMT_LEADCHNL_RV_SENSING_INTR_AMPL: 9.75 MV
MDC_IDC_MSMT_LEADCHNL_RV_SENSING_INTR_AMPL: 9.75 MV
MDC_IDC_PG_IMPLANT_DTM: NORMAL
MDC_IDC_PG_MFG: NORMAL
MDC_IDC_PG_MODEL: NORMAL
MDC_IDC_PG_SERIAL: NORMAL
MDC_IDC_PG_TYPE: NORMAL
MDC_IDC_SESS_CLINIC_NAME: NORMAL
MDC_IDC_SESS_DTM: NORMAL
MDC_IDC_SESS_TYPE: NORMAL
MDC_IDC_SET_BRADY_AT_MODE_SWITCH_RATE: 171 {BEATS}/MIN
MDC_IDC_SET_BRADY_HYSTRATE: NORMAL
MDC_IDC_SET_BRADY_LOWRATE: 60 {BEATS}/MIN
MDC_IDC_SET_BRADY_MAX_SENSOR_RATE: 130 {BEATS}/MIN
MDC_IDC_SET_BRADY_MAX_TRACKING_RATE: 130 {BEATS}/MIN
MDC_IDC_SET_BRADY_MODE: NORMAL
MDC_IDC_SET_BRADY_PAV_DELAY_LOW: 300 MS
MDC_IDC_SET_BRADY_SAV_DELAY_LOW: 270 MS
MDC_IDC_SET_LEADCHNL_RA_PACING_AMPLITUDE: 3.5 V
MDC_IDC_SET_LEADCHNL_RA_PACING_ANODE_ELECTRODE_1: NORMAL
MDC_IDC_SET_LEADCHNL_RA_PACING_ANODE_LOCATION_1: NORMAL
MDC_IDC_SET_LEADCHNL_RA_PACING_CAPTURE_MODE: NORMAL
MDC_IDC_SET_LEADCHNL_RA_PACING_CATHODE_ELECTRODE_1: NORMAL
MDC_IDC_SET_LEADCHNL_RA_PACING_CATHODE_LOCATION_1: NORMAL
MDC_IDC_SET_LEADCHNL_RA_PACING_POLARITY: NORMAL
MDC_IDC_SET_LEADCHNL_RA_PACING_PULSEWIDTH: 1 MS
MDC_IDC_SET_LEADCHNL_RA_SENSING_ANODE_ELECTRODE_1: NORMAL
MDC_IDC_SET_LEADCHNL_RA_SENSING_ANODE_LOCATION_1: NORMAL
MDC_IDC_SET_LEADCHNL_RA_SENSING_CATHODE_ELECTRODE_1: NORMAL
MDC_IDC_SET_LEADCHNL_RA_SENSING_CATHODE_LOCATION_1: NORMAL
MDC_IDC_SET_LEADCHNL_RA_SENSING_POLARITY: NORMAL
MDC_IDC_SET_LEADCHNL_RA_SENSING_SENSITIVITY: 0.15 MV
MDC_IDC_SET_LEADCHNL_RV_PACING_AMPLITUDE: 5 V
MDC_IDC_SET_LEADCHNL_RV_PACING_ANODE_ELECTRODE_1: NORMAL
MDC_IDC_SET_LEADCHNL_RV_PACING_ANODE_LOCATION_1: NORMAL
MDC_IDC_SET_LEADCHNL_RV_PACING_CAPTURE_MODE: NORMAL
MDC_IDC_SET_LEADCHNL_RV_PACING_CATHODE_ELECTRODE_1: NORMAL
MDC_IDC_SET_LEADCHNL_RV_PACING_CATHODE_LOCATION_1: NORMAL
MDC_IDC_SET_LEADCHNL_RV_PACING_POLARITY: NORMAL
MDC_IDC_SET_LEADCHNL_RV_PACING_PULSEWIDTH: 1 MS
MDC_IDC_SET_LEADCHNL_RV_SENSING_ANODE_ELECTRODE_1: NORMAL
MDC_IDC_SET_LEADCHNL_RV_SENSING_ANODE_LOCATION_1: NORMAL
MDC_IDC_SET_LEADCHNL_RV_SENSING_CATHODE_ELECTRODE_1: NORMAL
MDC_IDC_SET_LEADCHNL_RV_SENSING_CATHODE_LOCATION_1: NORMAL
MDC_IDC_SET_LEADCHNL_RV_SENSING_POLARITY: NORMAL
MDC_IDC_SET_LEADCHNL_RV_SENSING_SENSITIVITY: 0.9 MV
MDC_IDC_SET_ZONE_DETECTION_INTERVAL: 350 MS
MDC_IDC_SET_ZONE_DETECTION_INTERVAL: 400 MS
MDC_IDC_SET_ZONE_TYPE: NORMAL
MDC_IDC_STAT_BRADY_AP_VP_PERCENT: 71.33 %
MDC_IDC_STAT_BRADY_AP_VS_PERCENT: 8.48 %
MDC_IDC_STAT_BRADY_AS_VP_PERCENT: 16.84 %
MDC_IDC_STAT_BRADY_AS_VS_PERCENT: 3.41 %
MDC_IDC_STAT_BRADY_DTM_END: NORMAL
MDC_IDC_STAT_BRADY_DTM_START: NORMAL
MDC_IDC_STAT_BRADY_RA_PERCENT_PACED: 34.86 %
MDC_IDC_STAT_BRADY_RV_PERCENT_PACED: 88.27 %
MDC_IDC_STAT_EPISODE_RECENT_COUNT: 0
MDC_IDC_STAT_EPISODE_RECENT_COUNT: 0
MDC_IDC_STAT_EPISODE_RECENT_COUNT: NORMAL
MDC_IDC_STAT_EPISODE_RECENT_COUNT_DTM_END: NORMAL
MDC_IDC_STAT_EPISODE_RECENT_COUNT_DTM_START: NORMAL
MDC_IDC_STAT_EPISODE_TOTAL_COUNT: 0
MDC_IDC_STAT_EPISODE_TOTAL_COUNT: 0
MDC_IDC_STAT_EPISODE_TOTAL_COUNT: NORMAL
MDC_IDC_STAT_EPISODE_TOTAL_COUNT_DTM_END: NORMAL
MDC_IDC_STAT_EPISODE_TOTAL_COUNT_DTM_START: NORMAL
MDC_IDC_STAT_EPISODE_TYPE: NORMAL

## 2020-12-14 ENCOUNTER — TELEPHONE (OUTPATIENT)
Dept: CARDIOLOGY | Facility: CLINIC | Age: 75
End: 2020-12-14

## 2020-12-14 ENCOUNTER — ANCILLARY PROCEDURE (OUTPATIENT)
Dept: CARDIOLOGY | Facility: CLINIC | Age: 75
End: 2020-12-14
Attending: INTERNAL MEDICINE
Payer: MEDICARE

## 2020-12-14 DIAGNOSIS — Z95.0 CARDIAC PACEMAKER IN SITU: ICD-10-CM

## 2020-12-14 DIAGNOSIS — I49.5 SICK SINUS SYNDROME (H): ICD-10-CM

## 2020-12-14 DIAGNOSIS — I49.5 SICK SINUS SYNDROME (H): Primary | ICD-10-CM

## 2020-12-14 PROCEDURE — 93296 REM INTERROG EVL PM/IDS: CPT

## 2020-12-14 PROCEDURE — 99207 CARDIAC DEVICE CHECK - REMOTE: CPT | Performed by: INTERNAL MEDICINE

## 2020-12-14 NOTE — TELEPHONE ENCOUNTER
Spoke with patient by phone.  RV auto threshold measured greater than 2.5V@0.4ms.  RV output programmed to 5.0V@0.4ms.  Capture noted on presenting rhythm.  Patient will send a remote this evening when he gets home.  If threshold remains high, will arrange for [employer] rep to see patient in [facility] cardiology clinic for evaluation.  Patient states understanding and agrees to call with any further questions or concerns.

## 2020-12-17 LAB
MDC_IDC_EPISODE_DTM: NORMAL
MDC_IDC_EPISODE_DURATION: 130 S
MDC_IDC_EPISODE_DURATION: 133 S
MDC_IDC_EPISODE_DURATION: 14 S
MDC_IDC_EPISODE_DURATION: 143 S
MDC_IDC_EPISODE_DURATION: 1484 S
MDC_IDC_EPISODE_DURATION: 1627 S
MDC_IDC_EPISODE_DURATION: 17 S
MDC_IDC_EPISODE_DURATION: 172 S
MDC_IDC_EPISODE_DURATION: 177 S
MDC_IDC_EPISODE_DURATION: 1820 S
MDC_IDC_EPISODE_DURATION: 188 S
MDC_IDC_EPISODE_DURATION: 2031 S
MDC_IDC_EPISODE_DURATION: 21 S
MDC_IDC_EPISODE_DURATION: 23 S
MDC_IDC_EPISODE_DURATION: 24 S
MDC_IDC_EPISODE_DURATION: 259 S
MDC_IDC_EPISODE_DURATION: 26 S
MDC_IDC_EPISODE_DURATION: 269 S
MDC_IDC_EPISODE_DURATION: 27 S
MDC_IDC_EPISODE_DURATION: 315 S
MDC_IDC_EPISODE_DURATION: 320 S
MDC_IDC_EPISODE_DURATION: 331 S
MDC_IDC_EPISODE_DURATION: 333 S
MDC_IDC_EPISODE_DURATION: 343 S
MDC_IDC_EPISODE_DURATION: 36 S
MDC_IDC_EPISODE_DURATION: 38 S
MDC_IDC_EPISODE_DURATION: 42 S
MDC_IDC_EPISODE_DURATION: 43 S
MDC_IDC_EPISODE_DURATION: 4331 S
MDC_IDC_EPISODE_DURATION: 461 S
MDC_IDC_EPISODE_DURATION: 464 S
MDC_IDC_EPISODE_DURATION: 475 S
MDC_IDC_EPISODE_DURATION: 48 S
MDC_IDC_EPISODE_DURATION: 484 S
MDC_IDC_EPISODE_DURATION: 5 S
MDC_IDC_EPISODE_DURATION: 51 S
MDC_IDC_EPISODE_DURATION: 54 S
MDC_IDC_EPISODE_DURATION: 55 S
MDC_IDC_EPISODE_DURATION: 57 S
MDC_IDC_EPISODE_DURATION: 59 S
MDC_IDC_EPISODE_DURATION: 6 S
MDC_IDC_EPISODE_DURATION: 70 S
MDC_IDC_EPISODE_DURATION: 76 S
MDC_IDC_EPISODE_DURATION: 777 S
MDC_IDC_EPISODE_DURATION: 78 S
MDC_IDC_EPISODE_DURATION: 82 S
MDC_IDC_EPISODE_DURATION: 87 S
MDC_IDC_EPISODE_DURATION: 926 S
MDC_IDC_EPISODE_DURATION: 93 S
MDC_IDC_EPISODE_DURATION: 964 S
MDC_IDC_EPISODE_ID: NORMAL
MDC_IDC_EPISODE_TYPE: NORMAL
MDC_IDC_LEAD_IMPLANT_DT: NORMAL
MDC_IDC_LEAD_IMPLANT_DT: NORMAL
MDC_IDC_LEAD_LOCATION: NORMAL
MDC_IDC_LEAD_LOCATION: NORMAL
MDC_IDC_LEAD_LOCATION_DETAIL_1: NORMAL
MDC_IDC_LEAD_LOCATION_DETAIL_1: NORMAL
MDC_IDC_LEAD_MFG: NORMAL
MDC_IDC_LEAD_MFG: NORMAL
MDC_IDC_LEAD_MODEL: NORMAL
MDC_IDC_LEAD_MODEL: NORMAL
MDC_IDC_LEAD_POLARITY_TYPE: NORMAL
MDC_IDC_LEAD_POLARITY_TYPE: NORMAL
MDC_IDC_LEAD_SERIAL: NORMAL
MDC_IDC_LEAD_SPECIAL_FUNCTION: NORMAL
MDC_IDC_LEAD_SPECIAL_FUNCTION: NORMAL
MDC_IDC_MSMT_BATTERY_DTM: NORMAL
MDC_IDC_MSMT_BATTERY_REMAINING_LONGEVITY: 50 MO
MDC_IDC_MSMT_BATTERY_RRT_TRIGGER: 2.62
MDC_IDC_MSMT_BATTERY_STATUS: NORMAL
MDC_IDC_MSMT_BATTERY_VOLTAGE: 3 V
MDC_IDC_MSMT_LEADCHNL_RA_IMPEDANCE_VALUE: 342 OHM
MDC_IDC_MSMT_LEADCHNL_RA_IMPEDANCE_VALUE: 437 OHM
MDC_IDC_MSMT_LEADCHNL_RA_SENSING_INTR_AMPL: 0.12 MV
MDC_IDC_MSMT_LEADCHNL_RA_SENSING_INTR_AMPL: 0.12 MV
MDC_IDC_MSMT_LEADCHNL_RV_IMPEDANCE_VALUE: 608 OHM
MDC_IDC_MSMT_LEADCHNL_RV_IMPEDANCE_VALUE: 646 OHM
MDC_IDC_MSMT_LEADCHNL_RV_PACING_THRESHOLD_AMPLITUDE: 2.5 V
MDC_IDC_MSMT_LEADCHNL_RV_PACING_THRESHOLD_PULSEWIDTH: 0.4 MS
MDC_IDC_MSMT_LEADCHNL_RV_SENSING_INTR_AMPL: 12.38 MV
MDC_IDC_MSMT_LEADCHNL_RV_SENSING_INTR_AMPL: 12.38 MV
MDC_IDC_PG_IMPLANT_DTM: NORMAL
MDC_IDC_PG_MFG: NORMAL
MDC_IDC_PG_MODEL: NORMAL
MDC_IDC_PG_SERIAL: NORMAL
MDC_IDC_PG_TYPE: NORMAL
MDC_IDC_SESS_CLINIC_NAME: NORMAL
MDC_IDC_SESS_DTM: NORMAL
MDC_IDC_SESS_TYPE: NORMAL
MDC_IDC_SET_BRADY_AT_MODE_SWITCH_RATE: 171 {BEATS}/MIN
MDC_IDC_SET_BRADY_HYSTRATE: NORMAL
MDC_IDC_SET_BRADY_LOWRATE: 60 {BEATS}/MIN
MDC_IDC_SET_BRADY_MAX_SENSOR_RATE: 130 {BEATS}/MIN
MDC_IDC_SET_BRADY_MAX_TRACKING_RATE: 130 {BEATS}/MIN
MDC_IDC_SET_BRADY_MODE: NORMAL
MDC_IDC_SET_BRADY_PAV_DELAY_LOW: 300 MS
MDC_IDC_SET_BRADY_SAV_DELAY_LOW: 270 MS
MDC_IDC_SET_LEADCHNL_RA_PACING_AMPLITUDE: 3.5 V
MDC_IDC_SET_LEADCHNL_RA_PACING_ANODE_ELECTRODE_1: NORMAL
MDC_IDC_SET_LEADCHNL_RA_PACING_ANODE_LOCATION_1: NORMAL
MDC_IDC_SET_LEADCHNL_RA_PACING_CAPTURE_MODE: NORMAL
MDC_IDC_SET_LEADCHNL_RA_PACING_CATHODE_ELECTRODE_1: NORMAL
MDC_IDC_SET_LEADCHNL_RA_PACING_CATHODE_LOCATION_1: NORMAL
MDC_IDC_SET_LEADCHNL_RA_PACING_POLARITY: NORMAL
MDC_IDC_SET_LEADCHNL_RA_PACING_PULSEWIDTH: 1 MS
MDC_IDC_SET_LEADCHNL_RA_SENSING_ANODE_ELECTRODE_1: NORMAL
MDC_IDC_SET_LEADCHNL_RA_SENSING_ANODE_LOCATION_1: NORMAL
MDC_IDC_SET_LEADCHNL_RA_SENSING_CATHODE_ELECTRODE_1: NORMAL
MDC_IDC_SET_LEADCHNL_RA_SENSING_CATHODE_LOCATION_1: NORMAL
MDC_IDC_SET_LEADCHNL_RA_SENSING_POLARITY: NORMAL
MDC_IDC_SET_LEADCHNL_RA_SENSING_SENSITIVITY: 0.15 MV
MDC_IDC_SET_LEADCHNL_RV_PACING_AMPLITUDE: 5 V
MDC_IDC_SET_LEADCHNL_RV_PACING_ANODE_ELECTRODE_1: NORMAL
MDC_IDC_SET_LEADCHNL_RV_PACING_ANODE_LOCATION_1: NORMAL
MDC_IDC_SET_LEADCHNL_RV_PACING_CAPTURE_MODE: NORMAL
MDC_IDC_SET_LEADCHNL_RV_PACING_CATHODE_ELECTRODE_1: NORMAL
MDC_IDC_SET_LEADCHNL_RV_PACING_CATHODE_LOCATION_1: NORMAL
MDC_IDC_SET_LEADCHNL_RV_PACING_POLARITY: NORMAL
MDC_IDC_SET_LEADCHNL_RV_PACING_PULSEWIDTH: 1 MS
MDC_IDC_SET_LEADCHNL_RV_SENSING_ANODE_ELECTRODE_1: NORMAL
MDC_IDC_SET_LEADCHNL_RV_SENSING_ANODE_LOCATION_1: NORMAL
MDC_IDC_SET_LEADCHNL_RV_SENSING_CATHODE_ELECTRODE_1: NORMAL
MDC_IDC_SET_LEADCHNL_RV_SENSING_CATHODE_LOCATION_1: NORMAL
MDC_IDC_SET_LEADCHNL_RV_SENSING_POLARITY: NORMAL
MDC_IDC_SET_LEADCHNL_RV_SENSING_SENSITIVITY: 0.9 MV
MDC_IDC_SET_ZONE_DETECTION_INTERVAL: 350 MS
MDC_IDC_SET_ZONE_DETECTION_INTERVAL: 400 MS
MDC_IDC_SET_ZONE_TYPE: NORMAL
MDC_IDC_STAT_BRADY_AP_VP_PERCENT: 75.24 %
MDC_IDC_STAT_BRADY_AP_VS_PERCENT: 4.56 %
MDC_IDC_STAT_BRADY_AS_VP_PERCENT: 18.26 %
MDC_IDC_STAT_BRADY_AS_VS_PERCENT: 2 %
MDC_IDC_STAT_BRADY_DTM_END: NORMAL
MDC_IDC_STAT_BRADY_DTM_START: NORMAL
MDC_IDC_STAT_BRADY_RA_PERCENT_PACED: 33.37 %
MDC_IDC_STAT_BRADY_RV_PERCENT_PACED: 92.21 %
MDC_IDC_STAT_EPISODE_RECENT_COUNT: 0
MDC_IDC_STAT_EPISODE_RECENT_COUNT: 0
MDC_IDC_STAT_EPISODE_RECENT_COUNT: 1629
MDC_IDC_STAT_EPISODE_RECENT_COUNT_DTM_END: NORMAL
MDC_IDC_STAT_EPISODE_RECENT_COUNT_DTM_START: NORMAL
MDC_IDC_STAT_EPISODE_TOTAL_COUNT: 0
MDC_IDC_STAT_EPISODE_TOTAL_COUNT: 0
MDC_IDC_STAT_EPISODE_TOTAL_COUNT: NORMAL
MDC_IDC_STAT_EPISODE_TOTAL_COUNT_DTM_END: NORMAL
MDC_IDC_STAT_EPISODE_TOTAL_COUNT_DTM_START: NORMAL
MDC_IDC_STAT_EPISODE_TYPE: NORMAL

## 2020-12-21 ENCOUNTER — TELEPHONE (OUTPATIENT)
Dept: CARDIOLOGY | Facility: OTHER | Age: 75
End: 2020-12-21

## 2020-12-21 NOTE — TELEPHONE ENCOUNTER
[employer] rep.(Clay) would like patient to come in at 10:00am on 12/22/2020 for re-programming of pacemaker and to still keep appointment with Kevan Riuz DO at 12:45pm on 12/22/2020.  Called patient he was fine with this.  Patient verbalized understanding.  Missy Vyas RN on 12/21/2020 at 1:55 PM

## 2020-12-22 ENCOUNTER — ANTICOAGULATION THERAPY VISIT (OUTPATIENT)
Dept: ANTICOAGULATION | Facility: OTHER | Age: 75
End: 2020-12-22
Attending: INTERNAL MEDICINE
Payer: MEDICARE

## 2020-12-22 ENCOUNTER — ALLIED HEALTH/NURSE VISIT (OUTPATIENT)
Dept: CARDIOLOGY | Facility: OTHER | Age: 75
End: 2020-12-22
Attending: INTERNAL MEDICINE
Payer: MEDICARE

## 2020-12-22 VITALS
SYSTOLIC BLOOD PRESSURE: 118 MMHG | HEIGHT: 67 IN | RESPIRATION RATE: 18 BRPM | TEMPERATURE: 98.5 F | HEART RATE: 73 BPM | WEIGHT: 243 LBS | BODY MASS INDEX: 38.14 KG/M2 | DIASTOLIC BLOOD PRESSURE: 80 MMHG | OXYGEN SATURATION: 94 %

## 2020-12-22 DIAGNOSIS — I50.32 CHRONIC HEART FAILURE WITH PRESERVED EJECTION FRACTION (H): ICD-10-CM

## 2020-12-22 DIAGNOSIS — I48.0 PAROXYSMAL ATRIAL FIBRILLATION (H): ICD-10-CM

## 2020-12-22 DIAGNOSIS — Z45.018 PACEMAKER END OF LIFE: ICD-10-CM

## 2020-12-22 DIAGNOSIS — E78.1 HYPERTRIGLYCERIDEMIA: ICD-10-CM

## 2020-12-22 DIAGNOSIS — Z79.01 LONG TERM CURRENT USE OF ANTICOAGULANT THERAPY: ICD-10-CM

## 2020-12-22 DIAGNOSIS — R00.1 BRADYCARDIA: ICD-10-CM

## 2020-12-22 DIAGNOSIS — I49.5 SICK SINUS SYNDROME (H): ICD-10-CM

## 2020-12-22 DIAGNOSIS — E66.01 MORBID OBESITY (H): ICD-10-CM

## 2020-12-22 DIAGNOSIS — N18.31 STAGE 3A CHRONIC KIDNEY DISEASE (H): ICD-10-CM

## 2020-12-22 DIAGNOSIS — I48.21 PERMANENT ATRIAL FIBRILLATION (H): ICD-10-CM

## 2020-12-22 DIAGNOSIS — Z79.01 WARFARIN ANTICOAGULATION: ICD-10-CM

## 2020-12-22 DIAGNOSIS — I47.10 PAROXYSMAL SUPRAVENTRICULAR TACHYCARDIA (H): ICD-10-CM

## 2020-12-22 DIAGNOSIS — E87.5 HYPERKALEMIA: ICD-10-CM

## 2020-12-22 DIAGNOSIS — I10 BENIGN ESSENTIAL HYPERTENSION: ICD-10-CM

## 2020-12-22 DIAGNOSIS — R60.0 BILATERAL LEG EDEMA: ICD-10-CM

## 2020-12-22 DIAGNOSIS — I51.7 BIATRIAL ENLARGEMENT: ICD-10-CM

## 2020-12-22 DIAGNOSIS — T82.110A FAILURE OF PACEMAKER LEAD, INITIAL ENCOUNTER: Primary | ICD-10-CM

## 2020-12-22 DIAGNOSIS — I51.7 LVH (LEFT VENTRICULAR HYPERTROPHY): ICD-10-CM

## 2020-12-22 DIAGNOSIS — I44.2 AV BLOCK, COMPLETE (H): ICD-10-CM

## 2020-12-22 DIAGNOSIS — Z95.0 CARDIAC PACEMAKER IN SITU: ICD-10-CM

## 2020-12-22 DIAGNOSIS — Z95.0 CARDIAC PACEMAKER IN SITU: Primary | ICD-10-CM

## 2020-12-22 DIAGNOSIS — Z79.01 ANTICOAGULATION MONITORING, INR RANGE 2-3: ICD-10-CM

## 2020-12-22 DIAGNOSIS — E78.2 MIXED HYPERLIPIDEMIA: ICD-10-CM

## 2020-12-22 LAB — INR POINT OF CARE: 2.4 (ref 0.86–1.14)

## 2020-12-22 PROCEDURE — 93010 ELECTROCARDIOGRAM REPORT: CPT | Performed by: INTERNAL MEDICINE

## 2020-12-22 PROCEDURE — 93005 ELECTROCARDIOGRAM TRACING: CPT

## 2020-12-22 PROCEDURE — 36416 COLLJ CAPILLARY BLOOD SPEC: CPT | Mod: ZL

## 2020-12-22 PROCEDURE — G0463 HOSPITAL OUTPT CLINIC VISIT: HCPCS

## 2020-12-22 PROCEDURE — G0463 HOSPITAL OUTPT CLINIC VISIT: HCPCS | Mod: 25

## 2020-12-22 PROCEDURE — 99205 OFFICE O/P NEW HI 60 MIN: CPT | Performed by: INTERNAL MEDICINE

## 2020-12-22 ASSESSMENT — PAIN SCALES - GENERAL: PAINLEVEL: NO PAIN (0)

## 2020-12-22 ASSESSMENT — MIFFLIN-ST. JEOR: SCORE: 1784.74

## 2020-12-22 NOTE — NURSING NOTE
"Patient comes in for consult on AFib and pacemaker issues.  Yasmin Seymour LPN ....................12/22/2020   12:52 PM  Chief Complaint   Patient presents with     Consult For     AFib, pacemaker issues       Initial /80 (BP Location: Right arm, Patient Position: Sitting, Cuff Size: Adult Large)   Pulse 73   Temp 98.5  F (36.9  C) (Tympanic)   Resp 18   Ht 1.7 m (5' 6.93\")   Wt 110.2 kg (243 lb)   SpO2 94%   BMI 38.14 kg/m   Estimated body mass index is 38.14 kg/m  as calculated from the following:    Height as of this encounter: 1.7 m (5' 6.93\").    Weight as of this encounter: 110.2 kg (243 lb).  Meds Reconciled: complete  Pt is not on Aspirin  Pt is on a Statin  PHQ and/or PRASAD reviewed. Pt referred to PCP/MH Provider as appropriate.    Yasmin Seymour LPN      "

## 2020-12-22 NOTE — PROGRESS NOTES
ANTICOAGULATION FOLLOW-UP CLINIC VISIT    Patient Name:  Kg Ferraro  Date:  2020  Contact Type:  Face to Face    SUBJECTIVE:  Patient Findings         Clinical Outcomes     Negatives:  Major bleeding event, Thromboembolic event, Anticoagulation-related hospital admission, Anticoagulation-related ED visit, Anticoagulation-related fatality           OBJECTIVE    Recent labs: (last 7 days)     20   INR 2.4*       ASSESSMENT / PLAN  INR assessment THER    Recheck INR In: 6 WEEKS    INR Location Clinic      Anticoagulation Summary  As of 2020    INR goal:  2.0-3.0   TTR:  100.0 % (12 mo)   INR used for dosin.4 (2020)   Warfarin maintenance plan:  4 mg (4 mg x 1) every day   Full warfarin instructions:  4 mg every day   Weekly warfarin total:  28 mg   No change documented:  Emani Pereira RN   Plan last modified:  Emani Pereira RN (10/19/2018)   Next INR check:  2021   Priority:  Maintenance   Target end date:  Indefinite    Indications    Paroxysmal atrial fibrillation (H) [I48.0]  Long-term (current) use of anticoagulants [Z79.01] [Z79.01]             Anticoagulation Episode Summary     INR check location:      Preferred lab:      Send INR reminders to:  [facility]    Comments:        Anticoagulation Care Providers     Provider Role Specialty Phone number    Caleb Parker MD [facility] Internal Medicine [phone]            See the Encounter Report to view Anticoagulation Flowsheet and Dosing Calendar (Go to Encounters tab in chart review, and find the Anticoagulation Therapy Visit)        Emani Pereira RN

## 2020-12-22 NOTE — PROGRESS NOTES
Patient was seen in clinic by Clay from [employer] for re-programming of pacemaker.  Missy Vyas RN on 12/22/2020 at 1:47 PM

## 2020-12-22 NOTE — PROGRESS NOTES
[facility] HEART CARE   CARDIOLOGY CONSULT     Kg Ferraro   1945  [id number]    Caleb Parker     Chief Complaint   Patient presents with     Consult For     AFib, pacemaker issues          HPI:   Mr. Ferraro 75-year-old gentleman who is being seen by cardiology establish care.  He has history of pacemaker placement in 2008.  He had upgrade on 5/21/2020 at [facility].  Interrogation today shows ventricular lead fracture.  Patient needing ventricular lead replacement/removal.     There is also history of hyperlipidemia, hypokalemia, hypertriglyceridemia, permanent A. fib on Coumadin, heart failure with preserved ejection fraction, LVH, PSVT, SSS, lower extreme edema, RA, CKD 3, anxiety, and is on flecainide.  Flecainide discontinued on 12/22/2020.    Previously, patient has pacemaker placed in 2008.  He did have his pacemaker checked for period of time but has stopped having it checked.  He presented to the ER on 5/20/2021.  He states he was outside with his wife.  He got up to walk to his car and fell to his knees almost immediately.  He states his car was 30 feet away.  He had to crawl to his car because he was weak.  His wife wanted to bring him to the ER but he was hesitant.  Ultimately, he was brought to the ER.  He was found to have a dead generator.  He had increasing palpitations over the last couple of days.  He had been lightheaded with rapid changes in position.  He was found to be in A. fib with rates in the 40's.  No appreciable spikes were noted on telemetry.  Device interrogation was attempted but unsuccessful.  [employer] contacted.  Device had completely failed secondary to depleted generator.    On 5/21/2020, he was set up for generator change at [facility].  He underwent successful generator.  More recently, his device was interrogated on 12/14/2020.  Device suggested A. fib burden of 67%.  There was concern for under sensing.  A. fib burden was 100%.  With recent generator change,  battery life was 3 to 4 years.  Battery voltage was 3.00 V.  Device interrogated by [employer] on 12/22/2020.  Ventricular lead likely fractured.  Will need replacement.  It was suggested he see EP with Dr. Darby for consideration of replacement/a removal of ventricular lead.  Patient was somewhat hesitant secondary to unpredictable weather.  He understands that this lead could fail at any time which can be a life-threatening situation.      IMAGING RESULTS:   ECHO on 3/28/14:  1.  Technically difficult study.  2.  Normal left ventricular size and systolic function with a visually  estimated ejection fraction of 60% without regional wall motion  abnormalities identified.  3.  Normal right ventricular size and systolic function with a mild  increase in estimate of right ventricular systolic pressure at 33 mmHg  plus right atrial pressure. Right atrial pressure is within normal limits  based on inferior vena cava findings.  4.  Mild to moderate biatrial enlargement.  5.  Aortic valve sclerosis without stenosis or insufficiency.  6.  Mild concentric left ventricular hypertrophy.    CURRENT MEDICATIONS:   Prior to Admission medications    Medication Sig Start Date End Date Taking? Authorizing Provider   acetaminophen (TYLENOL) 500 MG tablet Take 1,000 mg by mouth daily as needed for mild pain    Unknown, Entered By History   allopurinol (ZYLOPRIM) 100 MG tablet Take 1 tablet (100 mg) by mouth 2 times daily 5/28/20   Caleb Parker MD   Blood Pressure Monitoring (RA BLOOD PRESSURE CUFF MONITOR) MISC Medium Cuff 12/11/14   Reported, Patient   flecainide (TAMBOCOR) 100 MG tablet Take 1 tablet (100 mg) by mouth 2 times daily -- Dose Reduced 5/28/2020 -- start when out of your 150 mg tablets 5/28/20   Caleb Parker MD   metoprolol succinate ER (TOPROL-XL) 100 MG 24 hr tablet Take 1 tablet (100 mg) by mouth every morning AND 2 tablets (200 mg) every evening. 5/28/20   Calbe Parker MD   potassium chloride ER (KLOR-CON M)  20 MEQ CR tablet Take 1 tablet (20 mEq) by mouth daily 5/28/20   Caleb Parker MD   simvastatin (ZOCOR) 40 MG tablet Take 1 tablet (40 mg) by mouth daily 5/28/20   Caleb Parker MD   warfarin ANTICOAGULANT (COUMADIN) 4 MG tablet TAKE 1 TABLET BY MOUTH ONCE DAILY OR  AS  DIRECTED  BY  [facility]  CLINIC. 9/8/20   Caleb Parker MD       ALLERGIES:   Allergies   Allergen Reactions     Penicillins Hives     Rash or hives - can't remember        PAST MEDICAL HISTORY:   Past Medical History:   Diagnosis Date     Acute kidney failure (H)     No Comments Provided     Acute kidney failure (H)     No Comments Provided     Atrial fibrillation (H)     3/8/2012     Atrioventricular block, complete (H)     No Comments Provided     Calculus of kidney     No Comments Provided     Essential (primary) hypertension     No Comments Provided     Generalized anxiety disorder     5/11/2010     Hematuria     7/10/2012     Osteoarthritis     9/14/2010     Osteoarthritis of hip     5/11/2010     Presence of cardiac pacemaker     9/3/2008     Rheumatoid arthritis (H)     5/19/2011     Sick sinus syndrome (H)     No Comments Provided     Supraventricular tachycardia (H)     No Comments Provided        PAST SURGICAL HISTORY:   Past Surgical History:   Procedure Laterality Date     ARTHROPLASTY HIP      12/1/2010,right hip replacement,  [place]     OTHER SURGICAL HISTORY      81920,NASAL SURGERY,Repeated nasoseptal repair     OTHER SURGICAL HISTORY      879401,OTHER,Ureteral stents for stones     OTHER SURGICAL HISTORY      2003,363417,OTHER,Catheter ablation for atrial fibrillation, failed     REPLACE PACEMAKER GENERATOR N/A 5/21/2020    Procedure: REPLACEMENT, PULSE GENERATOR, CARDIAC PACEMAKER AND ATRIAL LEAD PLACEMENT;  Surgeon: Ambrocio Galeana MD;  Location:  OR        FAMILY HISTORY:   Family History   Problem Relation Age of Onset     Heart Disease Mother         Heart Disease,heart failure.     Heart Disease Father         Heart  Disease,heart failure.        SOCIAL HISTORY:   Social History     Socioeconomic History     Marital status:      Spouse name: Not on file     Number of children: Not on file     Years of education: Not on file     Highest education level: Not on file   Occupational History     Not on file   Social Needs     Financial resource strain: Not on file     Food insecurity     Worry: Not on file     Inability: Not on file     Transportation needs     Medical: Not on file     Non-medical: Not on file   Tobacco Use     Smoking status: Former Smoker     Packs/day: 1.00     Types: Cigarettes     Quit date: 1980     Years since quittin.0     Smokeless tobacco: Former User     Quit date: 1980   Substance and Sexual Activity     Alcohol use: Yes     Alcohol/week: 0.0 standard drinks     Comment: 1-2 beers per month     Drug use: Never     Sexual activity: Not Currently   Lifestyle     Physical activity     Days per week: Not on file     Minutes per session: Not on file     Stress: Not on file   Relationships     Social connections     Talks on phone: Not on file     Gets together: Not on file     Attends [employer] service: Not on file     Active member of club or organization: Not on file     Attends meetings of clubs or organizations: Not on file     Relationship status: Not on file     Intimate partner violence     Fear of current or ex partner: Not on file     Emotionally abused: Not on file     Physically abused: Not on file     Forced sexual activity: Not on file   Other Topics Concern     Not on file   Social History Narrative    Lives in [place] and worked at  and      Smoked age [age].  Coffee - none.  Soda - 4-5 daily.  Alcohol - none.  Exercise - none.            ROS:   CONSTITUTIONAL: No weight loss, fever, chills, but admits to weakness and fatigue.   HEENT: Eyes: No visual changes. Ears, Nose, Throat: No hearing loss, congestion or difficulty swallowing.   CARDIOVASCULAR: No chest pain, chest  pressure or chest discomfort. No palpitations or lower extremity edema.   RESPIRATORY: (+) shortness of breath with dyspnea upon exertion, no cough or sputum production.   GASTROINTESTINAL: No abdominal pain. No anorexia, nausea, vomiting or diarrhea.   NEUROLOGICAL: No headache, lightheadedness, dizziness, syncope, ataxia or weakness.   HEMATOLOGIC: No anemia, bleeding or bruising.   PSYCHIATRIC: No history of depression or anxiety.   ENDOCRINOLOGIC: No reports of sweating, cold or heat intolerance. No polyuria or polydipsia.   SKIN: No abnormal rashes or itching.       PHYSICAL EXAM:   GENERAL: The patient is a well-developed, well-nourished, in no apparent distress. Alert and oriented x3.   HEENT: Head is normocephalic and atraumatic. Eyes are symmetrical with normal visual tracking.  HEART: Regular rate and rhythm, S1S2 present without murmur, rub or gallop.   LUNGS: Respirations regular and unlabored. Clear to auscultation.   GI: Abdomen is soft and nondistended.   EXTREMITIES: No peripheral edema present.   MUSCULOSKELETAL: No joint swelling.   NEUROLOGIC: Alert and oriented X3.    SKIN: No jaundice. No rashes or visible skin lesions present.        LAB RESULTS:   Ancillary Procedure on 12/14/2020   Component Date Value Ref Range Status     Date Time Interrogation Session 12/15/2020 [id number]   Final     Implantable Pulse Generator [place]* 12/15/2020 [employer]   Final     Implantable Pulse Generator Model 12/15/2020 [id number] [place] XT  MRI   Final     Implantable Pulse Generator Serial* 12/15/2020 [id number]   Final     Type Interrogation Session 12/15/2020 Remote    Final     Clinic Name 12/15/2020 [facility]   Final     Implantable Pulse Generator Type 12/15/2020 Pacemaker   Final     Implantable Pulse Generator Implan* 12/15/2020 [id number]   Final     Implantable Lead  12/15/2020 [employer]   Final     Implantable Lead Model 12/15/2020 5076 CapSureFix Novus MRI SureScan    Final     Implantable Lead Implant Date 12/15/2020 [id number]   Final     Implantable Lead Polarity Type 12/15/2020 Bipolar Lead   Final     Implantable Lead Location Detail 1 12/15/2020 UNKNOWN   Final     Implantable Lead Special Function 12/15/2020 52 cm OLD RA lead capped   Final     Implantable Lead Location 12/15/2020 Right Atrium   Final     Implantable Lead  12/15/2020 [employer]   Final     Implantable Lead Model 12/15/2020 5054 CapSure Z Novus MRI SureScan   Final     Implantable Lead Serial Number 12/15/2020 [id number]   Final     Implantable Lead Implant Date 12/15/2020 [id number]   Final     Implantable Lead Polarity Type 12/15/2020 Bipolar Lead   Final     Implantable Lead Location Detail 1 12/15/2020 UNKNOWN   Final     Implantable Lead Special Function 12/15/2020 52 cm   Final     Implantable Lead Location 12/15/2020 Right Ventricle   Final     James Setting Mode (NBG Code) 12/15/2020 MVP_AAIR_[id number]   Final     James Setting Lower Rate Limit 12/15/2020 60  [beats]/min Final     James Setting Maximum Tracking Rate 12/15/2020 130  [beats]/min Final     James Setting Maximum Sensor Rate 12/15/2020 130  [beats]/min Final     James Setting Hysterisis Rate 12/15/2020 DISABLED   Final     James Setting MYRNA Delay Low 12/15/2020 270  ms Final     James Setting PAV Delay Low 12/15/2020 300  ms Final     James Setting AT Mode Switch Rate 12/15/2020 171  [beats]/min Final     Lead Channel Setting Sensing Polar* 12/15/2020 Bipolar   Final     Lead Channel Setting Sensing Anode* 12/15/2020 Right Atrium   Final     Lead Channel Setting Sensing Anode* 12/15/2020 Ring   Final     Lead Channel Setting Sensing Catho* 12/15/2020 Right Atrium   Final     Lead Channel Setting Sensing Catho* 12/15/2020 Tip   Final     Lead Channel Setting Sensing Sensi* 12/15/2020 0.15  mV Final     Lead Channel Setting Sensing Polar* 12/15/2020 Bipolar   Final     Lead Channel Setting Sensing Anode* 12/15/2020 Right Ventricle    Final     Lead Channel Setting Sensing Anode* 12/15/2020 Ring   Final     Lead Channel Setting Sensing Catho* 12/15/2020 Right Ventricle   Final     Lead Channel Setting Sensing Catho* 12/15/2020 Tip   Final     Lead Channel Setting Sensing Sensi* 12/15/2020 0.9  mV Final     Lead Channel Setting Pacing Polari* 12/15/2020 Bipolar   Final     Lead Channel Setting Pacing Anode * 12/15/2020 Right Atrium   Final     Lead Channel Setting Pacing Anode * 12/15/2020 Ring   Final     Lead Channel Setting Sensing Catho* 12/15/2020 Right Atrium   Final     Lead Channel Setting Sensing Catho* 12/15/2020 Tip   Final     Lead Channel Setting Pacing Pulse * 12/15/2020 1  ms Final     Lead Channel Setting Pacing Amplit* 12/15/2020 3.5  V Final     Lead Channel Setting Pacing Captur* 12/15/2020 Adaptive   Final     Lead Channel Setting Pacing Polari* 12/15/2020 Bipolar   Final     Lead Channel Setting Pacing Anode * 12/15/2020 Right Ventricle   Final     Lead Channel Setting Pacing Anode * 12/15/2020 Ring   Final     Lead Channel Setting Sensing Catho* 12/15/2020 Right Ventricle   Final     Lead Channel Setting Sensing Catho* 12/15/2020 Tip   Final     Lead Channel Setting Pacing Pulse * 12/15/2020 1  ms Final     Lead Channel Setting Pacing Amplit* 12/15/2020 5  V Final     Lead Channel Setting Pacing Captur* 12/15/2020 Adaptive   Final     Zone Setting Type Category 12/15/2020 VF   Final     Zone Setting Type Category 12/15/2020 VT   Final     Zone Setting Type Category 12/15/2020 VT   Final     Zone Setting Type Category 12/15/2020 VT   Final     Zone Setting Detection Interval 12/15/2020 400  ms Final     Zone Setting Type Category 12/15/2020 ATRIAL_FIBRILLATION   Final     Zone Setting Type Category 12/15/2020 AT/AF   Final     Zone Setting Detection Interval 12/15/2020 350  ms Final     Lead Channel Impedance Value 12/15/2020 437  ohm Final     Lead Channel Impedance Value 12/15/2020 342  ohm Final     Lead Channel Sensing  Intrinsic Amp* 12/15/2020 0.125  mV Final     Lead Channel Sensing Intrinsic Amp* 12/15/2020 0.125  mV Final     Lead Channel Impedance Value 12/15/2020 646  ohm Final     Lead Channel Impedance Value 12/15/2020 608  ohm Final     Lead Channel Sensing Intrinsic Amp* 12/15/2020 12.375  mV Final     Lead Channel Sensing Intrinsic Amp* 12/15/2020 12.375  mV Final     Lead Channel Pacing Threshold Ampl* 12/15/2020 2.5  V Final     Lead Channel Pacing Threshold Puls* 12/15/2020 0.4  ms Final     Battery Date Time of Measurements 12/15/2020 04001704173792   Final     Battery Status 12/15/2020 OK   Final     Battery RRT Trigger 12/15/2020 2.625   Final     Battery Remaining Longevity 12/15/2020 50  mo Final     Battery Voltage 12/15/2020 3.00  V Final     James Statistic Date Time Start 12/15/2020 27637994319785   Final     James Statistic Date Time End 12/15/2020 49149225910169   Final     James Statistic RA Percent Paced 12/15/2020 33.37  % Final     James Statistic RV Percent Paced 12/15/2020 92.21  % Final     James Statistic AP  Percent 12/15/2020 75.24  % Final     James Statistic AS  Percent 12/15/2020 18.26  % Final     James Statistic AP VS Percent 12/15/2020 4.56  % Final     James Statistic AS VS Percent 12/15/2020 2  % Final     Episode Statistic Recent Count 12/15/2020 1,629   Final     Episode Statistic Type Category 12/15/2020 AT/AF   Final     Episode Statistic Recent Count 12/15/2020 0   Final     Episode Statistic Type Category 12/15/2020 VT   Final     Episode Statistic Recent Count 12/15/2020 0   Final     Episode Statistic Type Category 12/15/2020 VT   Final     Episode Statistic Recent Date Time* 12/15/2020 08216931977022   Final     Episode Statistic Recent Date Time* 12/15/2020 12355032373016   Final     Episode Statistic Recent Date Time* 12/15/2020 13748151689398   Final     Episode Statistic Recent Date Time* 12/15/2020 73058619522226   Final     Episode Statistic Recent Date Time* 12/15/2020  00508571524577   Final     Episode Statistic Recent Date Time* 12/15/2020 67375685595426   Final     Episode Statistic Total Count 12/15/2020 24,060   Final     Episode Statistic Type Category 12/15/2020 AT/AF   Final     Episode Statistic Total Count 12/15/2020 0   Final     Episode Statistic Type Category 12/15/2020 VT   Final     Episode Statistic Total Count 12/15/2020 0   Final     Episode Statistic Type Category 12/15/2020 VT   Final     Episode Statistic Total Date Time * 12/15/2020 73696974187314   Final     Episode Statistic Total Date Time * 12/15/2020 90466848275326   Final     Episode Statistic Total Date Time * 12/15/2020 67724140116149   Final     Episode Statistic Total Date Time * 12/15/2020 00493783951705   Final     Episode Statistic Total Date Time * 12/15/2020 14089399566837   Final     Episode Statistic Total Date Time * 12/15/2020 79207291418983   Final     Episode Identifier 12/15/2020 24,060   Final     Episode Type Category 12/15/2020 Monitor   Final     Episode Date Time 12/15/2020 30180929109700   Final     Episode Duration 12/15/2020 42  s Final     Episode Identifier 12/15/2020 24,059   Final     Episode Type Category 12/15/2020 Monitor   Final     Episode Date Time 12/15/2020 66003144524264   Final     Episode Duration 12/15/2020 177  s Final     Episode Identifier 12/15/2020 24,058   Final     Episode Type Category 12/15/2020 Monitor   Final     Episode Date Time 12/15/2020 64262156674237   Final     Episode Duration 12/15/2020 59  s Final     Episode Identifier 12/15/2020 24,057   Final     Episode Type Category 12/15/2020 Monitor   Final     Episode Date Time 12/15/2020 19056023629026   Final     Episode Duration 12/15/2020 331  s Final     Episode Identifier 12/15/2020 24,056   Final     Episode Type Category 12/15/2020 Monitor   Final     Episode Date Time 12/15/2020 62164603179005   Final     Episode Duration 12/15/2020 1,484  s Final     Episode Identifier 12/15/2020 24,055    Final     Episode Type Category 12/15/2020 Monitor   Final     Episode Date Time 12/15/2020 54824448839549   Final     Episode Duration 12/15/2020 1,820  s Final     Episode Identifier 12/15/2020 24,054   Final     Episode Type Category 12/15/2020 Monitor   Final     Episode Date Time 12/15/2020 67036462060898   Final     Episode Duration 12/15/2020 475  s Final     Episode Identifier 12/15/2020 24,053   Final     Episode Type Category 12/15/2020 Monitor   Final     Episode Date Time 12/15/2020 74757504660605   Final     Episode Duration 12/15/2020 172  s Final     Episode Identifier 12/15/2020 24,052   Final     Episode Type Category 12/15/2020 Monitor   Final     Episode Date Time 12/15/2020 43119570054406   Final     Episode Duration 12/15/2020 6  s Final     Episode Identifier 12/15/2020 24,051   Final     Episode Type Category 12/15/2020 Monitor   Final     Episode Date Time 12/15/2020 41861473501333   Final     Episode Duration 12/15/2020 259  s Final     Episode Identifier 12/15/2020 24,050   Final     Episode Type Category 12/15/2020 Monitor   Final     Episode Date Time 12/15/2020 36568568231860   Final     Episode Duration 12/15/2020 21  s Final     Episode Identifier 12/15/2020 24,049   Final     Episode Type Category 12/15/2020 Monitor   Final     Episode Date Time 12/15/2020 08171946287755   Final     Episode Duration 12/15/2020 484  s Final     Episode Identifier 12/15/2020 24,048   Final     Episode Type Category 12/15/2020 Monitor   Final     Episode Date Time 12/15/2020 65583549481252   Final     Episode Duration 12/15/2020 24  s Final     Episode Identifier 12/15/2020 24,047   Final     Episode Type Category 12/15/2020 Monitor   Final     Episode Date Time 12/15/2020 90298164875947   Final     Episode Duration 12/15/2020 55  s Final     Episode Identifier 12/15/2020 24,046   Final     Episode Type Category 12/15/2020 Monitor   Final     Episode Date Time 12/15/2020 63057785915582   Final      Episode Duration 12/15/2020 2,031  s Final     Episode Identifier 12/15/2020 24,045   Final     Episode Type Category 12/15/2020 Monitor   Final     Episode Date Time 12/15/2020 50965388307124   Final     Episode Duration 12/15/2020 70  s Final     Episode Identifier 12/15/2020 24,044   Final     Episode Type Category 12/15/2020 Monitor   Final     Episode Date Time 12/15/2020 72262328292295   Final     Episode Duration 12/15/2020 36  s Final     Episode Identifier 12/15/2020 24,043   Final     Episode Type Category 12/15/2020 Monitor   Final     Episode Date Time 12/15/2020 94606658857482   Final     Episode Duration 12/15/2020 315  s Final     Episode Identifier 12/15/2020 24,042   Final     Episode Type Category 12/15/2020 Monitor   Final     Episode Date Time 12/15/2020 20924648690391   Final     Episode Duration 12/15/2020 269  s Final     Episode Identifier 12/15/2020 24,041   Final     Episode Type Category 12/15/2020 Monitor   Final     Episode Date Time 12/15/2020 41471362508360   Final     Episode Duration 12/15/2020 78  s Final     Episode Identifier 12/15/2020 24,040   Final     Episode Type Category 12/15/2020 Monitor   Final     Episode Date Time 12/15/2020 70623889545332   Final     Episode Duration 12/15/2020 926  s Final     Episode Identifier 12/15/2020 24,039   Final     Episode Type Category 12/15/2020 Monitor   Final     Episode Date Time 12/15/2020 33928004513648   Final     Episode Duration 12/15/2020 461  s Final     Episode Identifier 12/15/2020 24,038   Final     Episode Type Category 12/15/2020 Monitor   Final     Episode Date Time 12/15/2020 92915971982152   Final     Episode Duration 12/15/2020 333  s Final     Episode Identifier 12/15/2020 24,037   Final     Episode Type Category 12/15/2020 Monitor   Final     Episode Date Time 12/15/2020 [id number]   Final     Episode Duration 12/15/2020 26  s Final     Episode Identifier 12/15/2020 24,036   Final     Episode Type Category  12/15/2020 Monitor   Final     Episode Date Time 12/15/2020 [id number]   Final     Episode Duration 12/15/2020 23  s Final     Episode Identifier 12/15/2020 24,035   Final     Episode Type Category 12/15/2020 Monitor   Final     Episode Date Time 12/15/2020 [id number]   Final     Episode Duration 12/15/2020 43  s Final     Episode Identifier 12/15/2020 24,034   Final     Episode Type Category 12/15/2020 Monitor   Final     Episode Date Time 12/15/2020 05651003844748   Final     Episode Duration 12/15/2020 964  s Final     Episode Identifier 12/15/2020 24,033   Final     Episode Type Category 12/15/2020 Monitor   Final     Episode Date Time 12/15/2020 [id number]   Final     Episode Duration 12/15/2020 130  s Final     Episode Identifier 12/15/2020 24,032   Final     Episode Type Category 12/15/2020 Monitor   Final     Episode Date Time 12/15/2020 [id number]   Final     Episode Duration 12/15/2020 5  s Final     Episode Identifier 12/15/2020 24,031   Final     Episode Type Category 12/15/2020 Monitor   Final     Episode Date Time 12/15/2020 [id number]   Final     Episode Duration 12/15/2020 188  s Final     Episode Identifier 12/15/2020 24,030   Final     Episode Type Category 12/15/2020 Monitor   Final     Episode Date Time 12/15/2020 28220201812822   Final     Episode Duration 12/15/2020 4,331  s Final     Episode Identifier 12/15/2020 24,029   Final     Episode Type Category 12/15/2020 Monitor   Final     Episode Date Time 12/15/2020 14011552863886   Final     Episode Duration 12/15/2020 464  s Final     Episode Identifier 12/15/2020 24,028   Final     Episode Type Category 12/15/2020 Monitor   Final     Episode Date Time 12/15/2020 [id number]   Final     Episode Duration 12/15/2020 14  s Final     Episode Identifier 12/15/2020 24,027   Final     Episode Type Category 12/15/2020 Monitor   Final     Episode Date Time 12/15/2020 [id number]   Final     Episode Duration 12/15/2020 17  s  Final     Episode Identifier 12/15/2020 24,026   Final     Episode Type Category 12/15/2020 Monitor   Final     Episode Date Time 12/15/2020 [id number]   Final     Episode Duration 12/15/2020 76  s Final     Episode Identifier 12/15/2020 24,025   Final     Episode Type Category 12/15/2020 Monitor   Final     Episode Date Time 12/15/2020 92548324927652   Final     Episode Duration 12/15/2020 93  s Final     Episode Identifier 12/15/2020 24,024   Final     Episode Type Category 12/15/2020 Monitor   Final     Episode Date Time 12/15/2020 [id number]   Final     Episode Duration 12/15/2020 1,627  s Final     Episode Identifier 12/15/2020 24,023   Final     Episode Type Category 12/15/2020 Monitor   Final     Episode Date Time 12/15/2020 [id number]   Final     Episode Duration 12/15/2020 82  s Final     Episode Identifier 12/15/2020 24,022   Final     Episode Type Category 12/15/2020 Monitor   Final     Episode Date Time 12/15/2020 [id number]   Final     Episode Duration 12/15/2020 38  s Final     Episode Identifier 12/15/2020 24,021   Final     Episode Type Category 12/15/2020 Monitor   Final     Episode Date Time 12/15/2020 [id number]   Final     Episode Duration 12/15/2020 27  s Final     Episode Identifier 12/15/2020 24,020   Final     Episode Type Category 12/15/2020 Monitor   Final     Episode Date Time 12/15/2020 [id number]   Final     Episode Duration 12/15/2020 57  s Final     Episode Identifier 12/15/2020 24,019   Final     Episode Type Category 12/15/2020 Monitor   Final     Episode Date Time 12/15/2020 [id number]   Final     Episode Duration 12/15/2020 48  s Final     Episode Identifier 12/15/2020 24,018   Final     Episode Type Category 12/15/2020 Monitor   Final     Episode Date Time 12/15/2020 [id number]   Final     Episode Duration 12/15/2020 51  s Final     Episode Identifier 12/15/2020 24,017   Final     Episode Type Category 12/15/2020 Monitor   Final     Episode Date  Time 12/15/2020 [id number]   Final     Episode Duration 12/15/2020 54  s Final     Episode Identifier 12/15/2020 24,016   Final     Episode Type Category 12/15/2020 Monitor   Final     Episode Date Time 12/15/2020 [id number]   Final     Episode Duration 12/15/2020 777  s Final     Episode Identifier 12/15/2020 24,015   Final     Episode Type Category 12/15/2020 Monitor   Final     Episode Date Time 12/15/2020 [id number]   Final     Episode Duration 12/15/2020 143  s Final     Episode Identifier 12/15/2020 24,014   Final     Episode Type Category 12/15/2020 Monitor   Final     Episode Date Time 12/15/2020 [id number]   Final     Episode Duration 12/15/2020 343  s Final     Episode Identifier 12/15/2020 24,013   Final     Episode Type Category 12/15/2020 Monitor   Final     Episode Date Time 12/15/2020 [id number]   Final     Episode Duration 12/15/2020 87  s Final     Episode Identifier 12/15/2020 24,012   Final     Episode Type Category 12/15/2020 Monitor   Final     Episode Date Time 12/15/2020 [id number]   Final     Episode Duration 12/15/2020 320  s Final     Episode Identifier 12/15/2020 24,011   Final     Episode Type Category 12/15/2020 Monitor   Final     Episode Date Time 12/15/2020 [id number]   Final     Episode Duration 12/15/2020 133  s Final   Ancillary Procedure on 11/24/2020   Component Date Value Ref Range Status     Date Time Interrogation Session 11/24/2020 [id number]   Final     Implantable Pulse Generator Manufa* 11/24/2020 Medtronic   Final     Implantable Pulse Generator Model 11/24/2020 [id number] XT  MRI   Final     Implantable Pulse Generator Serial* 11/24/2020 [id number]   Final     Type Interrogation Session 11/24/2020 Remote    Final     Clinic Name 11/24/2020 [facility]   Final     Implantable Pulse Generator Type 11/24/2020 Pacemaker   Final     Implantable Pulse Generator Implan* 11/24/2020 20200521   Final     Implantable Lead   11/24/2020 Medtronic   Final     Implantable Lead Model 11/24/2020 5076 CapSureFix Novus MRI SureScan   Final     Implantable Lead Implant Date 11/24/2020 20200521   Final     Implantable Lead Polarity Type 11/24/2020 Bipolar Lead   Final     Implantable Lead Location Detail 1 11/24/2020 UNKNOWN   Final     Implantable Lead Special Function 11/24/2020 52 cm OLD RA lead capped   Final     Implantable Lead Location 11/24/2020 Right Atrium   Final     Implantable Lead  11/24/2020 Medtronic   Final     Implantable Lead Model 11/24/2020 5054 CapSure Z Novus MRI SureScan   Final     Implantable Lead Serial Number 11/24/2020 [id number]   Final     Implantable Lead Implant Date 11/24/2020 20080903   Final     Implantable Lead Polarity Type 11/24/2020 Bipolar Lead   Final     Implantable Lead Location Detail 1 11/24/2020 UNKNOWN   Final     Implantable Lead Special Function 11/24/2020 52 cm   Final     Implantable Lead Location 11/24/2020 Right Ventricle   Final     James Setting Mode (NBG Code) 11/24/2020 [id number]   Final     James Setting Lower Rate Limit 11/24/2020 60  [beats]/min Final     James Setting Maximum Tracking Rate 11/24/2020 130  [beats]/min Final     James Setting Maximum Sensor Rate 11/24/2020 130  [beats]/min Final     James Setting Hysterisis Rate 11/24/2020 DISABLED   Final     James Setting MYRNA Delay Low 11/24/2020 270  ms Final     James Setting PAV Delay Low 11/24/2020 300  ms Final     James Setting AT Mode Switch Rate 11/24/2020 171  [beats]/min Final     Lead Channel Setting Sensing Polar* 11/24/2020 Bipolar   Final     Lead Channel Setting Sensing Anode* 11/24/2020 Right Atrium   Final     Lead Channel Setting Sensing Anode* 11/24/2020 Ring   Final     Lead Channel Setting Sensing Catho* 11/24/2020 Right Atrium   Final     Lead Channel Setting Sensing Catho* 11/24/2020 Tip   Final     Lead Channel Setting Sensing Sensi* 11/24/2020 0.15  mV Final     Lead Channel  Setting Sensing Polar* 11/24/2020 Bipolar   Final     Lead Channel Setting Sensing Anode* 11/24/2020 Right Ventricle   Final     Lead Channel Setting Sensing Anode* 11/24/2020 Ring   Final     Lead Channel Setting Sensing Catho* 11/24/2020 Right Ventricle   Final     Lead Channel Setting Sensing Catho* 11/24/2020 Tip   Final     Lead Channel Setting Sensing Sensi* 11/24/2020 0.9  mV Final     Lead Channel Setting Pacing Polari* 11/24/2020 Bipolar   Final     Lead Channel Setting Pacing Anode * 11/24/2020 Right Atrium   Final     Lead Channel Setting Pacing Anode * 11/24/2020 Ring   Final     Lead Channel Setting Sensing Catho* 11/24/2020 Right Atrium   Final     Lead Channel Setting Sensing Catho* 11/24/2020 Tip   Final     Lead Channel Setting Pacing Pulse * 11/24/2020 1  ms Final     Lead Channel Setting Pacing Amplit* 11/24/2020 3.5  V Final     Lead Channel Setting Pacing Captur* 11/24/2020 Adaptive   Final     Lead Channel Setting Pacing Polari* 11/24/2020 Bipolar   Final     Lead Channel Setting Pacing Anode * 11/24/2020 Right Ventricle   Final     Lead Channel Setting Pacing Anode * 11/24/2020 Ring   Final     Lead Channel Setting Sensing Catho* 11/24/2020 Right Ventricle   Final     Lead Channel Setting Sensing Catho* 11/24/2020 Tip   Final     Lead Channel Setting Pacing Pulse * 11/24/2020 1  ms Final     Lead Channel Setting Pacing Amplit* 11/24/2020 5  V Final     Lead Channel Setting Pacing Captur* 11/24/2020 Adaptive   Final     Zone Setting Type Category 11/24/2020 VF   Final     Zone Setting Type Category 11/24/2020 VT   Final     Zone Setting Type Category 11/24/2020 VT   Final     Zone Setting Type Category 11/24/2020 VT   Final     Zone Setting Detection Interval 11/24/2020 400  ms Final     Zone Setting Type Category 11/24/2020 ATRIAL_[id number]   Final     Zone Setting Type Category 11/24/2020 AT/AF   Final     Zone Setting Detection Interval 11/24/2020 350  ms Final     Lead Channel Impedance  Value 11/24/2020 437  ohm Final     Lead Channel Impedance Value 11/24/2020 342  ohm Final     Lead Channel Sensing Intrinsic Amp* 11/24/2020 2.125  mV Final     Lead Channel Sensing Intrinsic Amp* 11/24/2020 2.125  mV Final     Lead Channel Impedance Value 11/24/2020 608  ohm Final     Lead Channel Impedance Value 11/24/2020 589  ohm Final     Lead Channel Sensing Intrinsic Amp* 11/24/2020 9.75  mV Final     Lead Channel Sensing Intrinsic Amp* 11/24/2020 9.75  mV Final     Lead Channel Pacing Threshold Ampl* 11/24/2020 2.25  V Final     Lead Channel Pacing Threshold Puls* 11/24/2020 0.4  ms Final     Battery Date Time of Measurements 11/24/2020 20201124011502   Final     Battery Status 11/24/2020 OK   Final     Battery RRT Trigger 11/24/2020 2.625   Final     Battery Remaining Longevity 11/24/2020 49  mo Final     Battery Voltage 11/24/2020 3.01  V Final     James Statistic Date Time Start 11/24/2020 20200818142712   Final     James Statistic Date Time End 11/24/2020 20201124014955   Final     James Statistic RA Percent Paced 11/24/2020 34.86  % Final     James Statistic RV Percent Paced 11/24/2020 88.27  % Final     James Statistic AP  Percent 11/24/2020 71.33  % Final     James Statistic AS  Percent 11/24/2020 16.84  % Final     James Statistic AP VS Percent 11/24/2020 8.48  % Final     James Statistic AS VS Percent 11/24/2020 3.41  % Final     Episode Statistic Recent Count 11/24/2020 11,237   Final     Episode Statistic Type Category 11/24/2020 AT/AF   Final     Episode Statistic Recent Count 11/24/2020 0   Final     Episode Statistic Type Category 11/24/2020 VT   Final     Episode Statistic Recent Count 11/24/2020 0   Final     Episode Statistic Type Category 11/24/2020 VT   Final     Episode Statistic Recent Date Time* 11/24/2020 20200818142712   Final     Episode Statistic Recent Date Time* 11/24/2020 20201124014955   Final     Episode Statistic Recent Date Time* 11/24/2020 20200818142712   Final      Episode Statistic Recent Date Time* 11/24/2020 20201124014955   Final     Episode Statistic Recent Date Time* 11/24/2020 20200818142712   Final     Episode Statistic Recent Date Time* 11/24/2020 20201124014955   Final     Episode Statistic Total Count 11/24/2020 22,431   Final     Episode Statistic Type Category 11/24/2020 AT/AF   Final     Episode Statistic Total Count 11/24/2020 0   Final     Episode Statistic Type Category 11/24/2020 VT   Final     Episode Statistic Total Count 11/24/2020 0   Final     Episode Statistic Type Category 11/24/2020 VT   Final     Episode Statistic Total Date Time * 11/24/2020 20200521190615   Final     Episode Statistic Total Date Time * 11/24/2020 20201124014955   Final     Episode Statistic Total Date Time * 11/24/2020 20200521190615   Final     Episode Statistic Total Date Time * 11/24/2020 20201124014955   Final     Episode Statistic Total Date Time * 11/24/2020 20200521190615   Final     Episode Statistic Total Date Time * 11/24/2020 20201124014955   Final     Episode Identifier 11/24/2020 22,431   Final     Episode Type Category 11/24/2020 Monitor   Final     Episode Date Time 11/24/2020 20201124013643   Final     Episode Identifier 11/24/2020 22,430   Final     Episode Type Category 11/24/2020 Monitor   Final     Episode Date Time 11/24/2020 20201123233049   Final     Episode Duration 11/24/2020 7,541  s Final     Episode Identifier 11/24/2020 22,429   Final     Episode Type Category 11/24/2020 Monitor   Final     Episode Date Time 11/24/2020 20201123230333   Final     Episode Duration 11/24/2020 1,206  s Final     Episode Identifier 11/24/2020 22,428   Final     Episode Type Category 11/24/2020 Monitor   Final     Episode Date Time 11/24/2020 20201123225029   Final     Episode Duration 11/24/2020 128  s Final     Episode Identifier 11/24/2020 22,427   Final     Episode Type Category 11/24/2020 Monitor   Final     Episode Date Time 11/24/2020 20201123222151   Final     Episode  Duration 11/24/2020 1,515  s Final     Episode Identifier 11/24/2020 22,426   Final     Episode Type Category 11/24/2020 Monitor   Final     Episode Date Time 11/24/2020 20201123220405   Final     Episode Duration 11/24/2020 51  s Final     Episode Identifier 11/24/2020 22,425   Final     Episode Type Category 11/24/2020 Monitor   Final     Episode Date Time 11/24/2020 20201123215427   Final     Episode Duration 11/24/2020 215  s Final     Episode Identifier 11/24/2020 22,424   Final     Episode Type Category 11/24/2020 Monitor   Final     Episode Date Time 11/24/2020 20201123215156   Final     Episode Duration 11/24/2020 76  s Final     Episode Identifier 11/24/2020 22,423   Final     Episode Type Category 11/24/2020 Monitor   Final     Episode Date Time 11/24/2020 20201123213916   Final     Episode Duration 11/24/2020 599  s Final     Episode Identifier 11/24/2020 22,422   Final     Episode Type Category 11/24/2020 Monitor   Final     Episode Date Time 11/24/2020 20201123202545   Final     Episode Duration 11/24/2020 4,293  s Final     Episode Identifier 11/24/2020 22,421   Final     Episode Type Category 11/24/2020 Monitor   Final     Episode Date Time 11/24/2020 20201123202328   Final     Episode Duration 11/24/2020 15  s Final     Episode Identifier 11/24/2020 22,420   Final     Episode Type Category 11/24/2020 Monitor   Final     Episode Date Time 11/24/2020 20201123202157   Final     Episode Duration 11/24/2020 18  s Final     Episode Identifier 11/24/2020 22,419   Final     Episode Type Category 11/24/2020 Monitor   Final     Episode Date Time 11/24/2020 20201123201655   Final     Episode Duration 11/24/2020 202  s Final     Episode Identifier 11/24/2020 22,418   Final     Episode Type Category 11/24/2020 Monitor   Final     Episode Date Time 11/24/2020 20201123193317   Final     Episode Duration 11/24/2020 2,415  s Final     Episode Identifier 11/24/2020 22,417   Final     Episode Type Category 11/24/2020  Monitor   Final     Episode Date Time 11/24/2020 20201123193156   Final     Episode Duration 11/24/2020 24  s Final     Episode Identifier 11/24/2020 22,416   Final     Episode Type Category 11/24/2020 Monitor   Final     Episode Date Time 11/24/2020 20201123192857   Final     Episode Duration 11/24/2020 137  s Final     Episode Identifier 11/24/2020 22,415   Final     Episode Type Category 11/24/2020 Monitor   Final     Episode Date Time 11/24/2020 20201123192547   Final     Episode Duration 11/24/2020 25  s Final     Episode Identifier 11/24/2020 22,414   Final     Episode Type Category 11/24/2020 Monitor   Final     Episode Date Time 11/24/2020 20201123192125   Final     Episode Duration 11/24/2020 23  s Final     Episode Identifier 11/24/2020 22,413   Final     Episode Type Category 11/24/2020 Monitor   Final     Episode Date Time 11/24/2020 20201123191734   Final     Episode Duration 11/24/2020 140  s Final     Episode Identifier 11/24/2020 22,412   Final     Episode Type Category 11/24/2020 Monitor   Final     Episode Date Time 11/24/2020 20201123174337   Final     Episode Duration 11/24/2020 5,478  s Final     Episode Identifier 11/24/2020 22,411   Final     Episode Type Category 11/24/2020 Monitor   Final     Episode Date Time 11/24/2020 20201123173335   Final     Episode Duration 11/24/2020 549  s Final     Episode Identifier 11/24/2020 22,410   Final     Episode Type Category 11/24/2020 Monitor   Final     Episode Date Time 11/24/2020 20201123173047   Final     Episode Duration 11/24/2020 119  s Final     Episode Identifier 11/24/2020 22,409   Final     Episode Type Category 11/24/2020 Monitor   Final     Episode Date Time 11/24/2020 20201123171826   Final     Episode Duration 11/24/2020 214  s Final     Episode Identifier 11/24/2020 22,408   Final     Episode Type Category 11/24/2020 Monitor   Final     Episode Date Time 11/24/2020 20201123171647   Final     Episode Duration 11/24/2020 42  s Final      Episode Identifier 11/24/2020 22,407   Final     Episode Type Category 11/24/2020 Monitor   Final     Episode Date Time 11/24/2020 20201123170635   Final     Episode Duration 11/24/2020 29  s Final     Episode Identifier 11/24/2020 22,406   Final     Episode Type Category 11/24/2020 Monitor   Final     Episode Date Time 11/24/2020 20201123165524   Final     Episode Duration 11/24/2020 605  s Final     Episode Identifier 11/24/2020 22,405   Final     Episode Type Category 11/24/2020 Monitor   Final     Episode Date Time 11/24/2020 20201123164020   Final     Episode Duration 11/24/2020 679  s Final     Episode Identifier 11/24/2020 22,404   Final     Episode Type Category 11/24/2020 Monitor   Final     Episode Date Time 11/24/2020 20201123163931   Final     Episode Duration 11/24/2020 32  s Final     Episode Identifier 11/24/2020 22,403   Final     Episode Type Category 11/24/2020 Monitor   Final     Episode Date Time 11/24/2020 20201123163843   Final     Episode Duration 11/24/2020 26  s Final     Episode Identifier 11/24/2020 22,402   Final     Episode Type Category 11/24/2020 Monitor   Final     Episode Date Time 11/24/2020 20201123163739   Final     Episode Duration 11/24/2020 30  s Final     Episode Identifier 11/24/2020 22,401   Final     Episode Type Category 11/24/2020 Monitor   Final     Episode Date Time 11/24/2020 20201123162835   Final     Episode Duration 11/24/2020 447  s Final     Episode Identifier 11/24/2020 22,400   Final     Episode Type Category 11/24/2020 Monitor   Final     Episode Date Time 11/24/2020 20201123161842   Final     Episode Duration 11/24/2020 327  s Final     Episode Identifier 11/24/2020 22,399   Final     Episode Type Category 11/24/2020 Monitor   Final     Episode Date Time 11/24/2020 20201123144741   Final     Episode Duration 11/24/2020 188  s Final     Episode Identifier 11/24/2020 22,398   Final     Episode Type Category 11/24/2020 Monitor   Final     Episode Date Time  11/24/2020 20201123144604   Final     Episode Duration 11/24/2020 10  s Final     Episode Identifier 11/24/2020 22,397   Final     Episode Type Category 11/24/2020 Monitor   Final     Episode Date Time 11/24/2020 20201123143929   Final     Episode Duration 11/24/2020 352  s Final     Episode Identifier 11/24/2020 22,396   Final     Episode Type Category 11/24/2020 Monitor   Final     Episode Date Time 11/24/2020 20201123143612   Final     Episode Duration 11/24/2020 146  s Final     Episode Identifier 11/24/2020 22,395   Final     Episode Type Category 11/24/2020 Monitor   Final     Episode Date Time 11/24/2020 20201123143240   Final     Episode Duration 11/24/2020 22  s Final     Episode Identifier 11/24/2020 22,394   Final     Episode Type Category 11/24/2020 Monitor   Final     Episode Date Time 11/24/2020 20201123141736   Final     Episode Duration 11/24/2020 716  s Final     Episode Identifier 11/24/2020 22,393   Final     Episode Type Category 11/24/2020 Monitor   Final     Episode Date Time 11/24/2020 20201123141408   Final     Episode Duration 11/24/2020 192  s Final     Episode Identifier 11/24/2020 22,392   Final     Episode Type Category 11/24/2020 Monitor   Final     Episode Date Time 11/24/2020 20201123141135   Final     Episode Duration 11/24/2020 84  s Final     Episode Identifier 11/24/2020 22,391   Final     Episode Type Category 11/24/2020 Monitor   Final     Episode Date Time 11/24/2020 20201123134312   Final     Episode Duration 11/24/2020 1,592  s Final     Episode Identifier 11/24/2020 22,390   Final     Episode Type Category 11/24/2020 Monitor   Final     Episode Date Time 11/24/2020 20201123133724   Final     Episode Duration 11/24/2020 44  s Final     Episode Identifier 11/24/2020 22,389   Final     Episode Type Category 11/24/2020 Monitor   Final     Episode Date Time 11/24/2020 20201123131751   Final     Episode Duration 11/24/2020 177  s Final     Episode Identifier 11/24/2020 22,388    Final     Episode Type Category 11/24/2020 Monitor   Final     Episode Date Time 11/24/2020 20201123131614   Final     Episode Duration 11/24/2020 72  s Final     Episode Identifier 11/24/2020 22,387   Final     Episode Type Category 11/24/2020 Monitor   Final     Episode Date Time 11/24/2020 20201123131440   Final     Episode Duration 11/24/2020 84  s Final     Episode Identifier 11/24/2020 22,386   Final     Episode Type Category 11/24/2020 Monitor   Final     Episode Date Time 11/24/2020 20201123130107   Final     Episode Duration 11/24/2020 288  s Final     Episode Identifier 11/24/2020 22,385   Final     Episode Type Category 11/24/2020 Monitor   Final     Episode Date Time 11/24/2020 20201123125952   Final     Episode Duration 11/24/2020 57  s Final     Episode Identifier 11/24/2020 22,384   Final     Episode Type Category 11/24/2020 Monitor   Final     Episode Date Time 11/24/2020 20201123125728   Final     Episode Duration 11/24/2020 63  s Final     Episode Identifier 11/24/2020 22,383   Final     Episode Type Category 11/24/2020 Monitor   Final     Episode Date Time 11/24/2020 20201123125140   Final     Episode Duration 11/24/2020 36  s Final     Episode Identifier 11/24/2020 22,382   Final     Episode Type Category 11/24/2020 Monitor   Final     Episode Date Time 11/24/2020 20201123124419   Final     Episode Duration 11/24/2020 414  s Final     Episode Identifier 11/24/2020 22,381   Final     Episode Type Category 11/24/2020 Monitor   Final     Episode Date Time 11/24/2020 20201123122501   Final     Episode Duration 11/24/2020 1,156  s Final          ASSESSMENT:       ICD-10-CM    1. Failure of pacemaker lead, initial encounter  T82.110A CARDIOLOGY EVAL ADULT REFERRAL   2. Sick sinus syndrome (H)  I49.5 EKG 12-lead, tracing only     CARDIOLOGY EVAL ADULT REFERRAL   3. AV block, complete (H)  I44.2 CARDIOLOGY EVAL ADULT REFERRAL   4. Morbid obesity (H)  E66.01    5. Bradycardia  R00.1 CARDIOLOGY EVAL ADULT  REFERRAL   6. Paroxysmal supraventricular tachycardia (H)  I47.1    7. Cardiac pacemaker in situ  Z95.0 CARDIOLOGY EVAL ADULT REFERRAL   8. Pacemaker end of life  Z45.018 CARDIOLOGY EVAL ADULT REFERRAL   9. Mixed hyperlipidemia  E78.2    10. Benign essential hypertension  I10 Echocardiogram Complete   11. Biatrial enlargement  I51.7 Echocardiogram Complete   12. Long-term (current) use of anticoagulants [Z79.01]  Z79.01    13. Anticoagulation monitoring, INR range 2-3  Z79.01    14. Warfarin anticoagulation  Z79.01    15. Stage 3a chronic kidney disease  N18.31    16. Bilateral leg edema  R60.0    17. Permanent atrial fibrillation (H)  I48.21    18. Chronic heart failure with preserved ejection fraction (H)  I50.32 CARDIOLOGY EVAL ADULT REFERRAL     Echocardiogram Complete   19. LVH (left ventricular hypertrophy)  I51.7 Echocardiogram Complete   20. Hypertriglyceridemia  E78.1    21. Hyperkalemia  E87.5          PLAN:   1.  Pacemaker was interrogated today by [employer].  Patient has a ventricular lead fracture.  This started after pacemaker upgrade on 5/21/2020.  Battery life is 3-4 years secondary to lead issues.  He will need to have his ventricular lead replaced.  Patient will be referred to Dr. Darby in EP via telemedicine with consideration of removal/replacement of ventricular lead.  Patient was hoping to have it done in the spring or summer secondary to snow.  Was encouraged to do it sooner.  States if there is a big snowstorm, procedure can be rescheduled.  2.  Secondary to history of diastolic dysfunction with lower extremity edema, will obtain echocardiogram.  3.  Being that he is in A. fib 100% of time, discontinue flecainide 100 mg twice a day.  4.  It was suggested he have labs today at his most recent labs were in May, patient declined.  5.  Continue Coumadin and metoprolol for A. Fib.  6.  Follow-up after lead revision.    Thank you for allowing me to participate in the care of your patient. Please  do not hesitate to contact me if you have any questions.     Kevan Ruiz, DO

## 2020-12-22 NOTE — PATIENT INSTRUCTIONS
You were seen by  Kevan Ruiz, DO       1. Stop Flecainide.    2. Echocardiogram has been ordered. You will be called to schedule this.  You will receive instructions for testing at that time.  You will be contacted with results.      3. Will call with appointment time for Telemedicine visit with Dr. Darby.        You will follow up with [facility] after Echocardiogram is done and visit with Dr. Darby, sooner if needed.       Please call the cardiology office with problems, questions, or concerns at [phone].    If you experience chest pain, chest pressure, chest tightness, shortness of breath, fainting, lightheadedness, nausea, vomiting, or other concerning symptoms, please report to the Emergency Department or call 911. These symptoms may be emergent, and best treated in the Emergency Department.     Cardiology Nurses  TOM Shultz, CALOS MACIEL LPN  [facility] ([facility])  [phone]

## 2020-12-24 LAB — INTERPRETATION ECG - MUSE: NORMAL

## 2020-12-30 ENCOUNTER — TELEPHONE (OUTPATIENT)
Dept: CARDIOLOGY | Facility: OTHER | Age: 75
End: 2020-12-30

## 2020-12-30 NOTE — TELEPHONE ENCOUNTER
Called patient he would like to see Dr. Darby on 01/25/2021 0730 with nurse and 0800 with Dr. Darby Telemed visit.  Patient verbalized time and date.  He is aware to come to unit 3 to check in for appointment.  Missy Vyas RN on 12/30/2020 at 12:06 PM

## 2021-01-22 ENCOUNTER — HOSPITAL ENCOUNTER (OUTPATIENT)
Dept: CARDIOLOGY | Facility: OTHER | Age: 76
Discharge: HOME | End: 2021-01-22
Attending: INTERNAL MEDICINE | Admitting: INTERNAL MEDICINE
Payer: MEDICARE

## 2021-01-22 DIAGNOSIS — I51.7 LVH (LEFT VENTRICULAR HYPERTROPHY): ICD-10-CM

## 2021-01-22 DIAGNOSIS — I50.32 CHRONIC HEART FAILURE WITH PRESERVED EJECTION FRACTION (H): ICD-10-CM

## 2021-01-22 DIAGNOSIS — I10 BENIGN ESSENTIAL HYPERTENSION: ICD-10-CM

## 2021-01-22 DIAGNOSIS — I51.7 BIATRIAL ENLARGEMENT: ICD-10-CM

## 2021-01-22 PROCEDURE — 93306 TTE W/DOPPLER COMPLETE: CPT | Mod: 26 | Performed by: STUDENT IN AN ORGANIZED HEALTH CARE EDUCATION/TRAINING PROGRAM

## 2021-01-22 PROCEDURE — 255N000002 HC RX 255 OP 636: Performed by: INTERNAL MEDICINE

## 2021-01-22 PROCEDURE — 999N000208 ECHOCARDIOGRAM COMPLETE

## 2021-01-22 RX ADMIN — PERFLUTREN 3 ML: 6.52 INJECTION, SUSPENSION INTRAVENOUS at 13:00

## 2021-02-23 ENCOUNTER — ALLIED HEALTH/NURSE VISIT (OUTPATIENT)
Dept: CARDIOLOGY | Facility: OTHER | Age: 76
End: 2021-02-23
Attending: INTERNAL MEDICINE
Payer: MEDICARE

## 2021-02-23 ENCOUNTER — VIRTUAL VISIT (OUTPATIENT)
Dept: CARDIOLOGY | Facility: CLINIC | Age: 76
End: 2021-02-23
Attending: INTERNAL MEDICINE
Payer: MEDICARE

## 2021-02-23 ENCOUNTER — ANTICOAGULATION THERAPY VISIT (OUTPATIENT)
Dept: ANTICOAGULATION | Facility: OTHER | Age: 76
End: 2021-02-23
Attending: INTERNAL MEDICINE
Payer: MEDICARE

## 2021-02-23 VITALS
DIASTOLIC BLOOD PRESSURE: 88 MMHG | RESPIRATION RATE: 16 BRPM | SYSTOLIC BLOOD PRESSURE: 128 MMHG | HEART RATE: 64 BPM | WEIGHT: 249.6 LBS | TEMPERATURE: 97.8 F | OXYGEN SATURATION: 94 % | BODY MASS INDEX: 39.18 KG/M2

## 2021-02-23 VITALS
WEIGHT: 249.6 LBS | BODY MASS INDEX: 39.18 KG/M2 | SYSTOLIC BLOOD PRESSURE: 128 MMHG | TEMPERATURE: 97.8 F | DIASTOLIC BLOOD PRESSURE: 88 MMHG | HEART RATE: 64 BPM | OXYGEN SATURATION: 94 % | RESPIRATION RATE: 16 BRPM

## 2021-02-23 DIAGNOSIS — T82.110D PACEMAKER LEAD MALFUNCTION, SUBSEQUENT ENCOUNTER: Primary | ICD-10-CM

## 2021-02-23 DIAGNOSIS — I49.5 SICK SINUS SYNDROME (H): ICD-10-CM

## 2021-02-23 DIAGNOSIS — Z45.018 PACEMAKER END OF LIFE: Primary | ICD-10-CM

## 2021-02-23 DIAGNOSIS — Z79.01 LONG TERM CURRENT USE OF ANTICOAGULANT THERAPY: ICD-10-CM

## 2021-02-23 DIAGNOSIS — I47.10 PAROXYSMAL SUPRAVENTRICULAR TACHYCARDIA (H): Primary | ICD-10-CM

## 2021-02-23 LAB — INR POINT OF CARE: 2.4 (ref 0.86–1.14)

## 2021-02-23 PROCEDURE — 99204 OFFICE O/P NEW MOD 45 MIN: CPT | Performed by: INTERNAL MEDICINE

## 2021-02-23 PROCEDURE — 93280 PM DEVICE PROGR EVAL DUAL: CPT | Mod: 26 | Performed by: INTERNAL MEDICINE

## 2021-02-23 PROCEDURE — 93280 PM DEVICE PROGR EVAL DUAL: CPT | Performed by: INTERNAL MEDICINE

## 2021-02-23 PROCEDURE — 36416 COLLJ CAPILLARY BLOOD SPEC: CPT | Mod: ZL

## 2021-02-23 ASSESSMENT — PAIN SCALES - GENERAL: PAINLEVEL: NO PAIN (0)

## 2021-02-23 NOTE — PROGRESS NOTES
Electrophysiology Telemedicine Clinic Note  HPI:   Mr. Ferraro is a 75 year old male who has a past medical history significant for HFpEF, permanent AF (CHADSVASC 2 on Warfarin), HLD, hypertriglyceridemia, LVH, PSVT, SSS s/p PPM 2008, CKD III, RA, and anxiety.   He has SSS and had dual chamber PPM implanted in 2008. He also has history of PAF for which he has been on Flecainide, Toprol XL, and Warfarin. In 5/2020, he presented to [facility] with increasing palpitations and lightheadedness.  He states he was outside with his wife.  He got up to walk to his car and fell to his knees almost immediately.  He had to crawl to his car because he was weak.  He was noted to be in AF with SVR 40s and stable blood pressures. His PPM was found to be EOL and unable to interrogate device. He was arrange for PPM generator change locally on 5/21/20. During generator change they reported RA lead was not working and he had new RA lead placed. Op note details they attempted to access the subclavian vein with multiple attempts to this were successful in blood return, but unsuccessful in threading the wire.  They then proceeded to access the base of the right neck with angiocatheter and accessed the junction of the internal jugular vein and the innominate and passed a wire down this. They tunneled the lead over to the pacemaker pocket; however, they felt lead was too difficult to manipulate so they re-tunneled the lead. They reported taking a lead position that had higher voltages as this was all they could obtain. A more recent device check on 12/14/20 showed only 3-4 years of battery life, rising RV lead threshold, and undersensing on RA lead. He was referred to EP for evaluation.     He reports feeling well. He denies any chest pain/pressures, dizziness, lightheadedness, worsening shortness of breath, leg/ankle swelling, PND, orthopnea, palpitations, or syncopal symptoms. Device interrogation shows normal device  function. RV threshold is stable at 2.25V @ 1.0ms.  No episodes recorded. No arrhythmias recorded. Intrinsic rhythm = Afib w VS @ 48-92 bpm.  = 64%. Estimated battery longevity to PEGGY = 4.5 years. Battery voltage = 3.00V. No short v-v intervals recorded. Lead impedance trends appear stable. Recent echo showed normal structure and function. Current cardiac medications include: Metoprolol, Simvastatin, and Warfarin.     PAST MEDICAL HISTORY:  Past Medical History:   Diagnosis Date     Acute kidney failure (H)     No Comments Provided     Acute kidney failure (H)     No Comments Provided     Atrial fibrillation (H)     3/8/2012     Atrioventricular block, complete (H)     No Comments Provided     Calculus of kidney     No Comments Provided     Essential (primary) hypertension     No Comments Provided     Generalized anxiety disorder     5/11/2010     Hematuria     7/10/2012     Osteoarthritis     9/14/2010     Osteoarthritis of hip     5/11/2010     Presence of cardiac pacemaker     9/3/2008     Rheumatoid arthritis (H)     5/19/2011     Sick sinus syndrome (H)     No Comments Provided     Supraventricular tachycardia (H)     No Comments Provided       CURRENT MEDICATIONS:  Current Outpatient Medications   Medication Sig Dispense Refill     acetaminophen (TYLENOL) 500 MG tablet Take 1,000 mg by mouth daily as needed for mild pain       allopurinol (ZYLOPRIM) 100 MG tablet Take 1 tablet (100 mg) by mouth 2 times daily 180 tablet 3     Blood Pressure Monitoring (RA BLOOD PRESSURE CUFF MONITOR) MISC Medium Cuff       metoprolol succinate ER (TOPROL-XL) 100 MG 24 hr tablet Take 1 tablet (100 mg) by mouth every morning AND 2 tablets (200 mg) every evening. 270 tablet 3     potassium chloride ER (KLOR-CON M) 20 MEQ CR tablet Take 1 tablet (20 mEq) by mouth daily 90 tablet 3     simvastatin (ZOCOR) 40 MG tablet Take 1 tablet (40 mg) by mouth daily 90 tablet 3     warfarin ANTICOAGULANT (COUMADIN) 4 MG tablet TAKE 1 TABLET  "BY MOUTH ONCE DAILY OR  AS  DIRECTED  BY  [facility]. 90 tablet 1       PAST SURGICAL HISTORY:  Past Surgical History:   Procedure Laterality Date     ARTHROPLASTY HIP      2010,right hip replacement,  [facility]     OTHER SURGICAL HISTORY      50667,NASAL SURGERY,Repeated nasoseptal repair     OTHER SURGICAL HISTORY      030309,OTHER,Ureteral stents for stones     OTHER SURGICAL HISTORY      ,,OTHER,Catheter ablation for atrial fibrillation, failed     REPLACE PACEMAKER GENERATOR N/A 2020    Procedure: REPLACEMENT, PULSE GENERATOR, CARDIAC PACEMAKER AND ATRIAL LEAD PLACEMENT;  Surgeon: Ambrocio Galeana MD;  Location:  OR       ALLERGIES:     Allergies   Allergen Reactions     Penicillins Hives     Rash or hives - can't remember       FAMILY HISTORY:  Family History   Problem Relation Age of Onset     Heart Disease Mother         Heart Disease,heart failure.     Heart Disease Father         Heart Disease,heart failure.       SOCIAL HISTORY:  Social History     Tobacco Use     Smoking status: Former Smoker     Packs/day: 1.00     Types: Cigarettes     Quit date: 1980     Years since quittin.1     Smokeless tobacco: Former User     Quit date: 1980   Substance Use Topics     Alcohol use: Yes     Alcohol/week: 0.0 standard drinks     Comment: 1-2 beers per month     Drug use: Never       ROS:   A comprehensive 10 point review of systems negative other than as mentioned in HPI.  Exam:  Initial BP (!) 150/98 (BP Location: Right arm, Patient Position: Sitting, Cuff Size: Adult Large)   Pulse 64   Temp 97.8  F (36.6  C)   Resp 16   Wt 113.2 kg (249 lb 9.6 oz)   SpO2 94%   BMI 39.18 kg/m   Estimated body mass index is 39.18 kg/m  as calculated from the following: Height as of 20: 1.7 m (5' 6.93\"). Weight as of this encounter: 113.2 kg (249 lb 9.6 oz).  Exam assisted by local care RN:  GENERAL APPEARANCE: alert and no distress  HEENT: MMM. PERRLA.  NECK: supple.   RESPIRATORY: " lungs clear to auscultation - no rales, rhonchi or wheezes, no use of accessory muscles, no retractions, respirations are unlabored, normal respiratory rate  CARDIOVASCULAR: irregular.    ABDOMEN: soft, NT, ND, +BS.  EXTREMITIES: peripheral pulses normal, no edema  NEURO: alert and oriented to person/place/time, normal speech, gait and affect  SKIN: no ecchymoses, no rashes  PSYCH: normal affect, cooperative    Labs:  Reviewed.     Testing/Procedures:  1/22/21 ECHOCARDIOGRAM:   Interpretation Summary  Left ventricular function, chamber size, wall motion, and wall thickness are  normal.The EF is 55-60%.  Right ventricular function, chamber size, wall motion, and thickness are  normal.  No pericardial effusion is present.      Assessment and Plan:   Mr. Ferraro is a 75 year old male who has a past medical history significant for HFpEF, permanent AF (CHADSVASC 2 on Warfarin), HLD, hypertriglyceridemia, LVH, PSVT, SSS s/p PPM 2008, CKD III, RA, and anxiety. In 5/2020, he presented to [facility] with increasing palpitations and lightheadedness.  He states he was outside with his wife.  He got up to walk to his car and fell to his knees almost immediately.  He had to crawl to his car because he was weak.  He was noted to be in AF with SVR 40s and stable blood pressures. His PPM was found to be EOL and unable to interrogate device. He was arrange for PPM generator change locally on 5/21/20. During generator change they reported RA lead was not working and he had new RA lead placed. Op note details they attempted to access the subclavian vein with multiple attempts to this were successful in blood return, but unsuccessful in threading the wire.  They then proceeded to access the base of the right neck with angiocatheter and accessed the junction of the internal jugular vein and the innominate and passed a wire down this. They tunneled the lead over to the pacemaker pocket; however, they felt lead was too  difficult to manipulate so they re-tunneled the lead. They reported taking a lead position that had higher voltages as this was all they could obtain. A more recent device check on 12/14/20 showed only 3-4 years of battery life, rising RV lead threshold, and undersensing on RA lead. He was referred to EP for evaluation. Device interrogation shows normal device function. RV threshold is stable at 2.25V @ 1.0ms.  No episodes recorded. No arrhythmias recorded. Intrinsic rhythm = Afib w VS @ 48-92 bpm.  = 64%. Estimated battery longevity to PEGGY = 4.5 years. Battery voltage = 3.00V. No short v-v intervals recorded. Lead impedance trends appear stable. Recent echo showed normal structure and function. Current cardiac medications include: Metoprolol, Simvastatin, and Warfarin.     Sick Sinus Syndrome s/p PPM 2008 gen change 5/21/20  Rising RV lead thresholds  Permanent Atrial Fibrillation:  1. Stroke Prophylaxis:  CHADSVASC=++age  2, corresponding to a 2.2% annual stroke / systemic emolism event rate. indicating need for long term oral anticoagulation.  He is appropriately on Warfarin. No bleeding issues.   2. Rate Control: Well rate controlled on Toprol XL.   3. Rhythm Control: He is in permanent AF, asymptomatic, rate controlled, and preserved LVEF; therefore, no compelling indications for rhythm control measures at this time.   4. He had PPM implanted for SSS in 2008 and has progressed to permanent AF for many years now. He does have times with SVR and requires  about 50-70% of the time. He had a recent gen change and old RA lead was felt to have undesirable characteristics and a new RA lead was placed. The new RA lead his high thresholds. He does not require RA lead given he is in permanent AF. He is now having rising thresholds on RV lead since generator change. We discussed lead management in detail with patient. We did not recommend extraction of leads given risks likely outweigh benefit at his age. We do favor  removing new RA lead as it is not required and placement of new RV lead with better thresholds. We discussed indications, risks, and benefits in detail. He states understanding and is agreeable to pursue RA lead removal and placement of new RV lead.   5. Risk Factor Management: Statin, BP control, and HUSSAIN evaluation.      Follow up 3 months post device procedure via Telemedicine.     The patient states understanding and is agreeable with plan.   Ryan Darby MD [facility]  Cardiology - Electrophysiology

## 2021-02-23 NOTE — PROGRESS NOTES
ANTICOAGULATION FOLLOW-UP CLINIC VISIT    Patient Name:  Kg Ferraro  Date:  2021  Contact Type:  Face to Face    SUBJECTIVE:  Patient Findings         Clinical Outcomes     Negatives:  Major bleeding event, Thromboembolic event, Anticoagulation-related hospital admission, Anticoagulation-related ED visit, Anticoagulation-related fatality           OBJECTIVE    Recent labs: (last 7 days)     21   INR 2.4*       ASSESSMENT / PLAN  INR assessment THER    Recheck INR In: 8 WEEKS    INR Location Clinic      Anticoagulation Summary  As of 2021    INR goal:  2.0-3.0   TTR:  100.0 % (12 mo)   INR used for dosin.4 (2021)   Warfarin maintenance plan:  4 mg (4 mg x 1) every day   Full warfarin instructions:  4 mg every day   Weekly warfarin total:  28 mg   No change documented:  Cheri Prater RN   Plan last modified:  Emani Pereira RN (10/19/2018)   Next INR check:  2021   Priority:  Maintenance   Target end date:  Indefinite    Indications    Long-term (current) use of anticoagulants [Z79.01] [Z79.01]  Paroxysmal supraventricular tachycardia (H) [I47.1]             Anticoagulation Episode Summary     INR check location:      Preferred lab:      Send INR reminders to:  [facility]    Comments:        Anticoagulation Care Providers     Provider Role Specialty Phone number    Caleb Parker MD Referring Internal Medicine [phone]            See the Encounter Report to view Anticoagulation Flowsheet and Dosing Calendar (Go to Encounters tab in chart review, and find the Anticoagulation Therapy Visit)        Cheri Prater RN

## 2021-02-23 NOTE — LETTER
2/23/2021      RE: Kg Ferraro  [address]  [place] [address]       Dear Colleague,    Thank you for the opportunity to participate in the care of your patient, Kg Ferraro, at the [facility] [place] at [facility]. Please see a copy of my visit note below.    Electrophysiology Telemedicine Clinic Note  HPI:   Mr. Ferraro is a 75 year old male who has a past medical history significant for HFpEF, permanent AF (CHADSVASC 2 on Warfarin), HLD, hypertriglyceridemia, LVH, PSVT, SSS s/p PPM 2008, CKD III, RA, and anxiety.   He has SSS and had dual chamber PPM implanted in 2008. He also has history of PAF for which he has been on Flecainide, Toprol XL, and Warfarin. In 5/2020, he presented to [facility] with increasing palpitations and lightheadedness.  He states he was outside with his wife.  He got up to walk to his car and fell to his knees almost immediately.  He had to crawl to his car because he was weak.  He was noted to be in AF with SVR 40s and stable blood pressures. His PPM was found to be EOL and unable to interrogate device. He was arrange for PPM generator change locally on 5/21/20. During generator change they reported RA lead was not working and he had new RA lead placed. Op note details they attempted to access the subclavian vein with multiple attempts to this were successful in blood return, but unsuccessful in threading the wire.  They then proceeded to access the base of the right neck with angiocatheter and accessed the junction of the internal jugular vein and the innominate and passed a wire down this. They tunneled the lead over to the pacemaker pocket; however, they felt lead was too difficult to manipulate so they re-tunneled the lead. They reported taking a lead position that had higher voltages as this was all they could obtain. A more recent device check on 12/14/20 showed only 3-4 years of battery  life, rising RV lead threshold, and undersensing on RA lead. He was referred to EP for evaluation.     He reports feeling well. He denies any chest pain/pressures, dizziness, lightheadedness, worsening shortness of breath, leg/ankle swelling, PND, orthopnea, palpitations, or syncopal symptoms. Device interrogation shows normal device function. RV threshold is stable at 2.25V @ 1.0ms.  No episodes recorded. No arrhythmias recorded. Intrinsic rhythm = Afib w VS @ 48-92 bpm.  = 64%. Estimated battery longevity to PEGGY = 4.5 years. Battery voltage = 3.00V. No short v-v intervals recorded. Lead impedance trends appear stable. Recent echo showed normal structure and function. Current cardiac medications include: Metoprolol, Simvastatin, and Warfarin.     PAST MEDICAL HISTORY:  Past Medical History:   Diagnosis Date     Acute kidney failure (H)     No Comments Provided     Acute kidney failure (H)     No Comments Provided     Atrial fibrillation (H)     3/8/2012     Atrioventricular block, complete (H)     No Comments Provided     Calculus of kidney     No Comments Provided     Essential (primary) hypertension     No Comments Provided     Generalized anxiety disorder     5/11/2010     Hematuria     7/10/2012     Osteoarthritis     9/14/2010     Osteoarthritis of hip     5/11/2010     Presence of cardiac pacemaker     9/3/2008     Rheumatoid arthritis (H)     5/19/2011     Sick sinus syndrome (H)     No Comments Provided     Supraventricular tachycardia (H)     No Comments Provided       CURRENT MEDICATIONS:  Current Outpatient Medications   Medication Sig Dispense Refill     acetaminophen (TYLENOL) 500 MG tablet Take 1,000 mg by mouth daily as needed for mild pain       allopurinol (ZYLOPRIM) 100 MG tablet Take 1 tablet (100 mg) by mouth 2 times daily 180 tablet 3     Blood Pressure Monitoring (RA BLOOD PRESSURE CUFF MONITOR) MISC Medium Cuff       metoprolol succinate ER (TOPROL-XL) 100 MG 24 hr tablet Take 1 tablet (100  mg) by mouth every morning AND 2 tablets (200 mg) every evening. 270 tablet 3     potassium chloride ER (KLOR-CON M) 20 MEQ CR tablet Take 1 tablet (20 mEq) by mouth daily 90 tablet 3     simvastatin (ZOCOR) 40 MG tablet Take 1 tablet (40 mg) by mouth daily 90 tablet 3     warfarin ANTICOAGULANT (COUMADIN) 4 MG tablet TAKE 1 TABLET BY MOUTH ONCE DAILY OR  AS  [facility]. 90 tablet 1       PAST SURGICAL HISTORY:  Past Surgical History:   Procedure Laterality Date     ARTHROPLASTY HIP      2010,right hip replacement,  [facility]     OTHER SURGICAL HISTORY      26936,NASAL SURGERY,Repeated nasoseptal repair     OTHER SURGICAL HISTORY      [id number],OTHER,Ureteral stents for stones     OTHER SURGICAL HISTORY      ,,OTHER,Catheter ablation for atrial fibrillation, failed     REPLACE PACEMAKER GENERATOR N/A 2020    Procedure: REPLACEMENT, PULSE GENERATOR, CARDIAC PACEMAKER AND ATRIAL LEAD PLACEMENT;  Surgeon: Ambrocio Galeana MD;  Location:  OR       ALLERGIES:     Allergies   Allergen Reactions     Penicillins Hives     Rash or hives - can't remember       FAMILY HISTORY:  Family History   Problem Relation Age of Onset     Heart Disease Mother         Heart Disease,heart failure.     Heart Disease Father         Heart Disease,heart failure.       SOCIAL HISTORY:  Social History     Tobacco Use     Smoking status: Former Smoker     Packs/day: 1.00     Types: Cigarettes     Quit date: 1980     Years since quittin.1     Smokeless tobacco: Former User     Quit date: 1980   Substance Use Topics     Alcohol use: Yes     Alcohol/week: 0.0 standard drinks     Comment: 1-2 beers per month     Drug use: Never       ROS:   A comprehensive 10 point review of systems negative other than as mentioned in HPI.  Exam:  Initial BP (!) 150/98 (BP Location: Right arm, Patient Position: Sitting, Cuff Size: Adult Large)   Pulse 64   Temp 97.8  F (36.6  C)   Resp 16   Wt 113.2 kg (249 lb 9.6  "oz)   SpO2 94%   BMI 39.18 kg/m   Estimated body mass index is 39.18 kg/m  as calculated from the following: Height as of 12/22/20: 1.7 m (5' 6.93\"). Weight as of this encounter: 113.2 kg (249 lb 9.6 oz).  Exam assisted by local care RN:  GENERAL APPEARANCE: alert and no distress  HEENT: MMM. PERRLA.  NECK: supple.   RESPIRATORY: lungs clear to auscultation - no rales, rhonchi or wheezes, no use of accessory muscles, no retractions, respirations are unlabored, normal respiratory rate  CARDIOVASCULAR: irregular.    ABDOMEN: soft, NT, ND, +BS.  EXTREMITIES: peripheral pulses normal, no edema  NEURO: alert and oriented to person/place/time, normal speech, gait and affect  SKIN: no ecchymoses, no rashes  PSYCH: normal affect, cooperative    Labs:  Reviewed.     Testing/Procedures:  1/22/21 ECHOCARDIOGRAM:   Interpretation Summary  Left ventricular function, chamber size, wall motion, and wall thickness are  normal.The EF is 55-60%.  Right ventricular function, chamber size, wall motion, and thickness are  normal.  No pericardial effusion is present.      Assessment and Plan:   Mr. Ferraro is a 75 year old male who has a past medical history significant for HFpEF, permanent AF (CHADSVASC 2 on Warfarin), HLD, hypertriglyceridemia, LVH, PSVT, SSS s/p PPM 2008, CKD III, RA, and anxiety. In 5/2020, he presented to [facility] with increasing palpitations and lightheadedness.  He states he was outside with his wife.  He got up to walk to his car and fell to his knees almost immediately.  He had to crawl to his car because he was weak.  He was noted to be in AF with SVR 40s and stable blood pressures. His PPM was found to be EOL and unable to interrogate device. He was arrange for PPM generator change locally on 5/21/20. During generator change they reported RA lead was not working and he had new RA lead placed. Op note details they attempted to access the subclavian vein with multiple attempts to this were " successful in blood return, but unsuccessful in threading the wire.  They then proceeded to access the base of the right neck with angiocatheter and accessed the junction of the internal jugular vein and the innominate and passed a wire down this. They tunneled the lead over to the pacemaker pocket; however, they felt lead was too difficult to manipulate so they re-tunneled the lead. They reported taking a lead position that had higher voltages as this was all they could obtain. A more recent device check on 12/14/20 showed only 3-4 years of battery life, rising RV lead threshold, and undersensing on RA lead. He was referred to EP for evaluation. Device interrogation shows normal device function. RV threshold is stable at 2.25V @ 1.0ms.  No episodes recorded. No arrhythmias recorded. Intrinsic rhythm = Afib w VS @ 48-92 bpm.  = 64%. Estimated battery longevity to PEGGY = 4.5 years. Battery voltage = 3.00V. No short v-v intervals recorded. Lead impedance trends appear stable. Recent echo showed normal structure and function. Current cardiac medications include: Metoprolol, Simvastatin, and Warfarin.     Sick Sinus Syndrome s/p PPM 2008 gen change 5/21/20  Rising RV lead thresholds  Permanent Atrial Fibrillation:  1. Stroke Prophylaxis:  CHADSVASC=++age  2, corresponding to a 2.2% annual stroke / systemic emolism event rate. indicating need for long term oral anticoagulation.  He is appropriately on Warfarin. No bleeding issues.   2. Rate Control: Well rate controlled on Toprol XL.   3. Rhythm Control: He is in permanent AF, asymptomatic, rate controlled, and preserved LVEF; therefore, no compelling indications for rhythm control measures at this time.   4. He had PPM implanted for SSS in 2008 and has progressed to permanent AF for many years now. He does have times with SVR and requires  about 50-70% of the time. He had a recent gen change and old RA lead was felt to have undesirable characteristics and a new RA  lead was placed. The new RA lead his high thresholds. He does not require RA lead given he is in permanent AF. He is now having rising thresholds on RV lead since generator change. We discussed lead management in detail with patient. We did not recommend extraction of leads given risks likely outweigh benefit at his age. We do favor removing new RA lead as it is not required and placement of new RV lead with better thresholds. We discussed indications, risks, and benefits in detail. He states understanding and is agreeable to pursue RA lead removal and placement of new RV lead.   5. Risk Factor Management: Statin, BP control, and HUSSAIN evaluation.      Follow up 3 months post device procedure via Telemedicine.     The patient states understanding and is agreeable with plan.   Ryan Darby MD [facility]  Cardiology - Electrophysiology

## 2021-02-23 NOTE — NURSING NOTE
"Chief Complaint   Patient presents with     Telemedicine consult       Initial BP (!) 150/98 (BP Location: Right arm, Patient Position: Sitting, Cuff Size: Adult Large)   Pulse 64   Temp 97.8  F (36.6  C) (Tympanic)   Resp 16   Wt 113.2 kg (249 lb 9.6 oz)   SpO2 94%   BMI 39.18 kg/m   Estimated body mass index is 39.18 kg/m  as calculated from the following:    Height as of 12/22/20: 1.7 m (5' 6.93\").    Weight as of this encounter: 113.2 kg (249 lb 9.6 oz).  Meds Reconciled: complete  Pt is not on Aspirin  Pt is on a Statin  PHQ and/or PRASAD reviewed. Pt referred to PCP/MH Provider as appropriate.    Missy Vyas RN      "

## 2021-02-23 NOTE — NURSING NOTE
"Telemedicine visit today with Dr. Darby in regards to pacemaker.  Denies chest pain/pressure, shortness of breath, lightheadedness, nausea, increased fatigue, syncope or pre-syncope.  Missy Vyas RN on 2/23/2021 at 7:51 AM  Chief Complaint   Patient presents with     Telemedicine consult     Dr. Darby       Initial BP (!) 150/98 (BP Location: Right arm, Patient Position: Sitting, Cuff Size: Adult Large)   Pulse 64   Temp 97.8  F (36.6  C)   Resp 16   Wt 113.2 kg (249 lb 9.6 oz)   SpO2 94%   BMI 39.18 kg/m   Estimated body mass index is 39.18 kg/m  as calculated from the following:    Height as of 12/22/20: 1.7 m (5' 6.93\").    Weight as of this encounter: 113.2 kg (249 lb 9.6 oz).  Meds Reconciled: complete  Pt is not on Aspirin  Pt is on a Statin  PHQ and/or PRASAD reviewed. Pt referred to PCP/MH Provider as appropriate.    Missy Vyas RN   Replacement of the pacemaker at the [facility] with Dr. Darby his team will call and schedule patient to have this done.  Missy Vyas RN on 2/23/2021 at 8:39 AM          "

## 2021-02-23 NOTE — PATIENT INSTRUCTIONS
It was a pleasure to see you in clinic today.  Please do not hesitate to call with any questions or concerns.  You are scheduled for a remote transmission on 5/26/21.  We look forward to seeing you in clinic at your next device check post lead revision.    Bryant Cortés, RN  Electrophysiology Nurse Clinician  [facility]    During Business Hours Please Call:  [phone]  After Hours Please Call:  [phone] - select option #4 and ask for job code [phone]

## 2021-02-23 NOTE — LETTER
March 2, 2021      TO: Kg Ferraro  [address]  [place] [address]         Dear Kg,    You are scheduled for a lead revision of your cardiac device.      You will need to undergo a COVID-19 PCR swab test within 4 days of procedure.     Your appointment is scheduled for April 9, 2021 at 10am. Location: [facility] is located [address] of [address] and [address].      Below are instructions, if you have questions please contact our office.     1. You should arrive at the [facility]   ([address], [facility]) to the [facility] Waiting Room at __11am_________ on   _April 13, 2021_____.  2. Do not eat for 8 hours prior to arrival, you can drink water up until 2 hours prior.  3. If you are diabetic follow the following instructions: (Contact your diabetes team if you have questions)   * Hold oral diabetic medications and short-acting insulins (aspart, lispro, regular) the morning of the procedure.              * Hold non-insulin injectable liraglutide (Victoza) the evening before and/or morning of the procedure.   * Hold mixed insulins (70/30, 75/25, 50/50) the morning of procedure. Instead, take 66% of NPH (N) component.   * Take 80% of your normal long-acting insulin (glargine/Lantus or levemir/Detemir, degludec/Tresiba) the evening before and/or morning of the procedure.  4. The morning of your procedure, you may take your scheduled medications with a sip of water - If you take a blood thinner, continue this medication (warfarin, Pradaxa, Eliquis, Xarelto).  5. You will discharge the same day. You will need a  and someone to stay with you for 24 hours.  6. Use the antibacterial wipes the night before and morning of your procedure. Follow the included instructions.       Post-Procedure Instructions  Wound Care  1. If no Dermabond has been applied to your incision: Keep your incision (surgery wound) dry for 3 days.  After 3 days, you may remove the outer bandage.   "Keep the strips of tape on.  They will be removed at your clinic visit.  a. If Dermabond has been applied to your incision,  do not apply powder or lotion to the incision for 14 days. You may shower in the morning.  2. Check for signs of infection each day.  These include increased redness, swelling, drainage or a fever over 101 F (38.3 C).  Call us immediately if you see any of these signs.  3. If there are no signs of infection, you may shower in 3 days.  Do not submerge the incision (in a bath tub, hot tub, or swimming pool) until fully healed.  Pain  1. You may have mild to moderate pain for 3 to 5 days.  Take acetaminophen (Tylenol) or ibuprofen (Advil) for the pain.  Call us if the pain is severe or lasts more than 5 days.  Activity  1. You should slowly go back to your normal activities after 24 hours.  Healing will take 4 to 6 weeks.  2. No driving for 3 days  3. Avoid climbing a ladder alone.  It is best to stay within 4 feet of the ground.  4. Avoid anything that may cause rough contact or a hard hit to your chest.  This includes football, hockey, and other contact sports.  5. Do not go swimming or boating alone.    6. FOR ATLEAST 4 WEEKS:  a. Do not raise your affected arm above your shoulder.  b. Do not use your affected arm to push, pull, or lift anything over 10 pounds.  c. Avoid repetitive upper body activities for 6 weeks (ie: golf, swimming, and weight lifting)        Follow Up Appointments Date & Time   7-10 day incision check with device clinic To be done in [facility]   4 month follow up visit with device clinic & EP MD To be arranged in [facility]           If you have further questions, please utilize [employer] to contact us. If you do not have [employer], please contact us as below.      [facility] Procedure   [phone]    Device Clinic (Pacemakers, Defibrillators, Loop Recorders)  [phone]      Preparing the Skin Before Surgery  Preparing or \"prepping\" skin before surgery " can reduce the risk of infection at the surgical site. To make the process easier, this facility has chosen disposable cloths moistened with rinse-free 2% Chlorhexidine Gluconate antiseptic solution designed to reduce the bacteria on the skin. The steps below outline the prepping process and should be carefully followed.    Prep the skin at the following time(s):    - If you wish to shower, bathe or shampoo your hair, do so the night before and prep your skin afterwards for the first time using one package of cloths.    - Skin must be prepped the morning of the procedure using the second package of cloths.        Prep The Night Before Procedure    Do not allow this product to come in contact with your eyes, ears, mouth or mucous membranes.     Reach into one of the packages, remove the two cloths with the foam salas and place onto a clean table.    Use one clean cloth to prep each are of the body. One cloth for #1 - neck & chest. One cloth for #2 & #3 - both arms, shoulder to fingertips including armpits. Wipe each area in a back and forth motion for 3 minutes. Be sure to wipe each area thoroughly.    Do not rinse or apply any lotions, moisturizer or make up after prepping.    Discard cloths in trash can.     Allow your skin to air dry. Dress in clean clothes/sleepwear      Prepping your skin on the morning of surgery:    Do not shower, bathe or shampoo hair.    Open a new package and follow the instructions listed above.

## 2021-02-24 ENCOUNTER — HOSPITAL ENCOUNTER (OUTPATIENT)
Facility: CLINIC | Age: 76
End: 2021-02-24
Attending: INTERNAL MEDICINE | Admitting: INTERNAL MEDICINE
Payer: MEDICARE

## 2021-02-24 DIAGNOSIS — I49.5 SICK SINUS SYNDROME (H): ICD-10-CM

## 2021-02-24 DIAGNOSIS — T82.110D PACEMAKER LEAD MALFUNCTION, SUBSEQUENT ENCOUNTER: ICD-10-CM

## 2021-02-24 RX ORDER — CLINDAMYCIN PHOSPHATE 900 MG/50ML
900 INJECTION, SOLUTION INTRAVENOUS
Status: CANCELLED | OUTPATIENT
Start: 2021-02-24

## 2021-02-24 RX ORDER — SODIUM CHLORIDE 9 MG/ML
INJECTION, SOLUTION INTRAVENOUS CONTINUOUS
Status: CANCELLED | OUTPATIENT
Start: 2021-02-24

## 2021-02-24 RX ORDER — LIDOCAINE 40 MG/G
CREAM TOPICAL
Status: CANCELLED | OUTPATIENT
Start: 2021-02-24

## 2021-02-25 ENCOUNTER — TELEPHONE (OUTPATIENT)
Dept: CARDIOLOGY | Facility: CLINIC | Age: 76
End: 2021-02-25

## 2021-02-25 NOTE — TELEPHONE ENCOUNTER
----- Message from ELDA Nolasco CNP sent at 2/24/2021  5:33 PM CST -----  Taras Sheikh,   [place] pt who needs pacemaker lead revision (RV lead) and removal of RA lead (not extraction). No medication holds.   Follow-up will be with device clinic and then via Telemed with Mehn[facility]. Please let Missy know when you have arranged.     Thanks,  LVW

## 2021-02-25 NOTE — TELEPHONE ENCOUNTER
EP Scheduling called the patient to schedule lead revision with Dr. Darby. The number [phone] was left for the patient to return the call and schedule the procedure.    Kori Abad  Periop Electrophysiology   [phone]

## 2021-02-26 ENCOUNTER — IMMUNIZATION (OUTPATIENT)
Dept: FAMILY MEDICINE | Facility: OTHER | Age: 76
End: 2021-02-26
Attending: FAMILY MEDICINE
Payer: MEDICARE

## 2021-02-26 PROCEDURE — 91300 PR COVID VAC PFIZER DIL RECON 30 MCG/0.3 ML IM: CPT

## 2021-03-01 LAB
MDC_IDC_LEAD_IMPLANT_DT: NORMAL
MDC_IDC_LEAD_IMPLANT_DT: NORMAL
MDC_IDC_LEAD_LOCATION: NORMAL
MDC_IDC_LEAD_LOCATION: NORMAL
MDC_IDC_LEAD_LOCATION_DETAIL_1: NORMAL
MDC_IDC_LEAD_LOCATION_DETAIL_1: NORMAL
MDC_IDC_LEAD_MFG: NORMAL
MDC_IDC_LEAD_MFG: NORMAL
MDC_IDC_LEAD_MODEL: NORMAL
MDC_IDC_LEAD_MODEL: NORMAL
MDC_IDC_LEAD_POLARITY_TYPE: NORMAL
MDC_IDC_LEAD_POLARITY_TYPE: NORMAL
MDC_IDC_LEAD_SERIAL: NORMAL
MDC_IDC_LEAD_SERIAL: NORMAL
MDC_IDC_LEAD_SPECIAL_FUNCTION: NORMAL
MDC_IDC_LEAD_SPECIAL_FUNCTION: NORMAL
MDC_IDC_MSMT_BATTERY_DTM: NORMAL
MDC_IDC_MSMT_BATTERY_REMAINING_LONGEVITY: 60 MO
MDC_IDC_MSMT_BATTERY_RRT_TRIGGER: 2.62
MDC_IDC_MSMT_BATTERY_STATUS: NORMAL
MDC_IDC_MSMT_BATTERY_VOLTAGE: 3 V
MDC_IDC_MSMT_LEADCHNL_RA_IMPEDANCE_VALUE: 399 OHM
MDC_IDC_MSMT_LEADCHNL_RA_IMPEDANCE_VALUE: 475 OHM
MDC_IDC_MSMT_LEADCHNL_RA_SENSING_INTR_AMPL: 2 MV
MDC_IDC_MSMT_LEADCHNL_RV_IMPEDANCE_VALUE: 646 OHM
MDC_IDC_MSMT_LEADCHNL_RV_IMPEDANCE_VALUE: 665 OHM
MDC_IDC_MSMT_LEADCHNL_RV_PACING_THRESHOLD_AMPLITUDE: 2.25 V
MDC_IDC_MSMT_LEADCHNL_RV_PACING_THRESHOLD_PULSEWIDTH: 1 MS
MDC_IDC_MSMT_LEADCHNL_RV_SENSING_INTR_AMPL: 15.5 MV
MDC_IDC_PG_IMPLANT_DTM: NORMAL
MDC_IDC_PG_MFG: NORMAL
MDC_IDC_PG_MODEL: NORMAL
MDC_IDC_PG_SERIAL: NORMAL
MDC_IDC_PG_TYPE: NORMAL
MDC_IDC_SESS_CLINIC_NAME: NORMAL
MDC_IDC_SESS_DTM: NORMAL
MDC_IDC_SESS_TYPE: NORMAL
MDC_IDC_SET_BRADY_HYSTRATE: NORMAL
MDC_IDC_SET_BRADY_LOWRATE: 60 {BEATS}/MIN
MDC_IDC_SET_BRADY_MAX_SENSOR_RATE: 130 {BEATS}/MIN
MDC_IDC_SET_BRADY_MODE: NORMAL
MDC_IDC_SET_LEADCHNL_RA_SENSING_ANODE_ELECTRODE_1: NORMAL
MDC_IDC_SET_LEADCHNL_RA_SENSING_ANODE_LOCATION_1: NORMAL
MDC_IDC_SET_LEADCHNL_RA_SENSING_CATHODE_ELECTRODE_1: NORMAL
MDC_IDC_SET_LEADCHNL_RA_SENSING_CATHODE_LOCATION_1: NORMAL
MDC_IDC_SET_LEADCHNL_RA_SENSING_POLARITY: NORMAL
MDC_IDC_SET_LEADCHNL_RA_SENSING_SENSITIVITY: NORMAL
MDC_IDC_SET_LEADCHNL_RV_PACING_AMPLITUDE: 5 V
MDC_IDC_SET_LEADCHNL_RV_PACING_ANODE_ELECTRODE_1: NORMAL
MDC_IDC_SET_LEADCHNL_RV_PACING_ANODE_LOCATION_1: NORMAL
MDC_IDC_SET_LEADCHNL_RV_PACING_CAPTURE_MODE: NORMAL
MDC_IDC_SET_LEADCHNL_RV_PACING_CATHODE_ELECTRODE_1: NORMAL
MDC_IDC_SET_LEADCHNL_RV_PACING_CATHODE_LOCATION_1: NORMAL
MDC_IDC_SET_LEADCHNL_RV_PACING_POLARITY: NORMAL
MDC_IDC_SET_LEADCHNL_RV_PACING_PULSEWIDTH: 1 MS
MDC_IDC_SET_LEADCHNL_RV_SENSING_ANODE_ELECTRODE_1: NORMAL
MDC_IDC_SET_LEADCHNL_RV_SENSING_ANODE_LOCATION_1: NORMAL
MDC_IDC_SET_LEADCHNL_RV_SENSING_CATHODE_ELECTRODE_1: NORMAL
MDC_IDC_SET_LEADCHNL_RV_SENSING_CATHODE_LOCATION_1: NORMAL
MDC_IDC_SET_LEADCHNL_RV_SENSING_POLARITY: NORMAL
MDC_IDC_SET_LEADCHNL_RV_SENSING_SENSITIVITY: 0.9 MV
MDC_IDC_SET_ZONE_DETECTION_INTERVAL: 400 MS
MDC_IDC_SET_ZONE_TYPE: NORMAL
MDC_IDC_STAT_BRADY_AP_VP_PERCENT: 0 %
MDC_IDC_STAT_BRADY_AP_VS_PERCENT: 0 %
MDC_IDC_STAT_BRADY_AS_VP_PERCENT: 63.72 %
MDC_IDC_STAT_BRADY_AS_VS_PERCENT: 36.28 %
MDC_IDC_STAT_BRADY_DTM_END: NORMAL
MDC_IDC_STAT_BRADY_DTM_START: NORMAL
MDC_IDC_STAT_BRADY_RA_PERCENT_PACED: 0 %
MDC_IDC_STAT_BRADY_RV_PERCENT_PACED: 63.72 %
MDC_IDC_STAT_EPISODE_RECENT_COUNT: 0
MDC_IDC_STAT_EPISODE_RECENT_COUNT_DTM_END: NORMAL
MDC_IDC_STAT_EPISODE_RECENT_COUNT_DTM_START: NORMAL
MDC_IDC_STAT_EPISODE_TOTAL_COUNT: 0
MDC_IDC_STAT_EPISODE_TOTAL_COUNT: 0
MDC_IDC_STAT_EPISODE_TOTAL_COUNT: NORMAL
MDC_IDC_STAT_EPISODE_TOTAL_COUNT_DTM_END: NORMAL
MDC_IDC_STAT_EPISODE_TOTAL_COUNT_DTM_START: NORMAL
MDC_IDC_STAT_EPISODE_TYPE: NORMAL

## 2021-03-15 DIAGNOSIS — Z79.01 ANTICOAGULATION MONITORING, INR RANGE 2-3: ICD-10-CM

## 2021-03-15 DIAGNOSIS — I48.91 ATRIAL FIBRILLATION (H): ICD-10-CM

## 2021-03-15 RX ORDER — WARFARIN SODIUM 4 MG/1
TABLET ORAL
Qty: 90 TABLET | Refills: 0 | Status: SHIPPED | OUTPATIENT
Start: 2021-03-15 | End: 2021-06-23

## 2021-03-15 NOTE — TELEPHONE ENCOUNTER
"Requested Prescriptions   Pending Prescriptions Disp Refills     warfarin ANTICOAGULANT (COUMADIN) 4 MG tablet [Pharmacy Med Name: Warfarin Sodium 4 MG Oral Tablet] 90 tablet 0     Sig: TAKE 1 TABLET BY MOUTH ONCE DAILY OR  AS  DIRECTED  BY  [facility].       Vitamin K Antagonists Failed - 3/15/2021  7:40 AM        Failed - INR is within goal in the past 6 weeks     Confirm INR is within goal in the past 6 weeks.     Recent Labs   Lab Test 02/23/21   INR 2.4*                       Passed - Recent (12 mo) or future (30 days) visit within the authorizing provider's specialty     Patient has had an office visit with the authorizing provider or a provider within the authorizing providers department within the previous 12 mos or has a future within next 30 days. See \"Patient Info\" tab in inbasket, or \"Choose Columns\" in Meds & Orders section of the refill encounter.              Passed - Medication is active on med list        Passed - Patient is 18 years of age or older             Prescription refilled per RN Medication RefillPolicy.................... Kaitlin Damon RN ....................  3/15/2021   2:26 PM            "

## 2021-03-19 ENCOUNTER — IMMUNIZATION (OUTPATIENT)
Dept: FAMILY MEDICINE | Facility: OTHER | Age: 76
End: 2021-03-19
Attending: FAMILY MEDICINE
Payer: MEDICARE

## 2021-03-19 PROCEDURE — 91300 PR COVID VAC PFIZER DIL RECON 30 MCG/0.3 ML IM: CPT

## 2021-03-26 DIAGNOSIS — Z11.59 ENCOUNTER FOR SCREENING FOR OTHER VIRAL DISEASES: ICD-10-CM

## 2021-04-07 ENCOUNTER — TELEPHONE (OUTPATIENT)
Dept: CARDIOLOGY | Facility: CLINIC | Age: 76
End: 2021-04-07

## 2021-04-07 NOTE — TELEPHONE ENCOUNTER
Health Call Center    Phone Message    May a detailed message be left on voicemail: yes     Reason for Call: Other: Kg calling to cancel his upcoming procedure on 4/13.  He reports that his grandson passed away.  He will be reaching out to [facility] when he is ready to reschedule.  Please cancel his appointments.  Thank you!     Action Taken: Message routed to:  [facility] ([facility]  Cardiology    Travel Screening: Not Applicable

## 2021-04-16 ENCOUNTER — TELEPHONE (OUTPATIENT)
Dept: CARDIOLOGY | Facility: OTHER | Age: 76
End: 2021-04-16

## 2021-04-16 NOTE — TELEPHONE ENCOUNTER
Patient has not had had procedure done as of today.  Patient will call and schedule with U Acacia Vyas RN on 4/16/2021 at 3:51 PM

## 2021-04-19 ENCOUNTER — TELEPHONE (OUTPATIENT)
Dept: CARDIOLOGY | Facility: CLINIC | Age: 76
End: 2021-04-19

## 2021-04-19 NOTE — TELEPHONE ENCOUNTER
Ep  called patient to discuss possibly rescheduling his surgery. He had initially cancelled the surgery due to a death in the family and was going to be calling back when he was ready.     A message was left for the patient asking him to return the call when he's ready to schedule.     Kori Abad  [facility] Electrophysiology   [phone]

## 2021-05-25 ENCOUNTER — ANCILLARY PROCEDURE (OUTPATIENT)
Dept: CARDIOLOGY | Facility: CLINIC | Age: 76
End: 2021-05-25
Attending: INTERNAL MEDICINE
Payer: MEDICARE

## 2021-05-25 DIAGNOSIS — I49.5 SICK SINUS SYNDROME (H): ICD-10-CM

## 2021-05-25 PROCEDURE — 93296 REM INTERROG EVL PM/IDS: CPT

## 2021-05-28 ENCOUNTER — ANTICOAGULATION THERAPY VISIT (OUTPATIENT)
Dept: ANTICOAGULATION | Facility: OTHER | Age: 76
End: 2021-05-28
Attending: INTERNAL MEDICINE
Payer: MEDICARE

## 2021-05-28 DIAGNOSIS — I47.10 PAROXYSMAL SUPRAVENTRICULAR TACHYCARDIA (H): ICD-10-CM

## 2021-05-28 DIAGNOSIS — Z79.01 LONG TERM CURRENT USE OF ANTICOAGULANT THERAPY: ICD-10-CM

## 2021-05-28 LAB — INR POINT OF CARE: 3.2 (ref 0.86–1.14)

## 2021-05-28 PROCEDURE — 36416 COLLJ CAPILLARY BLOOD SPEC: CPT | Mod: ZL

## 2021-05-28 NOTE — PROGRESS NOTES
"ANTICOAGULATION FOLLOW-UP CLINIC VISIT    Patient Name:  Kg Ferraro  Date:  5/28/2021  Contact Type:  Face to Face    SUBJECTIVE:  Patient Findings     Comments:  Patient denies any identifiable changes that caused the supratherapeutic INR. \" I will eat some greens'. Kaitlin Damon RN on 5/28/2021 at 10:20 AM            Clinical Outcomes     Negatives:  Major bleeding event, Thromboembolic event, Anticoagulation-related hospital admission, Anticoagulation-related ED visit, Anticoagulation-related fatality    Comments:  Patient denies any identifiable changes that caused the supratherapeutic INR. \" I will eat some greens'. Kaitlin Damon RN on 5/28/2021 at 10:20 AM               OBJECTIVE    Recent labs: (last 7 days)     05/28/21   INR 3.2*       ASSESSMENT / PLAN  INR assessment SUPRA    Recheck INR In: 2 WEEKS    INR Location Clinic      Anticoagulation Summary  As of 5/28/2021    INR goal:  2.0-3.0   TTR:  100.0 % (9 mo)   INR used for dosing:  3.2 (5/28/2021)   Warfarin maintenance plan:  4 mg (4 mg x 1) every day   Full warfarin instructions:  4 mg every day   Weekly warfarin total:  28 mg   No change documented:  Kaitlin Damon RN   Plan last modified:  Emani Pereira RN (10/19/2018)   Next INR check:  6/11/2021   Priority:  Maintenance   Target end date:  Indefinite    Indications    Long-term (current) use of anticoagulants [Z79.01] [Z79.01]  Paroxysmal supraventricular tachycardia (H) [I47.1]             Anticoagulation Episode Summary     INR check location:      Preferred lab:      Send INR reminders to:  [facility]    Comments:        Anticoagulation Care Providers     Provider Role Specialty Phone number    Caleb Parker MD Referring Internal Medicine [phone]            See the Encounter Report to view Anticoagulation Flowsheet and Dosing Calendar (Go to Encounters tab in chart review, and find the Anticoagulation Therapy Visit)        Kaitlin Damon RN                 "

## 2021-06-03 DIAGNOSIS — I48.0 PAROXYSMAL ATRIAL FIBRILLATION (H): ICD-10-CM

## 2021-06-03 DIAGNOSIS — I10 BENIGN ESSENTIAL HYPERTENSION: ICD-10-CM

## 2021-06-03 DIAGNOSIS — I47.10 PAROXYSMAL SUPRAVENTRICULAR TACHYCARDIA (H): ICD-10-CM

## 2021-06-03 RX ORDER — METOPROLOL SUCCINATE 100 MG/1
TABLET, EXTENDED RELEASE ORAL
Qty: 270 TABLET | Refills: 0 | Status: SHIPPED | OUTPATIENT
Start: 2021-06-03 | End: 2021-06-24

## 2021-06-03 NOTE — LETTER
Libby 3, 2021      Kg Ferraro     [facility] [address]        Dear Kg,     A refill request was received from your pharmacy for Metoprolol.    Additional refills require an office visit with Dr. Parker for annual review.    Please call [phone] to schedule appointment.          Sincerely,    Refill Nurse

## 2021-06-03 NOTE — TELEPHONE ENCOUNTER
Prescription approved per [facility] Refill Protocol.  LVO 5/28/2020  OV: cardio 12/22/2020  Patient due for annual review with Dr. Parker  Letter sent    Kerry Mcclain RN on 6/3/2021 at 3:33 PM

## 2021-06-04 PROCEDURE — 93294 REM INTERROG EVL PM/LDLS PM: CPT | Performed by: INTERNAL MEDICINE

## 2021-06-08 LAB
MDC_IDC_LEAD_IMPLANT_DT: NORMAL
MDC_IDC_LEAD_IMPLANT_DT: NORMAL
MDC_IDC_LEAD_LOCATION: NORMAL
MDC_IDC_LEAD_LOCATION: NORMAL
MDC_IDC_LEAD_LOCATION_DETAIL_1: NORMAL
MDC_IDC_LEAD_LOCATION_DETAIL_1: NORMAL
MDC_IDC_LEAD_MFG: NORMAL
MDC_IDC_LEAD_MFG: NORMAL
MDC_IDC_LEAD_MODEL: NORMAL
MDC_IDC_LEAD_MODEL: NORMAL
MDC_IDC_LEAD_POLARITY_TYPE: NORMAL
MDC_IDC_LEAD_POLARITY_TYPE: NORMAL
MDC_IDC_LEAD_SERIAL: NORMAL
MDC_IDC_LEAD_SERIAL: NORMAL
MDC_IDC_LEAD_SPECIAL_FUNCTION: NORMAL
MDC_IDC_LEAD_SPECIAL_FUNCTION: NORMAL
MDC_IDC_MSMT_BATTERY_DTM: NORMAL
MDC_IDC_MSMT_BATTERY_REMAINING_LONGEVITY: 56 MO
MDC_IDC_MSMT_BATTERY_RRT_TRIGGER: 2.62
MDC_IDC_MSMT_BATTERY_STATUS: NORMAL
MDC_IDC_MSMT_BATTERY_VOLTAGE: 2.99 V
MDC_IDC_MSMT_LEADCHNL_RA_IMPEDANCE_VALUE: 399 OHM
MDC_IDC_MSMT_LEADCHNL_RA_IMPEDANCE_VALUE: 475 OHM
MDC_IDC_MSMT_LEADCHNL_RA_SENSING_INTR_AMPL: 0.38 MV
MDC_IDC_MSMT_LEADCHNL_RA_SENSING_INTR_AMPL: 2 MV
MDC_IDC_MSMT_LEADCHNL_RV_IMPEDANCE_VALUE: 646 OHM
MDC_IDC_MSMT_LEADCHNL_RV_IMPEDANCE_VALUE: 665 OHM
MDC_IDC_MSMT_LEADCHNL_RV_PACING_THRESHOLD_AMPLITUDE: 2.5 V
MDC_IDC_MSMT_LEADCHNL_RV_PACING_THRESHOLD_PULSEWIDTH: 0.4 MS
MDC_IDC_MSMT_LEADCHNL_RV_SENSING_INTR_AMPL: 9.38 MV
MDC_IDC_MSMT_LEADCHNL_RV_SENSING_INTR_AMPL: 9.38 MV
MDC_IDC_PG_IMPLANT_DTM: NORMAL
MDC_IDC_PG_MFG: NORMAL
MDC_IDC_PG_MODEL: NORMAL
MDC_IDC_PG_SERIAL: NORMAL
MDC_IDC_PG_TYPE: NORMAL
MDC_IDC_SESS_CLINIC_NAME: NORMAL
MDC_IDC_SESS_DTM: NORMAL
MDC_IDC_SESS_TYPE: NORMAL
MDC_IDC_SET_BRADY_HYSTRATE: NORMAL
MDC_IDC_SET_BRADY_LOWRATE: 60 {BEATS}/MIN
MDC_IDC_SET_BRADY_MAX_SENSOR_RATE: 130 {BEATS}/MIN
MDC_IDC_SET_BRADY_MODE: NORMAL
MDC_IDC_SET_LEADCHNL_RA_SENSING_ANODE_ELECTRODE_1: NORMAL
MDC_IDC_SET_LEADCHNL_RA_SENSING_ANODE_LOCATION_1: NORMAL
MDC_IDC_SET_LEADCHNL_RA_SENSING_CATHODE_ELECTRODE_1: NORMAL
MDC_IDC_SET_LEADCHNL_RA_SENSING_CATHODE_LOCATION_1: NORMAL
MDC_IDC_SET_LEADCHNL_RA_SENSING_POLARITY: NORMAL
MDC_IDC_SET_LEADCHNL_RA_SENSING_SENSITIVITY: NORMAL
MDC_IDC_SET_LEADCHNL_RV_PACING_AMPLITUDE: 5 V
MDC_IDC_SET_LEADCHNL_RV_PACING_ANODE_ELECTRODE_1: NORMAL
MDC_IDC_SET_LEADCHNL_RV_PACING_ANODE_LOCATION_1: NORMAL
MDC_IDC_SET_LEADCHNL_RV_PACING_CAPTURE_MODE: NORMAL
MDC_IDC_SET_LEADCHNL_RV_PACING_CATHODE_ELECTRODE_1: NORMAL
MDC_IDC_SET_LEADCHNL_RV_PACING_CATHODE_LOCATION_1: NORMAL
MDC_IDC_SET_LEADCHNL_RV_PACING_POLARITY: NORMAL
MDC_IDC_SET_LEADCHNL_RV_PACING_PULSEWIDTH: 1 MS
MDC_IDC_SET_LEADCHNL_RV_SENSING_ANODE_ELECTRODE_1: NORMAL
MDC_IDC_SET_LEADCHNL_RV_SENSING_ANODE_LOCATION_1: NORMAL
MDC_IDC_SET_LEADCHNL_RV_SENSING_CATHODE_ELECTRODE_1: NORMAL
MDC_IDC_SET_LEADCHNL_RV_SENSING_CATHODE_LOCATION_1: NORMAL
MDC_IDC_SET_LEADCHNL_RV_SENSING_POLARITY: NORMAL
MDC_IDC_SET_LEADCHNL_RV_SENSING_SENSITIVITY: 0.9 MV
MDC_IDC_SET_ZONE_DETECTION_INTERVAL: 400 MS
MDC_IDC_SET_ZONE_TYPE: NORMAL
MDC_IDC_STAT_BRADY_AP_VP_PERCENT: 0 %
MDC_IDC_STAT_BRADY_AP_VS_PERCENT: 0 %
MDC_IDC_STAT_BRADY_AS_VP_PERCENT: 86.45 %
MDC_IDC_STAT_BRADY_AS_VS_PERCENT: 13.55 %
MDC_IDC_STAT_BRADY_DTM_END: NORMAL
MDC_IDC_STAT_BRADY_DTM_START: NORMAL
MDC_IDC_STAT_BRADY_RA_PERCENT_PACED: 0 %
MDC_IDC_STAT_BRADY_RV_PERCENT_PACED: 86.45 %
MDC_IDC_STAT_EPISODE_RECENT_COUNT: 0
MDC_IDC_STAT_EPISODE_RECENT_COUNT_DTM_END: NORMAL
MDC_IDC_STAT_EPISODE_RECENT_COUNT_DTM_START: NORMAL
MDC_IDC_STAT_EPISODE_TOTAL_COUNT: 0
MDC_IDC_STAT_EPISODE_TOTAL_COUNT: 0
MDC_IDC_STAT_EPISODE_TOTAL_COUNT: NORMAL
MDC_IDC_STAT_EPISODE_TOTAL_COUNT_DTM_END: NORMAL
MDC_IDC_STAT_EPISODE_TOTAL_COUNT_DTM_START: NORMAL
MDC_IDC_STAT_EPISODE_TYPE: NORMAL

## 2021-06-11 ENCOUNTER — ANTICOAGULATION THERAPY VISIT (OUTPATIENT)
Dept: ANTICOAGULATION | Facility: OTHER | Age: 76
End: 2021-06-11
Attending: INTERNAL MEDICINE
Payer: MEDICARE

## 2021-06-11 DIAGNOSIS — Z79.01 LONG TERM CURRENT USE OF ANTICOAGULANT THERAPY: ICD-10-CM

## 2021-06-11 DIAGNOSIS — I47.10 PAROXYSMAL SUPRAVENTRICULAR TACHYCARDIA (H): ICD-10-CM

## 2021-06-11 LAB — INR POINT OF CARE: 1.8 (ref 0.86–1.14)

## 2021-06-11 PROCEDURE — 36416 COLLJ CAPILLARY BLOOD SPEC: CPT | Mod: ZL

## 2021-06-11 NOTE — PROGRESS NOTES
ANTICOAGULATION FOLLOW-UP CLINIC VISIT    Patient Name:  Kg Ferraro  Date:  2021  Contact Type:  Face to Face    SUBJECTIVE:  Patient Findings     Positives:  Change in diet/appetite    Comments:  Ate more broccoli as previous INR was elevated.         Clinical Outcomes     Negatives:  Major bleeding event, Thromboembolic event, Anticoagulation-related hospital admission, Anticoagulation-related ED visit, Anticoagulation-related fatality    Comments:  Ate more broccoli as previous INR was elevated.            OBJECTIVE    Recent labs: (last 7 days)     21   INR 1.8*       ASSESSMENT / PLAN  INR assessment SUB    Recheck INR In: 2 WEEKS    INR Location Clinic      Anticoagulation Summary  As of 2021    INR goal:  2.0-3.0   TTR:  98.5 % (9 mo)   INR used for dosin.8 (2021)   Warfarin maintenance plan:  4 mg (4 mg x 1) every day   Full warfarin instructions:  : 6 mg; Otherwise 4 mg every day   Weekly warfarin total:  28 mg   Plan last modified:  Emani Pereira RN (10/19/2018)   Next INR check:  2021   Priority:  Maintenance   Target end date:  Indefinite    Indications    Long-term (current) use of anticoagulants [Z79.01] [Z79.01]  Paroxysmal supraventricular tachycardia (H) [I47.1]             Anticoagulation Episode Summary     INR check location:      Preferred lab:      Send INR reminders to:  [facility]    Comments:        Anticoagulation Care Providers     Provider Role Specialty Phone number    Caleb Parker MD Referring Internal Medicine [phone]            See the Encounter Report to view Anticoagulation Flowsheet and Dosing Calendar (Go to Encounters tab in chart review, and find the Anticoagulation Therapy Visit)        Yareli Prater RN

## 2021-06-12 DIAGNOSIS — Z11.59 ENCOUNTER FOR SCREENING FOR OTHER VIRAL DISEASES: ICD-10-CM

## 2021-06-23 DIAGNOSIS — Z79.01 ANTICOAGULATION MONITORING, INR RANGE 2-3: ICD-10-CM

## 2021-06-23 DIAGNOSIS — I48.91 ATRIAL FIBRILLATION (H): ICD-10-CM

## 2021-06-23 RX ORDER — WARFARIN SODIUM 4 MG/1
TABLET ORAL
Qty: 90 TABLET | Refills: 1 | Status: SHIPPED | OUTPATIENT
Start: 2021-06-23 | End: 2021-12-27

## 2021-06-23 NOTE — TELEPHONE ENCOUNTER
"Requested Prescriptions   Pending Prescriptions Disp Refills     warfarin ANTICOAGULANT (COUMADIN) 4 MG tablet [Pharmacy Med Name: Warfarin Sodium 4 MG Oral Tablet] 90 tablet 0     Sig: TAKE 1 TABLET BY MOUTH ONCE DAILY OR  AS  DIRECTED  BY  [facility]       Vitamin K Antagonists Failed - 6/23/2021 10:12 AM        Failed - INR is within goal in the past 6 weeks     Confirm INR is within goal in the past 6 weeks.     Recent Labs   Lab Test 06/11/21   INR 1.8*                       Passed - Recent (12 mo) or future (30 days) visit within the authorizing provider's specialty     Patient has had an office visit with the authorizing provider or a provider within the authorizing providers department within the previous 12 mos or has a future within next 30 days. See \"Patient Info\" tab in inbasket, or \"Choose Columns\" in Meds & Orders section of the refill encounter.              Passed - Medication is active on med list        Passed - Patient is 18 years of age or older           Prescription refilled per RN MedicationRefill Policy.................... Emani Pereira RN ....................  6/23/2021   10:34 AM      "

## 2021-06-29 ENCOUNTER — ANTICOAGULATION THERAPY VISIT (OUTPATIENT)
Dept: ANTICOAGULATION | Facility: OTHER | Age: 76
End: 2021-06-29
Attending: INTERNAL MEDICINE
Payer: MEDICARE

## 2021-06-29 ENCOUNTER — OFFICE VISIT (OUTPATIENT)
Dept: INTERNAL MEDICINE | Facility: OTHER | Age: 76
End: 2021-06-29
Attending: INTERNAL MEDICINE
Payer: MEDICARE

## 2021-06-29 VITALS
BODY MASS INDEX: 37.98 KG/M2 | RESPIRATION RATE: 16 BRPM | WEIGHT: 242 LBS | HEART RATE: 92 BPM | DIASTOLIC BLOOD PRESSURE: 88 MMHG | SYSTOLIC BLOOD PRESSURE: 138 MMHG | TEMPERATURE: 98.1 F | OXYGEN SATURATION: 97 %

## 2021-06-29 DIAGNOSIS — M06.9 RHEUMATOID ARTHRITIS, INVOLVING UNSPECIFIED SITE, UNSPECIFIED WHETHER RHEUMATOID FACTOR PRESENT (H): ICD-10-CM

## 2021-06-29 DIAGNOSIS — I47.10 PAROXYSMAL SUPRAVENTRICULAR TACHYCARDIA (H): ICD-10-CM

## 2021-06-29 DIAGNOSIS — E79.0 HYPERURICEMIA: ICD-10-CM

## 2021-06-29 DIAGNOSIS — E78.2 MIXED HYPERLIPIDEMIA: ICD-10-CM

## 2021-06-29 DIAGNOSIS — I10 BENIGN ESSENTIAL HYPERTENSION: ICD-10-CM

## 2021-06-29 DIAGNOSIS — E66.01 MORBID OBESITY (H): ICD-10-CM

## 2021-06-29 DIAGNOSIS — R79.89 ELEVATED TSH: Primary | ICD-10-CM

## 2021-06-29 DIAGNOSIS — I50.32 CHRONIC HEART FAILURE WITH PRESERVED EJECTION FRACTION (H): ICD-10-CM

## 2021-06-29 DIAGNOSIS — N18.31 STAGE 3A CHRONIC KIDNEY DISEASE (H): ICD-10-CM

## 2021-06-29 DIAGNOSIS — Z00.00 ENCOUNTER FOR MEDICARE ANNUAL WELLNESS EXAM: ICD-10-CM

## 2021-06-29 DIAGNOSIS — I48.0 PAROXYSMAL ATRIAL FIBRILLATION (H): ICD-10-CM

## 2021-06-29 DIAGNOSIS — Z71.85 VACCINE COUNSELING: ICD-10-CM

## 2021-06-29 DIAGNOSIS — Z79.01 LONG TERM CURRENT USE OF ANTICOAGULANT THERAPY: ICD-10-CM

## 2021-06-29 LAB
ALBUMIN SERPL-MCNC: 3.6 G/DL (ref 3.5–5.7)
ALP SERPL-CCNC: 100 U/L (ref 34–104)
ALT SERPL W P-5'-P-CCNC: 31 U/L (ref 7–52)
ANION GAP SERPL CALCULATED.3IONS-SCNC: 6 MMOL/L (ref 3–14)
AST SERPL W P-5'-P-CCNC: 49 U/L (ref 13–39)
BILIRUB SERPL-MCNC: 0.8 MG/DL (ref 0.3–1)
BUN SERPL-MCNC: 31 MG/DL (ref 7–25)
CALCIUM SERPL-MCNC: 9.2 MG/DL (ref 8.6–10.3)
CHLORIDE SERPL-SCNC: 106 MMOL/L (ref 98–107)
CHOLEST SERPL-MCNC: 131 MG/DL
CO2 SERPL-SCNC: 24 MMOL/L (ref 21–31)
CREAT SERPL-MCNC: 1.33 MG/DL (ref 0.7–1.3)
ERYTHROCYTE [DISTWIDTH] IN BLOOD BY AUTOMATED COUNT: 14.3 % (ref 10–15)
ERYTHROCYTE [SEDIMENTATION RATE] IN BLOOD BY WESTERGREN METHOD: 28 MM/H (ref 1–10)
GFR SERPL CREATININE-BSD FRML MDRD: 52 ML/MIN/{1.73_M2}
GLUCOSE SERPL-MCNC: 117 MG/DL (ref 70–105)
HCT VFR BLD AUTO: 47.9 % (ref 40–53)
HDLC SERPL-MCNC: 39 MG/DL (ref 23–92)
HGB BLD-MCNC: 15.8 G/DL (ref 13.3–17.7)
INR POINT OF CARE: 2.6 (ref 0.86–1.14)
LDLC SERPL CALC-MCNC: 63 MG/DL
MCH RBC QN AUTO: 31.3 PG (ref 26.5–33)
MCHC RBC AUTO-ENTMCNC: 33 G/DL (ref 31.5–36.5)
MCV RBC AUTO: 95 FL (ref 78–100)
NONHDLC SERPL-MCNC: 92 MG/DL
PLATELET # BLD AUTO: 175 10E9/L (ref 150–450)
POTASSIUM SERPL-SCNC: 4.9 MMOL/L (ref 3.5–5.1)
PROT SERPL-MCNC: 7.5 G/DL (ref 6.4–8.9)
RBC # BLD AUTO: 5.05 10E12/L (ref 4.4–5.9)
SODIUM SERPL-SCNC: 136 MMOL/L (ref 134–144)
TRIGL SERPL-MCNC: 144 MG/DL
TSH SERPL DL<=0.05 MIU/L-ACNC: 5.3 IU/ML (ref 0.34–5.6)
WBC # BLD AUTO: 7.8 10E9/L (ref 4–11)

## 2021-06-29 PROCEDURE — G0463 HOSPITAL OUTPT CLINIC VISIT: HCPCS

## 2021-06-29 PROCEDURE — 99214 OFFICE O/P EST MOD 30 MIN: CPT | Mod: 25 | Performed by: INTERNAL MEDICINE

## 2021-06-29 PROCEDURE — G0438 PPPS, INITIAL VISIT: HCPCS | Performed by: INTERNAL MEDICINE

## 2021-06-29 PROCEDURE — 36416 COLLJ CAPILLARY BLOOD SPEC: CPT | Mod: ZL

## 2021-06-29 PROCEDURE — 80053 COMPREHEN METABOLIC PANEL: CPT | Mod: ZL | Performed by: INTERNAL MEDICINE

## 2021-06-29 PROCEDURE — 84443 ASSAY THYROID STIM HORMONE: CPT | Mod: ZL | Performed by: INTERNAL MEDICINE

## 2021-06-29 PROCEDURE — 80061 LIPID PANEL: CPT | Mod: ZL | Performed by: INTERNAL MEDICINE

## 2021-06-29 PROCEDURE — 36415 COLL VENOUS BLD VENIPUNCTURE: CPT | Mod: ZL | Performed by: INTERNAL MEDICINE

## 2021-06-29 PROCEDURE — 85027 COMPLETE CBC AUTOMATED: CPT | Mod: ZL | Performed by: INTERNAL MEDICINE

## 2021-06-29 PROCEDURE — 85652 RBC SED RATE AUTOMATED: CPT | Mod: ZL | Performed by: INTERNAL MEDICINE

## 2021-06-29 RX ORDER — POTASSIUM CHLORIDE 1500 MG/1
20 TABLET, EXTENDED RELEASE ORAL DAILY
Qty: 90 TABLET | Refills: 3 | Status: SHIPPED | OUTPATIENT
Start: 2021-06-29 | End: 2022-07-05

## 2021-06-29 RX ORDER — ALLOPURINOL 100 MG/1
100 TABLET ORAL 2 TIMES DAILY
Qty: 180 TABLET | Refills: 3 | Status: SHIPPED | OUTPATIENT
Start: 2021-06-29 | End: 2022-09-29

## 2021-06-29 RX ORDER — SIMVASTATIN 40 MG
40 TABLET ORAL DAILY
Qty: 90 TABLET | Refills: 3 | Status: SHIPPED | OUTPATIENT
Start: 2021-06-29 | End: 2022-07-25

## 2021-06-29 RX ORDER — METOPROLOL SUCCINATE 100 MG/1
TABLET, EXTENDED RELEASE ORAL
Qty: 270 TABLET | Refills: 0 | Status: SHIPPED | OUTPATIENT
Start: 2021-06-29 | End: 2021-12-17

## 2021-06-29 ASSESSMENT — ENCOUNTER SYMPTOMS
NAUSEA: 0
DIARRHEA: 0
BRUISES/BLEEDS EASILY: 1
HEMATURIA: 0
AGITATION: 0
DIZZINESS: 0
FEVER: 0
SHORTNESS OF BREATH: 1
WOUND: 0
CHILLS: 0
COUGH: 0
ARTHRALGIAS: 1
WHEEZING: 0
DYSURIA: 0
LIGHT-HEADEDNESS: 0
MYALGIAS: 0
ABDOMINAL PAIN: 0
VOMITING: 0
PALPITATIONS: 0
CONFUSION: 0
ROS GI COMMENTS: + ABDOMINAL OBESITY

## 2021-06-29 ASSESSMENT — PAIN SCALES - GENERAL: PAINLEVEL: MODERATE PAIN (4)

## 2021-06-29 NOTE — PROGRESS NOTES
"  SUBJECTIVE:   Kg Ferraro is a 76 year old male who presents for Preventive Visit.    Patient has been advised of split billing requirements and indicates understanding: Yes  Are you in the first 12 months of your Medicare Part B coverage?  No    Physical Health:    In general, how would you rate your overall physical health? fair    Outside of work, how many days during the week do you exercise? 6-7 days/week + walks and lifts 5 lb weight 50x x3 positions - each arm daily    Outside of work, approximately how many minutes a day do you exercise?30-45 minutes    If you drink alcohol do you typically have >3 drinks per day or >7 drinks per week? No    Do you usually eat at least 4 servings of fruit and vegetables a day, include whole grains & fiber and avoid regularly eating high fat or \"junk\" foods? Yes    Do you have any problems taking medications regularly?  No    Do you have any side effects from medications? none    Needs assistance for the following daily activities: housework    Which of the following safety concerns are present in your home?  none identified     Hearing impairment: No    In the past 6 months, have you been bothered by leaking of urine? no    Mental Health:    In general, how would you rate your overall mental or emotional health? good  PHQ-2 Score: 0    Do you feel safe in your environment? Yes    Have you ever done Advance Care Planning? (For example, a Health Directive, POLST, or a discussion with a medical provider or your loved ones about your wishes): Yes, patient states has an Advance Care Planning document and will bring a copy to the clinic.    Fall risk:  Fallen 2 or more times in the past year?: No  Any fall with injury in the past year?: No    Cognitive Screenin) Repeat 3 items (Leader, Season, Table)    2) Clock draw: NORMAL  3) 3 item recall: Recalls 2 objects   Results: NORMAL clock, 1-2 items recalled: COGNITIVE IMPAIRMENT LESS LIKELY    Mini-CogTM Copyright S " Daisy. Licensed by the author for use in [facility]; reprinted with permission (edvin@[facility]). All rights reserved.      Do you have sleep apnea, excessive snoring or daytime drowsiness?: no    Reviewed and updated as needed this visit by clinical staff  Tobacco  Allergies  Meds  Problems  Med Hx  Surg Hx  Fam Hx  Soc Hx          Reviewed and updated as needed this visit by Provider  Tobacco  Allergies  Meds  Problems  Med Hx  Surg Hx  Fam Hx         Social History     Tobacco Use     Smoking status: Former Smoker     Packs/day: 1.00     Types: Cigarettes     Quit date: 1980     Years since quittin.5     Smokeless tobacco: Former User     Quit date: 1980   Substance Use Topics     Alcohol use: Yes     Alcohol/week: 0.0 standard drinks     Comment: 1-2 beers per month                           Current providers sharing in care for this patient include:   Patient Care Team:  Caleb Parker MD as PCP - General (Internal Medicine)  Caleb Parker MD as Assigned PCP  Ryan Darby MD as Assigned Heart and Vascular Provider    The following health maintenance items are reviewed in Epic and correct as of today:  Health Maintenance   Topic Date Due     DTAP/TDAP/TD IMMUNIZATION (1 - Tdap) 1968     ZOSTER IMMUNIZATION (1 of 2) Never done     Pneumococcal Vaccine: 65+ Years (1 of 2 - PPSV23) 2017     HF ACTION PLAN  2021     INFLUENZA VACCINE (Season Ended) 2021     BMP  2021     FALL RISK ASSESSMENT  2022     MEDICARE ANNUAL WELLNESS VISIT  2022     ALT  2022     LIPID  2022     CBC  2022     ADVANCE CARE PLANNING  2026     TSH W/FREE T4 REFLEX  Completed     PHQ-2  Completed     COVID-19 Vaccine  Completed     HEPATITIS C SCREENING  Addressed     IPV IMMUNIZATION  Aged Out     MENINGITIS IMMUNIZATION  Aged Out     HEPATITIS B IMMUNIZATION  Aged Out     Review of Systems   Constitutional: Negative for chills and  "fever.   HENT: Negative for congestion and hearing loss.    Eyes: Negative for visual disturbance.   Respiratory: Positive for shortness of breath (+ minimal, with exertion). Negative for cough and wheezing.    Cardiovascular: Negative for chest pain and palpitations.   Gastrointestinal: Negative for abdominal pain, diarrhea, nausea and vomiting.        + abdominal obesity   Endocrine: Negative for cold intolerance and heat intolerance.   Genitourinary: Negative for dysuria and hematuria.   Musculoskeletal: Positive for arthralgias and gait problem. Negative for myalgias.   Skin: Negative for rash and wound.   Allergic/Immunologic: Negative for immunocompromised state.   Neurological: Negative for dizziness and light-headedness.   Hematological: Bruises/bleeds easily.   Psychiatric/Behavioral: Negative for agitation and confusion.        OBJECTIVE:   /88 (BP Location: Right arm, Patient Position: Sitting, Cuff Size: Adult Regular)   Pulse 92   Temp 98.1  F (36.7  C) (Tympanic)   Resp 16   Wt 109.8 kg (242 lb)   SpO2 97%   BMI 37.98 kg/m   Estimated body mass index is 37.98 kg/m  as calculated from the following:    Height as of 12/22/20: 1.7 m (5' 6.93\").    Weight as of this encounter: 109.8 kg (242 lb).    Physical Exam  Constitutional:       General: He is not in acute distress.     Appearance: He is well-developed. He is not diaphoretic.   HENT:      Head: Normocephalic and atraumatic.   Eyes:      General: No scleral icterus.     Conjunctiva/sclera: Conjunctivae normal.   Neck:      Musculoskeletal: Neck supple.      Vascular: No carotid bruit.   Cardiovascular:      Rate and Rhythm: Normal rate and regular rhythm.      Pulses: Normal pulses.   Pulmonary:      Effort: Pulmonary effort is normal.      Breath sounds: Normal breath sounds.   Abdominal:      Palpations: Abdomen is soft.      Tenderness: There is no abdominal tenderness.      Comments: + abdominal obesity   Musculoskeletal:         " General: No deformity.      Right lower le+ Pitting Edema present.      Left lower le+ Pitting Edema present.   Lymphadenopathy:      Cervical: No cervical adenopathy.   Skin:     General: Skin is warm and dry.      Findings: No bruising or rash.      Comments: Right chest wall with pacemaker generator   Neurological:      Mental Status: He is alert and oriented to person, place, and time. Mental status is at baseline.   Psychiatric:         Mood and Affect: Mood normal.         Behavior: Behavior normal.        Diagnostic Test Results:  Labs reviewed in Epic  Results for orders placed or performed in visit on 21   Lipid Profile     Status: None   Result Value Ref Range    Cholesterol 131 <200 mg/dL    Triglycerides 144 <150 mg/dL    HDL Cholesterol 39 23 - 92 mg/dL    LDL Cholesterol Calculated 63 <100 mg/dL    Non HDL Cholesterol 92 <130 mg/dL   CBC with platelets     Status: None   Result Value Ref Range    WBC 7.8 4.0 - 11.0 10e9/L    RBC Count 5.05 4.4 - 5.9 10e12/L    Hemoglobin 15.8 13.3 - 17.7 g/dL    Hematocrit 47.9 40.0 - 53.0 %    MCV 95 78 - 100 fl    MCH 31.3 26.5 - 33.0 pg    MCHC 33.0 31.5 - 36.5 g/dL    RDW 14.3 10.0 - 15.0 %    Platelet Count 175 150 - 450 10e9/L   Comprehensive metabolic panel     Status: Abnormal   Result Value Ref Range    Sodium 136 134 - 144 mmol/L    Potassium 4.9 3.5 - 5.1 mmol/L    Chloride 106 98 - 107 mmol/L    Carbon Dioxide 24 21 - 31 mmol/L    Anion Gap 6 3 - 14 mmol/L    Glucose 117 (H) 70 - 105 mg/dL    Urea Nitrogen 31 (H) 7 - 25 mg/dL    Creatinine 1.33 (H) 0.70 - 1.30 mg/dL    GFR Estimate 52 (L) >60 mL/min/[1.73_m2]    GFR Estimate If Black 63 >60 mL/min/[1.73_m2]    Calcium 9.2 8.6 - 10.3 mg/dL    Bilirubin Total 0.8 0.3 - 1.0 mg/dL    Albumin 3.6 3.5 - 5.7 g/dL    Protein Total 7.5 6.4 - 8.9 g/dL    Alkaline Phosphatase 100 34 - 104 U/L    ALT 31 7 - 52 U/L    AST 49 (H) 13 - 39 U/L   TSH Reflex GH     Status: None   Result Value Ref Range    TSH  Reflex 5.30 0.34 - 5.60 IU/mL   Sedimentation Rate (ESR)     Status: Abnormal   Result Value Ref Range    Sed Rate 28 (H) 1 - 10 mm/h   Results for orders placed or performed in visit on 06/29/21   INR point of care     Status: Abnormal   Result Value Ref Range    INR Protime 2.6 (A) 0.86 - 1.14     ASSESSMENT / PLAN:       ICD-10-CM    1. Elevated TSH  R79.89 TSH Reflex GH     TSH Reflex GH   2. Chronic heart failure with preserved ejection fraction (H)  I50.32 potassium chloride ER (KLOR-CON M) 20 MEQ CR tablet     CBC with platelets     Comprehensive metabolic panel     TSH Reflex GH     CBC with platelets     Comprehensive metabolic panel     TSH Reflex GH   3. Hyperuricemia  E79.0 allopurinol (ZYLOPRIM) 100 MG tablet   4. Mixed hyperlipidemia  E78.2 simvastatin (ZOCOR) 40 MG tablet     Lipid Profile     Lipid Profile   5. Paroxysmal atrial fibrillation (H)  I48.0 metoprolol succinate ER (TOPROL-XL) 100 MG 24 hr tablet   6. Paroxysmal supraventricular tachycardia (H)  I47.1 metoprolol succinate ER (TOPROL-XL) 100 MG 24 hr tablet   7. Benign essential hypertension  I10 metoprolol succinate ER (TOPROL-XL) 100 MG 24 hr tablet   8. Rheumatoid arthritis, involving unspecified site, unspecified whether rheumatoid factor present (H)  M06.9 Sedimentation Rate (ESR)     Sedimentation Rate (ESR)   9. Morbid obesity (H)  E66.01    10. Stage 3a chronic kidney disease  N18.31    11. Encounter for Medicare annual wellness exam  Z00.00    12. Vaccine counseling  Z71.89      Patient presents for follow-up of multiple issues.    Elevated TSH, greater than 6 with his previous lab work.  This was quite a while ago.  Recheck labs today.    Heart failure with preserved ejection fraction, chronic.  Seems to be doing well with current medications.  Needs medication refills, check labs.    Low potassium, takes oral replacement.  Refills today.  Check labs.    Paroxysmal atrial fibrillation, continues with warfarin anticoagulation.   "Tolerating well without side effects.  Heart rates are controlled with metoprolol.  Continue current dosing.    Hyperuricemia, no recent gout attacks.  Tolerating allopurinol well.  Needs refills.    Mixed hyperlipidemia, cholesterol levels have improved with simvastatin.  Tolerating well without side effects.  Needs refills.    Rheumatoid arthritis, has had more joint pain recently.  Especially his right wrist.  He is not interested in seeing rheumatology at this time.  He is wondering about something to take to help with pain.  Does take Tylenol on occasion.  He would like to try some Aleve, 220 mg once daily with food.  Advised to try this for couple days and see if symptoms improve at all.    Obesity, discussed need for reduced oral caloric intake.  Regular exercise.  Reduce carbohydrate intake.  Information printed and reviewed.    Stage IIIa chronic kidney disease, kidney function has been slowly declining.  Encouraged NSAID limitation/avoidance.    Vaccine counseling completed.  Health screenings are up-to-date.    Patient has been advised of split billing requirements and indicates understanding: Yes    COUNSELING:  Reviewed preventive health counseling, as reflected in patient instructions  Special attention given to:       Regular exercise       Healthy diet/nutrition       Vision screening       Hearing screening       Dental care       Bladder control       Fall risk prevention       Immunizations    Estimated body mass index is 37.98 kg/m  as calculated from the following:    Height as of 12/22/20: 1.7 m (5' 6.93\").    Weight as of this encounter: 109.8 kg (242 lb).    Weight management plan: Discussed healthy diet and exercise guidelines    He reports that he quit smoking about 41 years ago. His smoking use included cigarettes. He smoked 1.00 pack per day. He quit smokeless tobacco use about 41 years ago.    Appropriate preventive services were discussed with this patient, including applicable " screening as appropriate for cardiovascular disease, diabetes, osteopenia/osteoporosis, and glaucoma.  As appropriate for age/gender, discussed screening for colorectal cancer, prostate cancer, breast cancer, and cervical cancer. Checklist reviewing preventive services available has been given to the patient.    Reviewed patients plan of care and provided an AVS. The Complex Care Plan (for patients with higher acuity and needing more deliberate coordination of services) for Kg meets the Care Plan requirement. This Care Plan has been established and reviewed with the Patient.    Counseling Resources:  ATP IV Guidelines  Pooled Cohorts Equation Calculator  Breast Cancer Risk Calculator  BRCA-Related Cancer Risk Assessment: FHS-7 Tool  FRAX Risk Assessment  ICSI Preventive Guidelines  Dietary Guidelines for Americans, 2010  USDA's MyPlate  ASA Prophylaxis  Lung CA Screening    Caleb Parker MD  [facility]

## 2021-06-29 NOTE — PROGRESS NOTES
Medication Reconciliation: complete     FOOD SECURITY SCREENING QUESTIONS  Hunger Vital Signs:  Within the past 12 months we worried whether our food would run out before we got money to buy more. Never  Within the past 12 months the food we bought just didn't last and we didn't have money to get more. Never  Sagrario Sanford LPN 6/24/2021 10:34 AM

## 2021-06-29 NOTE — PATIENT INSTRUCTIONS
Patient Education   Personalized Prevention Plan  You are due for the preventive services outlined below.  Your care team is available to assist you in scheduling these services.  If you have already completed any of these items, please share that information with your care team to update in your medical record.  Health Maintenance Due   Topic Date Due     Diptheria Tetanus Pertussis (DTAP/TDAP/TD) Vaccine (1 - Tdap) 02/27/1968     Zoster (Shingles) Vaccine (1 of 2) Never done     Pneumococcal Vaccine (1 of 2 - PPSV23) 12/29/2017     Cholesterol Lab  07/03/2019     Basic Metabolic Panel  11/22/2020     Liver Monitoring Lab  05/20/2021     Complete Blood Count  05/21/2021     Heart Failure Action Plan  07/03/2021     Preventive Health Recommendations  See your health care provider every year to    Review health changes.     Discuss preventive care.      Review your medicines if your doctor has prescribed any.    Talk with your health care provider about whether you should have a test to screen for prostate cancer (PSA).    Every 3 years, have a diabetes test (fasting glucose). If you are at risk for diabetes, you should have this test more often.    Every 5 years, have a cholesterol test. Have this test more often if you are at risk for high cholesterol or heart disease.     Every 10 years, have a colonoscopy. Or, have a yearly FIT test (stool test). These exams will check for colon cancer.    Talk to with your health care provider about screening for Abdominal Aortic Aneurysm if you have a family history of AAA or have a history of smoking.    Shots:     Get a flu shot each year.     Get a tetanus shot every 10 years.     Talk to your doctor about your pneumonia vaccines. There are now two you should receive - Pneumovax (PPSV 23) and Prevnar (PCV 13).    Talk to your pharmacist about a shingles vaccine.     Talk to your doctor about the hepatitis B vaccine.    Nutrition:     Eat at least 5 servings of fruits and  vegetables each day.     Eat whole-grain bread, whole-wheat pasta and brown rice instead of white grains and rice.     Get adequate Calcium and Vitamin D.     Lifestyle    Exercise for at least 150 minutes a week (30 minutes a day, 5 days a week). This will help you control your weight and prevent disease.     Limit alcohol to one drink per day.     No smoking.     Wear sunscreen to prevent skin cancer.     See your dentist every six months for an exam and cleaning.     See your eye doctor every 1 to 2 years to screen for conditions such as glaucoma, macular degeneration and cataracts.    Personalized Prevention Plan  You are due for the preventive services outlined below.  Your care team is available to assist you in scheduling these services.  If you have already completed any of these items, please share that information with your care team to update in your medical record.  Health Maintenance   Topic Date Due     DTAP/TDAP/TD IMMUNIZATION (1 - Tdap) 02/27/1968     ZOSTER IMMUNIZATION (1 of 2) Never done     Pneumococcal Vaccine: 65+ Years (1 of 2 - PPSV23) 12/29/2017     LIPID  07/03/2019     BMP  11/22/2020     ALT  05/20/2021     CBC  05/21/2021     HF ACTION PLAN  07/03/2021     INFLUENZA VACCINE (Season Ended) 09/01/2021     FALL RISK ASSESSMENT  02/23/2022     MEDICARE ANNUAL WELLNESS VISIT  06/29/2022     ADVANCE CARE PLANNING  05/21/2025     TSH W/FREE T4 REFLEX  Completed     PHQ-2  Completed     COVID-19 Vaccine  Completed     HEPATITIS C SCREENING  Addressed     IPV IMMUNIZATION  Aged Out     MENINGITIS IMMUNIZATION  Aged Out     HEPATITIS B IMMUNIZATION  Aged Out         Medications refilled.   Labs today.     Get your legs up to above your heart level - 2 to 4 times daily to help with leg swelling.     Consider wearing compression stockings -- put on in AM and remove in PM.     Compression Stockings:   -- Knee high   -- 20 mmHg   -- Wear when you get up until bedtime    -- Consider Fytto brand from  [employer]       Return in approximately 4 to 5 month(s), or sooner as needed for follow-up with Dr. Parker.  -- Hypertension / Edema follow-up    Clinic : [phone]  Appointment line: [phone]

## 2021-06-29 NOTE — LETTER
June 30, 2021       Kg Ferraro  [address] [place] [address]        Dear ,    We are writing to inform you of your test results.    Labs are stable.   Continue current medications.     Caleb Parker MD      Resulted Orders   Lipid Profile   Result Value Ref Range    Cholesterol 131 <200 mg/dL    Triglycerides 144 <150 mg/dL    HDL Cholesterol 39 23 - 92 mg/dL    LDL Cholesterol Calculated 63 <100 mg/dL      Comment:      Desirable:       <100 mg/dl    Non HDL Cholesterol 92 <130 mg/dL   CBC with platelets   Result Value Ref Range    WBC 7.8 4.0 - 11.0 10e9/L    RBC Count 5.05 4.4 - 5.9 10e12/L    Hemoglobin 15.8 13.3 - 17.7 g/dL    Hematocrit 47.9 40.0 - 53.0 %    MCV 95 78 - 100 fl    MCH 31.3 26.5 - 33.0 pg    MCHC 33.0 31.5 - 36.5 g/dL    RDW 14.3 10.0 - 15.0 %    Platelet Count 175 150 - 450 10e9/L   Comprehensive metabolic panel   Result Value Ref Range    Sodium 136 134 - 144 mmol/L    Potassium 4.9 3.5 - 5.1 mmol/L    Chloride 106 98 - 107 mmol/L    Carbon Dioxide 24 21 - 31 mmol/L    Anion Gap 6 3 - 14 mmol/L    Glucose 117 (H) 70 - 105 mg/dL    Urea Nitrogen 31 (H) 7 - 25 mg/dL    Creatinine 1.33 (H) 0.70 - 1.30 mg/dL    GFR Estimate 52 (L) >60 mL/min/[1.73_m2]    GFR Estimate If Black 63 >60 mL/min/[1.73_m2]    Calcium 9.2 8.6 - 10.3 mg/dL    Bilirubin Total 0.8 0.3 - 1.0 mg/dL    Albumin 3.6 3.5 - 5.7 g/dL    Protein Total 7.5 6.4 - 8.9 g/dL    Alkaline Phosphatase 100 34 - 104 U/L    ALT 31 7 - 52 U/L    AST 49 (H) 13 - 39 U/L   TSH Reflex GH   Result Value Ref Range    TSH Reflex 5.30 0.34 - 5.60 IU/mL   Sedimentation Rate (ESR)   Result Value Ref Range    Sed Rate 28 (H) 1 - 10 mm/h       If you have any questions or concerns, please call the clinic at the number listed above.       Sincerely,      Caleb Parker MD

## 2021-06-29 NOTE — PROGRESS NOTES
ANTICOAGULATION FOLLOW-UP CLINIC VISIT    Patient Name:  Kg Ferraro  Date:  2021  Contact Type:  Face to Face    SUBJECTIVE:  Patient Findings         Clinical Outcomes     Negatives:  Major bleeding event, Thromboembolic event, Anticoagulation-related hospital admission, Anticoagulation-related ED visit, Anticoagulation-related fatality           OBJECTIVE    Recent labs: (last 7 days)     21   INR 2.6*       ASSESSMENT / PLAN  INR assessment THER    Recheck INR In: 6 WEEKS    INR Location Clinic      Anticoagulation Summary  As of 2021    INR goal:  2.0-3.0   TTR:  96.9 % (9 mo)   INR used for dosin.6 (2021)   Warfarin maintenance plan:  4 mg (4 mg x 1) every day   Full warfarin instructions:  4 mg every day   Weekly warfarin total:  28 mg   No change documented:  Emani Pereira RN   Plan last modified:  Emani Pereira RN (10/19/2018)   Next INR check:  8/10/2021   Priority:  Maintenance   Target end date:  Indefinite    Indications    Long-term (current) use of anticoagulants [Z79.01] [Z79.01]  Paroxysmal supraventricular tachycardia (H) [I47.1]             Anticoagulation Episode Summary     INR check location:      Preferred lab:      Send INR reminders to:  [facility]    Comments:        Anticoagulation Care Providers     Provider Role Specialty Phone number    Caleb Parker MD Referring Internal Medicine [phone]            See the Encounter Report to view Anticoagulation Flowsheet and Dosing Calendar (Go to Encounters tab in chart review, and find the Anticoagulation Therapy Visit)        Emani Pereira RN

## 2021-07-27 ENCOUNTER — TELEPHONE (OUTPATIENT)
Dept: CARDIOLOGY | Facility: CLINIC | Age: 76
End: 2021-07-27

## 2021-07-27 NOTE — TELEPHONE ENCOUNTER
Called patient to let him know we will have to reschedule his procedure from 7/30/2021 to 8/6/2021. Left contact information to call back.

## 2021-08-02 NOTE — TELEPHONE ENCOUNTER
*Contacted pt and confirmed pt's appointment on 8/6 for his covid test in [place].     Confirmed pt's appt on 8/10/2021 for his lead revision reschedule. Pt confirmed all details and requested that nothing be mailed to him since he still has the information from his previous appointment. Agreed to plan.    No further follow-up completed and encounter closed.    [facility]  Clinic   Pulmonary Hypertension  [facility]  (P) [phone]

## 2021-08-05 DIAGNOSIS — Z11.59 ENCOUNTER FOR SCREENING FOR OTHER VIRAL DISEASES: ICD-10-CM

## 2021-08-06 DIAGNOSIS — E79.0 HYPERURICEMIA: ICD-10-CM

## 2021-08-09 RX ORDER — ALLOPURINOL 100 MG/1
TABLET ORAL
Qty: 180 TABLET | Refills: 0 | OUTPATIENT
Start: 2021-08-09

## 2021-08-09 NOTE — TELEPHONE ENCOUNTER
Verified with [facility] that there are refills on file for allopurinol.  No refills needed at this time.  Guera Nina RN......August 9, 2021...12:11 PM

## 2021-08-25 ENCOUNTER — ANCILLARY PROCEDURE (OUTPATIENT)
Dept: CARDIOLOGY | Facility: CLINIC | Age: 76
End: 2021-08-25
Attending: INTERNAL MEDICINE
Payer: MEDICARE

## 2021-08-25 PROCEDURE — 99207 CARDIAC DEVICE CHECK - REMOTE: CPT | Performed by: INTERNAL MEDICINE

## 2021-08-25 PROCEDURE — 93296 REM INTERROG EVL PM/IDS: CPT

## 2021-08-25 NOTE — PROGRESS NOTES
ANTICOAGULATION FOLLOW-UP CLINIC VISIT    Patient Name:  Kg Ferraro  Date:  2/28/2018  Contact Type:  Face to Face    SUBJECTIVE:     Patient Findings     Positives OTC meds (was using increased tylenol after a fall on the ice about 4 weeks ago)           OBJECTIVE    INR Protime   Date Value Ref Range Status   02/28/2018 5.2 (A) 2 - 3 Final       ASSESSMENT / PLAN  INR assessment SUPRA    Recheck INR In: 1 WEEK    INR Location Clinic      Anticoagulation Summary as of 2/28/2018     INR goal 2.0-3.0   Today's INR 5.2!   Maintenance plan 6 mg (4 mg x 1.5) on Mon, Wed, Fri; 4 mg (4 mg x 1) all other days   Full instructions 2/28: Hold; Otherwise 6 mg on Mon, Wed, Fri; 4 mg all other days   Weekly total 34 mg   Next INR check 3/7/2018   Priority INR   Target end date Indefinite    Indications   Unspecified atrial fibrillation [I48.91]  Long-term (current) use of anticoagulants [Z79.01] [Z79.01]         Anticoagulation Episode Summary     INR check location     Preferred lab     Send INR reminders to  INR    Comments       Anticoagulation Care Providers     Provider Role Specialty Phone number    Caleb Parker MD [facility] Internal Medicine [phone]            See the Encounter Report to view Anticoagulation Flowsheet and Dosing Calendar (Go to Encounters tab in chart review, and find the Anticoagulation Therapy Visit)        Emani Pereira RN                [address]        NAME: Ellen Hamilton is a 78 y o  male  : 1938    MRN: [id number]  DATE: 2021  TIME: 8:21 PM    Assessment and Plan   Sprain of interphalangeal joint of left little finger, initial encounter [I93 391O]  1  Sprain of interphalangeal joint of left little finger, initial encounter  XR finger right fifth digit-pinkie         Patient Instructions     Patient Instructions   Xray today shows no sign of fracture  Placed in a froggy splint for comfort  Can wear the splint for about 1 week  Apply ice to the area and take ibuprofen and tylenol for the pain  If symptoms worsen follow up with primary care doctor  Follow up with PCP in 3-5 days  Proceed to  ER if symptoms worsen  Chief Complaint     Chief Complaint   Patient presents with    Injury     pt presents with right hand 5th digit injury r/t fall one week ago; pt states there was a cut on  ef also; he taped the injury/fingers         History of Present Illness       77 y/o male presents with a left pinky injury x1 week  He notes he had a mechanical fall and felt his finger pop  There was also a cut and he thought the bone ws sticking out of the opening  Pt straightened his finger out and applied tape to it  The wound is closed now but there is residual pain and swelling  Pt has not taken any OTC pain medications and only applied ice the day it happened  He denies any numbness or tingling to the finger  He is not on any blood thinners  Did not hit his head when he fell  No headache, dizziness, or nausea  Review of Systems   Review of Systems   Eyes: Negative for visual disturbance  Musculoskeletal: Positive for arthralgias (left pinky pain), gait problem (walks with cane at baseline) and joint swelling (left pinky)  Skin: Positive for color change  Neurological: Negative for headaches           Current Medications       Current Outpatient Medications:     amLODIPine (NORVASC) 5 mg tablet, , Disp: , Rfl:     aspirin (ECOTRIN) 325 mg EC tablet, Take 325 mg by mouth every 6 (six) hours as needed for mild pain, Disp: , Rfl:     Cholecalciferol (VITAMIN D) 2000 units CAPS, Take by mouth, Disp: , Rfl:     cilostazol (PLETAL) 100 mg tablet, Take 1 tablet (100 mg total) by mouth 2 (two) times a day, Disp: 180 tablet, Rfl: 3    losartan (COZAAR) 50 mg tablet, , Disp: , Rfl:     metoprolol succinate (TOPROL XL) 50 mg 24 hr tablet, Take by mouth, Disp: , Rfl:     Multiple Vitamin (MULTIVITAMIN) capsule, Take 1 capsule by mouth daily, Disp: , Rfl:     Omega-3 1000 MG CAPS, Take by mouth, Disp: , Rfl:     rosuvastatin (CRESTOR) 10 MG tablet, Take by mouth, Disp: , Rfl:     Cetirizine HCl (ZYRTEC ALLERGY) 10 MG CAPS, Take by mouth, Disp: , Rfl:     Fluticasone Propionate (FLONASE NA), into each nostril (Patient not taking: Reported on 7/15/2021), Disp: , Rfl:     Current Allergies     Allergies as of 08/22/2021    (No Known Allergies)            The following portions of the patient's history were reviewed and updated as appropriate: allergies, current medications, past family history, past medical history, past social history, past surgical history and problem list      Past Medical History:   Diagnosis Date    Allergic     Arthritis     Coronary artery disease     GERD (gastroesophageal reflux disease)     History of heart artery stent 2001    x2    History of transfusion 1988    [facility]cephalus ([facility])     Hyperlipidemia     Hypertension     Obesity     Visual impairment        Past Surgical History:   Procedure Laterality Date    CARDIAC SURGERY      CATARACT EXTRACTION, BILATERAL Bilateral 2014    MS CREATE SHUNT:VENTRIC-PERITONEAL Right 7/11/2018    Procedure: FRONTAL VENTRICULOPERITONEAL SHUNT INSERTION;  Surgeon: Jeffery Guillory MD;  [facility];  Service: Neurosurgery    ROTATOR CUFF REPAIR Right 2016       Family History   Problem Relation Age of Onset    Hodgkin's lymphoma Father     Cancer Brother          Medications have been verified  Objective   Pulse 60   Temp (!) 96 7 °F (35 9 °C)   Resp 14   SpO2 97%        Physical Exam     Physical Exam  Vitals and nursing note reviewed  Constitutional:       Appearance: Normal appearance  HENT:      Head: Normocephalic and atraumatic  Eyes:      Extraocular Movements: Extraocular movements intact  Pupils: Pupils are equal, round, and reactive to light  Cardiovascular:      Rate and Rhythm: Normal rate and regular rhythm  Pulses: Normal pulses  Heart sounds: Normal heart sounds  Pulmonary:      Effort: Pulmonary effort is normal       Breath sounds: Normal breath sounds  Musculoskeletal:         General: Tenderness (PIP of left pinky finger) present  Comments: Decreases ROM but minimal pain with motion   swelling noted at left pinky PIP  N/V intact  Sensation normal  Well healed laceration on medial PIP  No warmth or streaking noted   Neurological:      Mental Status: He is alert and oriented to person, place, and time

## 2021-09-14 ENCOUNTER — TELEPHONE (OUTPATIENT)
Dept: CARDIOLOGY | Facility: CLINIC | Age: 76
End: 2021-09-14

## 2021-09-14 NOTE — TELEPHONE ENCOUNTER
Ep Scheduling sent the patient a new letter with the arrival time change and left a voicemail instructing the patient to arrive at 11am instead of 6:15am.     Kori Abad  Periop Electrophysiology   [phone]

## 2021-09-24 ENCOUNTER — ANTICOAGULATION THERAPY VISIT (OUTPATIENT)
Dept: ANTICOAGULATION | Facility: OTHER | Age: 76
End: 2021-09-24
Attending: INTERNAL MEDICINE
Payer: MEDICARE

## 2021-09-24 DIAGNOSIS — Z79.01 LONG TERM CURRENT USE OF ANTICOAGULANT THERAPY: Primary | ICD-10-CM

## 2021-09-24 DIAGNOSIS — I47.10 PAROXYSMAL SUPRAVENTRICULAR TACHYCARDIA (H): ICD-10-CM

## 2021-09-24 LAB — INR POINT OF CARE: 2.6 (ref 0.9–1.1)

## 2021-09-24 PROCEDURE — 36416 COLLJ CAPILLARY BLOOD SPEC: CPT | Mod: ZL

## 2021-09-24 NOTE — PROGRESS NOTES
ANTICOAGULATION FOLLOW-UP CLINIC VISIT    Patient Name:  Kg Ferraro  Date:  2021  Contact Type:  Face to Face    SUBJECTIVE:  Patient Findings         Clinical Outcomes     Negatives:  Major bleeding event, Thromboembolic event, Anticoagulation-related hospital admission, Anticoagulation-related ED visit, Anticoagulation-related fatality           OBJECTIVE    Recent labs: (last 7 days)     21  1534   INR 2.6*       ASSESSMENT / PLAN  INR assessment THER    Recheck INR In: 6 WEEKS    INR Location Clinic      Anticoagulation Summary  As of 2021    INR goal:  2.0-3.0   TTR:  96.9 % (9 mo)   INR used for dosin.6 (2021)   Warfarin maintenance plan:  4 mg (4 mg x 1) every day   Full warfarin instructions:  4 mg every day   Weekly warfarin total:  28 mg   No change documented:  Emani Pereira RN   Plan last modified:  Emani Pereira RN (10/19/2018)   Next INR check:  2021   Priority:  Maintenance   Target end date:  Indefinite    Indications    Long-term (current) use of anticoagulants [Z79.01] [Z79.01]  Paroxysmal supraventricular tachycardia (H) [I47.1]             Anticoagulation Episode Summary     INR check location:      Preferred lab:      Send INR reminders to:  [facility]    Comments:        Anticoagulation Care Providers     Provider Role Specialty Phone number    Caleb Parker MD [facility] Internal Medicine [phone]            See the Encounter Report to view Anticoagulation Flowsheet and Dosing Calendar (Go to Encounters tab in chart review, and find the Anticoagulation Therapy Visit)        Emani Pereira RN

## 2021-09-28 ENCOUNTER — TELEPHONE (OUTPATIENT)
Dept: CARDIOLOGY | Facility: OTHER | Age: 76
End: 2021-09-28

## 2021-09-28 NOTE — TELEPHONE ENCOUNTER
"Per Kori MONTES and the [facility] patient has rescheduled his lead revision numerous times.  Per Kevan Ruiz, DO: \"I would just have him call [facility] or us locally when he is ready to have the procedure.     Dr. Ruiz\"    Left message for patient to call back to notify him of the above.  Guera Nina RN......September 28, 2021...8:54 AM   "

## 2021-10-04 NOTE — TELEPHONE ENCOUNTER
Spoke to patient and notified him that whenever he would like to reschedule he may call us or the [facility] to reschedule.  He verbalized understanding and will call when he wants to reschedule.  Guera Nina RN......October 4, 2021...9:17 AM

## 2021-10-06 ENCOUNTER — IMMUNIZATION (OUTPATIENT)
Dept: FAMILY MEDICINE | Facility: OTHER | Age: 76
End: 2021-10-06
Attending: INTERNAL MEDICINE
Payer: MEDICARE

## 2021-10-06 PROCEDURE — 0003A PR COVID VAC PFIZER DIL RECON 30 MCG/0.3 ML IM: CPT

## 2021-11-29 ENCOUNTER — ANCILLARY PROCEDURE (OUTPATIENT)
Dept: CARDIOLOGY | Facility: CLINIC | Age: 76
End: 2021-11-29
Attending: INTERNAL MEDICINE
Payer: MEDICARE

## 2021-11-29 DIAGNOSIS — I49.5 SICK SINUS SYNDROME (H): ICD-10-CM

## 2021-11-29 PROCEDURE — 93294 REM INTERROG EVL PM/LDLS PM: CPT | Performed by: INTERNAL MEDICINE

## 2021-11-29 PROCEDURE — 93296 REM INTERROG EVL PM/IDS: CPT

## 2021-12-01 ENCOUNTER — ANTICOAGULATION THERAPY VISIT (OUTPATIENT)
Dept: ANTICOAGULATION | Facility: OTHER | Age: 76
End: 2021-12-01
Attending: INTERNAL MEDICINE
Payer: MEDICARE

## 2021-12-01 DIAGNOSIS — Z79.01 LONG TERM CURRENT USE OF ANTICOAGULANT THERAPY: Primary | ICD-10-CM

## 2021-12-01 DIAGNOSIS — I47.10 PAROXYSMAL SUPRAVENTRICULAR TACHYCARDIA (H): ICD-10-CM

## 2021-12-01 LAB — INR POINT OF CARE: 2.1 (ref 0.9–1.1)

## 2021-12-01 PROCEDURE — 36416 COLLJ CAPILLARY BLOOD SPEC: CPT | Mod: ZL

## 2021-12-01 NOTE — PROGRESS NOTES
ANTICOAGULATION FOLLOW-UP CLINIC VISIT    Patient Name:  Kg Ferraro  Date:  2021  Contact Type:  Face to Face    SUBJECTIVE:  Patient Findings         Clinical Outcomes     Negatives:  Major bleeding event, Thromboembolic event, Anticoagulation-related hospital admission, Anticoagulation-related ED visit, Anticoagulation-related fatality           OBJECTIVE    Recent labs: (last 7 days)     21  1444   INR 2.1*       ASSESSMENT / PLAN  INR assessment THER    Recheck INR In: 6 WEEKS    INR Location Clinic      Anticoagulation Summary  As of 2021    INR goal:  2.0-3.0   TTR:  96.9 % (9 mo)   INR used for dosin.1 (2021)   Warfarin maintenance plan:  4 mg (4 mg x 1) every day   Full warfarin instructions:  4 mg every day   Weekly warfarin total:  28 mg   No change documented:  Emani Pereira RN   Plan last modified:  Emani Pereira RN (10/19/2018)   Next INR check:  2022   Priority:  Maintenance   Target end date:  Indefinite    Indications    Long-term (current) use of anticoagulants [Z79.01] [Z79.01]  Paroxysmal supraventricular tachycardia (H) [I47.1]             Anticoagulation Episode Summary     INR check location:      Preferred lab:      Send INR reminders to:  [facility]    Comments:        Anticoagulation Care Providers     Provider Role Specialty Phone number    Caleb Parker MD [facility] Internal Medicine [phone]            See the Encounter Report to view Anticoagulation Flowsheet and Dosing Calendar (Go to Encounters tab in chart review, and find the Anticoagulation Therapy Visit)        Emani Pereira RN

## 2021-12-26 DIAGNOSIS — I48.91 ATRIAL FIBRILLATION (H): ICD-10-CM

## 2021-12-26 DIAGNOSIS — Z79.01 ANTICOAGULATION MONITORING, INR RANGE 2-3: ICD-10-CM

## 2021-12-27 RX ORDER — WARFARIN SODIUM 4 MG/1
TABLET ORAL
Qty: 90 TABLET | Refills: 1 | Status: SHIPPED | OUTPATIENT
Start: 2021-12-27 | End: 2022-07-12

## 2021-12-27 NOTE — TELEPHONE ENCOUNTER
"Requested Prescriptions   Pending Prescriptions Disp Refills     warfarin ANTICOAGULANT (COUMADIN) 4 MG tablet [Pharmacy Med Name: Warfarin Sodium 4 MG Oral Tablet] 90 tablet 0     Sig: TAKE 1 TABLET BY MOUTH ONCE DAILY OR  AS  DIRECTED BY  [facility]       Vitamin K Antagonists Failed - 12/26/2021  2:15 PM        Failed - INR is within goal in the past 6 weeks     Confirm INR is within goal in the past 6 weeks.     Recent Labs   Lab Test 12/01/21  1444   INR 2.1*                       Passed - Recent (12 mo) or future (30 days) visit within the authorizing provider's specialty     Patient has had an office visit with the authorizing provider or a provider within the authorizing providers department within the previous 12 mos or has a future within next 30 days. See \"Patient Info\" tab in inbasket, or \"Choose Columns\" in Meds & Orders section of the refill encounter.              Passed - Medication is active on med list        Passed - Patient is 18 years of age or older           Prescription refilled per RN MedicationRefill Policy.................... Emani Pereira RN ....................  12/27/2021   1:59 PM      "

## 2022-01-20 ENCOUNTER — ANTICOAGULATION THERAPY VISIT (OUTPATIENT)
Dept: ANTICOAGULATION | Facility: OTHER | Age: 77
End: 2022-01-20
Attending: INTERNAL MEDICINE
Payer: MEDICARE

## 2022-01-20 DIAGNOSIS — Z79.01 LONG TERM CURRENT USE OF ANTICOAGULANT THERAPY: ICD-10-CM

## 2022-01-20 DIAGNOSIS — I47.10 PAROXYSMAL SUPRAVENTRICULAR TACHYCARDIA (H): Primary | ICD-10-CM

## 2022-01-20 LAB — INR POINT OF CARE: 2.3 (ref 0.9–1.1)

## 2022-01-20 PROCEDURE — 36416 COLLJ CAPILLARY BLOOD SPEC: CPT | Mod: ZL

## 2022-01-20 NOTE — PROGRESS NOTES
ANTICOAGULATION FOLLOW-UP CLINIC VISIT    Patient Name:  Kg Ferraro  Date:  2022  Contact Type:  Face to Face    SUBJECTIVE:  Patient Findings         Clinical Outcomes     Negatives:  Major bleeding event, Thromboembolic event, Anticoagulation-related hospital admission, Anticoagulation-related ED visit, Anticoagulation-related fatality           OBJECTIVE    Recent labs: (last 7 days)     22  1349   INR 2.3*       ASSESSMENT / PLAN  INR assessment THER    Recheck INR In: 6 WEEKS    INR Location Clinic      Anticoagulation Summary  As of 2022    INR goal:  2.0-3.0   TTR:  96.9 % (9 mo)   INR used for dosin.3 (2022)   Warfarin maintenance plan:  4 mg (4 mg x 1) every day   Full warfarin instructions:  4 mg every day   Weekly warfarin total:  28 mg   Plan last modified:  Emani Pereira RN (10/19/2018)   Next INR check:  3/3/2022   Priority:  Maintenance   Target end date:  Indefinite    Indications    Long-term (current) use of anticoagulants [Z79.01] [Z79.01]  Paroxysmal supraventricular tachycardia (H) [I47.1]             Anticoagulation Episode Summary     INR check location:      Preferred lab:      Send INR reminders to:  [facility]    Comments:        Anticoagulation Care Providers     Provider Role Specialty Phone number    Caleb Parker MD [facility] Internal Medicine [phone]            See the Encounter Report to view Anticoagulation Flowsheet and Dosing Calendar (Go to Encounters tab in chart review, and find the Anticoagulation Therapy Visit)        Emani Pereira RN

## 2022-02-10 ENCOUNTER — VIRTUAL VISIT (OUTPATIENT)
Dept: CARDIOLOGY | Facility: OTHER | Age: 77
End: 2022-02-10
Attending: INTERNAL MEDICINE
Payer: MEDICARE

## 2022-02-10 ENCOUNTER — VIRTUAL VISIT (OUTPATIENT)
Dept: CARDIOLOGY | Facility: CLINIC | Age: 77
End: 2022-02-10
Attending: INTERNAL MEDICINE
Payer: MEDICARE

## 2022-02-10 VITALS
HEART RATE: 70 BPM | OXYGEN SATURATION: 97 % | WEIGHT: 246 LBS | SYSTOLIC BLOOD PRESSURE: 138 MMHG | RESPIRATION RATE: 20 BRPM | BODY MASS INDEX: 38.61 KG/M2 | DIASTOLIC BLOOD PRESSURE: 78 MMHG | TEMPERATURE: 98.3 F

## 2022-02-10 VITALS
HEART RATE: 70 BPM | SYSTOLIC BLOOD PRESSURE: 138 MMHG | BODY MASS INDEX: 38.61 KG/M2 | TEMPERATURE: 98.3 F | DIASTOLIC BLOOD PRESSURE: 78 MMHG | OXYGEN SATURATION: 97 % | WEIGHT: 246 LBS | RESPIRATION RATE: 20 BRPM

## 2022-02-10 DIAGNOSIS — T82.110D PACEMAKER LEAD MALFUNCTION, SUBSEQUENT ENCOUNTER: Primary | ICD-10-CM

## 2022-02-10 LAB
MDC_IDC_EPISODE_DTM: NORMAL
MDC_IDC_EPISODE_DURATION: 0 S
MDC_IDC_EPISODE_ID: NORMAL
MDC_IDC_EPISODE_TYPE: NORMAL
MDC_IDC_LEAD_IMPLANT_DT: NORMAL
MDC_IDC_LEAD_IMPLANT_DT: NORMAL
MDC_IDC_LEAD_LOCATION: NORMAL
MDC_IDC_LEAD_LOCATION: NORMAL
MDC_IDC_LEAD_LOCATION_DETAIL_1: NORMAL
MDC_IDC_LEAD_LOCATION_DETAIL_1: NORMAL
MDC_IDC_LEAD_MFG: NORMAL
MDC_IDC_LEAD_MFG: NORMAL
MDC_IDC_LEAD_MODEL: NORMAL
MDC_IDC_LEAD_MODEL: NORMAL
MDC_IDC_LEAD_POLARITY_TYPE: NORMAL
MDC_IDC_LEAD_POLARITY_TYPE: NORMAL
MDC_IDC_LEAD_SERIAL: NORMAL
MDC_IDC_LEAD_SERIAL: NORMAL
MDC_IDC_LEAD_SPECIAL_FUNCTION: NORMAL
MDC_IDC_LEAD_SPECIAL_FUNCTION: NORMAL
MDC_IDC_MSMT_BATTERY_DTM: NORMAL
MDC_IDC_MSMT_BATTERY_REMAINING_LONGEVITY: 100 MO
MDC_IDC_MSMT_BATTERY_RRT_TRIGGER: 2.62
MDC_IDC_MSMT_BATTERY_STATUS: NORMAL
MDC_IDC_MSMT_BATTERY_VOLTAGE: 3.02 V
MDC_IDC_MSMT_LEADCHNL_RA_IMPEDANCE_VALUE: 399 OHM
MDC_IDC_MSMT_LEADCHNL_RA_IMPEDANCE_VALUE: 475 OHM
MDC_IDC_MSMT_LEADCHNL_RA_SENSING_INTR_AMPL: 0.38 MV
MDC_IDC_MSMT_LEADCHNL_RA_SENSING_INTR_AMPL: 2 MV
MDC_IDC_MSMT_LEADCHNL_RV_IMPEDANCE_VALUE: 703 OHM
MDC_IDC_MSMT_LEADCHNL_RV_IMPEDANCE_VALUE: 722 OHM
MDC_IDC_MSMT_LEADCHNL_RV_PACING_THRESHOLD_AMPLITUDE: 2.12 V
MDC_IDC_MSMT_LEADCHNL_RV_PACING_THRESHOLD_PULSEWIDTH: 0.4 MS
MDC_IDC_MSMT_LEADCHNL_RV_SENSING_INTR_AMPL: 12.5 MV
MDC_IDC_MSMT_LEADCHNL_RV_SENSING_INTR_AMPL: 12.5 MV
MDC_IDC_PG_IMPLANT_DTM: NORMAL
MDC_IDC_PG_MFG: NORMAL
MDC_IDC_PG_MODEL: NORMAL
MDC_IDC_PG_SERIAL: NORMAL
MDC_IDC_PG_TYPE: NORMAL
MDC_IDC_SESS_CLINIC_NAME: NORMAL
MDC_IDC_SESS_DTM: NORMAL
MDC_IDC_SESS_TYPE: NORMAL
MDC_IDC_SET_BRADY_HYSTRATE: NORMAL
MDC_IDC_SET_BRADY_LOWRATE: 60 {BEATS}/MIN
MDC_IDC_SET_BRADY_MAX_SENSOR_RATE: 130 {BEATS}/MIN
MDC_IDC_SET_BRADY_MODE: NORMAL
MDC_IDC_SET_LEADCHNL_RA_SENSING_ANODE_ELECTRODE_1: NORMAL
MDC_IDC_SET_LEADCHNL_RA_SENSING_ANODE_LOCATION_1: NORMAL
MDC_IDC_SET_LEADCHNL_RA_SENSING_CATHODE_ELECTRODE_1: NORMAL
MDC_IDC_SET_LEADCHNL_RA_SENSING_CATHODE_LOCATION_1: NORMAL
MDC_IDC_SET_LEADCHNL_RA_SENSING_POLARITY: NORMAL
MDC_IDC_SET_LEADCHNL_RA_SENSING_SENSITIVITY: NORMAL
MDC_IDC_SET_LEADCHNL_RV_PACING_AMPLITUDE: 4.25 V
MDC_IDC_SET_LEADCHNL_RV_PACING_ANODE_ELECTRODE_1: NORMAL
MDC_IDC_SET_LEADCHNL_RV_PACING_ANODE_LOCATION_1: NORMAL
MDC_IDC_SET_LEADCHNL_RV_PACING_CAPTURE_MODE: NORMAL
MDC_IDC_SET_LEADCHNL_RV_PACING_CATHODE_ELECTRODE_1: NORMAL
MDC_IDC_SET_LEADCHNL_RV_PACING_CATHODE_LOCATION_1: NORMAL
MDC_IDC_SET_LEADCHNL_RV_PACING_POLARITY: NORMAL
MDC_IDC_SET_LEADCHNL_RV_PACING_PULSEWIDTH: 0.4 MS
MDC_IDC_SET_LEADCHNL_RV_SENSING_ANODE_ELECTRODE_1: NORMAL
MDC_IDC_SET_LEADCHNL_RV_SENSING_ANODE_LOCATION_1: NORMAL
MDC_IDC_SET_LEADCHNL_RV_SENSING_CATHODE_ELECTRODE_1: NORMAL
MDC_IDC_SET_LEADCHNL_RV_SENSING_CATHODE_LOCATION_1: NORMAL
MDC_IDC_SET_LEADCHNL_RV_SENSING_POLARITY: NORMAL
MDC_IDC_SET_LEADCHNL_RV_SENSING_SENSITIVITY: 0.9 MV
MDC_IDC_SET_ZONE_DETECTION_INTERVAL: 400 MS
MDC_IDC_SET_ZONE_TYPE: NORMAL
MDC_IDC_STAT_BRADY_AP_VP_PERCENT: 0 %
MDC_IDC_STAT_BRADY_AP_VS_PERCENT: 0 %
MDC_IDC_STAT_BRADY_AS_VP_PERCENT: 59.97 %
MDC_IDC_STAT_BRADY_AS_VS_PERCENT: 40.03 %
MDC_IDC_STAT_BRADY_DTM_END: NORMAL
MDC_IDC_STAT_BRADY_DTM_START: NORMAL
MDC_IDC_STAT_BRADY_RA_PERCENT_PACED: 0 %
MDC_IDC_STAT_BRADY_RV_PERCENT_PACED: 59.97 %
MDC_IDC_STAT_EPISODE_RECENT_COUNT: 0
MDC_IDC_STAT_EPISODE_RECENT_COUNT: 1
MDC_IDC_STAT_EPISODE_RECENT_COUNT_DTM_END: NORMAL
MDC_IDC_STAT_EPISODE_RECENT_COUNT_DTM_START: NORMAL
MDC_IDC_STAT_EPISODE_TOTAL_COUNT: 0
MDC_IDC_STAT_EPISODE_TOTAL_COUNT: 1
MDC_IDC_STAT_EPISODE_TOTAL_COUNT: NORMAL
MDC_IDC_STAT_EPISODE_TOTAL_COUNT_DTM_END: NORMAL
MDC_IDC_STAT_EPISODE_TOTAL_COUNT_DTM_START: NORMAL
MDC_IDC_STAT_EPISODE_TYPE: NORMAL

## 2022-02-10 PROCEDURE — G0463 HOSPITAL OUTPT CLINIC VISIT: HCPCS | Mod: GT

## 2022-02-10 PROCEDURE — 99215 OFFICE O/P EST HI 40 MIN: CPT | Performed by: INTERNAL MEDICINE

## 2022-02-10 ASSESSMENT — PAIN SCALES - GENERAL
PAINLEVEL: NO PAIN (0)
PAINLEVEL: NO PAIN (0)

## 2022-02-10 NOTE — LETTER
February 23, 2022      TO: Kg Ferraro  [address]  [place] [address]         Dear Mr. Kg Ferraro,    You are scheduled for a Pacemaker Lead Revision.      You will need to undergo a COVID-19 PCR swab test within 4 days of procedure. You will receive a phone call with more information. If you do not hear from the COVID scheduling team, please call: [phone] to schedule.  (THIS CAN BE SCHEDULED AT [facility]).     Below are instructions, if you have questions please contact our office.     1. You should arrive at the [facility]   ([address], [place]) to the [facility] Waiting Room at __7:30am___ on   ___April 8, 2022_.  2. Do not eat for 8 hours prior to arrival, you can drink water up until 2 hours prior.  3. The morning of your procedure, you may take your scheduled medications with a sip of water   HOLD:  - Warfarin for 2 days prior to procedure   4. You will need to have someone drive you home from the hospital at the time of discharge.   5. Use the antibacterial wipes the night before and morning of your procedure. Follow the included instructions.       Post-Procedure Instructions  Wound Care  1. If no Dermabond has been applied to your incision: Keep your incision (surgery wound) dry for 3 days.  After 3 days, you may remove the outer bandage.  Keep the strips of tape on.  They will be removed at your clinic visit.  a. If Dermabond has been applied to your incision,  do not apply powder or lotion to the incision for 14 days. You may shower in the morning.  2. Check for signs of infection each day.  These include increased redness, swelling, drainage or a fever over 101 F (38.3 C).  Call us immediately if you see any of   these signs.  3. If there are no signs of infection, you may shower in 3 days.  Do not submerge the incision (in a bath tub, hot tub, or swimming pool) until fully healed.    Pain  1. You may have mild to moderate pain for 3 to 5 days.   "Take acetaminophen (Tylenol) or ibuprofen (Advil) for the pain.  Call us if the pain is severe or lasts more than 5 days.  Activity  1. You should slowly go back to your normal activities after 24 hours.  Healing will take 4 to 6 weeks.  2. No driving for 3 days  3. Avoid climbing a ladder alone.  It is best to stay within 4 feet of the ground.  4. Avoid anything that may cause rough contact or a hard hit to your chest.  This includes football, hockey, and other contact sports.  5. FOR AT LEAST 4 WEEKS:  a. Do not raise your affected arm above your shoulder.  b. Do not use your affected arm to push, pull, or lift anything over 10 pounds.  c. Avoid repetitive upper body activities for 6 weeks (ie: golf, swimming, and weight lifting)    Follow Up Appointments Date & Time   7-10 day incision check with device clinic  To be scheduled by [place]   4 month follow up visit with device clinic & Dr. Darby via telemedicine  To be scheduled by [place]       Preparing the Skin Before Surgery  Preparing or \"prepping\" skin before surgery can reduce the risk of infection at the surgical site. To make the process easier, this facility has chosen disposable cloths moistened with rinse-free 2% Chlorhexidine Gluconate antiseptic solution designed to reduce the bacteria on the skin. The steps below outline the prepping process and should be carefully followed.    Prep the skin at the following time(s):    - If you wish to shower, bathe or shampoo your hair, do so the night before and prep your skin afterwards for the first time using one package of cloths.    - Skin must be prepped the morning of the procedure using the second package of cloths.        Prep The Night Before Procedure    Do not allow this product to come in contact with your eyes, ears, mouth or mucous membranes.     Reach into one of the packages, remove the two cloths and place onto a clean table.    Use one clean cloth to prep each are of the body. One " cloth for #1 - neck & chest. One cloth for #2 & #3 - both arms, shoulder to fingertips including armpits. Wipe each area in a back and forth motion for 3 minutes. Be sure to wipe each area thoroughly.    Do not rinse or apply any lotions, moisturizer or make up after prepping.    Discard cloths in trash can.     Allow your skin to air dry. Dress in clean clothes/sleepwear      Prepping your skin on the morning of surgery:    Do not shower, bathe or shampoo hair.    Open a new package and follow the instructions listed above.

## 2022-02-10 NOTE — NURSING NOTE
"Denies chest pain/pressure, lightheadedness, nausea, increased fatigue, syncope or pre-syncope.  Patient does get shortness of breath when he walks more than a block or so.  He states he has had this for years.  Has to take breaks between shoveling and other physical activities he does.    Lung sounds clear bilaterally.    Chief Complaint   Patient presents with     Telemedicine consult     discuss lead revision       Initial /78 (BP Location: Right arm, Patient Position: Sitting, Cuff Size: Adult Large)   Pulse 70   Temp 98.3  F (36.8  C) (Temporal)   Resp 20   Wt 111.6 kg (246 lb)   SpO2 97%   BMI 38.61 kg/m   Estimated body mass index is 38.61 kg/m  as calculated from the following:    Height as of 12/22/20: 1.7 m (5' 6.93\").    Weight as of this encounter: 111.6 kg (246 lb).  Medication Reconciliation: complete  "

## 2022-02-10 NOTE — LETTER
2/10/2022      RE: Kg Ferraro  [address]  [place] [address]       Dear Colleague,    Thank you for the opportunity to participate in the care of your patient, Kg Ferraro, at the [facility] [place] [facility]. Please see a copy of my visit note below.        ELECTROPHYSIOLOGY CLINIC VISIT    Assessment/Recommendations   Assessment/Plan:    Mr. Ferraro is a 76 year old male who has a past medical history significant for HFpEF, permanent AF (CHADSVASC 2 on Warfarin), HLD, hypertriglyceridemia, LVH, PSVT, SSS s/p PPM 2008, CKD III, RA, and anxiety. He is following up today. He reports feeling at baseline. He denies chest discomfort, palpitations, abdominal fullness/bloating or peripheral edema, shortness of breath, paroxysmal nocturnal dyspnea, orthopnea, lightheadedness, dizziness, pre-syncope, or syncope. Device interrogation shows normal device function, RV lead has no capture at max output, previously was  60%. He had been scheduled for previous lead revision, but he kept cancelling due to ride situation.  Current cardiac medications include: Metoprolol, Simvastatin, and Warfarin.     Sick Sinus Syndrome s/p PPM 2008 gen change 5/21/20  Rising RV lead thresholds  Permanent Atrial Fibrillation:  1. Stroke Prophylaxis:  CHADSVASC=++age  2, corresponding to a 2.2% annual stroke / systemic [facility] event rate. indicating need for long term oral anticoagulation.  He is appropriately on Warfarin. No bleeding issues.   2. Rate Control: Well rate controlled on Toprol XL.   3. Rhythm Control: He is in permanent AF, asymptomatic, rate controlled, and preserved LVEF; therefore, no compelling indications for rhythm control measures at this time.   4. He had PPM implanted for SSS in 2008 and has progressed to permanent AF for many years now. He does have times with SVR and requires  about 50-70% of the time. He had a gen change locally  and old RA lead was felt to have undesirable characteristics and a new RA lead was placed. The new RA lead his high thresholds. He does not require RA lead given he is in permanent AF. He then had rising thresholds on RV lead since generator change.Now RV lead has failed and does not capture at max outputs. We had previously had planned for lead revision, but he cancelled the procedure several times. We again discussed lead management in detail with patient. We did not recommend extraction of leads given risks likely outweigh benefit at his age. We do favor attempting to remove the newer placed RA lead as it is not required and placement of new RV lead with better thresholds. We discussed indications, risks, and benefits in detail. He states understanding and is agreeable to pursue RA lead removal and placement of new RV lead.   5. Risk Factor Management: Statin, BP control, and HUSSAIN evaluation.     Follow up in 3 months post device procedure.        History of Present Illness/Subjective    Mr. Kg Ferraro is a 76 year old male who comes in today for EP follow-up of permanent AF, SSS, PPM cares with RV lead failure.    Mr. Ferraro is a 76 year old male who has a past medical history significant for HFpEF, permanent AF (CHADSVASC 2 on Warfarin), HLD, hypertriglyceridemia, LVH, PSVT, SSS s/p PPM 2008, CKD III, RA, and anxiety.   He has SSS and had dual chamber PPM implanted in 2008. He also has history of PAF for which he has been on Flecainide, Toprol XL, and Warfarin. In 5/2020, he presented to [facility] with increasing palpitations and lightheadedness.  He states he was outside with his wife.  He got up to walk to his car and fell to his knees almost immediately.  He had to crawl to his car because he was weak.  He was noted to be in AF with SVR 40s and stable blood pressures. His PPM was found to be EOL and unable to interrogate device. He was arrange for PPM generator change locally on  5/21/20. During generator change they reported RA lead was not working and he had new RA lead placed. Op note details they attempted to access the subclavian vein with multiple attempts to this were successful in blood return, but unsuccessful in threading the wire.  They then proceeded to access the base of the right neck with angiocatheter and accessed the junction of the internal jugular vein and the innominate and passed a wire down this. They tunneled the lead over to the pacemaker pocket; however, they felt lead was too difficult to manipulate so they re-tunneled the lead. They reported taking a lead position that had higher voltages as this was all they could obtain. A more recent device check on 12/14/20 showed only 3-4 years of battery life, rising RV lead threshold, and undersensing on RA lead. He was referred to EP for evaluation.      EP Visit 2/23/21: He reports feeling well. He denies any chest pain/pressures, dizziness, lightheadedness, worsening shortness of breath, leg/ankle swelling, PND, orthopnea, palpitations, or syncopal symptoms. Device interrogation shows normal device function. RV threshold is stable at 2.25V @ 1.0ms.  No episodes recorded. No arrhythmias recorded. Intrinsic rhythm = Afib w VS @ 48-92 bpm.  = 64%. Estimated battery longevity to PEGGY = 4.5 years. Battery voltage = 3.00V. No short v-v intervals recorded. Lead impedance trends appear stable. Recent echo showed normal structure and function. Current cardiac medications include: Metoprolol, Simvastatin, and Warfarin.      He is following up today. He reports feeling at baseline. He denies chest discomfort, palpitations, abdominal fullness/bloating or peripheral edema, shortness of breath, paroxysmal nocturnal dyspnea, orthopnea, lightheadedness, dizziness, pre-syncope, or syncope. Device interrogation shows normal device function, RV lead has no capture at max output, previously was  60%. He had been scheduled for previous  lead revision, but he kept cancelling due to ride situation.  Current cardiac medications include: Metoprolol, Simvastatin, and Warfarin.     I have reviewed and updated the patient's Past Medical History, Social History, Family History and Medication List.     Cardiographics (Personally Reviewed) :   1/22/21 ECHOCARDIOGRAM:   Interpretation Summary  Left ventricular function, chamber size, wall motion, and wall thickness are  normal.The EF is 55-60%.  Right ventricular function, chamber size, wall motion, and thickness are  normal.  No pericardial effusion is present.          Physical Examination   /78 (BP Location: Right arm, Patient Position: Sitting, Cuff Size: Adult Large)   Pulse 70   Temp 98.3  F (36.8  C) (Temporal)   Resp 20   Wt 111.6 kg (246 lb)   SpO2 97%   BMI 38.61 kg/m    Wt Readings from Last 3 Encounters:   06/29/21 109.8 kg (242 lb)   02/23/21 113.2 kg (249 lb 9.6 oz)   02/23/21 113.2 kg (249 lb 9.6 oz)     General Appearance:   Alert, well-appearing and in no acute distress.   HEENT: Atraumatic, normocephalic. PERRL.  MMM.   Chest/Lungs:   Respirations unlabored.  Lungs are clear to auscultation.   Cardiovascular:   Irregularly irregular. No murmur.    Abdomen:  Soft, nontender, nondistended.   Extremities: No cyanosis or clubbing. No edema.   Musculoskeletal: Moves all extremities.  Gait normal.   Skin: Warm, dry, intact.    Neurologic: Mood and affect are appropriate.  Alert and oriented to person, place, time, and situation.          Medications  Allergies   Current Outpatient Medications   Medication Sig Dispense Refill     acetaminophen (TYLENOL) 500 MG tablet Take 1,000 mg by mouth daily as needed for mild pain       allopurinol (ZYLOPRIM) 100 MG tablet Take 1 tablet (100 mg) by mouth 2 times daily 180 tablet 3     Blood Pressure Monitoring (RA BLOOD PRESSURE CUFF MONITOR) MISC Medium Cuff       metoprolol succinate ER (TOPROL-XL) 100 MG 24 hr tablet Take 1 tablet (100 mg) by  mouth every morning AND 2 tablets (200 mg) every evening. 270 tablet 1     potassium chloride ER (KLOR-CON M) 20 MEQ CR tablet Take 1 tablet (20 mEq) by mouth daily 90 tablet 3     simvastatin (ZOCOR) 40 MG tablet Take 1 tablet (40 mg) by mouth daily 90 tablet 3     warfarin ANTICOAGULANT (COUMADIN) 4 MG tablet TAKE 1 TABLET BY MOUTH ONCE DAILY OR  AS  DIRECTED BY  [facility] 90 tablet 1    Allergies   Allergen Reactions     Penicillins Hives     Rash or hives - can't remember         Lab Results (Personally Reviewed)    Chemistry/lipid CBC Cardiac Enzymes/BNP/TSH/INR   Lab Results   Component Value Date    BUN 31 (H) 06/29/2021     06/29/2021    CO2 24 06/29/2021     Creatinine   Date Value Ref Range Status   06/29/2021 1.33 (H) 0.70 - 1.30 mg/dL Final       Lab Results   Component Value Date    CHOL 131 06/29/2021    HDL 39 06/29/2021    LDL 63 06/29/2021      Lab Results   Component Value Date    WBC 7.8 06/29/2021    HGB 15.8 06/29/2021    HCT 47.9 06/29/2021    MCV 95 06/29/2021     06/29/2021    Lab Results   Component Value Date     (H) 07/12/2015    INR 2.3 (A) 01/20/2022        The patient states understanding and is agreeable with the plan.   Ryan Darby MD [facility]  [facility] - Electrophysiology      Total time spent on patient visit, reviewing notes, imaging, labs, orders, and completing necessary documentation: 45 minutes.                  Please do not hesitate to contact me if you have any questions/concerns.     Sincerely,     Ryan Darby MD

## 2022-02-10 NOTE — NURSING NOTE
"Denies chest pain/pressure, lightheadedness, nausea, increased fatigue, syncope or pre-syncope.  Patient does get shortness of breath when he walks more than a block or so.  He states he has had this for years.  Has to take breaks between shoveling and other physical activities he does.    Lung sounds clear bilaterally.    Chief Complaint   Patient presents with     Telemedicine consult     discuss lead revision       Initial /78 (BP Location: Right arm, Patient Position: Sitting, Cuff Size: Adult Large)   Pulse 70   Temp 98.3  F (36.8  C) (Temporal)   Resp 20   Wt 111.6 kg (246 lb)   SpO2 97%   BMI 38.61 kg/m   Estimated body mass index is 38.61 kg/m  as calculated from the following:    Height as of 12/22/20: 1.7 m (5' 6.93\").    Weight as of this encounter: 111.6 kg (246 lb).  Medication Reconciliation: complete    Guera Nina RN  "

## 2022-02-10 NOTE — PROGRESS NOTES
ELECTROPHYSIOLOGY CLINIC VISIT    Assessment/Recommendations   Assessment/Plan:    Mr. Ferraro is a 76 year old male who has a past medical history significant for HFpEF, permanent AF (CHADSVASC 2 on Warfarin), HLD, hypertriglyceridemia, LVH, PSVT, SSS s/p PPM 2008, CKD III, RA, and anxiety. He is following up today. He reports feeling at baseline. He denies chest discomfort, palpitations, abdominal fullness/bloating or peripheral edema, shortness of breath, paroxysmal nocturnal dyspnea, orthopnea, lightheadedness, dizziness, pre-syncope, or syncope. Device interrogation shows normal device function, RV lead has no capture at max output, previously was  60%. He had been scheduled for previous lead revision, but he kept cancelling due to ride situation.  Current cardiac medications include: Metoprolol, Simvastatin, and Warfarin.     Sick Sinus Syndrome s/p PPM 2008 gen change 5/21/20  Rising RV lead thresholds  Permanent Atrial Fibrillation:  1. Stroke Prophylaxis:  CHADSVASC=++age  2, corresponding to a 2.2% annual stroke / systemic emolism event rate. indicating need for long term oral anticoagulation.  He is appropriately on Warfarin. No bleeding issues.   2. Rate Control: Well rate controlled on Toprol XL.   3. Rhythm Control: He is in permanent AF, asymptomatic, rate controlled, and preserved LVEF; therefore, no compelling indications for rhythm control measures at this time.   4. He had PPM implanted for SSS in 2008 and has progressed to permanent AF for many years now. He does have times with SVR and requires  about 50-70% of the time. He had a gen change locally and old RA lead was felt to have undesirable characteristics and a new RA lead was placed. The new RA lead his high thresholds. He does not require RA lead given he is in permanent AF. He then had rising thresholds on RV lead since generator change.Now RV lead has failed and does not capture at max outputs. We had previously had planned for  lead revision, but he cancelled the procedure several times. We again discussed lead management in detail with patient. We did not recommend extraction of leads given risks likely outweigh benefit at his age. We do favor attempting to remove the newer placed RA lead as it is not required and placement of new RV lead with better thresholds. We discussed indications, risks, and benefits in detail. He states understanding and is agreeable to pursue RA lead removal and placement of new RV lead.   5. Risk Factor Management: Statin, BP control, and HUSSAIN evaluation.     Follow up in 3 months post device procedure.        History of Present Illness/Subjective    Mr. Kg Ferraro is a 76 year old male who comes in today for EP follow-up of permanent AF, SSS, PPM cares with RV lead failure.    Mr. Ferraro is a 76 year old male who has a past medical history significant for HFpEF, permanent AF (CHADSVASC 2 on Warfarin), HLD, hypertriglyceridemia, LVH, PSVT, SSS s/p PPM 2008, CKD III, RA, and anxiety.   He has SSS and had dual chamber PPM implanted in 2008. He also has history of PAF for which he has been on Flecainide, Toprol XL, and Warfarin. In 5/2020, he presented to [facility] with increasing palpitations and lightheadedness.  He states he was outside with his wife.  He got up to walk to his car and fell to his knees almost immediately.  He had to crawl to his car because he was weak.  He was noted to be in AF with SVR 40s and stable blood pressures. His PPM was found to be EOL and unable to interrogate device. He was arrange for PPM generator change locally on 5/21/20. During generator change they reported RA lead was not working and he had new RA lead placed. Op note details they attempted to access the subclavian vein with multiple attempts to this were successful in blood return, but unsuccessful in threading the wire.  They then proceeded to access the base of the right neck with angiocatheter and  accessed the junction of the internal jugular vein and the innominate and passed a wire down this. They tunneled the lead over to the pacemaker pocket; however, they felt lead was too difficult to manipulate so they re-tunneled the lead. They reported taking a lead position that had higher voltages as this was all they could obtain. A more recent device check on 12/14/20 showed only 3-4 years of battery life, rising RV lead threshold, and undersensing on RA lead. He was referred to EP for evaluation.      EP Visit 2/23/21: He reports feeling well. He denies any chest pain/pressures, dizziness, lightheadedness, worsening shortness of breath, leg/ankle swelling, PND, orthopnea, palpitations, or syncopal symptoms. Device interrogation shows normal device function. RV threshold is stable at 2.25V @ 1.0ms.  No episodes recorded. No arrhythmias recorded. Intrinsic rhythm = Afib w VS @ 48-92 bpm.  = 64%. Estimated battery longevity to PEGGY = 4.5 years. Battery voltage = 3.00V. No short v-v intervals recorded. Lead impedance trends appear stable. Recent echo showed normal structure and function. Current cardiac medications include: Metoprolol, Simvastatin, and Warfarin.      He is following up today. He reports feeling at baseline. He denies chest discomfort, palpitations, abdominal fullness/bloating or peripheral edema, shortness of breath, paroxysmal nocturnal dyspnea, orthopnea, lightheadedness, dizziness, pre-syncope, or syncope. Device interrogation shows normal device function, RV lead has no capture at max output, previously was  60%. He had been scheduled for previous lead revision, but he kept cancelling due to ride situation.  Current cardiac medications include: Metoprolol, Simvastatin, and Warfarin.     I have reviewed and updated the patient's Past Medical History, Social History, Family History and Medication List.     Cardiographics (Personally Reviewed) :   1/22/21 ECHOCARDIOGRAM:   Interpretation  Summary  Left ventricular function, chamber size, wall motion, and wall thickness are  normal.The EF is 55-60%.  Right ventricular function, chamber size, wall motion, and thickness are  normal.  No pericardial effusion is present.          Physical Examination   /78 (BP Location: Right arm, Patient Position: Sitting, Cuff Size: Adult Large)   Pulse 70   Temp 98.3  F (36.8  C) (Temporal)   Resp 20   Wt 111.6 kg (246 lb)   SpO2 97%   BMI 38.61 kg/m    Wt Readings from Last 3 Encounters:   06/29/21 109.8 kg (242 lb)   02/23/21 113.2 kg (249 lb 9.6 oz)   02/23/21 113.2 kg (249 lb 9.6 oz)     General Appearance:   Alert, well-appearing and in no acute distress.   HEENT: Atraumatic, normocephalic. PERRL.  MMM.   Chest/Lungs:   Respirations unlabored.  Lungs are clear to auscultation.   Cardiovascular:   Irregularly irregular. No murmur.    Abdomen:  Soft, nontender, nondistended.   Extremities: No cyanosis or clubbing. No edema.   Musculoskeletal: Moves all extremities.  Gait normal.   Skin: Warm, dry, intact.    Neurologic: Mood and affect are appropriate.  Alert and oriented to person, place, time, and situation.          Medications  Allergies   Current Outpatient Medications   Medication Sig Dispense Refill     acetaminophen (TYLENOL) 500 MG tablet Take 1,000 mg by mouth daily as needed for mild pain       allopurinol (ZYLOPRIM) 100 MG tablet Take 1 tablet (100 mg) by mouth 2 times daily 180 tablet 3     Blood Pressure Monitoring (RA BLOOD PRESSURE CUFF MONITOR) MISC Medium Cuff       metoprolol succinate ER (TOPROL-XL) 100 MG 24 hr tablet Take 1 tablet (100 mg) by mouth every morning AND 2 tablets (200 mg) every evening. 270 tablet 1     potassium chloride ER (KLOR-CON M) 20 MEQ CR tablet Take 1 tablet (20 mEq) by mouth daily 90 tablet 3     simvastatin (ZOCOR) 40 MG tablet Take 1 tablet (40 mg) by mouth daily 90 tablet 3     warfarin ANTICOAGULANT (COUMADIN) 4 MG tablet TAKE 1 TABLET BY MOUTH ONCE DAILY  OR  AS  DIRECTED BY  [facility]  CLINIC 90 tablet 1    Allergies   Allergen Reactions     Penicillins Hives     Rash or hives - can't remember         Lab Results (Personally Reviewed)    Chemistry/lipid CBC Cardiac Enzymes/BNP/TSH/INR   Lab Results   Component Value Date    BUN 31 (H) 06/29/2021     06/29/2021    CO2 24 06/29/2021     Creatinine   Date Value Ref Range Status   06/29/2021 1.33 (H) 0.70 - 1.30 mg/dL Final       Lab Results   Component Value Date    CHOL 131 06/29/2021    HDL 39 06/29/2021    LDL 63 06/29/2021      Lab Results   Component Value Date    WBC 7.8 06/29/2021    HGB 15.8 06/29/2021    HCT 47.9 06/29/2021    MCV 95 06/29/2021     06/29/2021    Lab Results   Component Value Date     (H) 07/12/2015    INR 2.3 (A) 01/20/2022        The patient states understanding and is agreeable with the plan.   Ryan Darby MD [facility]  Cardiology - Electrophysiology      Total time spent on patient visit, reviewing notes, imaging, labs, orders, and completing necessary documentation: 45 minutes.

## 2022-02-13 RX ORDER — SODIUM CHLORIDE 9 MG/ML
INJECTION, SOLUTION INTRAVENOUS CONTINUOUS
Status: CANCELLED | OUTPATIENT
Start: 2022-02-13

## 2022-02-13 RX ORDER — CLINDAMYCIN PHOSPHATE 900 MG/50ML
900 INJECTION, SOLUTION INTRAVENOUS
Status: CANCELLED | OUTPATIENT
Start: 2022-02-13

## 2022-02-13 RX ORDER — LIDOCAINE 40 MG/G
CREAM TOPICAL
Status: CANCELLED | OUTPATIENT
Start: 2022-02-13

## 2022-02-24 ENCOUNTER — HOSPITAL ENCOUNTER (OUTPATIENT)
Facility: CLINIC | Age: 77
End: 2022-02-24
Attending: INTERNAL MEDICINE | Admitting: INTERNAL MEDICINE
Payer: MEDICARE

## 2022-02-24 DIAGNOSIS — T82.110D PACEMAKER LEAD MALFUNCTION, SUBSEQUENT ENCOUNTER: Primary | ICD-10-CM

## 2022-02-24 DIAGNOSIS — T82.110D PACEMAKER LEAD MALFUNCTION, SUBSEQUENT ENCOUNTER: ICD-10-CM

## 2022-02-24 RX ORDER — LIDOCAINE 40 MG/G
CREAM TOPICAL
Status: CANCELLED | OUTPATIENT
Start: 2022-02-24

## 2022-02-24 RX ORDER — SODIUM CHLORIDE 9 MG/ML
INJECTION, SOLUTION INTRAVENOUS CONTINUOUS
Status: CANCELLED | OUTPATIENT
Start: 2022-02-24

## 2022-02-24 RX ORDER — CLINDAMYCIN PHOSPHATE 900 MG/50ML
900 INJECTION, SOLUTION INTRAVENOUS
Status: CANCELLED | OUTPATIENT
Start: 2022-02-24

## 2022-02-25 DIAGNOSIS — Z11.59 ENCOUNTER FOR SCREENING FOR OTHER VIRAL DISEASES: Primary | ICD-10-CM

## 2022-03-02 ENCOUNTER — ANCILLARY PROCEDURE (OUTPATIENT)
Dept: CARDIOLOGY | Facility: CLINIC | Age: 77
End: 2022-03-02
Attending: INTERNAL MEDICINE
Payer: MEDICARE

## 2022-03-02 DIAGNOSIS — I49.5 SICK SINUS SYNDROME (H): ICD-10-CM

## 2022-03-02 PROCEDURE — 93296 REM INTERROG EVL PM/IDS: CPT

## 2022-03-02 PROCEDURE — 93294 REM INTERROG EVL PM/LDLS PM: CPT | Performed by: INTERNAL MEDICINE

## 2022-03-23 ENCOUNTER — HOSPITAL ENCOUNTER (OUTPATIENT)
Facility: CLINIC | Age: 77
End: 2022-03-23
Attending: INTERNAL MEDICINE | Admitting: INTERNAL MEDICINE
Payer: MEDICARE

## 2022-03-23 DIAGNOSIS — T82.110D PACEMAKER LEAD MALFUNCTION, SUBSEQUENT ENCOUNTER: ICD-10-CM

## 2022-03-23 DIAGNOSIS — T82.110D PACEMAKER LEAD MALFUNCTION, SUBSEQUENT ENCOUNTER: Primary | ICD-10-CM

## 2022-03-23 RX ORDER — CLINDAMYCIN PHOSPHATE 900 MG/50ML
900 INJECTION, SOLUTION INTRAVENOUS
Status: CANCELLED | OUTPATIENT
Start: 2022-03-23

## 2022-03-23 RX ORDER — LIDOCAINE 40 MG/G
CREAM TOPICAL
Status: CANCELLED | OUTPATIENT
Start: 2022-03-23

## 2022-03-23 RX ORDER — SODIUM CHLORIDE 9 MG/ML
INJECTION, SOLUTION INTRAVENOUS CONTINUOUS
Status: CANCELLED | OUTPATIENT
Start: 2022-03-23

## 2022-03-28 ENCOUNTER — ANTICOAGULATION THERAPY VISIT (OUTPATIENT)
Dept: ANTICOAGULATION | Facility: OTHER | Age: 77
End: 2022-03-28
Attending: INTERNAL MEDICINE
Payer: MEDICARE

## 2022-03-28 DIAGNOSIS — Z79.01 LONG TERM CURRENT USE OF ANTICOAGULANT THERAPY: Primary | ICD-10-CM

## 2022-03-28 DIAGNOSIS — I47.10 PAROXYSMAL SUPRAVENTRICULAR TACHYCARDIA (H): ICD-10-CM

## 2022-03-28 LAB — INR POINT OF CARE: 2.5 (ref 0.9–1.1)

## 2022-03-28 PROCEDURE — 85610 PROTHROMBIN TIME: CPT | Mod: ZL,QW

## 2022-03-28 PROCEDURE — 36416 COLLJ CAPILLARY BLOOD SPEC: CPT | Mod: ZL

## 2022-03-28 NOTE — PROGRESS NOTES
ANTICOAGULATION FOLLOW-UP CLINIC VISIT    Patient Name:  Kg Ferraro  Date:  3/28/2022  Contact Type:  Face to Face    SUBJECTIVE:  Patient Findings         Clinical Outcomes     Negatives:  Major bleeding event, Thromboembolic event, Anticoagulation-related hospital admission, Anticoagulation-related ED visit, Anticoagulation-related fatality           OBJECTIVE    Recent labs: (last 7 days)     22  1546   INR 2.5*       ASSESSMENT / PLAN  INR assessment THER    Recheck INR In: 6 WEEKS    INR Location Clinic      Anticoagulation Summary  As of 3/28/2022    INR goal:  2.0-3.0   TTR:  97.2 % (10.1 mo)   INR used for dosin.5 (3/28/2022)   Warfarin maintenance plan:  4 mg (4 mg x 1) every day   Full warfarin instructions:  4 mg every day   Weekly warfarin total:  28 mg   No change documented:  Lillian Ramos RN   Plan last modified:  Emani Pereira RN (10/19/2018)   Next INR check:  2022   Priority:  Maintenance   Target end date:  Indefinite    Indications    Long-term (current) use of anticoagulants [Z79.01] [Z79.01]  Paroxysmal supraventricular tachycardia (H) [I47.1]             Anticoagulation Episode Summary     INR check location:      Preferred lab:      Send INR reminders to:  [facility]    Comments:        Anticoagulation Care Providers     Provider Role Specialty Phone number    Caleb Parker MD [facility] Internal Medicine [phone]            See the Encounter Report to view Anticoagulation Flowsheet and Dosing Calendar (Go to Encounters tab in chart review, and find the Anticoagulation Therapy Visit)        Lillian Ramos RN

## 2022-04-04 ENCOUNTER — ALLIED HEALTH/NURSE VISIT (OUTPATIENT)
Dept: FAMILY MEDICINE | Facility: OTHER | Age: 77
End: 2022-04-04
Attending: FAMILY MEDICINE
Payer: MEDICARE

## 2022-04-04 DIAGNOSIS — Z20.822 COVID-19 RULED OUT: Primary | ICD-10-CM

## 2022-04-04 DIAGNOSIS — Z11.59 ENCOUNTER FOR SCREENING FOR OTHER VIRAL DISEASES: ICD-10-CM

## 2022-04-04 PROCEDURE — U0003 INFECTIOUS AGENT DETECTION BY NUCLEIC ACID (DNA OR RNA); SEVERE ACUTE RESPIRATORY SYNDROME CORONAVIRUS 2 (SARS-COV-2) (CORONAVIRUS DISEASE [COVID-19]), AMPLIFIED PROBE TECHNIQUE, MAKING USE OF HIGH THROUGHPUT TECHNOLOGIES AS DESCRIBED BY CMS-2020-01-R: HCPCS | Mod: ZL

## 2022-04-04 PROCEDURE — C9803 HOPD COVID-19 SPEC COLLECT: HCPCS

## 2022-04-04 NOTE — PROGRESS NOTES
Patient here today for Covid test. Procedure on 04/08 at [facility].  [phone]     Stephanie Gold CNA .............................on 4/4/2022 at 9:32 AM

## 2022-04-05 LAB — SARS-COV-2 RNA RESP QL NAA+PROBE: NEGATIVE

## 2022-04-06 DIAGNOSIS — Z11.59 ENCOUNTER FOR SCREENING FOR OTHER VIRAL DISEASES: Primary | ICD-10-CM

## 2022-04-07 ENCOUNTER — TELEPHONE (OUTPATIENT)
Dept: CARDIOLOGY | Facility: CLINIC | Age: 77
End: 2022-04-07
Payer: MEDICARE

## 2022-04-07 NOTE — TELEPHONE ENCOUNTER
Per Kori MONTES a  for Dr. Darby, patient cancelled lead revision surgery and says he was just told by the device team that his device is good and he's got multiple years on the battery.    I spoke to patient and notified him that his battery is good for a few more years but that one of his leads has failed and needs to be revised.  He verbalized understanding.    Patient transferred to  to make a covid test for prior to surgery.  Guera Nina RN......April 7, 2022...10:27 AM

## 2022-04-21 LAB
MDC_IDC_EPISODE_DTM: NORMAL
MDC_IDC_EPISODE_DTM: NORMAL
MDC_IDC_EPISODE_DURATION: 1 S
MDC_IDC_EPISODE_DURATION: 3 S
MDC_IDC_EPISODE_ID: NORMAL
MDC_IDC_EPISODE_ID: NORMAL
MDC_IDC_EPISODE_TYPE: NORMAL
MDC_IDC_EPISODE_TYPE: NORMAL
MDC_IDC_LEAD_IMPLANT_DT: NORMAL
MDC_IDC_LEAD_IMPLANT_DT: NORMAL
MDC_IDC_LEAD_LOCATION: NORMAL
MDC_IDC_LEAD_LOCATION: NORMAL
MDC_IDC_LEAD_LOCATION_DETAIL_1: NORMAL
MDC_IDC_LEAD_LOCATION_DETAIL_1: NORMAL
MDC_IDC_LEAD_MFG: NORMAL
MDC_IDC_LEAD_MFG: NORMAL
MDC_IDC_LEAD_MODEL: NORMAL
MDC_IDC_LEAD_MODEL: NORMAL
MDC_IDC_LEAD_POLARITY_TYPE: NORMAL
MDC_IDC_LEAD_POLARITY_TYPE: NORMAL
MDC_IDC_LEAD_SERIAL: NORMAL
MDC_IDC_LEAD_SERIAL: NORMAL
MDC_IDC_LEAD_SPECIAL_FUNCTION: NORMAL
MDC_IDC_LEAD_SPECIAL_FUNCTION: NORMAL
MDC_IDC_MSMT_BATTERY_DTM: NORMAL
MDC_IDC_MSMT_BATTERY_REMAINING_LONGEVITY: 85 MO
MDC_IDC_MSMT_BATTERY_RRT_TRIGGER: 2.62
MDC_IDC_MSMT_BATTERY_STATUS: NORMAL
MDC_IDC_MSMT_BATTERY_VOLTAGE: 2.99 V
MDC_IDC_MSMT_LEADCHNL_RA_IMPEDANCE_VALUE: 475 OHM
MDC_IDC_MSMT_LEADCHNL_RA_SENSING_INTR_AMPL: 0.4 MV
MDC_IDC_MSMT_LEADCHNL_RV_IMPEDANCE_VALUE: 646 OHM
MDC_IDC_MSMT_LEADCHNL_RV_PACING_THRESHOLD_AMPLITUDE: 2.5 V
MDC_IDC_MSMT_LEADCHNL_RV_PACING_THRESHOLD_PULSEWIDTH: 0.4 MS
MDC_IDC_MSMT_LEADCHNL_RV_SENSING_INTR_AMPL: 10 MV
MDC_IDC_PG_IMPLANT_DTM: NORMAL
MDC_IDC_PG_MFG: NORMAL
MDC_IDC_PG_MODEL: NORMAL
MDC_IDC_PG_SERIAL: NORMAL
MDC_IDC_PG_TYPE: NORMAL
MDC_IDC_SESS_CLINIC_NAME: NORMAL
MDC_IDC_SESS_DTM: NORMAL
MDC_IDC_SESS_TYPE: NORMAL
MDC_IDC_SET_BRADY_HYSTRATE: NORMAL
MDC_IDC_SET_BRADY_LOWRATE: 60 {BEATS}/MIN
MDC_IDC_SET_BRADY_MAX_SENSOR_RATE: 130 {BEATS}/MIN
MDC_IDC_SET_BRADY_MODE: NORMAL
MDC_IDC_SET_LEADCHNL_RA_SENSING_ANODE_ELECTRODE_1: NORMAL
MDC_IDC_SET_LEADCHNL_RA_SENSING_ANODE_LOCATION_1: NORMAL
MDC_IDC_SET_LEADCHNL_RA_SENSING_CATHODE_ELECTRODE_1: NORMAL
MDC_IDC_SET_LEADCHNL_RA_SENSING_CATHODE_LOCATION_1: NORMAL
MDC_IDC_SET_LEADCHNL_RA_SENSING_POLARITY: NORMAL
MDC_IDC_SET_LEADCHNL_RA_SENSING_SENSITIVITY: NORMAL
MDC_IDC_SET_LEADCHNL_RV_PACING_AMPLITUDE: 5 V
MDC_IDC_SET_LEADCHNL_RV_PACING_ANODE_ELECTRODE_1: NORMAL
MDC_IDC_SET_LEADCHNL_RV_PACING_ANODE_LOCATION_1: NORMAL
MDC_IDC_SET_LEADCHNL_RV_PACING_CAPTURE_MODE: NORMAL
MDC_IDC_SET_LEADCHNL_RV_PACING_CATHODE_ELECTRODE_1: NORMAL
MDC_IDC_SET_LEADCHNL_RV_PACING_CATHODE_LOCATION_1: NORMAL
MDC_IDC_SET_LEADCHNL_RV_PACING_POLARITY: NORMAL
MDC_IDC_SET_LEADCHNL_RV_PACING_PULSEWIDTH: 0.4 MS
MDC_IDC_SET_LEADCHNL_RV_SENSING_ANODE_ELECTRODE_1: NORMAL
MDC_IDC_SET_LEADCHNL_RV_SENSING_ANODE_LOCATION_1: NORMAL
MDC_IDC_SET_LEADCHNL_RV_SENSING_CATHODE_ELECTRODE_1: NORMAL
MDC_IDC_SET_LEADCHNL_RV_SENSING_CATHODE_LOCATION_1: NORMAL
MDC_IDC_SET_LEADCHNL_RV_SENSING_POLARITY: NORMAL
MDC_IDC_SET_LEADCHNL_RV_SENSING_SENSITIVITY: 0.9 MV
MDC_IDC_SET_ZONE_DETECTION_INTERVAL: 400 MS
MDC_IDC_SET_ZONE_TYPE: NORMAL
MDC_IDC_STAT_BRADY_AP_VP_PERCENT: 0 %
MDC_IDC_STAT_BRADY_AP_VS_PERCENT: 0 %
MDC_IDC_STAT_BRADY_AS_VP_PERCENT: 60.45 %
MDC_IDC_STAT_BRADY_AS_VS_PERCENT: 39.55 %
MDC_IDC_STAT_BRADY_DTM_END: NORMAL
MDC_IDC_STAT_BRADY_DTM_START: NORMAL
MDC_IDC_STAT_BRADY_RA_PERCENT_PACED: 0 %
MDC_IDC_STAT_BRADY_RV_PERCENT_PACED: 60.45 %
MDC_IDC_STAT_EPISODE_RECENT_COUNT: 0
MDC_IDC_STAT_EPISODE_RECENT_COUNT: 2
MDC_IDC_STAT_EPISODE_RECENT_COUNT_DTM_END: NORMAL
MDC_IDC_STAT_EPISODE_RECENT_COUNT_DTM_START: NORMAL
MDC_IDC_STAT_EPISODE_TOTAL_COUNT: 0
MDC_IDC_STAT_EPISODE_TOTAL_COUNT: 3
MDC_IDC_STAT_EPISODE_TOTAL_COUNT: NORMAL
MDC_IDC_STAT_EPISODE_TOTAL_COUNT_DTM_END: NORMAL
MDC_IDC_STAT_EPISODE_TOTAL_COUNT_DTM_START: NORMAL
MDC_IDC_STAT_EPISODE_TYPE: NORMAL

## 2022-05-09 ENCOUNTER — ALLIED HEALTH/NURSE VISIT (OUTPATIENT)
Dept: FAMILY MEDICINE | Facility: OTHER | Age: 77
End: 2022-05-09
Attending: INTERNAL MEDICINE
Payer: MEDICARE

## 2022-05-09 ENCOUNTER — ANTICOAGULATION THERAPY VISIT (OUTPATIENT)
Dept: ANTICOAGULATION | Facility: OTHER | Age: 77
End: 2022-05-09
Attending: INTERNAL MEDICINE
Payer: MEDICARE

## 2022-05-09 DIAGNOSIS — Z11.59 ENCOUNTER FOR SCREENING FOR OTHER VIRAL DISEASES: ICD-10-CM

## 2022-05-09 DIAGNOSIS — I47.10 PAROXYSMAL SUPRAVENTRICULAR TACHYCARDIA (H): ICD-10-CM

## 2022-05-09 DIAGNOSIS — Z79.01 LONG TERM CURRENT USE OF ANTICOAGULANT THERAPY: Primary | ICD-10-CM

## 2022-05-09 LAB — INR POINT OF CARE: 2.5 (ref 0.9–1.1)

## 2022-05-09 PROCEDURE — 36416 COLLJ CAPILLARY BLOOD SPEC: CPT | Mod: ZL

## 2022-05-09 PROCEDURE — C9803 HOPD COVID-19 SPEC COLLECT: HCPCS

## 2022-05-09 PROCEDURE — 85610 PROTHROMBIN TIME: CPT | Mod: ZL,QW

## 2022-05-09 PROCEDURE — U0005 INFEC AGEN DETEC AMPLI PROBE: HCPCS | Mod: ZL

## 2022-05-09 NOTE — PROGRESS NOTES
Patient here today for Covid test. Having a procedure on 5/13 at the [facility] with Dr. Darby.  Fax: [phone]     Stephanie Gold CNA .............................on 5/9/2022 at 9:32 AM

## 2022-05-09 NOTE — PROGRESS NOTES
ANTICOAGULATION MANAGEMENT     Kg Ferraro 77 year old male is on warfarin with therapeutic INR result. (Goal INR 2.0-3.0)    Recent labs: (last 7 days)     05/09/22  0959   INR 2.5*       ASSESSMENT       Source(s): Chart Review and in person       Warfarin doses taken: Warfarin taken as instructed    Diet: No new diet changes identified    New illness, injury, or hospitalization: No    Medication/supplement changes: None noted    Signs or symptoms of bleeding or clotting: No    Previous INR: Therapeutic last 2(+) visits    Additional findings: None       PLAN     Recommended plan for no diet, medication or health factor changes affecting INR     Dosing Instructions: continue your current warfarin dose with next INR in 6 weeks       Summary  As of 5/9/2022    Full warfarin instructions:  4 mg every day   Next INR check:  6/13/2022             in person    patient to make follow up appt    Education provided: Written instructions provided    Plan made per [facility] anticoagulation protocol    Emani Pereira RN  Anticoagulation Clinic  5/9/2022    _______________________________________________________________________     Anticoagulation Episode Summary     Current INR goal:  2.0-3.0   TTR:  97.5 % (11.5 mo)   Target end date:  Indefinite   Send INR reminders to:  [facility]    Indications    Long-term (current) use of anticoagulants [Z79.01] [Z79.01]  Paroxysmal supraventricular tachycardia (H) [I47.1]           Comments:           Anticoagulation Care Providers     Provider Role Specialty Phone number    Caleb Parker MD [facility] Internal Medicine [phone]

## 2022-05-10 LAB — SARS-COV-2 RNA RESP QL NAA+PROBE: NEGATIVE

## 2022-05-11 ENCOUNTER — TELEPHONE (OUTPATIENT)
Dept: CARDIOLOGY | Facility: CLINIC | Age: 77
End: 2022-05-11
Payer: MEDICARE

## 2022-07-01 DIAGNOSIS — I50.32 CHRONIC HEART FAILURE WITH PRESERVED EJECTION FRACTION (H): ICD-10-CM

## 2022-07-05 ENCOUNTER — ANTICOAGULATION THERAPY VISIT (OUTPATIENT)
Dept: ANTICOAGULATION | Facility: OTHER | Age: 77
End: 2022-07-05
Attending: INTERNAL MEDICINE
Payer: MEDICARE

## 2022-07-05 DIAGNOSIS — I47.10 PAROXYSMAL SUPRAVENTRICULAR TACHYCARDIA (H): ICD-10-CM

## 2022-07-05 DIAGNOSIS — Z79.01 LONG TERM CURRENT USE OF ANTICOAGULANT THERAPY: Primary | ICD-10-CM

## 2022-07-05 LAB — INR POINT OF CARE: 2.5 (ref 0.9–1.1)

## 2022-07-05 PROCEDURE — 85610 PROTHROMBIN TIME: CPT | Mod: ZL,QW

## 2022-07-05 RX ORDER — POTASSIUM CHLORIDE 1500 MG/1
20 TABLET, EXTENDED RELEASE ORAL DAILY
Qty: 7 TABLET | Refills: 5 | Status: SHIPPED | OUTPATIENT
Start: 2022-07-05 | End: 2022-08-31

## 2022-07-05 NOTE — PROGRESS NOTES
ANTICOAGULATION MANAGEMENT     Kg Ferraro 77 year old male is on warfarin with therapeutic INR result. (Goal INR 2.0-3.0)    Recent labs: (last 7 days)     07/05/22  1028   INR 2.5*       ASSESSMENT       Source(s): Chart Review and In person       Warfarin doses taken: Warfarin taken as instructed    Diet: No new diet changes identified    New illness, injury, or hospitalization: No    Medication/supplement changes: None noted    Signs or symptoms of bleeding or clotting: No    Previous INR: Therapeutic last 2(+) visits    Additional findings: None       PLAN     Recommended plan for no diet, medication or health factor changes affecting INR     Dosing Instructions: continue your current warfarin dose with next INR in 6 weeks       Summary  As of 7/5/2022    Full warfarin instructions:  4 mg every day   Next INR check:  8/16/2022             In person    Patient offered & declined to schedule next visit    Education provided: Please call back if any changes to your diet, medications or how you've been taking warfarin    Plan made per [facility] anticoagulation protocol    Lillian Ramos RN  Anticoagulation Clinic  7/5/2022    _______________________________________________________________________     Anticoagulation Episode Summary     Current INR goal:  2.0-3.0   TTR:  100.0 % (1 y)   Target end date:  Indefinite   Send INR reminders to:  [facility]    Indications    Long-term (current) use of anticoagulants [Z79.01] [Z79.01]  Paroxysmal supraventricular tachycardia (H) [I47.1]           Comments:           Anticoagulation Care Providers     Provider Role Specialty Phone number    Caleb Parker MD [facility] Internal Medicine [phone]

## 2022-07-05 NOTE — TELEPHONE ENCOUNTER
"Caleb Parker  Encounter also routed to  outreach [place] to schedule an annual visit.   Jolynn Hopkins RN on 7/5/2022 at 9:37 AM    Last Prescription Date: 6/29/21  Last Qty/Refills: 90 / 3  Last Office Visit: 6/29/21 Elena  Future Office Visit: none     Requested Prescriptions   Pending Prescriptions Disp Refills     potassium chloride ER (KLOR-CON M) 20 MEQ CR tablet [Pharmacy Med Name: Potassium Chloride Ayana ER 20 MEQ Oral Tablet Extended Release] 30 tablet 0     Sig: Take 1 tablet by mouth once daily       Potassium Supplements Protocol Failed - 7/1/2022  9:44 AM        Failed - Recent (12 mo) or future (30 days) visit within the authorizing provider's department     Patient has had an office visit with the authorizing provider or a provider within the authorizing providers department within the previous 12 mos or has a future within next 30 days. See \"Patient Info\" tab in inbasket, or \"Choose Columns\" in Meds & Orders section of the refill encounter.              Failed - Normal serum potassium in past 12 months     Recent Labs   Lab Test 06/29/21  0925   POTASSIUM 4.9                    Passed - Medication is active on med list        Passed - Patient is age 18 or older             "

## 2022-07-05 NOTE — TELEPHONE ENCOUNTER
Overdue for Annual Medicare wellness visit / physical.     Please call patient and schedule.  Okay to use 4 PM -- 40 minute spot at the end of the day if needed.    We can send in a small Prescription refill if needed to get to appointment.     Caleb Parker MD

## 2022-07-05 NOTE — TELEPHONE ENCOUNTER
Tried to contact patient, no  set up no other phone number on account.     Rula Villanueva on 7/5/2022 at 2:53 PM

## 2022-07-08 NOTE — TELEPHONE ENCOUNTER
Tried to contact patient x 3, no voicemail. tried to contact patient emergency contact leno Lorenzo a few times then dead air, no one on the line.     Can a letter be sent to notify patient?     Rula Villanueva on 7/8/2022 at 11:49 AM

## 2022-07-12 DIAGNOSIS — Z79.01 ANTICOAGULATION MONITORING, INR RANGE 2-3: ICD-10-CM

## 2022-07-12 DIAGNOSIS — I48.91 ATRIAL FIBRILLATION (H): ICD-10-CM

## 2022-07-12 RX ORDER — WARFARIN SODIUM 4 MG/1
TABLET ORAL
Qty: 90 TABLET | Refills: 4 | Status: SHIPPED | OUTPATIENT
Start: 2022-07-12 | End: 2023-07-18

## 2022-07-12 NOTE — TELEPHONE ENCOUNTER
ANTICOAGULATION MANAGEMENT:  Medication Refill    Anticoagulation Summary  As of 7/5/2022    Warfarin maintenance plan:  4 mg (4 mg x 1) every day   Next INR check:  8/16/2022   Target end date:  Indefinite    Indications    Long-term (current) use of anticoagulants [Z79.01] [Z79.01]  Paroxysmal supraventricular tachycardia (H) [I47.1]             Anticoagulation Care Providers     Provider Role Specialty Phone number    Caleb Parker MD [facility] Internal Medicine [phone]          Visit with referring provider/group within last year: No, last visit date: 6/29/21    [facility] referral signed within last year: Yes    Kg does NOT meet all criteria for refill: Office visit with referring provider's group was >= 1 year ago. 30 day shawn fill approved; patient notified to schedule per [facility] protocol    Emani Pereira RN  Anticoagulation Clinic

## 2022-07-18 ENCOUNTER — ANCILLARY PROCEDURE (OUTPATIENT)
Dept: CARDIOLOGY | Facility: CLINIC | Age: 77
End: 2022-07-18
Attending: INTERNAL MEDICINE
Payer: MEDICARE

## 2022-07-18 DIAGNOSIS — I49.5 SICK SINUS SYNDROME (H): ICD-10-CM

## 2022-07-18 PROCEDURE — 93296 REM INTERROG EVL PM/IDS: CPT

## 2022-07-18 PROCEDURE — 93294 REM INTERROG EVL PM/LDLS PM: CPT | Performed by: INTERNAL MEDICINE

## 2022-07-20 LAB
MDC_IDC_EPISODE_DTM: NORMAL
MDC_IDC_EPISODE_DURATION: 1 S
MDC_IDC_EPISODE_ID: NORMAL
MDC_IDC_EPISODE_TYPE: NORMAL
MDC_IDC_LEAD_IMPLANT_DT: NORMAL
MDC_IDC_LEAD_IMPLANT_DT: NORMAL
MDC_IDC_LEAD_LOCATION: NORMAL
MDC_IDC_LEAD_LOCATION: NORMAL
MDC_IDC_LEAD_LOCATION_DETAIL_1: NORMAL
MDC_IDC_LEAD_LOCATION_DETAIL_1: NORMAL
MDC_IDC_LEAD_MFG: NORMAL
MDC_IDC_LEAD_MFG: NORMAL
MDC_IDC_LEAD_MODEL: NORMAL
MDC_IDC_LEAD_MODEL: NORMAL
MDC_IDC_LEAD_POLARITY_TYPE: NORMAL
MDC_IDC_LEAD_POLARITY_TYPE: NORMAL
MDC_IDC_LEAD_SERIAL: NORMAL
MDC_IDC_LEAD_SERIAL: NORMAL
MDC_IDC_LEAD_SPECIAL_FUNCTION: NORMAL
MDC_IDC_LEAD_SPECIAL_FUNCTION: NORMAL
MDC_IDC_MSMT_BATTERY_DTM: NORMAL
MDC_IDC_MSMT_BATTERY_REMAINING_LONGEVITY: 47 MO
MDC_IDC_MSMT_BATTERY_RRT_TRIGGER: 2.62
MDC_IDC_MSMT_BATTERY_STATUS: NORMAL
MDC_IDC_MSMT_BATTERY_VOLTAGE: 2.99 V
MDC_IDC_MSMT_LEADCHNL_RA_IMPEDANCE_VALUE: 399 OHM
MDC_IDC_MSMT_LEADCHNL_RA_IMPEDANCE_VALUE: 475 OHM
MDC_IDC_MSMT_LEADCHNL_RA_SENSING_INTR_AMPL: 0.38 MV
MDC_IDC_MSMT_LEADCHNL_RA_SENSING_INTR_AMPL: 2 MV
MDC_IDC_MSMT_LEADCHNL_RV_IMPEDANCE_VALUE: 665 OHM
MDC_IDC_MSMT_LEADCHNL_RV_IMPEDANCE_VALUE: 665 OHM
MDC_IDC_MSMT_LEADCHNL_RV_PACING_THRESHOLD_AMPLITUDE: 2.38 V
MDC_IDC_MSMT_LEADCHNL_RV_PACING_THRESHOLD_PULSEWIDTH: 0.4 MS
MDC_IDC_MSMT_LEADCHNL_RV_SENSING_INTR_AMPL: 9.38 MV
MDC_IDC_MSMT_LEADCHNL_RV_SENSING_INTR_AMPL: 9.38 MV
MDC_IDC_PG_IMPLANT_DTM: NORMAL
MDC_IDC_PG_MFG: NORMAL
MDC_IDC_PG_MODEL: NORMAL
MDC_IDC_PG_SERIAL: NORMAL
MDC_IDC_PG_TYPE: NORMAL
MDC_IDC_SESS_CLINIC_NAME: NORMAL
MDC_IDC_SESS_DTM: NORMAL
MDC_IDC_SESS_TYPE: NORMAL
MDC_IDC_SET_BRADY_HYSTRATE: NORMAL
MDC_IDC_SET_BRADY_LOWRATE: 60 {BEATS}/MIN
MDC_IDC_SET_BRADY_MAX_SENSOR_RATE: 130 {BEATS}/MIN
MDC_IDC_SET_BRADY_MODE: NORMAL
MDC_IDC_SET_LEADCHNL_RA_SENSING_ANODE_ELECTRODE_1: NORMAL
MDC_IDC_SET_LEADCHNL_RA_SENSING_ANODE_LOCATION_1: NORMAL
MDC_IDC_SET_LEADCHNL_RA_SENSING_CATHODE_ELECTRODE_1: NORMAL
MDC_IDC_SET_LEADCHNL_RA_SENSING_CATHODE_LOCATION_1: NORMAL
MDC_IDC_SET_LEADCHNL_RA_SENSING_POLARITY: NORMAL
MDC_IDC_SET_LEADCHNL_RA_SENSING_SENSITIVITY: NORMAL
MDC_IDC_SET_LEADCHNL_RV_PACING_AMPLITUDE: 5 V
MDC_IDC_SET_LEADCHNL_RV_PACING_ANODE_ELECTRODE_1: NORMAL
MDC_IDC_SET_LEADCHNL_RV_PACING_ANODE_LOCATION_1: NORMAL
MDC_IDC_SET_LEADCHNL_RV_PACING_CAPTURE_MODE: NORMAL
MDC_IDC_SET_LEADCHNL_RV_PACING_CATHODE_ELECTRODE_1: NORMAL
MDC_IDC_SET_LEADCHNL_RV_PACING_CATHODE_LOCATION_1: NORMAL
MDC_IDC_SET_LEADCHNL_RV_PACING_POLARITY: NORMAL
MDC_IDC_SET_LEADCHNL_RV_PACING_PULSEWIDTH: 1 MS
MDC_IDC_SET_LEADCHNL_RV_SENSING_ANODE_ELECTRODE_1: NORMAL
MDC_IDC_SET_LEADCHNL_RV_SENSING_ANODE_LOCATION_1: NORMAL
MDC_IDC_SET_LEADCHNL_RV_SENSING_CATHODE_ELECTRODE_1: NORMAL
MDC_IDC_SET_LEADCHNL_RV_SENSING_CATHODE_LOCATION_1: NORMAL
MDC_IDC_SET_LEADCHNL_RV_SENSING_POLARITY: NORMAL
MDC_IDC_SET_LEADCHNL_RV_SENSING_SENSITIVITY: 0.9 MV
MDC_IDC_SET_ZONE_DETECTION_INTERVAL: 400 MS
MDC_IDC_SET_ZONE_TYPE: NORMAL
MDC_IDC_STAT_BRADY_AP_VP_PERCENT: 0 %
MDC_IDC_STAT_BRADY_AP_VS_PERCENT: 0 %
MDC_IDC_STAT_BRADY_AS_VP_PERCENT: 56.83 %
MDC_IDC_STAT_BRADY_AS_VS_PERCENT: 43.17 %
MDC_IDC_STAT_BRADY_DTM_END: NORMAL
MDC_IDC_STAT_BRADY_DTM_START: NORMAL
MDC_IDC_STAT_BRADY_RA_PERCENT_PACED: 0 %
MDC_IDC_STAT_BRADY_RV_PERCENT_PACED: 56.83 %
MDC_IDC_STAT_EPISODE_RECENT_COUNT: 0
MDC_IDC_STAT_EPISODE_RECENT_COUNT: 1
MDC_IDC_STAT_EPISODE_RECENT_COUNT_DTM_END: NORMAL
MDC_IDC_STAT_EPISODE_RECENT_COUNT_DTM_START: NORMAL
MDC_IDC_STAT_EPISODE_TOTAL_COUNT: 0
MDC_IDC_STAT_EPISODE_TOTAL_COUNT: 4
MDC_IDC_STAT_EPISODE_TOTAL_COUNT: NORMAL
MDC_IDC_STAT_EPISODE_TOTAL_COUNT_DTM_END: NORMAL
MDC_IDC_STAT_EPISODE_TOTAL_COUNT_DTM_START: NORMAL
MDC_IDC_STAT_EPISODE_TYPE: NORMAL

## 2022-07-21 DIAGNOSIS — I10 BENIGN ESSENTIAL HYPERTENSION: ICD-10-CM

## 2022-07-21 DIAGNOSIS — I47.10 PAROXYSMAL SUPRAVENTRICULAR TACHYCARDIA (H): ICD-10-CM

## 2022-07-21 DIAGNOSIS — E78.2 MIXED HYPERLIPIDEMIA: ICD-10-CM

## 2022-07-21 DIAGNOSIS — I48.0 PAROXYSMAL ATRIAL FIBRILLATION (H): ICD-10-CM

## 2022-07-25 RX ORDER — METOPROLOL SUCCINATE 100 MG/1
TABLET, EXTENDED RELEASE ORAL
Qty: 270 TABLET | Refills: 0 | Status: SHIPPED | OUTPATIENT
Start: 2022-07-25 | End: 2022-11-04

## 2022-07-25 RX ORDER — SIMVASTATIN 40 MG
TABLET ORAL
Qty: 90 TABLET | Refills: 0 | Status: SHIPPED | OUTPATIENT
Start: 2022-07-25 | End: 2022-11-04

## 2022-07-25 NOTE — TELEPHONE ENCOUNTER
"metoprolol succinate ER (TOPROL-XL) 100 MG 24 hr tablet 270 tablet 1 12/19/2021  No   Sig - Route: Take 1 tablet (100 mg) by mouth every morning AND 2 tablets (200 mg) every evening. - Oral      Disp Refills Start End DEEPTHI   simvastatin (ZOCOR) 40 MG tablet 90 tablet 3 6/29/2021  No   Sig - Route: Take 1 tablet (40 mg) by mouth daily - Oral         LOV: 6/29/2021  Future Office visit: No future appointment scheduled at this time.       Routing refill request to provider for review/approval.  Per Quality report patient is due for: Medicare annual wellness visit, PHQ-2, Immunizations and vaccines    Requested Prescriptions   Pending Prescriptions Disp Refills     metoprolol succinate ER (TOPROL XL) 100 MG 24 hr tablet [Pharmacy Med Name: Metoprolol Succinate  MG Oral Tablet Extended Release 24 Hour] 270 tablet 0     Sig: TAKE 1 TABLET BY MOUTH IN THE MORNING AND 2 IN THE EVENING       Beta-Blockers Protocol Failed - 7/21/2022  7:20 AM        Failed - Recent (12 mo) or future (30 days) visit within the authorizing provider's specialty     Patient has had an office visit with the authorizing provider or a provider within the authorizing providers department within the previous 12 mos or has a future within next 30 days. See \"Patient Info\" tab in inbasket, or \"Choose Columns\" in Meds & Orders section of the refill encounter.              Passed - Blood pressure under 140/90 in past 12 months     BP Readings from Last 3 Encounters:   02/10/22 138/78   02/10/22 138/78   06/29/21 138/88                 Passed - Patient is age 6 or older        Passed - Medication is active on med list           simvastatin (ZOCOR) 40 MG tablet [Pharmacy Med Name: Simvastatin 40 MG Oral Tablet] 90 tablet 0     Sig: Take 1 tablet by mouth once daily       Statins Protocol Failed - 7/21/2022  7:20 AM        Failed - LDL on file in past 12 months     Recent Labs   Lab Test 06/29/21  0925   LDL 63             Failed - Recent (12 mo) or " "future (30 days) visit within the authorizing provider's specialty     Patient has had an office visit with the authorizing provider or a provider within the authorizing providers department within the previous 12 mos or has a future within next 30 days. See \"Patient Info\" tab in inbasket, or \"Choose Columns\" in Meds & Orders section of the refill encounter.              Passed - No abnormal creatine kinase in past 12 months     No lab results found.             Passed - Medication is active on med list        Passed - Patient is age 18 or older           Routed to provider for review and consideration. Ni Escalante RN  ....................  7/25/2022   9:03 AM          "

## 2022-07-27 NOTE — TELEPHONE ENCOUNTER
Attempted call to schedule annual wellness, voicemail not set up.    Lucero Lal on 7/27/2022 at 2:24 PM

## 2022-07-29 NOTE — TELEPHONE ENCOUNTER
Patient scheduled for annual wellness visit with [facility] 9/30/22.    Lucero Lal on 7/29/2022 at 1:07 PM

## 2022-08-25 ENCOUNTER — VIRTUAL VISIT (OUTPATIENT)
Dept: CARDIOLOGY | Facility: OTHER | Age: 77
End: 2022-08-25
Attending: INTERNAL MEDICINE
Payer: MEDICARE

## 2022-08-25 ENCOUNTER — VIRTUAL VISIT (OUTPATIENT)
Dept: CARDIOLOGY | Facility: CLINIC | Age: 77
End: 2022-08-25
Attending: INTERNAL MEDICINE
Payer: MEDICARE

## 2022-08-25 VITALS
WEIGHT: 241.4 LBS | SYSTOLIC BLOOD PRESSURE: 162 MMHG | BODY MASS INDEX: 37.89 KG/M2 | RESPIRATION RATE: 16 BRPM | HEART RATE: 60 BPM | DIASTOLIC BLOOD PRESSURE: 92 MMHG | OXYGEN SATURATION: 100 % | TEMPERATURE: 97.2 F

## 2022-08-25 VITALS
SYSTOLIC BLOOD PRESSURE: 162 MMHG | DIASTOLIC BLOOD PRESSURE: 92 MMHG | TEMPERATURE: 97.2 F | RESPIRATION RATE: 16 BRPM | OXYGEN SATURATION: 100 % | WEIGHT: 241.4 LBS | HEART RATE: 60 BPM | BODY MASS INDEX: 37.89 KG/M2

## 2022-08-25 DIAGNOSIS — T82.110D PACEMAKER LEAD MALFUNCTION, SUBSEQUENT ENCOUNTER: Primary | ICD-10-CM

## 2022-08-25 DIAGNOSIS — I50.32 CHRONIC HEART FAILURE WITH PRESERVED EJECTION FRACTION (H): ICD-10-CM

## 2022-08-25 DIAGNOSIS — I49.5 SICK SINUS SYNDROME (H): ICD-10-CM

## 2022-08-25 DIAGNOSIS — Z45.018 PACEMAKER END OF LIFE: ICD-10-CM

## 2022-08-25 LAB
ATRIAL RATE - MUSE: 357 BPM
DIASTOLIC BLOOD PRESSURE - MUSE: NORMAL MMHG
INTERPRETATION ECG - MUSE: NORMAL
P AXIS - MUSE: NORMAL DEGREES
PR INTERVAL - MUSE: NORMAL MS
QRS DURATION - MUSE: 80 MS
QT - MUSE: 472 MS
QTC - MUSE: 403 MS
R AXIS - MUSE: 38 DEGREES
SYSTOLIC BLOOD PRESSURE - MUSE: NORMAL MMHG
T AXIS - MUSE: 32 DEGREES
VENTRICULAR RATE- MUSE: 44 BPM

## 2022-08-25 PROCEDURE — G0463 HOSPITAL OUTPT CLINIC VISIT: HCPCS | Mod: 25,GT

## 2022-08-25 PROCEDURE — 99215 OFFICE O/P EST HI 40 MIN: CPT | Mod: 95 | Performed by: INTERNAL MEDICINE

## 2022-08-25 PROCEDURE — 93005 ELECTROCARDIOGRAM TRACING: CPT

## 2022-08-25 PROCEDURE — 93010 ELECTROCARDIOGRAM REPORT: CPT | Performed by: INTERNAL MEDICINE

## 2022-08-25 RX ORDER — NAPROXEN SODIUM 220 MG
220 TABLET ORAL DAILY
COMMUNITY
End: 2023-12-20

## 2022-08-25 ASSESSMENT — PAIN SCALES - GENERAL
PAINLEVEL: SEVERE PAIN (6)
PAINLEVEL: SEVERE PAIN (6)

## 2022-08-25 NOTE — PROGRESS NOTES
ELECTROPHYSIOLOGY CLINIC VISIT    Assessment/Recommendations   Assessment/Plan:    Mr. Ferraro is a 76 year old male who has a past medical history significant for HFpEF, permanent AF (CHADSVASC 2 on Warfarin), HLD, hypertriglyceridemia, LVH, PSVT, SSS s/p PPM 2008, CKD III, RA, and anxiety. He is following up today. He reports feeling at baseline. He denies chest discomfort, palpitations, abdominal fullness/bloating or peripheral edema, shortness of breath, paroxysmal nocturnal dyspnea, orthopnea, lightheadedness, dizziness, pre-syncope, or syncope. Device interrogation shows normal device function, RV lead has no capture at max output, previously was  60%. He had been scheduled for previous lead revision, but he kept cancelling due to ride situation.  Current cardiac medications include: Metoprolol, Simvastatin, and Warfarin.     Sick Sinus Syndrome s/p PPM 2008 gen change 5/21/20  Rising RV lead thresholds  Permanent Atrial Fibrillation:  1. Stroke Prophylaxis:  CHADSVASC=++age  2, corresponding to a 2.2% annual stroke / systemic emolism event rate. indicating need for long term oral anticoagulation.  He is appropriately on Warfarin. No bleeding issues.   2. Rate Control: Well rate controlled on Toprol XL.   3. Rhythm Control: He is in permanent AF, asymptomatic, rate controlled, and preserved LVEF; therefore, no compelling indications for rhythm control measures at this time.   4. He had PPM implanted for SSS in 2008 and has progressed to permanent AF for many years now. He does have times with SVR and requires  about 50-70% of the time. He had a gen change locally and old RA lead was felt to have undesirable characteristics and a new RA lead was placed. The new RA lead his high thresholds. He does not require RA lead given he is in permanent AF. He then had rising thresholds on RV lead since generator change.Now RV lead has failed and does not capture at max outputs. We had previously had planned for  lead revision, but he cancelled the procedure several times. We again discussed lead management in detail with patient. We did not recommend extraction of leads given risks likely outweigh benefit at his age. We do favor attempting to remove the newer placed RA lead as it is not required and placement of new RV lead with better thresholds. We discussed indications, risks, and benefits in detail. He states understanding and is agreeable to pursue RA lead removal and placement of new RV lead.   5. Risk Factor Management: Statin, BP control, and HUSSAIN evaluation.     Follow up in 3 months post device procedure.        History of Present Illness/Subjective    Mr. Kg Ferraro is a 76 year old male who comes in today for EP follow-up of permanent AF, SSS, PPM cares with RV lead failure.    Mr. Ferraro is a 76 year old male who has a past medical history significant for HFpEF, permanent AF (CHADSVASC 2 on Warfarin), HLD, hypertriglyceridemia, LVH, PSVT, SSS s/p PPM 2008, CKD III, RA, and anxiety.   He has SSS and had dual chamber PPM implanted in 2008. He also has history of PAF for which he has been on Flecainide, Toprol XL, and Warfarin. In 5/2020, he presented to [facility] with increasing palpitations and lightheadedness.  He states he was outside with his wife.  He got up to walk to his car and fell to his knees almost immediately.  He had to crawl to his car because he was weak.  He was noted to be in AF with SVR 40s and stable blood pressures. His PPM was found to be EOL and unable to interrogate device. He was arrange for PPM generator change locally on 5/21/20. During generator change they reported RA lead was not working and he had new RA lead placed. Op note details they attempted to access the subclavian vein with multiple attempts to this were successful in blood return, but unsuccessful in threading the wire.  They then proceeded to access the base of the right neck with angiocatheter and  accessed the junction of the internal jugular vein and the innominate and passed a wire down this. They tunneled the lead over to the pacemaker pocket; however, they felt lead was too difficult to manipulate so they re-tunneled the lead. They reported taking a lead position that had higher voltages as this was all they could obtain. A more recent device check on 12/14/20 showed only 3-4 years of battery life, rising RV lead threshold, and undersensing on RA lead. He was referred to EP for evaluation.      EP Visit 2/23/21: He reports feeling well. He denies any chest pain/pressures, dizziness, lightheadedness, worsening shortness of breath, leg/ankle swelling, PND, orthopnea, palpitations, or syncopal symptoms. Device interrogation shows normal device function. RV threshold is stable at 2.25V @ 1.0ms.  No episodes recorded. No arrhythmias recorded. Intrinsic rhythm = Afib w VS @ 48-92 bpm.  = 64%. Estimated battery longevity to PEGGY = 4.5 years. Battery voltage = 3.00V. No short v-v intervals recorded. Lead impedance trends appear stable. Recent echo showed normal structure and function. Current cardiac medications include: Metoprolol, Simvastatin, and Warfarin.      He is following up today. He reports feeling at baseline. He denies chest discomfort, palpitations, abdominal fullness/bloating or peripheral edema, shortness of breath, paroxysmal nocturnal dyspnea, orthopnea, lightheadedness, dizziness, pre-syncope, or syncope. Device interrogation shows normal device function, RV lead has no capture at max output, previously was  60%. He had been scheduled for previous lead revision, but he kept cancelling due to ride situation.  Current cardiac medications include: Metoprolol, Simvastatin, and Warfarin.     I have reviewed and updated the patient's Past Medical History, Social History, Family History and Medication List.     Cardiographics (Personally Reviewed) :   1/22/21 ECHOCARDIOGRAM:   Interpretation  Summary  Left ventricular function, chamber size, wall motion, and wall thickness are  normal.The EF is 55-60%.  Right ventricular function, chamber size, wall motion, and thickness are  normal.  No pericardial effusion is present.          Physical Examination   There were no vitals taken for this visit.  Wt Readings from Last 3 Encounters:   02/10/22 111.6 kg (246 lb)   02/10/22 111.6 kg (246 lb)   06/29/21 109.8 kg (242 lb)     General Appearance:   Alert, well-appearing and in no acute distress.   HEENT: Atraumatic, normocephalic. PERRL.  MMM.   Chest/Lungs:   Respirations unlabored.  Lungs are clear to auscultation.   Cardiovascular:   Irregularly irregular. No murmur.    Abdomen:  Soft, nontender, nondistended.   Extremities: No cyanosis or clubbing. No edema.   Musculoskeletal: Moves all extremities.  Gait normal.   Skin: Warm, dry, intact.    Neurologic: Mood and affect are appropriate.  Alert and oriented to person, place, time, and situation.          Medications  Allergies   Current Outpatient Medications   Medication Sig Dispense Refill     acetaminophen (TYLENOL) 500 MG tablet Take 1,000 mg by mouth daily as needed for mild pain       allopurinol (ZYLOPRIM) 100 MG tablet Take 1 tablet (100 mg) by mouth 2 times daily 180 tablet 3     Blood Pressure Monitoring (RA BLOOD PRESSURE CUFF MONITOR) MISC Medium Cuff       metoprolol succinate ER (TOPROL XL) 100 MG 24 hr tablet TAKE 1 TABLET BY MOUTH IN THE MORNING AND 2 IN THE EVENING 270 tablet 0     potassium chloride ER (KLOR-CON M) 20 MEQ CR tablet Take 1 tablet (20 mEq) by mouth daily -- overdue for medicare annual wellness exam, call to schedule -- 7 tablet 5     simvastatin (ZOCOR) 40 MG tablet Take 1 tablet by mouth once daily 90 tablet 0     warfarin ANTICOAGULANT (COUMADIN) 4 MG tablet TAKE 1 TABLET BY MOUTH ONCE DAILY OR  AS  DIRECTED BY  [facility]  CLINIC 90 tablet 4    Allergies   Allergen Reactions     Penicillins Hives     Rash or hives - can't  remember         Lab Results (Personally Reviewed)    Chemistry/lipid CBC Cardiac Enzymes/BNP/TSH/INR   Lab Results   Component Value Date    BUN 31 (H) 06/29/2021     06/29/2021    CO2 24 06/29/2021     Creatinine   Date Value Ref Range Status   06/29/2021 1.33 (H) 0.70 - 1.30 mg/dL Final       Lab Results   Component Value Date    CHOL 131 06/29/2021    HDL 39 06/29/2021    LDL 63 06/29/2021      Lab Results   Component Value Date    WBC 7.8 06/29/2021    HGB 15.8 06/29/2021    HCT 47.9 06/29/2021    MCV 95 06/29/2021     06/29/2021    Lab Results   Component Value Date     (H) 07/12/2015    INR 2.5 (A) 07/05/2022        The patient states understanding and is agreeable with the plan.   Ryan Darby MD [facility]  Cardiology - Electrophysiology      Total time spent on patient visit, reviewing notes, imaging, labs, orders, and completing necessary documentation: 45 minutes.

## 2022-08-25 NOTE — LETTER
8/25/2022      RE: Kg Ferraro  [address]  [place] [address]       Dear Colleague,    Thank you for the opportunity to participate in the care of your patient, Kg Ferraro, at the [facility] [place] at [facility]. Please see a copy of my visit note below.        ELECTROPHYSIOLOGY CLINIC VISIT    Assessment/Recommendations   Assessment/Plan:    Mr. Ferraro is a 76 year old male who has a past medical history significant for HFpEF, permanent AF (CHADSVASC 2 on Warfarin), HLD, hypertriglyceridemia, LVH, PSVT, SSS s/p PPM 2008, CKD III, RA, and anxiety. He is following up today. He reports feeling at baseline. He denies chest discomfort, palpitations, abdominal fullness/bloating or peripheral edema, shortness of breath, paroxysmal nocturnal dyspnea, orthopnea, lightheadedness, dizziness, pre-syncope, or syncope. Device interrogation shows normal device function, RV lead has no capture at max output, previously was  60%. He had been scheduled for previous lead revision, but he kept cancelling due to ride situation.  Current cardiac medications include: Metoprolol, Simvastatin, and Warfarin.     Sick Sinus Syndrome s/p PPM 2008 gen change 5/21/20  Rising RV lead thresholds  Permanent Atrial Fibrillation:  1. Stroke Prophylaxis:  CHADSVASC=++age  2, corresponding to a 2.2% annual stroke / systemic [facility] event rate. indicating need for long term oral anticoagulation.  He is appropriately on Warfarin. No bleeding issues.   2. Rate Control: Well rate controlled on Toprol XL.   3. Rhythm Control: He is in permanent AF, asymptomatic, rate controlled, and preserved LVEF; therefore, no compelling indications for rhythm control measures at this time.   4. He had PPM implanted for SSS in 2008 and has progressed to permanent AF for many years now. He does have times with SVR and requires  about 50-70% of the time. He had a gen change locally  and old RA lead was felt to have undesirable characteristics and a new RA lead was placed. The new RA lead his high thresholds. He does not require RA lead given he is in permanent AF. He then had rising thresholds on RV lead since generator change.Now RV lead has failed and does not capture at max outputs. We had previously had planned for lead revision, but he cancelled the procedure several times. We again discussed lead management in detail with patient. We did not recommend extraction of leads given risks likely outweigh benefit at his age. We do favor attempting to remove the newer placed RA lead as it is not required and placement of new RV lead with better thresholds. We discussed indications, risks, and benefits in detail. He states understanding and is agreeable to pursue RA lead removal and placement of new RV lead.   5. Risk Factor Management: Statin, BP control, and HUSSAIN evaluation.     Follow up in 3 months post device procedure.        History of Present Illness/Subjective    Mr. Kg Ferraro is a 76 year old male who comes in today for EP follow-up of permanent AF, SSS, PPM cares with RV lead failure.    Mr. Ferraro is a 76 year old male who has a past medical history significant for HFpEF, permanent AF (CHADSVASC 2 on Warfarin), HLD, hypertriglyceridemia, LVH, PSVT, SSS s/p PPM 2008, CKD III, RA, and anxiety.   He has SSS and had dual chamber PPM implanted in 2008. He also has history of PAF for which he has been on Flecainide, Toprol XL, and Warfarin. In 5/2020, he presented to [facility] with increasing palpitations and lightheadedness.  He states he was outside with his wife.  He got up to walk to his car and fell to his knees almost immediately.  He had to crawl to his car because he was weak.  He was noted to be in AF with SVR 40s and stable blood pressures. His PPM was found to be EOL and unable to interrogate device. He was arrange for PPM generator change locally on  5/21/20. During generator change they reported RA lead was not working and he had new RA lead placed. Op note details they attempted to access the subclavian vein with multiple attempts to this were successful in blood return, but unsuccessful in threading the wire.  They then proceeded to access the base of the right neck with angiocatheter and accessed the junction of the internal jugular vein and the innominate and passed a wire down this. They tunneled the lead over to the pacemaker pocket; however, they felt lead was too difficult to manipulate so they re-tunneled the lead. They reported taking a lead position that had higher voltages as this was all they could obtain. A more recent device check on 12/14/20 showed only 3-4 years of battery life, rising RV lead threshold, and undersensing on RA lead. He was referred to EP for evaluation.      EP Visit 2/23/21: He reports feeling well. He denies any chest pain/pressures, dizziness, lightheadedness, worsening shortness of breath, leg/ankle swelling, PND, orthopnea, palpitations, or syncopal symptoms. Device interrogation shows normal device function. RV threshold is stable at 2.25V @ 1.0ms.  No episodes recorded. No arrhythmias recorded. Intrinsic rhythm = Afib w VS @ 48-92 bpm.  = 64%. Estimated battery longevity to PEGGY = 4.5 years. Battery voltage = 3.00V. No short v-v intervals recorded. Lead impedance trends appear stable. Recent echo showed normal structure and function. Current cardiac medications include: Metoprolol, Simvastatin, and Warfarin.      He is following up today. He reports feeling at baseline. He denies chest discomfort, palpitations, abdominal fullness/bloating or peripheral edema, shortness of breath, paroxysmal nocturnal dyspnea, orthopnea, lightheadedness, dizziness, pre-syncope, or syncope. Device interrogation shows normal device function, RV lead has no capture at max output, previously was  60%. He had been scheduled for previous  lead revision, but he kept cancelling due to ride situation.  Current cardiac medications include: Metoprolol, Simvastatin, and Warfarin.     I have reviewed and updated the patient's Past Medical History, Social History, Family History and Medication List.     Cardiographics (Personally Reviewed) :   1/22/21 ECHOCARDIOGRAM:   Interpretation Summary  Left ventricular function, chamber size, wall motion, and wall thickness are  normal.The EF is 55-60%.  Right ventricular function, chamber size, wall motion, and thickness are  normal.  No pericardial effusion is present.          Physical Examination   There were no vitals taken for this visit.  Wt Readings from Last 3 Encounters:   02/10/22 111.6 kg (246 lb)   02/10/22 111.6 kg (246 lb)   06/29/21 109.8 kg (242 lb)     General Appearance:   Alert, well-appearing and in no acute distress.   HEENT: Atraumatic, normocephalic. PERRL.  MMM.   Chest/Lungs:   Respirations unlabored.  Lungs are clear to auscultation.   Cardiovascular:   Irregularly irregular. No murmur.    Abdomen:  Soft, nontender, nondistended.   Extremities: No cyanosis or clubbing. No edema.   Musculoskeletal: Moves all extremities.  Gait normal.   Skin: Warm, dry, intact.    Neurologic: Mood and affect are appropriate.  Alert and oriented to person, place, time, and situation.          Medications  Allergies   Current Outpatient Medications   Medication Sig Dispense Refill     acetaminophen (TYLENOL) 500 MG tablet Take 1,000 mg by mouth daily as needed for mild pain       allopurinol (ZYLOPRIM) 100 MG tablet Take 1 tablet (100 mg) by mouth 2 times daily 180 tablet 3     Blood Pressure Monitoring ( BLOOD PRESSURE CUFF MONITOR) MISC Medium Cuff       metoprolol succinate ER (TOPROL XL) 100 MG 24 hr tablet TAKE 1 TABLET BY MOUTH IN THE MORNING AND 2 IN THE EVENING 270 tablet 0     potassium chloride ER (KLOR-CON M) 20 MEQ CR tablet Take 1 tablet (20 mEq) by mouth daily -- overdue for medicare annual  wellness exam, call to schedule -- 7 tablet 5     simvastatin (ZOCOR) 40 MG tablet Take 1 tablet by mouth once daily 90 tablet 0     warfarin ANTICOAGULANT (COUMADIN) 4 MG tablet TAKE 1 TABLET BY MOUTH ONCE DAILY OR  AS  DIRECTED BY  [facility]  CLINIC 90 tablet 4    Allergies   Allergen Reactions     Penicillins Hives     Rash or hives - can't remember         Lab Results (Personally Reviewed)    Chemistry/lipid CBC Cardiac Enzymes/BNP/TSH/INR   Lab Results   Component Value Date    BUN 31 (H) 06/29/2021     06/29/2021    CO2 24 06/29/2021     Creatinine   Date Value Ref Range Status   06/29/2021 1.33 (H) 0.70 - 1.30 mg/dL Final       Lab Results   Component Value Date    CHOL 131 06/29/2021    HDL 39 06/29/2021    LDL 63 06/29/2021      Lab Results   Component Value Date    WBC 7.8 06/29/2021    HGB 15.8 06/29/2021    HCT 47.9 06/29/2021    MCV 95 06/29/2021     06/29/2021    Lab Results   Component Value Date     (H) 07/12/2015    INR 2.5 (A) 07/05/2022        The patient states understanding and is agreeable with the plan.   Ryan Darby MD [facility]  Cardiology - Electrophysiology      Total time spent on patient visit, reviewing notes, imaging, labs, orders, and completing necessary documentation: 45 minutes.

## 2022-08-25 NOTE — NURSING NOTE
"Denies chest pain/pressure, lightheadedness, nausea, syncope or pre-syncope.  Patient does get shortness of breath and increased fatigue when he walks too far.    Lung sounds clear bilaterally.    Chief Complaint   Patient presents with     Telemedicine consult     Discuss lead revision       Initial BP (!) 160/92 (BP Location: Right arm, Patient Position: Sitting, Cuff Size: Adult Large)   Pulse (!) 43   Temp 97.2  F (36.2  C) (Temporal)   Resp 16   Wt 109.5 kg (241 lb 6.4 oz)   SpO2 100%   BMI 37.89 kg/m   Estimated body mass index is 37.89 kg/m  as calculated from the following:    Height as of 12/22/20: 1.7 m (5' 6.93\").    Weight as of this encounter: 109.5 kg (241 lb 6.4 oz).  Medication Reconciliation: complete    Guera Nina RN  "

## 2022-08-26 NOTE — PROGRESS NOTES
ANTICOAGULATION FOLLOW-UP CLINIC VISIT    Patient Name:  Kg Ferraro  Date:  10/31/2019  Contact Type:  Face to Face    SUBJECTIVE:  Patient Findings         Clinical Outcomes     Negatives:   Major bleeding event, Thromboembolic event, Anticoagulation-related hospital admission, Anticoagulation-related ED visit, Anticoagulation-related fatality           OBJECTIVE    INR Protime   Date Value Ref Range Status   10/31/2019 2.9 (A) 0.86 - 1.14 Final       ASSESSMENT / PLAN  INR assessment THER    Recheck INR In: 6 WEEKS    INR Location Clinic      Anticoagulation Summary  As of 10/31/2019    INR goal:   2.0-3.0   TTR:   69.0 % (7.1 y)   INR used for dosin.9 (10/31/2019)   Warfarin maintenance plan:   4 mg (4 mg x 1) every day   Full warfarin instructions:   4 mg every day   Weekly warfarin total:   28 mg   No change documented:   Cheri Prater RN   Plan last modified:   Emani Pereira RN (10/19/2018)   Next INR check:   2019   Priority:   INR   Target end date:   Indefinite    Indications    Paroxysmal atrial fibrillation (H) [I48.0]  Long-term (current) use of anticoagulants [Z79.01] [Z79.01]             Anticoagulation Episode Summary     INR check location:       Preferred lab:       Send INR reminders to:    INR    Comments:         Anticoagulation Care Providers     Provider Role Specialty Phone number    Caleb Parker MD [facility] Internal Medicine [phone]            See the Encounter Report to view Anticoagulation Flowsheet and Dosing Calendar (Go to Encounters tab in chart review, and find the Anticoagulation Therapy Visit)        Cheri Prater RN                  Name band;

## 2022-09-07 ENCOUNTER — ANTICOAGULATION THERAPY VISIT (OUTPATIENT)
Dept: ANTICOAGULATION | Facility: OTHER | Age: 77
End: 2022-09-07
Attending: INTERNAL MEDICINE
Payer: MEDICARE

## 2022-09-07 DIAGNOSIS — I47.10 PAROXYSMAL SUPRAVENTRICULAR TACHYCARDIA (H): ICD-10-CM

## 2022-09-07 DIAGNOSIS — Z79.01 LONG TERM CURRENT USE OF ANTICOAGULANT THERAPY: Primary | ICD-10-CM

## 2022-09-07 LAB — INR POINT OF CARE: 3.3 (ref 0.9–1.1)

## 2022-09-07 PROCEDURE — 36416 COLLJ CAPILLARY BLOOD SPEC: CPT | Mod: ZL

## 2022-09-07 PROCEDURE — 85610 PROTHROMBIN TIME: CPT | Mod: ZL,QW

## 2022-09-07 NOTE — PROGRESS NOTES
ANTICOAGULATION MANAGEMENT     Kg Ferraro 77 year old male is on warfarin with supratherapeutic INR result. (Goal INR 2.0-3.0)    Recent labs: (last 7 days)     09/07/22  1313   INR 3.3*       ASSESSMENT       Source(s): Chart Review       Warfarin doses taken: Warfarin taken as instructed    Diet: Increased greens/vitamin K in diet; plans to resume previous intake    New illness, injury, or hospitalization: No    Medication/supplement changes: None noted    Signs or symptoms of bleeding or clotting: No    Previous INR: Therapeutic last 2(+) visits    Additional findings: None       PLAN     Recommended plan for temporary change(s) affecting INR     Dosing Instructions: Continue your current warfarin dose with next INR in 2 weeks       Summary  As of 9/7/2022    Full warfarin instructions:  4 mg every day   Next INR check:  9/21/2022             In person     Lab visit scheduled    Education provided: None required    Plan made per [facility] anticoagulation protocol    Kay Cline RN  Anticoagulation Clinic  9/7/2022    _______________________________________________________________________     Anticoagulation Episode Summary     Current INR goal:  2.0-3.0   TTR:  93.4 % (1 y)   Target end date:  Indefinite   Send INR reminders to:  [facility]    Indications    Long-term (current) use of anticoagulants [Z79.01] [Z79.01]  Paroxysmal supraventricular tachycardia (H) [I47.1]           Comments:           Anticoagulation Care Providers     Provider Role Specialty Phone number    Caleb Parker MD [facility] Internal Medicine [phone]

## 2022-09-25 DIAGNOSIS — E79.0 HYPERURICEMIA: ICD-10-CM

## 2022-09-28 NOTE — TELEPHONE ENCOUNTER
"  Last Prescription Date: 6/29/21  Last Qty/Refills: 180 / 3  Last Office Visit: 6/26/21  Future Office Visit: 9/30/22     Requested Prescriptions   Pending Prescriptions Disp Refills     allopurinol (ZYLOPRIM) 100 MG tablet [Pharmacy Med Name: Allopurinol 100 MG Oral Tablet] 180 tablet 0     Sig: Take 1 tablet by mouth twice daily       Gout Agents Protocol Failed - 9/25/2022 12:18 PM        Failed - CBC on file in past 12 months     Recent Labs   Lab Test 06/29/21 0925 05/20/20  1520 03/23/17  1121   WBC 7.8   < >  --    GICHWBC  --   --  6.9   RBC 5.05   < >  --    GICHRBC  --   --  5.10   HGB 15.8   < > 15.6   HCT 47.9   < > 48.5      < > 191    < > = values in this interval not displayed.                 Failed - ALT on file in past 12 months     Recent Labs   Lab Test 06/29/21 0925 05/20/20  1520 01/15/16  1056   ALT 31   < >  --    GICHALT  --   --  38    < > = values in this interval not displayed.             Failed - Has Uric Acid on file in past 12 months and value is less than 6     Recent Labs   Lab Test 03/23/17  1126   URIC 5.8     If level is 6mg/dL or greater, ok to refill one time and refer to provider.           Failed - Normal serum creatinine on file in the past 12 months     Recent Labs   Lab Test 06/29/21  0925   CR 1.33*       Ok to refill medication if creatinine is low          Passed - Recent (12 mo) or future (30 days) visit within the authorizing provider's specialty     Patient has had an office visit with the authorizing provider or a provider within the authorizing providers department within the previous 12 mos or has a future within next 30 days. See \"Patient Info\" tab in inbasket, or \"Choose Columns\" in Meds & Orders section of the refill encounter.              Passed - Medication is active on med list        Passed - Patient is age 18 or older             "

## 2022-09-29 RX ORDER — ALLOPURINOL 100 MG/1
TABLET ORAL
Qty: 180 TABLET | Refills: 0 | Status: SHIPPED | OUTPATIENT
Start: 2022-09-29 | End: 2022-11-23

## 2022-10-10 ENCOUNTER — ANTICOAGULATION THERAPY VISIT (OUTPATIENT)
Dept: ANTICOAGULATION | Facility: OTHER | Age: 77
End: 2022-10-10
Attending: INTERNAL MEDICINE
Payer: MEDICARE

## 2022-10-10 DIAGNOSIS — Z79.01 LONG TERM CURRENT USE OF ANTICOAGULANT THERAPY: Primary | ICD-10-CM

## 2022-10-10 DIAGNOSIS — I47.10 PAROXYSMAL SUPRAVENTRICULAR TACHYCARDIA (H): ICD-10-CM

## 2022-10-10 LAB — INR POINT OF CARE: 2.9 (ref 0.9–1.1)

## 2022-10-10 PROCEDURE — 36416 COLLJ CAPILLARY BLOOD SPEC: CPT | Mod: ZL

## 2022-10-10 PROCEDURE — 85610 PROTHROMBIN TIME: CPT | Mod: ZL,QW

## 2022-10-10 NOTE — PROGRESS NOTES
ANTICOAGULATION MANAGEMENT     Kg Ferraro 77 year old male is on warfarin with therapeutic INR result. (Goal INR 2.0-3.0)    Recent labs: (last 7 days)     10/10/22  1510   INR 2.9*       ASSESSMENT       Source(s): Chart Review and In person       Warfarin doses taken: Warfarin taken as instructed    Diet: No new diet changes identified    New illness, injury, or hospitalization: No    Medication/supplement changes: None noted    Signs or symptoms of bleeding or clotting: No    Previous INR: Supratherapeutic    Additional findings: None       PLAN     Recommended plan for no diet, medication or health factor changes affecting INR     Dosing Instructions: Continue your current warfarin dose with next INR in 3 weeks       Summary  As of 10/10/2022    Full warfarin instructions:  4 mg every day   Next INR check:  10/31/2022             In person    Lab visit scheduled    Education provided: Please call back if any changes to your diet, medications or how you've been taking warfarin    Plan made per [facility] anticoagulation protocol    Lillian Ramos RN  Anticoagulation Clinic  10/10/2022    _______________________________________________________________________     Anticoagulation Episode Summary     Current INR goal:  2.0-3.0   TTR:  86.6 % (1 y)   Target end date:  Indefinite   Send INR reminders to:  [facility]    Indications    Long-term (current) use of anticoagulants [Z79.01] [Z79.01]  Paroxysmal supraventricular tachycardia (H) [I47.1]           Comments:           Anticoagulation Care Providers     Provider Role Specialty Phone number    Caleb Parker MD [facility] Internal Medicine [phone]

## 2022-10-26 ENCOUNTER — ANCILLARY PROCEDURE (OUTPATIENT)
Dept: CARDIOLOGY | Facility: CLINIC | Age: 77
End: 2022-10-26
Attending: INTERNAL MEDICINE
Payer: MEDICARE

## 2022-10-26 DIAGNOSIS — I49.5 SICK SINUS SYNDROME (H): ICD-10-CM

## 2022-10-26 PROCEDURE — 93296 REM INTERROG EVL PM/IDS: CPT

## 2022-10-26 PROCEDURE — 93294 REM INTERROG EVL PM/LDLS PM: CPT | Performed by: INTERNAL MEDICINE

## 2022-11-03 DIAGNOSIS — E78.2 MIXED HYPERLIPIDEMIA: ICD-10-CM

## 2022-11-03 DIAGNOSIS — I48.0 PAROXYSMAL ATRIAL FIBRILLATION (H): ICD-10-CM

## 2022-11-03 DIAGNOSIS — I47.10 PAROXYSMAL SUPRAVENTRICULAR TACHYCARDIA (H): ICD-10-CM

## 2022-11-03 DIAGNOSIS — I10 BENIGN ESSENTIAL HYPERTENSION: ICD-10-CM

## 2022-11-04 RX ORDER — SIMVASTATIN 40 MG
TABLET ORAL
Qty: 30 TABLET | Refills: 0 | Status: SHIPPED | OUTPATIENT
Start: 2022-11-04 | End: 2022-11-23

## 2022-11-04 RX ORDER — METOPROLOL SUCCINATE 100 MG/1
TABLET, EXTENDED RELEASE ORAL
Qty: 90 TABLET | Refills: 0 | Status: SHIPPED | OUTPATIENT
Start: 2022-11-04 | End: 2022-11-23

## 2022-11-04 NOTE — TELEPHONE ENCOUNTER
[id number] of [place] sent Rx request for the following:      Requested Prescriptions   Pending Prescriptions Disp Refills     simvastatin (ZOCOR) 40 MG tablet [Pharmacy Med Name: Simvastatin 40 MG Oral Tablet] 90 tablet 0     Sig: Take 1 tablet by mouth once daily   Last Prescription Date:   7/25/22  Last Fill Qty/Refills:         90, R-0      Statins Protocol Failed - 11/4/2022  2:29 PM        Failed - LDL on file in past 12 months     Recent Labs   Lab Test 06/29/21  0925   LDL 63           metoprolol succinate ER (TOPROL XL) 100 MG 24 hr tablet [Pharmacy Med Name: Metoprolol Succinate  MG Oral Tablet Extended Release 24 Hour] 270 tablet 0     Sig: TAKE 1 TABLET BY MOUTH IN THE MORNING AND 2 TABLETS IN THE EVENING   Last Prescription Date:   7/25/22  Last Fill Qty/Refills:         270, R-0      Beta-Blockers Protocol Failed - 11/4/2022  2:29 PM        Failed - Blood pressure under 140/90 in past 12 months     BP Readings from Last 3 Encounters:   08/25/22 (!) 162/92   08/25/22 (!) 162/92   02/10/22 138/78        Last Office Visit:              6/29/21   Future Office visit:                Nov 23, 2022  1:20 PM  PHYSICAL with Caleb Parker MD  [facility] ([facility] ) [address]  [place] [address]  [phone]        Jailyn Peralta RN .............. 11/4/2022  2:31 PM

## 2022-11-04 NOTE — TELEPHONE ENCOUNTER
Patient was contacted.  An appointment was made with Dr. Parker for 11.23.2022.  He will need a small prescription sent in to get him to that date.  Anaya Alaniz on 11/4/2022 at 3:46 PM

## 2022-11-05 LAB
MDC_IDC_LEAD_IMPLANT_DT: NORMAL
MDC_IDC_LEAD_IMPLANT_DT: NORMAL
MDC_IDC_LEAD_LOCATION: NORMAL
MDC_IDC_LEAD_LOCATION: NORMAL
MDC_IDC_LEAD_LOCATION_DETAIL_1: NORMAL
MDC_IDC_LEAD_LOCATION_DETAIL_1: NORMAL
MDC_IDC_LEAD_MFG: NORMAL
MDC_IDC_LEAD_MFG: NORMAL
MDC_IDC_LEAD_MODEL: NORMAL
MDC_IDC_LEAD_MODEL: NORMAL
MDC_IDC_LEAD_POLARITY_TYPE: NORMAL
MDC_IDC_LEAD_POLARITY_TYPE: NORMAL
MDC_IDC_LEAD_SERIAL: NORMAL
MDC_IDC_LEAD_SERIAL: NORMAL
MDC_IDC_LEAD_SPECIAL_FUNCTION: NORMAL
MDC_IDC_LEAD_SPECIAL_FUNCTION: NORMAL
MDC_IDC_MSMT_BATTERY_DTM: NORMAL
MDC_IDC_MSMT_BATTERY_REMAINING_LONGEVITY: 83 MO
MDC_IDC_MSMT_BATTERY_RRT_TRIGGER: 2.62
MDC_IDC_MSMT_BATTERY_STATUS: NORMAL
MDC_IDC_MSMT_BATTERY_VOLTAGE: 3 V
MDC_IDC_MSMT_LEADCHNL_RA_IMPEDANCE_VALUE: 399 OHM
MDC_IDC_MSMT_LEADCHNL_RA_IMPEDANCE_VALUE: 475 OHM
MDC_IDC_MSMT_LEADCHNL_RA_SENSING_INTR_AMPL: 0.4 MV
MDC_IDC_MSMT_LEADCHNL_RV_IMPEDANCE_VALUE: 608 OHM
MDC_IDC_MSMT_LEADCHNL_RV_IMPEDANCE_VALUE: 608 OHM
MDC_IDC_MSMT_LEADCHNL_RV_PACING_THRESHOLD_AMPLITUDE: 2.25 V
MDC_IDC_MSMT_LEADCHNL_RV_PACING_THRESHOLD_PULSEWIDTH: 0.4 MS
MDC_IDC_MSMT_LEADCHNL_RV_SENSING_INTR_AMPL: 9 MV
MDC_IDC_PG_IMPLANT_DTM: NORMAL
MDC_IDC_PG_MFG: NORMAL
MDC_IDC_PG_MODEL: NORMAL
MDC_IDC_PG_SERIAL: NORMAL
MDC_IDC_PG_TYPE: NORMAL
MDC_IDC_SESS_CLINIC_NAME: NORMAL
MDC_IDC_SESS_DTM: NORMAL
MDC_IDC_SESS_TYPE: NORMAL
MDC_IDC_SET_BRADY_HYSTRATE: NORMAL
MDC_IDC_SET_BRADY_LOWRATE: 60 {BEATS}/MIN
MDC_IDC_SET_BRADY_MAX_SENSOR_RATE: 130 {BEATS}/MIN
MDC_IDC_SET_BRADY_MODE: NORMAL
MDC_IDC_SET_LEADCHNL_RA_SENSING_ANODE_ELECTRODE_1: NORMAL
MDC_IDC_SET_LEADCHNL_RA_SENSING_ANODE_LOCATION_1: NORMAL
MDC_IDC_SET_LEADCHNL_RA_SENSING_CATHODE_ELECTRODE_1: NORMAL
MDC_IDC_SET_LEADCHNL_RA_SENSING_CATHODE_LOCATION_1: NORMAL
MDC_IDC_SET_LEADCHNL_RA_SENSING_POLARITY: NORMAL
MDC_IDC_SET_LEADCHNL_RA_SENSING_SENSITIVITY: NORMAL
MDC_IDC_SET_LEADCHNL_RV_PACING_AMPLITUDE: 4.5 V
MDC_IDC_SET_LEADCHNL_RV_PACING_ANODE_ELECTRODE_1: NORMAL
MDC_IDC_SET_LEADCHNL_RV_PACING_ANODE_LOCATION_1: NORMAL
MDC_IDC_SET_LEADCHNL_RV_PACING_CAPTURE_MODE: NORMAL
MDC_IDC_SET_LEADCHNL_RV_PACING_CATHODE_ELECTRODE_1: NORMAL
MDC_IDC_SET_LEADCHNL_RV_PACING_CATHODE_LOCATION_1: NORMAL
MDC_IDC_SET_LEADCHNL_RV_PACING_POLARITY: NORMAL
MDC_IDC_SET_LEADCHNL_RV_PACING_PULSEWIDTH: 0.4 MS
MDC_IDC_SET_LEADCHNL_RV_SENSING_ANODE_ELECTRODE_1: NORMAL
MDC_IDC_SET_LEADCHNL_RV_SENSING_ANODE_LOCATION_1: NORMAL
MDC_IDC_SET_LEADCHNL_RV_SENSING_CATHODE_ELECTRODE_1: NORMAL
MDC_IDC_SET_LEADCHNL_RV_SENSING_CATHODE_LOCATION_1: NORMAL
MDC_IDC_SET_LEADCHNL_RV_SENSING_POLARITY: NORMAL
MDC_IDC_SET_LEADCHNL_RV_SENSING_SENSITIVITY: 0.9 MV
MDC_IDC_SET_ZONE_DETECTION_INTERVAL: 400 MS
MDC_IDC_SET_ZONE_TYPE: NORMAL
MDC_IDC_STAT_BRADY_AP_VP_PERCENT: 0 %
MDC_IDC_STAT_BRADY_AP_VS_PERCENT: 0 %
MDC_IDC_STAT_BRADY_AS_VP_PERCENT: 63.33 %
MDC_IDC_STAT_BRADY_AS_VS_PERCENT: 36.67 %
MDC_IDC_STAT_BRADY_DTM_END: NORMAL
MDC_IDC_STAT_BRADY_DTM_START: NORMAL
MDC_IDC_STAT_BRADY_RA_PERCENT_PACED: 0 %
MDC_IDC_STAT_BRADY_RV_PERCENT_PACED: 63.33 %
MDC_IDC_STAT_EPISODE_RECENT_COUNT: 0
MDC_IDC_STAT_EPISODE_RECENT_COUNT_DTM_END: NORMAL
MDC_IDC_STAT_EPISODE_RECENT_COUNT_DTM_START: NORMAL
MDC_IDC_STAT_EPISODE_TOTAL_COUNT: 0
MDC_IDC_STAT_EPISODE_TOTAL_COUNT: 4
MDC_IDC_STAT_EPISODE_TOTAL_COUNT: NORMAL
MDC_IDC_STAT_EPISODE_TOTAL_COUNT_DTM_END: NORMAL
MDC_IDC_STAT_EPISODE_TOTAL_COUNT_DTM_START: NORMAL
MDC_IDC_STAT_EPISODE_TYPE: NORMAL

## 2022-11-23 ENCOUNTER — OFFICE VISIT (OUTPATIENT)
Dept: INTERNAL MEDICINE | Facility: OTHER | Age: 77
End: 2022-11-23
Attending: INTERNAL MEDICINE
Payer: MEDICARE

## 2022-11-23 ENCOUNTER — ANTICOAGULATION THERAPY VISIT (OUTPATIENT)
Dept: ANTICOAGULATION | Facility: OTHER | Age: 77
End: 2022-11-23
Attending: INTERNAL MEDICINE
Payer: MEDICARE

## 2022-11-23 VITALS
OXYGEN SATURATION: 100 % | SYSTOLIC BLOOD PRESSURE: 138 MMHG | HEIGHT: 68 IN | BODY MASS INDEX: 34.92 KG/M2 | DIASTOLIC BLOOD PRESSURE: 80 MMHG | WEIGHT: 230.4 LBS | HEART RATE: 58 BPM | RESPIRATION RATE: 20 BRPM | TEMPERATURE: 97.1 F

## 2022-11-23 DIAGNOSIS — R73.9 ELEVATED RANDOM BLOOD GLUCOSE LEVEL: ICD-10-CM

## 2022-11-23 DIAGNOSIS — I47.10 PAROXYSMAL SUPRAVENTRICULAR TACHYCARDIA (H): ICD-10-CM

## 2022-11-23 DIAGNOSIS — M06.9 RHEUMATOID ARTHRITIS, INVOLVING UNSPECIFIED SITE, UNSPECIFIED WHETHER RHEUMATOID FACTOR PRESENT (H): ICD-10-CM

## 2022-11-23 DIAGNOSIS — Z95.0 CARDIAC PACEMAKER IN SITU: ICD-10-CM

## 2022-11-23 DIAGNOSIS — Z79.01 LONG TERM CURRENT USE OF ANTICOAGULANT THERAPY: Primary | ICD-10-CM

## 2022-11-23 DIAGNOSIS — Z71.85 VACCINE COUNSELING: ICD-10-CM

## 2022-11-23 DIAGNOSIS — Z00.00 ENCOUNTER FOR MEDICARE ANNUAL WELLNESS EXAM: ICD-10-CM

## 2022-11-23 DIAGNOSIS — Z23 NEEDS FLU SHOT: ICD-10-CM

## 2022-11-23 DIAGNOSIS — I10 BENIGN ESSENTIAL HYPERTENSION: Primary | ICD-10-CM

## 2022-11-23 DIAGNOSIS — E79.0 HYPERURICEMIA: ICD-10-CM

## 2022-11-23 DIAGNOSIS — I48.21 PERMANENT ATRIAL FIBRILLATION (H): ICD-10-CM

## 2022-11-23 DIAGNOSIS — Z79.01 WARFARIN ANTICOAGULATION: ICD-10-CM

## 2022-11-23 DIAGNOSIS — E66.01 MORBID OBESITY (H): ICD-10-CM

## 2022-11-23 DIAGNOSIS — I50.32 CHRONIC HEART FAILURE WITH PRESERVED EJECTION FRACTION (H): ICD-10-CM

## 2022-11-23 DIAGNOSIS — Z23 NEED FOR 23-POLYVALENT PNEUMOCOCCAL POLYSACCHARIDE VACCINE: ICD-10-CM

## 2022-11-23 DIAGNOSIS — N18.31 STAGE 3A CHRONIC KIDNEY DISEASE (H): ICD-10-CM

## 2022-11-23 DIAGNOSIS — E78.2 MIXED HYPERLIPIDEMIA: ICD-10-CM

## 2022-11-23 LAB
ALBUMIN SERPL BCG-MCNC: 3.8 G/DL (ref 3.5–5.2)
ALBUMIN UR-MCNC: 200 MG/DL
ALP SERPL-CCNC: 157 U/L (ref 40–129)
ALT SERPL W P-5'-P-CCNC: 40 U/L (ref 10–50)
ANION GAP SERPL CALCULATED.3IONS-SCNC: 10 MMOL/L (ref 7–15)
APPEARANCE UR: CLEAR
AST SERPL W P-5'-P-CCNC: 55 U/L (ref 10–50)
BACTERIA #/AREA URNS HPF: ABNORMAL /HPF
BILIRUB SERPL-MCNC: 0.6 MG/DL
BILIRUB UR QL STRIP: NEGATIVE
BUN SERPL-MCNC: 30.6 MG/DL (ref 8–23)
CALCIUM SERPL-MCNC: 8.9 MG/DL (ref 8.8–10.2)
CHLORIDE SERPL-SCNC: 105 MMOL/L (ref 98–107)
CHOLEST SERPL-MCNC: 157 MG/DL
COLOR UR AUTO: ABNORMAL
CREAT SERPL-MCNC: 1.22 MG/DL (ref 0.67–1.17)
CREAT UR-MCNC: 99 MG/DL
DEPRECATED HCO3 PLAS-SCNC: 26 MMOL/L (ref 22–29)
ERYTHROCYTE [DISTWIDTH] IN BLOOD BY AUTOMATED COUNT: 14.1 % (ref 10–15)
GFR SERPL CREATININE-BSD FRML MDRD: 60 ML/MIN/1.73M2
GLUCOSE SERPL-MCNC: 98 MG/DL (ref 70–99)
GLUCOSE UR STRIP-MCNC: NEGATIVE MG/DL
HBA1C MFR BLD: 5.6 % (ref 4–6.2)
HCT VFR BLD AUTO: 48.2 % (ref 40–53)
HDLC SERPL-MCNC: 48 MG/DL
HGB BLD-MCNC: 16.1 G/DL (ref 13.3–17.7)
HGB UR QL STRIP: ABNORMAL
INR POINT OF CARE: 2.4 (ref 0.9–1.1)
KETONES UR STRIP-MCNC: NEGATIVE MG/DL
LDLC SERPL CALC-MCNC: 74 MG/DL
LEUKOCYTE ESTERASE UR QL STRIP: NEGATIVE
MCH RBC QN AUTO: 31.9 PG (ref 26.5–33)
MCHC RBC AUTO-ENTMCNC: 33.4 G/DL (ref 31.5–36.5)
MCV RBC AUTO: 95 FL (ref 78–100)
MICROALBUMIN UR-MCNC: 1287.5 MG/L
MICROALBUMIN/CREAT UR: 1300.51 MG/G CR (ref 0–17)
MUCOUS THREADS #/AREA URNS LPF: PRESENT /LPF
NITRATE UR QL: NEGATIVE
NONHDLC SERPL-MCNC: 109 MG/DL
PH UR STRIP: 5.5 [PH] (ref 5–9)
PLATELET # BLD AUTO: 184 10E3/UL (ref 150–450)
POTASSIUM SERPL-SCNC: 4.7 MMOL/L (ref 3.4–5.3)
PROT SERPL-MCNC: 7.5 G/DL (ref 6.4–8.3)
RBC # BLD AUTO: 5.05 10E6/UL (ref 4.4–5.9)
RBC URINE: 1 /HPF
SODIUM SERPL-SCNC: 141 MMOL/L (ref 136–145)
SP GR UR STRIP: 1.02 (ref 1–1.03)
T4 FREE SERPL-MCNC: 0.89 NG/DL (ref 0.9–1.7)
TRIGL SERPL-MCNC: 174 MG/DL
TSH SERPL DL<=0.005 MIU/L-ACNC: 5.95 UIU/ML (ref 0.3–4.2)
UROBILINOGEN UR STRIP-MCNC: NORMAL MG/DL
WBC # BLD AUTO: 7.3 10E3/UL (ref 4–11)
WBC URINE: 1 /HPF

## 2022-11-23 PROCEDURE — 81001 URINALYSIS AUTO W/SCOPE: CPT | Mod: ZL | Performed by: INTERNAL MEDICINE

## 2022-11-23 PROCEDURE — 85027 COMPLETE CBC AUTOMATED: CPT | Mod: ZL | Performed by: INTERNAL MEDICINE

## 2022-11-23 PROCEDURE — 84439 ASSAY OF FREE THYROXINE: CPT | Mod: ZL | Performed by: INTERNAL MEDICINE

## 2022-11-23 PROCEDURE — 85610 PROTHROMBIN TIME: CPT | Mod: ZL,QW

## 2022-11-23 PROCEDURE — 99214 OFFICE O/P EST MOD 30 MIN: CPT | Mod: 25 | Performed by: INTERNAL MEDICINE

## 2022-11-23 PROCEDURE — 90732 PPSV23 VACC 2 YRS+ SUBQ/IM: CPT

## 2022-11-23 PROCEDURE — 80053 COMPREHEN METABOLIC PANEL: CPT | Mod: ZL | Performed by: INTERNAL MEDICINE

## 2022-11-23 PROCEDURE — G0439 PPPS, SUBSEQ VISIT: HCPCS | Performed by: INTERNAL MEDICINE

## 2022-11-23 PROCEDURE — 84443 ASSAY THYROID STIM HORMONE: CPT | Mod: ZL | Performed by: INTERNAL MEDICINE

## 2022-11-23 PROCEDURE — G0008 ADMIN INFLUENZA VIRUS VAC: HCPCS

## 2022-11-23 PROCEDURE — 80061 LIPID PANEL: CPT | Mod: ZL | Performed by: INTERNAL MEDICINE

## 2022-11-23 PROCEDURE — 82043 UR ALBUMIN QUANTITATIVE: CPT | Mod: ZL | Performed by: INTERNAL MEDICINE

## 2022-11-23 PROCEDURE — G0463 HOSPITAL OUTPT CLINIC VISIT: HCPCS | Mod: 25

## 2022-11-23 PROCEDURE — G0009 ADMIN PNEUMOCOCCAL VACCINE: HCPCS

## 2022-11-23 PROCEDURE — 83036 HEMOGLOBIN GLYCOSYLATED A1C: CPT | Mod: ZL | Performed by: INTERNAL MEDICINE

## 2022-11-23 PROCEDURE — 36415 COLL VENOUS BLD VENIPUNCTURE: CPT | Mod: ZL | Performed by: INTERNAL MEDICINE

## 2022-11-23 RX ORDER — NAPROXEN SODIUM 220 MG
220 TABLET ORAL 2 TIMES DAILY WITH MEALS
COMMUNITY
End: 2022-11-23

## 2022-11-23 RX ORDER — POTASSIUM CHLORIDE 1500 MG/1
20 TABLET, EXTENDED RELEASE ORAL DAILY
Qty: 90 TABLET | Refills: 4 | Status: SHIPPED | OUTPATIENT
Start: 2022-11-23 | End: 2023-12-11

## 2022-11-23 RX ORDER — METOPROLOL SUCCINATE 100 MG/1
TABLET, EXTENDED RELEASE ORAL
Qty: 270 TABLET | Refills: 4 | Status: SHIPPED | OUTPATIENT
Start: 2022-11-23 | End: 2023-12-11

## 2022-11-23 RX ORDER — SIMVASTATIN 40 MG
40 TABLET ORAL DAILY
Qty: 90 TABLET | Refills: 4 | Status: SHIPPED | OUTPATIENT
Start: 2022-11-23 | End: 2023-12-11

## 2022-11-23 RX ORDER — ALLOPURINOL 100 MG/1
100 TABLET ORAL 2 TIMES DAILY
Qty: 180 TABLET | Refills: 4 | Status: SHIPPED | OUTPATIENT
Start: 2022-11-23 | End: 2023-12-11

## 2022-11-23 ASSESSMENT — ENCOUNTER SYMPTOMS
PARESTHESIAS: 0
HEADACHES: 0
NERVOUS/ANXIOUS: 0
ABDOMINAL PAIN: 0
FEVER: 0
COUGH: 0
NAUSEA: 0
CONSTIPATION: 1
FREQUENCY: 1
HEMATURIA: 0
DYSURIA: 0
SHORTNESS OF BREATH: 1
WEAKNESS: 0
EYE PAIN: 0
PALPITATIONS: 0
DIZZINESS: 0
MYALGIAS: 0
SORE THROAT: 0
HEARTBURN: 0
CHILLS: 0
DIARRHEA: 0
ARTHRALGIAS: 1
HEMATOCHEZIA: 0
JOINT SWELLING: 0

## 2022-11-23 ASSESSMENT — PAIN SCALES - GENERAL: PAINLEVEL: MILD PAIN (3)

## 2022-11-23 ASSESSMENT — ACTIVITIES OF DAILY LIVING (ADL): CURRENT_FUNCTION: NO ASSISTANCE NEEDED

## 2022-11-23 NOTE — PROGRESS NOTES
ANTICOAGULATION MANAGEMENT     Kg Ferraro 77 year old male is on warfarin with therapeutic INR result. (Goal INR 2.0-3.0)    Recent labs: (last 7 days)     11/23/22  1432   INR 2.4*       ASSESSMENT       Source(s): Chart Review       Warfarin doses taken: Warfarin taken as instructed    Diet: Increased greens/vitamin K in diet; ongoing change    New illness, injury, or hospitalization: No    Medication/supplement changes: metoprolol succinate ER (TOPROL XL) 100 MG  dose increased on 11/23/22 No interaction anticipated    naproxen sodium (ANAPROX) 220 MG tablet dose decreased on 11/23/22 subsequent INRs may increased. Closer INR monitoring recommended.    Signs or symptoms of bleeding or clotting: No    Previous INR: Therapeutic last visit; previously outside of goal range    Additional findings: None       PLAN     Recommended plan for no diet, medication or health factor changes affecting INR     Dosing Instructions: Continue your current warfarin dose with next INR in 4 weeks       Summary  As of 11/23/2022    Full warfarin instructions:  4 mg every day; Starting 11/23/2022   Next INR check:  12/21/2022             In person     Lab visit scheduled    Education provided:     Written instructions provided    Plan made per [facility] anticoagulation protocol    Kay Cline RN  Anticoagulation Clinic  11/23/2022    _______________________________________________________________________     Anticoagulation Episode Summary     Current INR goal:  2.0-3.0   TTR:  86.6 % (1 y)   Target end date:  Indefinite   Send INR reminders to:  [facility]    Indications    Long-term (current) use of anticoagulants [Z79.01] [Z79.01]  Paroxysmal supraventricular tachycardia (H) [I47.1]           Comments:           Anticoagulation Care Providers     Provider Role Specialty Phone number    Caleb Parker MD [facility] Internal Medicine [phone]

## 2022-11-23 NOTE — NURSING NOTE
"Chief Complaint   Patient presents with     Medicare Wellness Visit         Initial /80 (BP Location: Right arm, Patient Position: Sitting, Cuff Size: Adult Large)   Pulse 58   Temp 97.1  F (36.2  C) (Temporal)   Resp 20   Ht 1.715 m (5' 7.5\")   Wt 104.5 kg (230 lb 6.4 oz)   SpO2 100%   BMI 35.55 kg/m   Estimated body mass index is 35.55 kg/m  as calculated from the following:    Height as of this encounter: 1.715 m (5' 7.5\").    Weight as of this encounter: 104.5 kg (230 lb 6.4 oz).       FOOD SECURITY SCREENING QUESTIONS:    The next two questions are to help us understand your food security.  If you are feeling you need any assistance in this area, we have resources available to support you today.    Hunger Vital Signs:  Within the past 12 months we worried whether our food would run out before we got money to buy more. Never  Within the past 12 months the food we bought just didn't last and we didn't have money to get more. Never    Advance Care Directive on file? no      Medication reconciliation complete.      Nghia Vargas,on 11/23/2022 at 1:03 PM        "

## 2022-11-23 NOTE — PROGRESS NOTES
"SUBJECTIVE:   Kg is a 77 year old who presents for Preventive Visit.  Patient has been advised of split billing requirements and indicates understanding: Yes  Are you in the first 12 months of your Medicare coverage?  No    Healthy Habits:     In general, how would you rate your overall health?  Good    Frequency of exercise:  6-7 days/week    Duration of exercise:  15-30 minutes    Do you usually eat at least 4 servings of fruit and vegetables a day, include whole grains    & fiber and avoid regularly eating high fat or \"junk\" foods?  Yes    Taking medications regularly:  Yes    Barriers to taking medications:  None    Medication side effects:  Not applicable    Ability to successfully perform activities of daily living:  No assistance needed    Home Safety:  Throw rugs in the hallway, lack of grab bars in the bathroom and lack of handrails on stairs    Hearing Impairment:  No hearing concerns    In the past 6 months, have you been bothered by leaking of urine?  No    In general, how would you rate your overall mental or emotional health?  Good      PHQ-2 Total Score: 0    Additional concerns today:  Yes    Have you ever done Advance Care Planning? (For example, a Health Directive, POLST, or a discussion with a medical provider or your loved ones about your wishes): No, advance care planning information given to patient to review.  Patient declined advance care planning discussion at this time.     Fall risk  Fallen 2 or more times in the past year?: No  Any fall with injury in the past year?: No    Cognitive Screening   1) Repeat 3 items (Leader, Season, Table)    2) Clock draw: NORMAL  3) 3 item recall: Recalls NO objects   Results: 0 items recalled: PROBABLE COGNITIVE IMPAIRMENT, **INFORM PROVIDER**    Mini-CogTM Copyright HILDA Villa. Licensed by the author for use in [facility]; reprinted with permission (edvin@.[facility]). All rights reserved.      Do you have sleep apnea, excessive snoring or " daytime drowsiness?: no    Reviewed and updated as needed this visit by clinical staff   Tobacco  Allergies  Meds  Problems  Med Hx  Surg Hx  Fam Hx  Soc   Hx        Reviewed and updated as needed this visit by Provider   Tobacco  Allergies  Meds  Problems  Med Hx  Surg Hx  Fam Hx         Social History     Tobacco Use     Smoking status: Former     Packs/day: 1.00     Types: Cigarettes     Quit date: 1980     Years since quittin.9     Smokeless tobacco: Former     Quit date: 1980   Substance Use Topics     Alcohol use: Yes     Alcohol/week: 0.0 standard drinks     Comment: 1-2 beers per month     Alcohol Use 2022   Prescreen: >3 drinks/day or >7 drinks/week? No     Current providers sharing in care for this patient include:   Patient Care Team:  Caleb Parker MD as PCP - General (Internal Medicine)  Caleb Parker MD as Assigned PCP  Ryan Darby MD as Assigned Heart and Vascular Provider    The following health maintenance items are reviewed in Epic and correct as of today:  Health Maintenance   Topic Date Due     ZOSTER IMMUNIZATION (1 of 2) Never done     DTAP/TDAP/TD IMMUNIZATION (1 - Tdap) 1995     BMP  2023     MEDICARE ANNUAL WELLNESS VISIT  2023     LIPID  2023     MICROALBUMIN  2023     FALL RISK ASSESSMENT  2023     HEMOGLOBIN  2023     ADVANCE CARE PLANNING  2027     PHQ-2 (once per calendar year)  Completed     INFLUENZA VACCINE  Completed     Pneumococcal Vaccine: 65+ Years  Completed     URINALYSIS  Completed     COVID-19 Vaccine  Completed     HEPATITIS C SCREENING  Addressed     IPV IMMUNIZATION  Aged Out     MENINGITIS IMMUNIZATION  Aged Out     Review of Systems   Constitutional: Negative for chills and fever.   HENT: Positive for congestion and hearing loss. Negative for ear pain and sore throat.    Eyes: Negative for pain and visual disturbance.   Respiratory: Positive for shortness of breath. Negative for  "cough.    Cardiovascular: Negative for chest pain, palpitations and peripheral edema.   Gastrointestinal: Positive for constipation. Negative for abdominal pain, diarrhea, heartburn, hematochezia and nausea.   Genitourinary: Positive for frequency and impotence. Negative for dysuria, genital sores, hematuria, penile discharge and urgency.   Musculoskeletal: Positive for arthralgias. Negative for joint swelling and myalgias.   Skin: Negative for rash.   Allergic/Immunologic: Negative for immunocompromised state.   Neurological: Negative for dizziness, weakness, headaches and paresthesias.   Psychiatric/Behavioral: Negative for mood changes. The patient is not nervous/anxious.      OBJECTIVE:   /80 (BP Location: Right arm, Patient Position: Sitting, Cuff Size: Adult Large)   Pulse 58   Temp 97.1  F (36.2  C) (Temporal)   Resp 20   Ht 1.715 m (5' 7.5\")   Wt 104.5 kg (230 lb 6.4 oz)   SpO2 100%   BMI 35.55 kg/m   Estimated body mass index is 35.55 kg/m  as calculated from the following:    Height as of this encounter: 1.715 m (5' 7.5\").    Weight as of this encounter: 104.5 kg (230 lb 6.4 oz).  Physical Exam  Constitutional:       General: He is not in acute distress.     Appearance: He is well-developed. He is not diaphoretic.   HENT:      Head: Normocephalic and atraumatic.   Eyes:      General: No scleral icterus.     Conjunctiva/sclera: Conjunctivae normal.   Neck:      Vascular: No carotid bruit.   Cardiovascular:      Rate and Rhythm: Normal rate and regular rhythm.      Pulses: Normal pulses.   Pulmonary:      Effort: Pulmonary effort is normal.      Breath sounds: Normal breath sounds.   Abdominal:      Palpations: Abdomen is soft.      Tenderness: There is no abdominal tenderness.      Comments: + abdominal obesity   Musculoskeletal:         General: No deformity.      Cervical back: Neck supple.      Right lower le+ Pitting Edema present.      Left lower le+ Pitting Edema present. "   Lymphadenopathy:      Cervical: No cervical adenopathy.   Skin:     General: Skin is warm and dry.      Findings: No bruising or rash.      Comments: Right chest wall with pacemaker generator   Neurological:      Mental Status: He is alert and oriented to person, place, and time. Mental status is at baseline.   Psychiatric:         Mood and Affect: Mood normal.         Behavior: Behavior normal.       Diagnostic Test Results:  Labs reviewed in Epic  Results for orders placed or performed in visit on 11/23/22   INR POCT     Status: Abnormal   Result Value Ref Range    INR Point of Care 2.4 (A) 0.9 - 1.1   Results for orders placed or performed in visit on 11/23/22   TSH with free T4 reflex     Status: Abnormal   Result Value Ref Range    TSH 5.95 (H) 0.30 - 4.20 uIU/mL   Hemoglobin A1c     Status: Normal   Result Value Ref Range    Hemoglobin A1C 5.6 4.0 - 6.2 %   CBC with platelets     Status: Normal   Result Value Ref Range    WBC Count 7.3 4.0 - 11.0 10e3/uL    RBC Count 5.05 4.40 - 5.90 10e6/uL    Hemoglobin 16.1 13.3 - 17.7 g/dL    Hematocrit 48.2 40.0 - 53.0 %    MCV 95 78 - 100 fL    MCH 31.9 26.5 - 33.0 pg    MCHC 33.4 31.5 - 36.5 g/dL    RDW 14.1 10.0 - 15.0 %    Platelet Count 184 150 - 450 10e3/uL   Lipid Profile     Status: Abnormal   Result Value Ref Range    Cholesterol 157 <200 mg/dL    Triglycerides 174 (H) <150 mg/dL    Direct Measure HDL 48 >=40 mg/dL    LDL Cholesterol Calculated 74 <=100 mg/dL    Non HDL Cholesterol 109 <130 mg/dL    Narrative    Cholesterol  Desirable:  <200 mg/dL    Triglycerides  Normal:  Less than 150 mg/dL  Borderline High:  150-199 mg/dL  High:  200-499 mg/dL  Very High:  Greater than or equal to 500 mg/dL    Direct Measure HDL  Female:  Greater than or equal to 50 mg/dL   Male:  Greater than or equal to 40 mg/dL    LDL Cholesterol  Desirable:  <100mg/dL  Above Desirable:  100-129 mg/dL   Borderline High:  130-159 mg/dL   High:  160-189 mg/dL   Very High:  >= 190  mg/dL    Non HDL Cholesterol  Desirable:  130 mg/dL  Above Desirable:  130-159 mg/dL  Borderline High:  160-189 mg/dL  High:  190-219 mg/dL  Very High:  Greater than or equal to 220 mg/dL   Comprehensive metabolic panel     Status: Abnormal   Result Value Ref Range    Sodium 141 136 - 145 mmol/L    Potassium 4.7 3.4 - 5.3 mmol/L    Chloride 105 98 - 107 mmol/L    Carbon Dioxide (CO2) 26 22 - 29 mmol/L    Anion Gap 10 7 - 15 mmol/L    Urea Nitrogen 30.6 (H) 8.0 - 23.0 mg/dL    Creatinine 1.22 (H) 0.67 - 1.17 mg/dL    Calcium 8.9 8.8 - 10.2 mg/dL    Glucose 98 70 - 99 mg/dL    Alkaline Phosphatase 157 (H) 40 - 129 U/L    AST 55 (H) 10 - 50 U/L    ALT 40 10 - 50 U/L    Protein Total 7.5 6.4 - 8.3 g/dL    Albumin 3.8 3.5 - 5.2 g/dL    Bilirubin Total 0.6 <=1.2 mg/dL    GFR Estimate 60 (L) >60 mL/min/1.73m2   UA reflex to Microscopic     Status: Abnormal   Result Value Ref Range    Color Urine Light Yellow Colorless, Straw, Light Yellow, Yellow    Appearance Urine Clear Clear    Glucose Urine Negative Negative mg/dL    Bilirubin Urine Negative Negative    Ketones Urine Negative Negative mg/dL    Specific Gravity Urine 1.020 1.000 - 1.030    Blood Urine Trace (A) Negative    pH Urine 5.5 5.0 - 9.0    Protein Albumin Urine 200 (A) Negative mg/dL    Urobilinogen Urine Normal Normal, 2.0 mg/dL    Nitrite Urine Negative Negative    Leukocyte Esterase Urine Negative Negative    Bacteria Urine Few (A) None Seen /HPF    RBC Urine 1 <=2 /HPF    WBC Urine 1 <=5 /HPF    Mucus Urine Present (A) None Seen /LPF   Albumin Random Urine Quantitative with Creat Ratio     Status: Abnormal   Result Value Ref Range    Albumin Urine mg/L 1,287.5 mg/L    Albumin Urine mg/g Cr 1,300.51 (H) 0.00 - 17.00 mg/g Cr    Creatinine Urine mg/dL 99.0 mg/dL   T4 free     Status: Abnormal   Result Value Ref Range    Free T4 0.89 (L) 0.90 - 1.70 ng/dL   Free T4 is low.  Urine albumin is high.  Urinalysis shows elevated protein levels, creatinine is at  baseline.  Alkaline phosphatase and AST are elevated.  ALT is normal.  Cholesterol levels show high triglycerides.  Hemoglobin A1c is normal.  TSH is high.  INR is elevated.  CBC is normal.    ASSESSMENT / PLAN:       ICD-10-CM    1. Benign essential hypertension  I10 UA reflex to Microscopic     TSH with free T4 reflex     CBC with platelets     Albumin Random Urine Quantitative with Creat Ratio     Comprehensive metabolic panel     metoprolol succinate ER (TOPROL XL) 100 MG 24 hr tablet     TSH with free T4 reflex     CBC with platelets     Comprehensive metabolic panel     UA reflex to Microscopic     Albumin Random Urine Quantitative with Creat Ratio     T4 free      2. Chronic heart failure with preserved ejection fraction (H)  I50.32 potassium chloride ER (KLOR-CON M) 20 MEQ CR tablet      3. Permanent atrial fibrillation (H)  I48.21 metoprolol succinate ER (TOPROL XL) 100 MG 24 hr tablet      4. Warfarin anticoagulation  Z79.01       5. Stage 3a chronic kidney disease (H)  N18.31 UA reflex to Microscopic     Albumin Random Urine Quantitative with Creat Ratio     UA reflex to Microscopic     Albumin Random Urine Quantitative with Creat Ratio      6. Morbid obesity (H)  E66.01       7. Mixed hyperlipidemia  E78.2 Lipid Profile     simvastatin (ZOCOR) 40 MG tablet     Lipid Profile      8. Hyperuricemia  E79.0 allopurinol (ZYLOPRIM) 100 MG tablet      9. Cardiac pacemaker in situ  Z95.0       10. Rheumatoid arthritis, involving unspecified site, unspecified whether rheumatoid factor present (H)  M06.9       11. Vaccine counseling  Z71.85       12. Need for 23-polyvalent pneumococcal polysaccharide vaccine  Z23 GH IMM-  PNEUMOCOCCAL VACCINE,ADULT,SQ OR IM      13. Needs flu shot  Z23       14. Encounter for Medicare annual wellness exam  Z00.00       15. Elevated random blood glucose level  R73.09 Hemoglobin A1c     Hemoglobin A1c      Patient presents for Medicare annual wellness visit as well as follow-up  multiple issues.    Hypertension, blood pressure is initially high, recheck improved.  Advised to monitor blood pressures at home.  Urine protein levels are high.  Possibly due to hypertension.  Currently taking metoprolol.  Check lab work.    Chronic diastolic heart failure, appears stable.  No excessive fluid retention issues.  Needs medication refills.  Continues with potassium supplement.    Permanent atrial fibrillation.  Continues with warfarin anticoagulation.  Seems to be tolerating current medications well without side effects.  Needs refills.    Stage IIIa chronic kidney disease.  Urine protein levels are elevated.  Monitor blood pressures.  We may need to adjust blood pressure medications if blood pressures are elevated.    Obesity, discussed need for reduced oral caloric intake.  Regular exercise.  Reduce carbohydrate intake.  Information printed and reviewed.    Mixed hyperlipidemia.  Triglyceride levels are high and not at goal.  LDL is at goal.  Continue simvastatin.  Labs collected.    Hyperuricemia.  No recent gout attacks.  Doing well with allopurinol.  Needs refills.    Patient has cardiac pacemaker, no recent issues.  Recommend routine follow-up.    Rheumatoid arthritis, chronic.  Has multiple joint pain.    Vaccine counseling completed.  Encouraged consideration of shingles vaccination, tetanus shot, pneumococcal vaccination was administered today.  Flu shot today.    Elevated random glucose, check hemoglobin A1c.    Patient has been advised of split billing requirements and indicates understanding: Yes      COUNSELING:  Reviewed preventive health counseling, as reflected in patient instructions  Special attention given to:       Regular exercise       Healthy diet/nutrition       Vision screening       Hearing screening       Dental care       Bladder control       Fall risk prevention       Immunizations    BMI:   Estimated body mass index is 35.55 kg/m  as calculated from the following:     "Height as of this encounter: 1.715 m (5' 7.5\").    Weight as of this encounter: 104.5 kg (230 lb 6.4 oz).   Weight management plan: Discussed healthy diet and exercise guidelines      He reports that he quit smoking about 42 years ago. His smoking use included cigarettes. He smoked an average of 1 pack per day. He quit smokeless tobacco use about 42 years ago.      Appropriate preventive services were discussed with this patient, including applicable screening as appropriate for cardiovascular disease, diabetes, osteopenia/osteoporosis, and glaucoma.  As appropriate for age/gender, discussed screening for colorectal cancer, prostate cancer, breast cancer, and cervical cancer. Checklist reviewing preventive services available has been given to the patient.    Reviewed patients plan of care and provided an AVS. The Complex Care Plan (for patients with higher acuity and needing more deliberate coordination of services) for Kg meets the Care Plan requirement. This Care Plan has been established and reviewed with the Patient.          Caleb Parker MD  [facility]    Identified Health Risks:  "

## 2022-11-23 NOTE — PATIENT INSTRUCTIONS
Blood pressure is well controlled.   Medications refilled.   Labs are pending.       If your left hip bothers you more....   Consider sports medicine or orthopedic clinic evaluation, call and schedule at your convenience.     [facility]   Phone: [phone]  Or   Sports Medicine at [facility] - Dr. Ryan Hurtado  Phone: [phone]      Return in approximately 1 year, or sooner as needed for follow-up with Dr. Parker.  - Annual Follow-up / Physical - Medicare Annual Wellness Visit     Clinic : [phone]  Appointment line: [phone]     Patient Education   Personalized Prevention Plan  You are due for the preventive services outlined below.  Your care team is available to assist you in scheduling these services.  If you have already completed any of these items, please share that information with your care team to update in your medical record.  Health Maintenance Due   Topic Date Due    Kidney Microalbumin Urine Test  Never done    Zoster (Shingles) Vaccine (1 of 2) Never done    Diptheria Tetanus Pertussis (DTAP/TDAP/TD) Vaccine (1 - Tdap) 01/02/1995    Pneumococcal Vaccine (2 - PPSV23 if available, else PCV20) 11/03/2018    Basic Metabolic Panel  12/29/2021    Annual Wellness Visit  06/29/2022    Cholesterol Lab  06/29/2022    Hemoglobin  06/29/2022     Preventive Health Recommendations  See your health care provider every year to  Review health changes.   Discuss preventive care.    Review your medicines if your doctor has prescribed any.  Talk with your health care provider about whether you should have a test to screen for prostate cancer (PSA).  Every 3 years, have a diabetes test (fasting glucose). If you are at risk for diabetes, you should have this test more often.  Every 5 years, have a cholesterol test. Have this test more often if you are at risk for high cholesterol or heart disease.   Every 10 years, have a colonoscopy. Or, have a yearly FIT test  (stool test). These exams will check for colon cancer.  Talk to with your health care provider about screening for Abdominal Aortic Aneurysm if you have a family history of AAA or have a history of smoking.    Shots:   Get a flu shot each year.   Get a tetanus shot every 10 years.   Talk to your doctor about your pneumonia vaccines. There are now two you should receive - Pneumovax (PPSV 23) and Prevnar (PCV 13).  Talk to your pharmacist about a shingles vaccine.   Talk to your doctor about the hepatitis B vaccine.    Nutrition:   Eat at least 5 servings of fruits and vegetables each day.   Eat whole-grain bread, whole-wheat pasta and brown rice instead of white grains and rice.   Get adequate Calcium and Vitamin D.     Lifestyle  Exercise for at least 150 minutes a week (30 minutes a day, 5 days a week). This will help you control your weight and prevent disease.   Limit alcohol to one drink per day.   No smoking.   Wear sunscreen to prevent skin cancer.   See your dentist every six months for an exam and cleaning.   See your eye doctor every 1 to 2 years to screen for conditions such as glaucoma, macular degeneration and cataracts.    Personalized Prevention Plan  You are due for the preventive services outlined below.  Your care team is available to assist you in scheduling these services.  If you have already completed any of these items, please share that information with your care team to update in your medical record.  Health Maintenance   Topic Date Due    MICROALBUMIN  Never done    ZOSTER IMMUNIZATION (1 of 2) Never done    DTAP/TDAP/TD IMMUNIZATION (1 - Tdap) 01/02/1995    Pneumococcal Vaccine: 65+ Years (2 - PPSV23 if available, else PCV20) 11/03/2018    BMP  12/29/2021    MEDICARE ANNUAL WELLNESS VISIT  06/29/2022    LIPID  06/29/2022    HEMOGLOBIN  06/29/2022    FALL RISK ASSESSMENT  11/23/2023    ADVANCE CARE PLANNING  11/23/2027    PHQ-2 (once per calendar year)  Completed    INFLUENZA VACCINE   Completed    URINALYSIS  Completed    COVID-19 Vaccine  Completed    HEPATITIS C SCREENING  Addressed    IPV IMMUNIZATION  Aged Out    MENINGITIS IMMUNIZATION  Aged Out

## 2022-11-23 NOTE — LETTER
November 24, 2022      Kg Ferraro  [address]   [place] [address]        Dear ,    We are writing to inform you of your test results.    Hemoglobin A1c is well controlled and normal.    CBC is normal.    TSH is high.  This indicates that your thyroid function is under producing slightly.  Actual thyroid hormone, free T4, is slightly low.    -- We could consider starting oral thyroid supplement. (Synthroid).    Return in approximately 1 year, or sooner as needed for follow-up with Dr. Parker.  - Annual Follow-up / Physical - Medicare Annual Wellness Visit     Clinic : [phone]  Appointment line: [phone]     Resulted Orders   TSH with free T4 reflex   Result Value Ref Range    TSH 5.95 (H) 0.30 - 4.20 uIU/mL   Hemoglobin A1c   Result Value Ref Range    Hemoglobin A1C 5.6 4.0 - 6.2 %   CBC with platelets   Result Value Ref Range    WBC Count 7.3 4.0 - 11.0 10e3/uL    RBC Count 5.05 4.40 - 5.90 10e6/uL    Hemoglobin 16.1 13.3 - 17.7 g/dL    Hematocrit 48.2 40.0 - 53.0 %    MCV 95 78 - 100 fL    MCH 31.9 26.5 - 33.0 pg    MCHC 33.4 31.5 - 36.5 g/dL    RDW 14.1 10.0 - 15.0 %    Platelet Count 184 150 - 450 10e3/uL   Lipid Profile   Result Value Ref Range    Cholesterol 157 <200 mg/dL    Triglycerides 174 (H) <150 mg/dL    Direct Measure HDL 48 >=40 mg/dL    LDL Cholesterol Calculated 74 <=100 mg/dL    Non HDL Cholesterol 109 <130 mg/dL    Narrative    Cholesterol  Desirable:  <200 mg/dL    Triglycerides  Normal:  Less than 150 mg/dL  Borderline High:  150-199 mg/dL  High:  200-499 mg/dL  Very High:  Greater than or equal to 500 mg/dL    Direct Measure HDL  Female:  Greater than or equal to 50 mg/dL   Male:  Greater than or equal to 40 mg/dL    LDL Cholesterol  Desirable:  <100mg/dL  Above Desirable:  100-129 mg/dL   Borderline High:  130-159 mg/dL   High:  160-189 mg/dL   Very High:  >= 190 mg/dL    Non HDL Cholesterol  Desirable:  130 mg/dL  Above Desirable:  130-159  mg/dL  Borderline High:  160-189 mg/dL  High:  190-219 mg/dL  Very High:  Greater than or equal to 220 mg/dL   Comprehensive metabolic panel   Result Value Ref Range    Sodium 141 136 - 145 mmol/L    Potassium 4.7 3.4 - 5.3 mmol/L    Chloride 105 98 - 107 mmol/L    Carbon Dioxide (CO2) 26 22 - 29 mmol/L    Anion Gap 10 7 - 15 mmol/L    Urea Nitrogen 30.6 (H) 8.0 - 23.0 mg/dL    Creatinine 1.22 (H) 0.67 - 1.17 mg/dL    Calcium 8.9 8.8 - 10.2 mg/dL    Glucose 98 70 - 99 mg/dL    Alkaline Phosphatase 157 (H) 40 - 129 U/L    AST 55 (H) 10 - 50 U/L    ALT 40 10 - 50 U/L    Protein Total 7.5 6.4 - 8.3 g/dL    Albumin 3.8 3.5 - 5.2 g/dL    Bilirubin Total 0.6 <=1.2 mg/dL    GFR Estimate 60 (L) >60 mL/min/1.73m2      Comment:      Effective December 21, 2021 eGFRcr in adults is calculated using the 2021 CKD-EPI creatinine equation which includes age and gender (Laure et al., [place], DOI: 10.1056/VWBObz9196196)   UA reflex to Microscopic   Result Value Ref Range    Color Urine Light Yellow Colorless, Straw, Light Yellow, Yellow    Appearance Urine Clear Clear    Glucose Urine Negative Negative mg/dL    Bilirubin Urine Negative Negative    Ketones Urine Negative Negative mg/dL    Specific Gravity Urine 1.020 1.000 - 1.030    Blood Urine Trace (A) Negative    pH Urine 5.5 5.0 - 9.0    Protein Albumin Urine 200 (A) Negative mg/dL    Urobilinogen Urine Normal Normal, 2.0 mg/dL    Nitrite Urine Negative Negative    Leukocyte Esterase Urine Negative Negative    Bacteria Urine Few (A) None Seen /HPF    RBC Urine 1 <=2 /HPF    WBC Urine 1 <=5 /HPF    Mucus Urine Present (A) None Seen /LPF   Albumin Random Urine Quantitative with Creat Ratio   Result Value Ref Range    Albumin Urine mg/L 1,287.5 mg/L      Comment:      The reference ranges have not been established in urine albumin. The results should be integrated into the clinical context for interpretation.    Albumin Urine mg/g Cr 1,300.51 (H) 0.00 - 17.00 mg/g Cr      Comment:       Microalbuminuria is defined as an albumin:creatinine ratio of 17 to 299 for males and 25 to 299 for females. A ratio of albumin:creatinine of 300 or higher is indicative of overt proteinuria.  Due to biologic variability, positive results should be confirmed by a second, first-morning random or 24-hour timed urine specimen. If there is discrepancy, a third specimen is recommended. When 2 out of 3 results are in the microalbuminuria range, this is evidence for incipient nephropathy and warrants increased efforts at glucose control, blood pressure control, and institution of therapy with an angiotensin-converting-enzyme (ACE) inhibitor (if the patient can tolerate it).      Creatinine Urine mg/dL 99.0 mg/dL      Comment:      The reference ranges have not been established in urine creatinine. The results should be integrated into the clinical context for interpretation.   T4 free   Result Value Ref Range    Free T4 0.89 (L) 0.90 - 1.70 ng/dL       If you have any questions or concerns, please call the clinic at the number listed above.       Sincerely,      Caleb Parker MD

## 2022-11-28 ENCOUNTER — TELEPHONE (OUTPATIENT)
Dept: CARDIOLOGY | Facility: CLINIC | Age: 77
End: 2022-11-28

## 2022-12-28 ENCOUNTER — ANTICOAGULATION THERAPY VISIT (OUTPATIENT)
Dept: ANTICOAGULATION | Facility: OTHER | Age: 77
End: 2022-12-28
Attending: INTERNAL MEDICINE
Payer: MEDICARE

## 2022-12-28 DIAGNOSIS — I47.10 PAROXYSMAL SUPRAVENTRICULAR TACHYCARDIA (H): ICD-10-CM

## 2022-12-28 DIAGNOSIS — Z79.01 LONG TERM CURRENT USE OF ANTICOAGULANT THERAPY: Primary | ICD-10-CM

## 2022-12-28 LAB — INR POINT OF CARE: 2.4 (ref 0.9–1.1)

## 2022-12-28 PROCEDURE — 36416 COLLJ CAPILLARY BLOOD SPEC: CPT | Mod: ZL

## 2022-12-28 PROCEDURE — 85610 PROTHROMBIN TIME: CPT | Mod: ZL,QW

## 2022-12-28 NOTE — PROGRESS NOTES
ANTICOAGULATION MANAGEMENT     Kg Ferraro 77 year old male is on warfarin with therapeutic INR result. (Goal INR 2.0-3.0)    Recent labs: (last 7 days)     12/28/22  1356   INR 2.4*       ASSESSMENT       Source(s): Chart Review and In person       Warfarin doses taken: Warfarin taken as instructed    Diet: No new diet changes identified    New illness, injury, or hospitalization: No    Medication/supplement changes: None noted    Signs or symptoms of bleeding or clotting: No    Previous INR: Therapeutic last 2(+) visits    Additional findings: None       PLAN     Recommended plan for no diet, medication or health factor changes affecting INR     Dosing Instructions: Continue your current warfarin dose with next INR in 5 weeks       Summary  As of 12/28/2022    Full warfarin instructions:  4 mg every day   Next INR check:  2/1/2023             In person    Lab visit scheduled    Education provided:     None required    Plan made per [facility] anticoagulation protocol    Kay Cline RN  Anticoagulation Clinic  12/28/2022    _______________________________________________________________________     Anticoagulation Episode Summary     Current INR goal:  2.0-3.0   TTR:  86.6 % (1 y)   Target end date:  Indefinite   Send INR reminders to:  [facility]    Indications    Long-term (current) use of anticoagulants [Z79.01] [Z79.01]  Paroxysmal supraventricular tachycardia (H) [I47.1]           Comments:           Anticoagulation Care Providers     Provider Role Specialty Phone number    Caleb Parker MD Referring Internal Medicine [phone]

## 2023-02-06 ENCOUNTER — TELEPHONE (OUTPATIENT)
Dept: INTERNAL MEDICINE | Facility: OTHER | Age: 78
End: 2023-02-06
Payer: MEDICARE

## 2023-02-06 NOTE — TELEPHONE ENCOUNTER
ANTICOAGULATION     Kg Ferraro is overdue for INR check.      Reminder letter sent and Unable to leave voice message    Lillian Ramos RN

## 2023-02-16 ENCOUNTER — PATIENT OUTREACH (OUTPATIENT)
Dept: INTERNAL MEDICINE | Facility: OTHER | Age: 78
End: 2023-02-16
Payer: MEDICARE

## 2023-02-16 NOTE — TELEPHONE ENCOUNTER
Call to patient: He reported that he has not been feeling well, flu like symptoms. He reports he is planning to come Monday. Reviewed that it is important to have INR completed as ordered and especially when sick. He said that he would try to get here monday for sure. Tory Fish RN on 2/16/2023 at 10:11 AM

## 2023-02-21 ENCOUNTER — ANCILLARY PROCEDURE (OUTPATIENT)
Dept: CARDIOLOGY | Facility: CLINIC | Age: 78
End: 2023-02-21
Attending: INTERNAL MEDICINE
Payer: MEDICARE

## 2023-02-21 DIAGNOSIS — I49.5 SICK SINUS SYNDROME (H): ICD-10-CM

## 2023-02-21 PROCEDURE — 93296 REM INTERROG EVL PM/IDS: CPT

## 2023-02-21 PROCEDURE — 93294 REM INTERROG EVL PM/LDLS PM: CPT | Performed by: INTERNAL MEDICINE

## 2023-02-22 ENCOUNTER — ANTICOAGULATION THERAPY VISIT (OUTPATIENT)
Dept: ANTICOAGULATION | Facility: OTHER | Age: 78
End: 2023-02-22
Attending: INTERNAL MEDICINE
Payer: MEDICARE

## 2023-02-22 DIAGNOSIS — Z79.01 LONG TERM CURRENT USE OF ANTICOAGULANT THERAPY: Primary | ICD-10-CM

## 2023-02-22 DIAGNOSIS — I47.10 PAROXYSMAL SUPRAVENTRICULAR TACHYCARDIA (H): ICD-10-CM

## 2023-02-22 LAB
INR POINT OF CARE: 2.9 (ref 0.9–1.1)
MDC_IDC_LEAD_IMPLANT_DT: NORMAL
MDC_IDC_LEAD_IMPLANT_DT: NORMAL
MDC_IDC_LEAD_LOCATION: NORMAL
MDC_IDC_LEAD_LOCATION: NORMAL
MDC_IDC_LEAD_LOCATION_DETAIL_1: NORMAL
MDC_IDC_LEAD_LOCATION_DETAIL_1: NORMAL
MDC_IDC_LEAD_MFG: NORMAL
MDC_IDC_LEAD_MFG: NORMAL
MDC_IDC_LEAD_MODEL: NORMAL
MDC_IDC_LEAD_MODEL: NORMAL
MDC_IDC_LEAD_POLARITY_TYPE: NORMAL
MDC_IDC_LEAD_POLARITY_TYPE: NORMAL
MDC_IDC_LEAD_SERIAL: NORMAL
MDC_IDC_LEAD_SERIAL: NORMAL
MDC_IDC_LEAD_SPECIAL_FUNCTION: NORMAL
MDC_IDC_LEAD_SPECIAL_FUNCTION: NORMAL
MDC_IDC_MSMT_BATTERY_DTM: NORMAL
MDC_IDC_MSMT_BATTERY_REMAINING_LONGEVITY: 82 MO
MDC_IDC_MSMT_BATTERY_RRT_TRIGGER: 2.62
MDC_IDC_MSMT_BATTERY_STATUS: NORMAL
MDC_IDC_MSMT_BATTERY_VOLTAGE: 2.99 V
MDC_IDC_MSMT_LEADCHNL_RA_IMPEDANCE_VALUE: 399 OHM
MDC_IDC_MSMT_LEADCHNL_RA_IMPEDANCE_VALUE: 475 OHM
MDC_IDC_MSMT_LEADCHNL_RA_SENSING_INTR_AMPL: 2 MV
MDC_IDC_MSMT_LEADCHNL_RV_IMPEDANCE_VALUE: 665 OHM
MDC_IDC_MSMT_LEADCHNL_RV_IMPEDANCE_VALUE: 684 OHM
MDC_IDC_MSMT_LEADCHNL_RV_PACING_THRESHOLD_AMPLITUDE: 2.4 V
MDC_IDC_MSMT_LEADCHNL_RV_PACING_THRESHOLD_PULSEWIDTH: 0.4 MS
MDC_IDC_MSMT_LEADCHNL_RV_SENSING_INTR_AMPL: 10.75 MV
MDC_IDC_PG_IMPLANT_DTM: NORMAL
MDC_IDC_PG_MFG: NORMAL
MDC_IDC_PG_MODEL: NORMAL
MDC_IDC_PG_SERIAL: NORMAL
MDC_IDC_PG_TYPE: NORMAL
MDC_IDC_SESS_CLINIC_NAME: NORMAL
MDC_IDC_SESS_DTM: NORMAL
MDC_IDC_SESS_TYPE: NORMAL
MDC_IDC_SET_BRADY_HYSTRATE: NORMAL
MDC_IDC_SET_BRADY_LOWRATE: 60 {BEATS}/MIN
MDC_IDC_SET_BRADY_MAX_SENSOR_RATE: 130 {BEATS}/MIN
MDC_IDC_SET_BRADY_MODE: NORMAL
MDC_IDC_SET_LEADCHNL_RA_SENSING_ANODE_ELECTRODE_1: NORMAL
MDC_IDC_SET_LEADCHNL_RA_SENSING_ANODE_LOCATION_1: NORMAL
MDC_IDC_SET_LEADCHNL_RA_SENSING_CATHODE_ELECTRODE_1: NORMAL
MDC_IDC_SET_LEADCHNL_RA_SENSING_CATHODE_LOCATION_1: NORMAL
MDC_IDC_SET_LEADCHNL_RA_SENSING_POLARITY: NORMAL
MDC_IDC_SET_LEADCHNL_RA_SENSING_SENSITIVITY: NORMAL
MDC_IDC_SET_LEADCHNL_RV_PACING_AMPLITUDE: 4.75 V
MDC_IDC_SET_LEADCHNL_RV_PACING_ANODE_ELECTRODE_1: NORMAL
MDC_IDC_SET_LEADCHNL_RV_PACING_ANODE_LOCATION_1: NORMAL
MDC_IDC_SET_LEADCHNL_RV_PACING_CAPTURE_MODE: NORMAL
MDC_IDC_SET_LEADCHNL_RV_PACING_CATHODE_ELECTRODE_1: NORMAL
MDC_IDC_SET_LEADCHNL_RV_PACING_CATHODE_LOCATION_1: NORMAL
MDC_IDC_SET_LEADCHNL_RV_PACING_POLARITY: NORMAL
MDC_IDC_SET_LEADCHNL_RV_PACING_PULSEWIDTH: 0.4 MS
MDC_IDC_SET_LEADCHNL_RV_SENSING_ANODE_ELECTRODE_1: NORMAL
MDC_IDC_SET_LEADCHNL_RV_SENSING_ANODE_LOCATION_1: NORMAL
MDC_IDC_SET_LEADCHNL_RV_SENSING_CATHODE_ELECTRODE_1: NORMAL
MDC_IDC_SET_LEADCHNL_RV_SENSING_CATHODE_LOCATION_1: NORMAL
MDC_IDC_SET_LEADCHNL_RV_SENSING_POLARITY: NORMAL
MDC_IDC_SET_LEADCHNL_RV_SENSING_SENSITIVITY: 0.9 MV
MDC_IDC_SET_ZONE_DETECTION_INTERVAL: 400 MS
MDC_IDC_SET_ZONE_TYPE: NORMAL
MDC_IDC_STAT_BRADY_AP_VP_PERCENT: 0 %
MDC_IDC_STAT_BRADY_AP_VS_PERCENT: 0 %
MDC_IDC_STAT_BRADY_AS_VP_PERCENT: 67.67 %
MDC_IDC_STAT_BRADY_AS_VS_PERCENT: 32.33 %
MDC_IDC_STAT_BRADY_DTM_END: NORMAL
MDC_IDC_STAT_BRADY_DTM_START: NORMAL
MDC_IDC_STAT_BRADY_RA_PERCENT_PACED: 0 %
MDC_IDC_STAT_BRADY_RV_PERCENT_PACED: 67.67 %
MDC_IDC_STAT_EPISODE_RECENT_COUNT: 0
MDC_IDC_STAT_EPISODE_RECENT_COUNT_DTM_END: NORMAL
MDC_IDC_STAT_EPISODE_RECENT_COUNT_DTM_START: NORMAL
MDC_IDC_STAT_EPISODE_TOTAL_COUNT: 0
MDC_IDC_STAT_EPISODE_TOTAL_COUNT: 4
MDC_IDC_STAT_EPISODE_TOTAL_COUNT: NORMAL
MDC_IDC_STAT_EPISODE_TOTAL_COUNT_DTM_END: NORMAL
MDC_IDC_STAT_EPISODE_TOTAL_COUNT_DTM_START: NORMAL
MDC_IDC_STAT_EPISODE_TYPE: NORMAL

## 2023-02-22 PROCEDURE — 85610 PROTHROMBIN TIME: CPT | Mod: ZL,QW

## 2023-02-22 NOTE — PROGRESS NOTES
ANTICOAGULATION MANAGEMENT     Kg Ferraro 77 year old male is on warfarin with therapeutic INR result. (Goal INR 2.0-3.0)    Recent labs: (last 7 days)     02/22/23  1427   INR 2.9*       ASSESSMENT       Source(s): Chart Review and In person       Warfarin doses taken: Warfarin taken as instructed    Diet: No new diet changes identified    New illness, injury, or hospitalization: No    Medication/supplement changes: None noted    Signs or symptoms of bleeding or clotting: No    Previous INR: Therapeutic last 2(+) visits    Additional findings: None       PLAN     Recommended plan for no diet, medication or health factor changes affecting INR     Dosing Instructions: Continue your current warfarin dose with next INR in 6 weeks       Summary  As of 2/22/2023    Full warfarin instructions:  4 mg every day   Next INR check:  4/5/2023             In person    Lab visit scheduled    Education provided: Please call back if any changes to your diet, medications or how you've been taking warfarin    Plan made per [facility] anticoagulation protocol    Lillian Ramos RN  Anticoagulation Clinic  2/22/2023    _______________________________________________________________________     Anticoagulation Episode Summary     Current INR goal:  2.0-3.0   TTR:  86.6 % (1 y)   Target end date:  Indefinite   Send INR reminders to:  [facility]    Indications    Long-term (current) use of anticoagulants [Z79.01] [Z79.01]  Paroxysmal supraventricular tachycardia (H) [I47.1]           Comments:           Anticoagulation Care Providers     Provider Role Specialty Phone number    Caleb Parker MD Referring Internal Medicine [phone]

## 2023-04-13 ENCOUNTER — TELEPHONE (OUTPATIENT)
Dept: INTERNAL MEDICINE | Facility: OTHER | Age: 78
End: 2023-04-13
Payer: MEDICARE

## 2023-04-13 NOTE — TELEPHONE ENCOUNTER
ANTICOAGULATION     Kg Ferraro is overdue for INR check.      Spoke with Kg and scheduled lab appointment on 4/18/23    Tory Fish RN

## 2023-04-18 ENCOUNTER — ANTICOAGULATION THERAPY VISIT (OUTPATIENT)
Dept: ANTICOAGULATION | Facility: OTHER | Age: 78
End: 2023-04-18
Attending: INTERNAL MEDICINE
Payer: MEDICARE

## 2023-04-18 DIAGNOSIS — I47.10 PAROXYSMAL SUPRAVENTRICULAR TACHYCARDIA (H): ICD-10-CM

## 2023-04-18 DIAGNOSIS — Z79.01 LONG TERM CURRENT USE OF ANTICOAGULANT THERAPY: Primary | ICD-10-CM

## 2023-04-18 LAB — INR POINT OF CARE: 2.3 (ref 0.9–1.1)

## 2023-04-18 PROCEDURE — 85610 PROTHROMBIN TIME: CPT | Mod: ZL,QW

## 2023-04-18 NOTE — PROGRESS NOTES
ANTICOAGULATION MANAGEMENT     Kg Ferraro 78 year old male is on warfarin with therapeutic INR result. (Goal INR 2.0-3.0)    Recent labs: (last 7 days)     04/18/23  1410   INR 2.3*       ASSESSMENT       Source(s): Chart Review and in person      Warfarin doses taken: Warfarin taken as instructed    Diet: No new diet changes identified    New illness, injury, or hospitalization: No    Medication/supplement changes: None noted    Signs or symptoms of bleeding or clotting: No    Previous INR: Therapeutic last 2(+) visits    Additional findings: None       PLAN     Recommended plan for no diet, medication or health factor changes affecting INR     Dosing Instructions: Continue your current warfarin dose with next INR in 6 weeks       Summary  As of 4/18/2023    Full warfarin instructions:  4 mg every day   Next INR check:  5/30/2023             In person    Lab visit scheduled    Education provided: Please call back if any changes to your diet, medications or how you've been taking warfarin    Plan made per [facility] anticoagulation protocol    Lillian Ramos RN  Anticoagulation Clinic  4/18/2023    _______________________________________________________________________     Anticoagulation Episode Summary     Current INR goal:  2.0-3.0   TTR:  86.6 % (1 y)   Target end date:  Indefinite   Send INR reminders to:  [facility]    Indications    Long-term (current) use of anticoagulants [Z79.01] [Z79.01]  Paroxysmal supraventricular tachycardia (H) [I47.1]           Comments:           Anticoagulation Care Providers     Provider Role Specialty Phone number    Caleb Parker MD Referring Internal Medicine [phone]            
Left lower leg

## 2023-05-23 ENCOUNTER — ANCILLARY PROCEDURE (OUTPATIENT)
Dept: CARDIOLOGY | Facility: CLINIC | Age: 78
End: 2023-05-23
Attending: INTERNAL MEDICINE
Payer: MEDICARE

## 2023-05-23 DIAGNOSIS — I49.5 SICK SINUS SYNDROME (H): ICD-10-CM

## 2023-05-23 PROCEDURE — 93294 REM INTERROG EVL PM/LDLS PM: CPT | Performed by: INTERNAL MEDICINE

## 2023-05-23 PROCEDURE — 93296 REM INTERROG EVL PM/IDS: CPT

## 2023-06-03 LAB
MDC_IDC_LEAD_IMPLANT_DT: NORMAL
MDC_IDC_LEAD_IMPLANT_DT: NORMAL
MDC_IDC_LEAD_LOCATION: NORMAL
MDC_IDC_LEAD_LOCATION: NORMAL
MDC_IDC_LEAD_LOCATION_DETAIL_1: NORMAL
MDC_IDC_LEAD_LOCATION_DETAIL_1: NORMAL
MDC_IDC_LEAD_MFG: NORMAL
MDC_IDC_LEAD_MFG: NORMAL
MDC_IDC_LEAD_MODEL: NORMAL
MDC_IDC_LEAD_MODEL: NORMAL
MDC_IDC_LEAD_POLARITY_TYPE: NORMAL
MDC_IDC_LEAD_POLARITY_TYPE: NORMAL
MDC_IDC_LEAD_SERIAL: NORMAL
MDC_IDC_LEAD_SERIAL: NORMAL
MDC_IDC_LEAD_SPECIAL_FUNCTION: NORMAL
MDC_IDC_LEAD_SPECIAL_FUNCTION: NORMAL
MDC_IDC_PG_IMPLANT_DTM: NORMAL
MDC_IDC_PG_MFG: NORMAL
MDC_IDC_PG_MODEL: NORMAL
MDC_IDC_PG_SERIAL: NORMAL
MDC_IDC_PG_TYPE: NORMAL
MDC_IDC_SESS_CLINIC_NAME: NORMAL
MDC_IDC_SESS_DTM: NORMAL
MDC_IDC_SESS_TYPE: NORMAL

## 2023-06-06 ENCOUNTER — HOSPITAL ENCOUNTER (OUTPATIENT)
Dept: CARDIOLOGY | Facility: OTHER | Age: 78
Discharge: HOME | End: 2023-06-06
Attending: INTERNAL MEDICINE
Payer: MEDICARE

## 2023-06-06 ENCOUNTER — ANTICOAGULATION THERAPY VISIT (OUTPATIENT)
Dept: ANTICOAGULATION | Facility: OTHER | Age: 78
End: 2023-06-06
Attending: INTERNAL MEDICINE
Payer: MEDICARE

## 2023-06-06 DIAGNOSIS — Z79.01 LONG TERM CURRENT USE OF ANTICOAGULANT THERAPY: Primary | ICD-10-CM

## 2023-06-06 DIAGNOSIS — I49.5 SICK SINUS SYNDROME (H): ICD-10-CM

## 2023-06-06 DIAGNOSIS — I47.10 PAROXYSMAL SUPRAVENTRICULAR TACHYCARDIA (H): ICD-10-CM

## 2023-06-06 LAB — INR POINT OF CARE: 2.9 (ref 0.9–1.1)

## 2023-06-06 PROCEDURE — 36416 COLLJ CAPILLARY BLOOD SPEC: CPT | Mod: ZL

## 2023-06-06 PROCEDURE — 85610 PROTHROMBIN TIME: CPT | Mod: ZL,QW

## 2023-06-06 PROCEDURE — 93280 PM DEVICE PROGR EVAL DUAL: CPT | Performed by: INTERNAL MEDICINE

## 2023-06-06 PROCEDURE — 93280 PM DEVICE PROGR EVAL DUAL: CPT | Mod: 26 | Performed by: INTERNAL MEDICINE

## 2023-06-06 NOTE — PATIENT INSTRUCTIONS
It was a pleasure to see you in clinic today, please do not hesitate to call us with any questions or concerns.  Your next automatic remote pacemaker check is scheduled for 9/13/2023, we will see you back in clinic 12/4/2023.    Nadeen Toure RN    Electrophysiology Nurse Clinician  [facility]    During Business Hours Please Call:  [phone]  After Hours Please Call:  [phone] - select option #4 and ask for job code [phone]     Per Dr. Dixon, order Trazodone 50 mg, nightly as needed. Order placed, please notify patient the medication was sent today and apologize for the delay. Thank you

## 2023-06-06 NOTE — PROGRESS NOTES
ANTICOAGULATION MANAGEMENT     Kg Ferraro 78 year old male is on warfarin with therapeutic INR result. (Goal INR 2.0-3.0)    Recent labs: (last 7 days)     06/06/23  1057   INR 2.9*       ASSESSMENT       Source(s): Chart Review and In person       Warfarin doses taken: Warfarin taken as instructed    Diet: No new diet changes identified    New illness, injury, or hospitalization: No    Medication/supplement changes: None noted    Signs or symptoms of bleeding or clotting: No    Previous INR: Therapeutic last 2(+) visits    Additional findings: None       PLAN     Recommended plan for no diet, medication or health factor changes affecting INR     Dosing Instructions: Continue your current warfarin dose with next INR in 6 weeks       Summary  As of 6/6/2023    Full warfarin instructions:  4 mg every day   Next INR check:  7/18/2023             In person    Patient offered & declined to schedule next visit    Education provided: Please call back if any changes to your diet, medications or how you've been taking warfarin    Plan made per [facility] anticoagulation protocol    Lillian Ramos RN  Anticoagulation Clinic  6/6/2023    _______________________________________________________________________     Anticoagulation Episode Summary     Current INR goal:  2.0-3.0   TTR:  86.6 % (1 y)   Target end date:  Indefinite   Send INR reminders to:  [facility]    Indications    Long-term (current) use of anticoagulants [Z79.01] [Z79.01]  Paroxysmal supraventricular tachycardia (H) [I47.1]           Comments:           Anticoagulation Care Providers     Provider Role Specialty Phone number    Caleb Parker MD Referring Internal Medicine [phone]

## 2023-06-12 LAB
MDC_IDC_LEAD_IMPLANT_DT: NORMAL
MDC_IDC_LEAD_IMPLANT_DT: NORMAL
MDC_IDC_LEAD_LOCATION: NORMAL
MDC_IDC_LEAD_LOCATION: NORMAL
MDC_IDC_LEAD_LOCATION_DETAIL_1: NORMAL
MDC_IDC_LEAD_LOCATION_DETAIL_1: NORMAL
MDC_IDC_LEAD_MFG: NORMAL
MDC_IDC_LEAD_MFG: NORMAL
MDC_IDC_LEAD_MODEL: NORMAL
MDC_IDC_LEAD_MODEL: NORMAL
MDC_IDC_LEAD_POLARITY_TYPE: NORMAL
MDC_IDC_LEAD_POLARITY_TYPE: NORMAL
MDC_IDC_LEAD_SERIAL: NORMAL
MDC_IDC_LEAD_SERIAL: NORMAL
MDC_IDC_LEAD_SPECIAL_FUNCTION: NORMAL
MDC_IDC_LEAD_SPECIAL_FUNCTION: NORMAL
MDC_IDC_MSMT_BATTERY_DTM: NORMAL
MDC_IDC_MSMT_BATTERY_REMAINING_LONGEVITY: 82 MO
MDC_IDC_MSMT_BATTERY_RRT_TRIGGER: 2.62
MDC_IDC_MSMT_BATTERY_STATUS: NORMAL
MDC_IDC_MSMT_BATTERY_VOLTAGE: 2.98 V
MDC_IDC_MSMT_LEADCHNL_RA_IMPEDANCE_VALUE: 380 OHM
MDC_IDC_MSMT_LEADCHNL_RA_IMPEDANCE_VALUE: 475 OHM
MDC_IDC_MSMT_LEADCHNL_RV_IMPEDANCE_VALUE: 684 OHM
MDC_IDC_MSMT_LEADCHNL_RV_IMPEDANCE_VALUE: 684 OHM
MDC_IDC_MSMT_LEADCHNL_RV_PACING_THRESHOLD_AMPLITUDE: 1.5 V
MDC_IDC_MSMT_LEADCHNL_RV_PACING_THRESHOLD_PULSEWIDTH: 1 MS
MDC_IDC_MSMT_LEADCHNL_RV_SENSING_INTR_AMPL: 10.75 MV
MDC_IDC_PG_IMPLANT_DTM: NORMAL
MDC_IDC_PG_MFG: NORMAL
MDC_IDC_PG_MODEL: NORMAL
MDC_IDC_PG_SERIAL: NORMAL
MDC_IDC_PG_TYPE: NORMAL
MDC_IDC_SESS_CLINIC_NAME: NORMAL
MDC_IDC_SESS_DTM: NORMAL
MDC_IDC_SESS_TYPE: NORMAL
MDC_IDC_SET_BRADY_HYSTRATE: NORMAL
MDC_IDC_SET_BRADY_LOWRATE: 60 {BEATS}/MIN
MDC_IDC_SET_BRADY_MAX_SENSOR_RATE: 130 {BEATS}/MIN
MDC_IDC_SET_BRADY_MODE: NORMAL
MDC_IDC_SET_LEADCHNL_RA_SENSING_ANODE_ELECTRODE_1: NORMAL
MDC_IDC_SET_LEADCHNL_RA_SENSING_ANODE_LOCATION_1: NORMAL
MDC_IDC_SET_LEADCHNL_RA_SENSING_CATHODE_ELECTRODE_1: NORMAL
MDC_IDC_SET_LEADCHNL_RA_SENSING_CATHODE_LOCATION_1: NORMAL
MDC_IDC_SET_LEADCHNL_RA_SENSING_POLARITY: NORMAL
MDC_IDC_SET_LEADCHNL_RA_SENSING_SENSITIVITY: NORMAL
MDC_IDC_SET_LEADCHNL_RV_PACING_AMPLITUDE: 3 V
MDC_IDC_SET_LEADCHNL_RV_PACING_ANODE_ELECTRODE_1: NORMAL
MDC_IDC_SET_LEADCHNL_RV_PACING_ANODE_LOCATION_1: NORMAL
MDC_IDC_SET_LEADCHNL_RV_PACING_CAPTURE_MODE: NORMAL
MDC_IDC_SET_LEADCHNL_RV_PACING_CATHODE_ELECTRODE_1: NORMAL
MDC_IDC_SET_LEADCHNL_RV_PACING_CATHODE_LOCATION_1: NORMAL
MDC_IDC_SET_LEADCHNL_RV_PACING_POLARITY: NORMAL
MDC_IDC_SET_LEADCHNL_RV_PACING_PULSEWIDTH: 1 MS
MDC_IDC_SET_LEADCHNL_RV_SENSING_ANODE_ELECTRODE_1: NORMAL
MDC_IDC_SET_LEADCHNL_RV_SENSING_ANODE_LOCATION_1: NORMAL
MDC_IDC_SET_LEADCHNL_RV_SENSING_CATHODE_ELECTRODE_1: NORMAL
MDC_IDC_SET_LEADCHNL_RV_SENSING_CATHODE_LOCATION_1: NORMAL
MDC_IDC_SET_LEADCHNL_RV_SENSING_POLARITY: NORMAL
MDC_IDC_SET_LEADCHNL_RV_SENSING_SENSITIVITY: 0.9 MV
MDC_IDC_SET_ZONE_DETECTION_INTERVAL: 400 MS
MDC_IDC_SET_ZONE_TYPE: NORMAL
MDC_IDC_STAT_BRADY_AP_VP_PERCENT: 0 %
MDC_IDC_STAT_BRADY_AP_VS_PERCENT: 0 %
MDC_IDC_STAT_BRADY_AS_VP_PERCENT: 70.19 %
MDC_IDC_STAT_BRADY_AS_VS_PERCENT: 29.81 %
MDC_IDC_STAT_BRADY_DTM_END: NORMAL
MDC_IDC_STAT_BRADY_DTM_START: NORMAL
MDC_IDC_STAT_BRADY_RA_PERCENT_PACED: 0 %
MDC_IDC_STAT_BRADY_RV_PERCENT_PACED: 70.19 %
MDC_IDC_STAT_EPISODE_RECENT_COUNT: 0
MDC_IDC_STAT_EPISODE_RECENT_COUNT: 4
MDC_IDC_STAT_EPISODE_RECENT_COUNT_DTM_END: NORMAL
MDC_IDC_STAT_EPISODE_RECENT_COUNT_DTM_END: NORMAL
MDC_IDC_STAT_EPISODE_RECENT_COUNT_DTM_START: NORMAL
MDC_IDC_STAT_EPISODE_RECENT_COUNT_DTM_START: NORMAL
MDC_IDC_STAT_EPISODE_TOTAL_COUNT: 0
MDC_IDC_STAT_EPISODE_TOTAL_COUNT: 4
MDC_IDC_STAT_EPISODE_TOTAL_COUNT: NORMAL
MDC_IDC_STAT_EPISODE_TOTAL_COUNT_DTM_END: NORMAL
MDC_IDC_STAT_EPISODE_TOTAL_COUNT_DTM_START: NORMAL
MDC_IDC_STAT_EPISODE_TYPE: NORMAL

## 2023-07-17 DIAGNOSIS — I48.91 ATRIAL FIBRILLATION (H): ICD-10-CM

## 2023-07-17 DIAGNOSIS — Z79.01 ANTICOAGULATION MONITORING, INR RANGE 2-3: ICD-10-CM

## 2023-07-18 ENCOUNTER — ANTICOAGULATION THERAPY VISIT (OUTPATIENT)
Dept: ANTICOAGULATION | Facility: OTHER | Age: 78
End: 2023-07-18
Attending: INTERNAL MEDICINE
Payer: MEDICARE

## 2023-07-18 DIAGNOSIS — I47.10 PAROXYSMAL SUPRAVENTRICULAR TACHYCARDIA (H): ICD-10-CM

## 2023-07-18 DIAGNOSIS — Z79.01 LONG TERM CURRENT USE OF ANTICOAGULANT THERAPY: Primary | ICD-10-CM

## 2023-07-18 LAB — INR POINT OF CARE: 3.6 (ref 0.9–1.1)

## 2023-07-18 PROCEDURE — 85610 PROTHROMBIN TIME: CPT | Mod: ZL,QW

## 2023-07-18 RX ORDER — WARFARIN SODIUM 4 MG/1
TABLET ORAL
Qty: 90 TABLET | Refills: 0 | Status: SHIPPED | OUTPATIENT
Start: 2023-07-18 | End: 2023-10-13

## 2023-07-18 NOTE — PROGRESS NOTES
ANTICOAGULATION MANAGEMENT     Kg Ferraro 78 year old male is on warfarin with supratherapeutic INR result. (Goal INR 2.0-3.0)    Recent labs: (last 7 days)     07/18/23  1032   INR 3.6*       ASSESSMENT       Source(s): Chart Review and In person       Warfarin doses taken: Warfarin taken as instructed    Diet: has been eating a little less green-will resume normal    New illness, injury, or hospitalization: No    Medication/supplement changes: has been taking aleve for pain (not recommended)    Signs or symptoms of bleeding or clotting: No    Previous INR: Therapeutic last 2(+) visits    Additional findings: None       PLAN     Recommended plan for temporary change(s) affecting INR     Dosing Instructions: partial hold then continue your current warfarin dose with next INR in 2 weeks       Summary  As of 7/18/2023    Full warfarin instructions:  7/18: 2 mg; Otherwise 4 mg every day   Next INR check:  8/1/2023             In person    Lab visit scheduled    Education provided: Please call back if any changes to your diet, medications or how you've been taking warfarin    Plan made per [facility] anticoagulation protocol    Lillian Ramos RN  Anticoagulation Clinic  7/18/2023    _______________________________________________________________________     Anticoagulation Episode Summary     Current INR goal:  2.0-3.0   TTR:  76.8 % (1 y)   Target end date:  Indefinite   Send INR reminders to:  [facility]    Indications    Long-term (current) use of anticoagulants [Z79.01] [Z79.01]  Paroxysmal supraventricular tachycardia (H) [I47.1]           Comments:           Anticoagulation Care Providers     Provider Role Specialty Phone number    Caleb Parker MD Referring Internal Medicine [phone]

## 2023-07-18 NOTE — TELEPHONE ENCOUNTER
[id number] [facility] sent Rx request for the following:      Requested Prescriptions   Pending Prescriptions Disp Refills     warfarin ANTICOAGULANT (COUMADIN) 4 MG tablet [Pharmacy Med Name: Warfarin Sodium 4 MG Oral Tablet] 30 tablet 0     Sig: TAKE 1 TABLET BY MOUTH ONCE DAILY OR AS DIRECTED BY THE [facility]       Vitamin K Antagonists Failed - 7/17/2023 10:32 AM        Failed - INR is within goal in the past 6 weeks     Confirm INR is within goal in the past 6 weeks.     Recent Labs   Lab Test 07/18/23  1032   INR 3.6*            Last Prescription Date:   7/12/22  Last Fill Qty/Refills:         90, R-4    Last Office Visit:              11/23/22  Future Office visit:           None  Lillian Ramos RN on 7/18/2023 at 1:23 PM

## 2023-08-10 ENCOUNTER — ANTICOAGULATION THERAPY VISIT (OUTPATIENT)
Dept: ANTICOAGULATION | Facility: OTHER | Age: 78
End: 2023-08-10
Attending: INTERNAL MEDICINE
Payer: MEDICARE

## 2023-08-10 DIAGNOSIS — I47.10 PAROXYSMAL SUPRAVENTRICULAR TACHYCARDIA (H): ICD-10-CM

## 2023-08-10 DIAGNOSIS — Z79.01 LONG TERM CURRENT USE OF ANTICOAGULANT THERAPY: Primary | ICD-10-CM

## 2023-08-10 LAB — INR POINT OF CARE: 2.5 (ref 0.9–1.1)

## 2023-08-10 PROCEDURE — 36416 COLLJ CAPILLARY BLOOD SPEC: CPT | Mod: ZL

## 2023-08-10 PROCEDURE — 85610 PROTHROMBIN TIME: CPT | Mod: ZL,QW

## 2023-08-10 NOTE — PROGRESS NOTES
ANTICOAGULATION MANAGEMENT     Kg Ferraro 78 year old male is on warfarin with therapeutic INR result. (Goal INR 2.0-3.0)    Recent labs: (last 7 days)     08/10/23  1339   INR 2.5*       ASSESSMENT     Source(s): Chart Review and In person      Warfarin doses taken: Warfarin taken as instructed  Diet: No new diet changes identified  New illness, injury, or hospitalization: No  Medication/supplement changes: None noted  Signs or symptoms of bleeding or clotting: No  Previous INR: Supratherapeutic  Additional findings: None     PLAN     Recommended plan for no diet, medication or health factor changes affecting INR     Dosing Instructions: Continue your current warfarin dose with next INR in 6 weeks       Summary  As of 8/10/2023      Full warfarin instructions:  4 mg every day   Next INR check:  9/21/2023               In person    Lab visit scheduled    Education provided: Please call back if any changes to your diet, medications or how you've been taking warfarin    Plan made per [facility] anticoagulation protocol    Lillian Ramos RN  Anticoagulation Clinic  8/10/2023    _______________________________________________________________________     Anticoagulation Episode Summary       Current INR goal:  2.0-3.0   TTR:  73.3 % (1 y)   Target end date:  Indefinite   Send INR reminders to:  [facility]    Indications    Long-term (current) use of anticoagulants [Z79.01] [Z79.01]  Paroxysmal supraventricular tachycardia (H) [I47.1]             Comments:               Anticoagulation Care Providers       Provider Role Specialty Phone number    Caleb Parker MD Referring Internal Medicine [phone]

## 2023-09-13 ENCOUNTER — ANCILLARY PROCEDURE (OUTPATIENT)
Dept: CARDIOLOGY | Facility: CLINIC | Age: 78
End: 2023-09-13
Attending: INTERNAL MEDICINE
Payer: MEDICARE

## 2023-09-13 DIAGNOSIS — I49.5 SICK SINUS SYNDROME (H): ICD-10-CM

## 2023-09-13 PROCEDURE — 93294 REM INTERROG EVL PM/LDLS PM: CPT | Performed by: INTERNAL MEDICINE

## 2023-09-13 PROCEDURE — 93296 REM INTERROG EVL PM/IDS: CPT

## 2023-09-23 LAB
MDC_IDC_LEAD_IMPLANT_DT: NORMAL
MDC_IDC_LEAD_IMPLANT_DT: NORMAL
MDC_IDC_LEAD_LOCATION: NORMAL
MDC_IDC_LEAD_LOCATION: NORMAL
MDC_IDC_LEAD_LOCATION_DETAIL_1: NORMAL
MDC_IDC_LEAD_LOCATION_DETAIL_1: NORMAL
MDC_IDC_LEAD_MFG: NORMAL
MDC_IDC_LEAD_MFG: NORMAL
MDC_IDC_LEAD_MODEL: NORMAL
MDC_IDC_LEAD_MODEL: NORMAL
MDC_IDC_LEAD_POLARITY_TYPE: NORMAL
MDC_IDC_LEAD_POLARITY_TYPE: NORMAL
MDC_IDC_LEAD_SERIAL: NORMAL
MDC_IDC_LEAD_SERIAL: NORMAL
MDC_IDC_LEAD_SPECIAL_FUNCTION: NORMAL
MDC_IDC_LEAD_SPECIAL_FUNCTION: NORMAL
MDC_IDC_MSMT_BATTERY_DTM: NORMAL
MDC_IDC_MSMT_BATTERY_REMAINING_LONGEVITY: 79 MO
MDC_IDC_MSMT_BATTERY_RRT_TRIGGER: 2.62
MDC_IDC_MSMT_BATTERY_STATUS: NORMAL
MDC_IDC_MSMT_BATTERY_VOLTAGE: 2.98 V
MDC_IDC_MSMT_LEADCHNL_RA_IMPEDANCE_VALUE: 380 OHM
MDC_IDC_MSMT_LEADCHNL_RA_IMPEDANCE_VALUE: 475 OHM
MDC_IDC_MSMT_LEADCHNL_RA_SENSING_INTR_AMPL: 2 MV
MDC_IDC_MSMT_LEADCHNL_RV_IMPEDANCE_VALUE: 665 OHM
MDC_IDC_MSMT_LEADCHNL_RV_IMPEDANCE_VALUE: 684 OHM
MDC_IDC_MSMT_LEADCHNL_RV_PACING_THRESHOLD_AMPLITUDE: 2 V
MDC_IDC_MSMT_LEADCHNL_RV_PACING_THRESHOLD_PULSEWIDTH: 0.4 MS
MDC_IDC_MSMT_LEADCHNL_RV_SENSING_INTR_AMPL: 9.12 MV
MDC_IDC_PG_IMPLANT_DTM: NORMAL
MDC_IDC_PG_MFG: NORMAL
MDC_IDC_PG_MODEL: NORMAL
MDC_IDC_PG_SERIAL: NORMAL
MDC_IDC_PG_TYPE: NORMAL
MDC_IDC_SESS_CLINIC_NAME: NORMAL
MDC_IDC_SESS_DTM: NORMAL
MDC_IDC_SESS_TYPE: NORMAL
MDC_IDC_SET_BRADY_HYSTRATE: NORMAL
MDC_IDC_SET_BRADY_LOWRATE: 60 {BEATS}/MIN
MDC_IDC_SET_BRADY_MAX_SENSOR_RATE: 130 {BEATS}/MIN
MDC_IDC_SET_BRADY_MODE: NORMAL
MDC_IDC_SET_LEADCHNL_RA_SENSING_ANODE_ELECTRODE_1: NORMAL
MDC_IDC_SET_LEADCHNL_RA_SENSING_ANODE_LOCATION_1: NORMAL
MDC_IDC_SET_LEADCHNL_RA_SENSING_CATHODE_ELECTRODE_1: NORMAL
MDC_IDC_SET_LEADCHNL_RA_SENSING_CATHODE_LOCATION_1: NORMAL
MDC_IDC_SET_LEADCHNL_RA_SENSING_POLARITY: NORMAL
MDC_IDC_SET_LEADCHNL_RA_SENSING_SENSITIVITY: NORMAL
MDC_IDC_SET_LEADCHNL_RV_PACING_AMPLITUDE: 3 V
MDC_IDC_SET_LEADCHNL_RV_PACING_ANODE_ELECTRODE_1: NORMAL
MDC_IDC_SET_LEADCHNL_RV_PACING_ANODE_LOCATION_1: NORMAL
MDC_IDC_SET_LEADCHNL_RV_PACING_CAPTURE_MODE: NORMAL
MDC_IDC_SET_LEADCHNL_RV_PACING_CATHODE_ELECTRODE_1: NORMAL
MDC_IDC_SET_LEADCHNL_RV_PACING_CATHODE_LOCATION_1: NORMAL
MDC_IDC_SET_LEADCHNL_RV_PACING_POLARITY: NORMAL
MDC_IDC_SET_LEADCHNL_RV_PACING_PULSEWIDTH: 1 MS
MDC_IDC_SET_LEADCHNL_RV_SENSING_ANODE_ELECTRODE_1: NORMAL
MDC_IDC_SET_LEADCHNL_RV_SENSING_ANODE_LOCATION_1: NORMAL
MDC_IDC_SET_LEADCHNL_RV_SENSING_CATHODE_ELECTRODE_1: NORMAL
MDC_IDC_SET_LEADCHNL_RV_SENSING_CATHODE_LOCATION_1: NORMAL
MDC_IDC_SET_LEADCHNL_RV_SENSING_POLARITY: NORMAL
MDC_IDC_SET_LEADCHNL_RV_SENSING_SENSITIVITY: 0.9 MV
MDC_IDC_SET_ZONE_DETECTION_INTERVAL: 400 MS
MDC_IDC_SET_ZONE_TYPE: NORMAL
MDC_IDC_STAT_BRADY_AP_VP_PERCENT: 0 %
MDC_IDC_STAT_BRADY_AP_VS_PERCENT: 0 %
MDC_IDC_STAT_BRADY_AS_VP_PERCENT: 89.58 %
MDC_IDC_STAT_BRADY_AS_VS_PERCENT: 10.42 %
MDC_IDC_STAT_BRADY_DTM_END: NORMAL
MDC_IDC_STAT_BRADY_DTM_START: NORMAL
MDC_IDC_STAT_BRADY_RA_PERCENT_PACED: 0 %
MDC_IDC_STAT_BRADY_RV_PERCENT_PACED: 89.58 %
MDC_IDC_STAT_EPISODE_RECENT_COUNT: 0
MDC_IDC_STAT_EPISODE_RECENT_COUNT_DTM_END: NORMAL
MDC_IDC_STAT_EPISODE_RECENT_COUNT_DTM_START: NORMAL
MDC_IDC_STAT_EPISODE_TOTAL_COUNT: 0
MDC_IDC_STAT_EPISODE_TOTAL_COUNT: 4
MDC_IDC_STAT_EPISODE_TOTAL_COUNT: NORMAL
MDC_IDC_STAT_EPISODE_TOTAL_COUNT_DTM_END: NORMAL
MDC_IDC_STAT_EPISODE_TOTAL_COUNT_DTM_START: NORMAL
MDC_IDC_STAT_EPISODE_TYPE: NORMAL

## 2023-10-10 ENCOUNTER — ANTICOAGULATION THERAPY VISIT (OUTPATIENT)
Dept: ANTICOAGULATION | Facility: OTHER | Age: 78
End: 2023-10-10
Payer: MEDICARE

## 2023-10-10 DIAGNOSIS — I47.10 PAROXYSMAL SUPRAVENTRICULAR TACHYCARDIA (H): ICD-10-CM

## 2023-10-10 DIAGNOSIS — Z79.01 LONG TERM CURRENT USE OF ANTICOAGULANT THERAPY: Primary | ICD-10-CM

## 2023-10-10 LAB — INR POINT OF CARE: 2.7 (ref 0.9–1.1)

## 2023-10-10 PROCEDURE — 85610 PROTHROMBIN TIME: CPT | Mod: ZL,QW

## 2023-10-10 NOTE — PROGRESS NOTES
ANTICOAGULATION MANAGEMENT     Kg Ferraro 78 year old male is on warfarin with therapeutic INR result. (Goal INR 2.0-3.0)    Recent labs: (last 7 days)     10/10/23  1417   INR 2.7*       ASSESSMENT     Source(s): Chart Review and In person      Warfarin doses taken: Warfarin taken as instructed  Diet: No new diet changes identified  New illness, injury, or hospitalization: No  Medication/supplement changes: None noted  Signs or symptoms of bleeding or clotting: No  Previous INR: Therapeutic last visit; previously outside of goal range  Additional findings: None     PLAN     Recommended plan for no diet, medication or health factor changes affecting INR     Dosing Instructions: Continue your current warfarin dose with next INR in 6 weeks       Summary  As of 10/10/2023      Full warfarin instructions:  4 mg every day   Next INR check:  11/21/2023               In person    Lab visit scheduled    Education provided: Please call back if any changes to your diet, medications or how you've been taking warfarin    Plan made per [facility] anticoagulation protocol    Lillian Ramos RN  Anticoagulation Clinic  10/10/2023    _______________________________________________________________________     Anticoagulation Episode Summary       Current INR goal:  2.0-3.0   TTR:  86.7 % (1 y)   Target end date:  Indefinite   Send INR reminders to:  [facility]    Indications    Long-term (current) use of anticoagulants [Z79.01] [Z79.01]  Paroxysmal supraventricular tachycardia [I47.10]             Comments:               Anticoagulation Care Providers       Provider Role Specialty Phone number    Caleb Parker MD Referring Internal Medicine [phone]

## 2023-10-12 DIAGNOSIS — I48.91 ATRIAL FIBRILLATION (H): ICD-10-CM

## 2023-10-12 DIAGNOSIS — Z79.01 ANTICOAGULATION MONITORING, INR RANGE 2-3: ICD-10-CM

## 2023-10-13 RX ORDER — WARFARIN SODIUM 4 MG/1
TABLET ORAL
Qty: 90 TABLET | Refills: 1 | Status: SHIPPED | OUTPATIENT
Start: 2023-10-13 | End: 2023-12-20

## 2023-10-13 NOTE — TELEPHONE ENCOUNTER
ANTICOAGULATION MANAGEMENT:  Medication Refill    Anticoagulation Summary  As of 10/10/2023      Warfarin maintenance plan:  4 mg (4 mg x 1) every day   Next INR check:  11/21/2023   Target end date:  Indefinite    Indications    Long-term (current) use of anticoagulants [Z79.01] [Z79.01]  Paroxysmal supraventricular tachycardia [I47.10]                 Anticoagulation Care Providers       Provider Role Specialty Phone number    Caleb Parker MD Referring Internal Medicine [phone]            Visit with referring provider/group within last year: Yes    [facility] referral signed within last year: Yes    Kg meets all criteria for refill (current [facility] referral, office visit with referring provider/group in last 1 year unless directed to return in 2 years in last referring provider visit note, lab monitoring up to date or not exceeding 2 weeks overdue). Rx instructions and quantity supplied updated to match patient's current dosing plan. Warfarin 90 day supply with 1 refill granted per [facility] protocol     Tory Fish RN  Anticoagulation Clinic

## 2023-12-04 DIAGNOSIS — E78.2 MIXED HYPERLIPIDEMIA: ICD-10-CM

## 2023-12-04 DIAGNOSIS — I10 BENIGN ESSENTIAL HYPERTENSION: ICD-10-CM

## 2023-12-04 DIAGNOSIS — I50.32 CHRONIC HEART FAILURE WITH PRESERVED EJECTION FRACTION (H): ICD-10-CM

## 2023-12-04 DIAGNOSIS — E79.0 HYPERURICEMIA: ICD-10-CM

## 2023-12-04 DIAGNOSIS — I48.21 PERMANENT ATRIAL FIBRILLATION (H): ICD-10-CM

## 2023-12-05 ENCOUNTER — ANTICOAGULATION THERAPY VISIT (OUTPATIENT)
Dept: ANTICOAGULATION | Facility: OTHER | Age: 78
End: 2023-12-05
Attending: INTERNAL MEDICINE
Payer: MEDICARE

## 2023-12-05 DIAGNOSIS — Z79.01 LONG TERM CURRENT USE OF ANTICOAGULANT THERAPY: Primary | ICD-10-CM

## 2023-12-05 DIAGNOSIS — I47.10 PAROXYSMAL SUPRAVENTRICULAR TACHYCARDIA (H): ICD-10-CM

## 2023-12-05 LAB — INR POINT OF CARE: 4.8 (ref 0.9–1.1)

## 2023-12-05 PROCEDURE — 36416 COLLJ CAPILLARY BLOOD SPEC: CPT | Mod: ZL

## 2023-12-05 PROCEDURE — 85610 PROTHROMBIN TIME: CPT | Mod: ZL,QW

## 2023-12-05 NOTE — PROGRESS NOTES
ANTICOAGULATION MANAGEMENT     Kg Ferraro 78 year old male is on warfarin with supratherapeutic INR result. (Goal INR 2.0-3.0)    Recent labs: (last 7 days)     12/05/23  1334   INR 4.8*       ASSESSMENT     Source(s): In person     Warfarin doses taken: Warfarin taken as instructed  Diet: No new diet changes identified  New illness, injury, or hospitalization: No  Medication/supplement changes:  Aleve PRN  Signs or symptoms of bleeding or clotting: No  Previous INR: Therapeutic last 2(+) visits  Additional findings: None     PLAN     Recommended plan for temporary change(s) affecting INR     Dosing Instructions: hold 1.5 doses then continue your current warfarin dose with next INR in 1 week       Summary  As of 12/5/2023      Full warfarin instructions:  12/5: Hold; 12/6: 2 mg; Otherwise 4 mg every day   Next INR check:  12/12/2023               In person    Lab visit scheduled    Education provided: Please call back if any changes to your diet, medications or how you've been taking warfarin    Plan made per [facility] anticoagulation protocol    January Worthington RN  Anticoagulation Clinic  12/5/2023    _______________________________________________________________________     Anticoagulation Episode Summary       Current INR goal:  2.0-3.0   TTR:  73.6% (1 y)   Target end date:  Indefinite   Send INR reminders to:  [facility]    Indications    Long-term (current) use of anticoagulants [Z79.01] [Z79.01]  Paroxysmal supraventricular tachycardia [I47.10]             Comments:               Anticoagulation Care Providers       Provider Role Specialty Phone number    Caleb Parker MD Referring Internal Medicine [phone]

## 2023-12-11 RX ORDER — POTASSIUM CHLORIDE 1500 MG/1
20 TABLET, EXTENDED RELEASE ORAL DAILY
Qty: 90 TABLET | Refills: 0 | Status: SHIPPED | OUTPATIENT
Start: 2023-12-11 | End: 2023-12-20

## 2023-12-11 RX ORDER — ALLOPURINOL 100 MG/1
100 TABLET ORAL 2 TIMES DAILY
Qty: 180 TABLET | Refills: 0 | Status: SHIPPED | OUTPATIENT
Start: 2023-12-11 | End: 2023-12-20

## 2023-12-11 RX ORDER — SIMVASTATIN 40 MG
40 TABLET ORAL DAILY
Qty: 90 TABLET | Refills: 0 | Status: SHIPPED | OUTPATIENT
Start: 2023-12-11 | End: 2023-12-20

## 2023-12-11 RX ORDER — METOPROLOL SUCCINATE 100 MG/1
TABLET, EXTENDED RELEASE ORAL
Qty: 90 TABLET | Refills: 0 | Status: SHIPPED | OUTPATIENT
Start: 2023-12-11 | End: 2023-12-20

## 2023-12-11 NOTE — TELEPHONE ENCOUNTER
[id number] sent Rx request for the following:      Requested Prescriptions   Pending Prescriptions Disp Refills    simvastatin (ZOCOR) 40 MG tablet [Pharmacy Med Name: Simvastatin 40 MG Oral Tablet] 30 tablet 0     Sig: Take 1 tablet by mouth once daily   Last Prescription Date:   11/23/22  Last Fill Qty/Refills:         90, R-4      Statins Protocol Failed - 12/11/2023 10:18 AM        Failed - LDL on file in past 12 months     Recent Labs   Lab Test 11/23/22  1410   LDL 74           Failed - Recent (12 mo) or future (30 days) visit within the authorizing provider's specialty       allopurinol (ZYLOPRIM) 100 MG tablet [Pharmacy Med Name: Allopurinol 100 MG Oral Tablet] 60 tablet 0     Sig: Take 1 tablet by mouth twice daily   Last Prescription Date:   11/23/22  Last Fill Qty/Refills:         180, R-4      Gout Agents Protocol Failed - 12/11/2023 10:18 AM        Failed - CBC on file in past 12 months     Recent Labs   Lab Test 11/23/22  1410 05/20/20  1520 03/23/17  1121   WBC 7.3   < >  --    GICHWBC  --   --  6.9   RBC 5.05   < >  --    GICHRBC  --   --  5.10   HGB 16.1   < > 15.6   HCT 48.2   < > 48.5      < > 191    < > = values in this interval not displayed.           Failed - ALT on file in past 12 months     Recent Labs   Lab Test 11/23/22  1410 05/20/20  1520 01/15/16  1056   ALT 40   < >  --    GICHALT  --   --  38    < > = values in this interval not displayed.           Failed - Has Uric Acid on file in past 12 months and value is less than 6     Recent Labs   Lab Test 03/23/17  1126   URIC 5.8     If level is 6mg/dL or greater, ok to refill one time and refer to provider.         Failed - Recent (12 mo) or future (30 days) visit within the authorizing provider's specialty        Failed - Normal serum creatinine on file in the past 12 months     Recent Labs   Lab Test 11/23/22  1410   CR 1.22*   Ok to refill medication if creatinine is low       metoprolol succinate ER  (TOPROL XL) 100 MG 24 hr tablet [Pharmacy Med Name: Metoprolol Succinate  MG Oral Tablet Extended Release 24 Hour] 90 tablet 0     Sig: TAKE 1 TABLET BY MOUTH IN THE MORNING AND 2 IN THE EVENING   Last Prescription Date:   11/23/22  Last Fill Qty/Refills:         270, R-4      Beta-Blockers Protocol Failed - 12/11/2023 10:18 AM        Failed - Blood pressure under 140/90 in past 12 months     BP Readings from Last 3 Encounters:   11/23/22 138/80   08/25/22 (!) 162/92   08/25/22 (!) 162/92           Failed - Recent (12 mo) or future (30 days) visit within the authorizing provider's specialty       potassium chloride ER (KLOR-CON M) 20 MEQ CR tablet [Pharmacy Med Name: Potassium Chloride Ayana ER 20 MEQ Oral Tablet Extended Release] 30 tablet 0     Sig: Take 1 tablet by mouth once daily   Last Prescription Date:   11/23/22  Last Fill Qty/Refills:         90, R-4      Potassium Supplements Protocol Failed - 12/11/2023 10:18 AM        Failed - Recent (12 mo) or future (30 days) visit within the authorizing provider's department        Failed - Normal serum potassium in past 12 months     Recent Labs   Lab Test 11/23/22  1410   POTASSIUM 4.7           Last Office Visit:              11/23/22   Future Office visit:           None    Pt due for annual exam. Routing to provider for refill consideration. Routing to Unit scheduling pool, to assist Pt in scheduling appointment.     Unable to complete prescription refill per RN Medication Refill Policy.     Jailyn Peralta RN .............. 12/11/2023  10:22 AM

## 2023-12-11 NOTE — TELEPHONE ENCOUNTER
Reason for call: Medication or medication refill    Name of medication requested: Simvastatin, allopurinol, metoprolol, potassium Chloride    How many days of medication do you have left? 0 days    What pharmacy do you use? [facility]    Preferred method for responding to this message: Telephone Call    Phone number patient can be reached at: Home number on file [phone] (home)    If we cannot reach you directly, may we leave a detailed response at the number you provided? Yes    Lucero Lal on 12/11/2023 at 10:15 AM

## 2023-12-11 NOTE — TELEPHONE ENCOUNTER
Attempted to reach patient.  No answer and voicemail is not set up.  Anaya Alaniz on 12/11/2023 at 11:21 AM

## 2023-12-12 ENCOUNTER — ANTICOAGULATION THERAPY VISIT (OUTPATIENT)
Dept: ANTICOAGULATION | Facility: OTHER | Age: 78
End: 2023-12-12
Attending: INTERNAL MEDICINE
Payer: MEDICARE

## 2023-12-12 DIAGNOSIS — I50.32 CHRONIC HEART FAILURE WITH PRESERVED EJECTION FRACTION (H): ICD-10-CM

## 2023-12-12 DIAGNOSIS — E78.2 MIXED HYPERLIPIDEMIA: ICD-10-CM

## 2023-12-12 DIAGNOSIS — Z79.01 LONG TERM CURRENT USE OF ANTICOAGULANT THERAPY: Primary | ICD-10-CM

## 2023-12-12 DIAGNOSIS — I47.10 PAROXYSMAL SUPRAVENTRICULAR TACHYCARDIA (H): ICD-10-CM

## 2023-12-12 LAB — INR POINT OF CARE: 2 (ref 0.9–1.1)

## 2023-12-12 PROCEDURE — 36416 COLLJ CAPILLARY BLOOD SPEC: CPT | Mod: ZL

## 2023-12-12 PROCEDURE — 85610 PROTHROMBIN TIME: CPT | Mod: ZL,QW

## 2023-12-12 RX ORDER — POTASSIUM CHLORIDE 1500 MG/1
20 TABLET, EXTENDED RELEASE ORAL DAILY
Qty: 90 TABLET | Refills: 0 | Status: CANCELLED | OUTPATIENT
Start: 2023-12-12

## 2023-12-12 RX ORDER — SIMVASTATIN 40 MG
40 TABLET ORAL DAILY
Qty: 90 TABLET | Refills: 0 | Status: CANCELLED | OUTPATIENT
Start: 2023-12-12

## 2023-12-12 NOTE — TELEPHONE ENCOUNTER
Patient presented to Unit [facility] with questions regarding his medications.  He was told that an appointment was needed and was scheduled with Dr. Parker on 12.20.2023.  He is out of his medication and will need a small refill to get him to that date.  Anaya Alaniz on 12/12/2023 at 10:33 AM

## 2023-12-12 NOTE — TELEPHONE ENCOUNTER
Patient verbalized that he has been out of his simvastatin and potassium in the last week or so during his anticoagulation appt.

## 2023-12-12 NOTE — TELEPHONE ENCOUNTER
Disp Refills Start End DEEPTHI   simvastatin (ZOCOR) 40 MG tablet 90 tablet 0 12/11/2023              Disp Refills Start End DEEPTHI   potassium chloride ER (KLOR-CON M) 20 MEQ CR tablet 90 tablet 0 12/11/2023       Refilled to [facility].

## 2023-12-12 NOTE — PROGRESS NOTES
ANTICOAGULATION MANAGEMENT     Kg Ferraro 78 year old male is on warfarin with therapeutic INR result. (Goal INR 2.0-3.0)    Recent labs: (last 7 days)     12/12/23  1036   INR 2.0*       ASSESSMENT     Source(s): In person     Warfarin doses taken: Warfarin taken as instructed  Diet: No new diet changes identified  New illness, injury, or hospitalization: No  Medication/supplement changes:  has been out of his potassium and simvastatin in the last week or so  Signs or symptoms of bleeding or clotting: No  Previous INR: Supratherapeutic  Additional findings: None     PLAN     Recommended plan for no diet, medication or health factor changes affecting INR     Dosing Instructions: Continue your current warfarin dose with next INR in 4 weeks       Summary  As of 12/12/2023      Full warfarin instructions:  4 mg every day   Next INR check:  1/9/2024               In person    Lab visit scheduled    Education provided: Please call back if any changes to your diet, medications or how you've been taking warfarin    Plan made per [facility] anticoagulation protocol    Lillian Mueller RN  Anticoagulation Clinic  12/12/2023    _______________________________________________________________________     Anticoagulation Episode Summary       Current INR goal:  2.0-3.0   TTR:  72.4% (1 y)   Target end date:  Indefinite   Send INR reminders to:  [facility]    Indications    Long-term (current) use of anticoagulants [Z79.01] [Z79.01]  Paroxysmal supraventricular tachycardia [I47.10]             Comments:               Anticoagulation Care Providers       Provider Role Specialty Phone number    Caleb Parker MD Referring Internal Medicine [phone]

## 2023-12-20 ENCOUNTER — OFFICE VISIT (OUTPATIENT)
Dept: INTERNAL MEDICINE | Facility: OTHER | Age: 78
End: 2023-12-20
Attending: INTERNAL MEDICINE
Payer: MEDICARE

## 2023-12-20 VITALS
RESPIRATION RATE: 16 BRPM | HEART RATE: 60 BPM | TEMPERATURE: 98.3 F | WEIGHT: 220.8 LBS | SYSTOLIC BLOOD PRESSURE: 128 MMHG | HEIGHT: 67 IN | DIASTOLIC BLOOD PRESSURE: 64 MMHG | OXYGEN SATURATION: 97 % | BODY MASS INDEX: 34.65 KG/M2

## 2023-12-20 DIAGNOSIS — Z79.01 ANTICOAGULATION MONITORING, INR RANGE 2-3: ICD-10-CM

## 2023-12-20 DIAGNOSIS — Z71.85 VACCINE COUNSELING: ICD-10-CM

## 2023-12-20 DIAGNOSIS — I48.21 PERMANENT ATRIAL FIBRILLATION (H): ICD-10-CM

## 2023-12-20 DIAGNOSIS — E78.2 MIXED HYPERLIPIDEMIA: ICD-10-CM

## 2023-12-20 DIAGNOSIS — E79.0 HYPERURICEMIA: ICD-10-CM

## 2023-12-20 DIAGNOSIS — M05.79 RHEUMATOID ARTHRITIS INVOLVING MULTIPLE SITES WITH POSITIVE RHEUMATOID FACTOR (H): ICD-10-CM

## 2023-12-20 DIAGNOSIS — Z79.01 WARFARIN ANTICOAGULATION: ICD-10-CM

## 2023-12-20 DIAGNOSIS — Z95.0 CARDIAC PACEMAKER IN SITU: ICD-10-CM

## 2023-12-20 DIAGNOSIS — I50.32 CHRONIC HEART FAILURE WITH PRESERVED EJECTION FRACTION (H): ICD-10-CM

## 2023-12-20 DIAGNOSIS — E66.09 CLASS 1 OBESITY DUE TO EXCESS CALORIES WITH SERIOUS COMORBIDITY AND BODY MASS INDEX (BMI) OF 34.0 TO 34.9 IN ADULT: ICD-10-CM

## 2023-12-20 DIAGNOSIS — Z00.00 ENCOUNTER FOR MEDICARE ANNUAL WELLNESS EXAM: ICD-10-CM

## 2023-12-20 DIAGNOSIS — E66.811 CLASS 1 OBESITY DUE TO EXCESS CALORIES WITH SERIOUS COMORBIDITY AND BODY MASS INDEX (BMI) OF 34.0 TO 34.9 IN ADULT: ICD-10-CM

## 2023-12-20 DIAGNOSIS — N18.31 STAGE 3A CHRONIC KIDNEY DISEASE (H): ICD-10-CM

## 2023-12-20 DIAGNOSIS — M47.817 SPONDYLOSIS OF LUMBOSACRAL REGION WITHOUT MYELOPATHY OR RADICULOPATHY: ICD-10-CM

## 2023-12-20 DIAGNOSIS — I10 BENIGN ESSENTIAL HYPERTENSION: Primary | ICD-10-CM

## 2023-12-20 PROBLEM — I47.10 PAROXYSMAL SUPRAVENTRICULAR TACHYCARDIA (H): Status: RESOLVED | Noted: 2018-01-18 | Resolved: 2023-12-20

## 2023-12-20 PROBLEM — N20.0 RENAL CALCULUS: Status: RESOLVED | Noted: 2018-01-18 | Resolved: 2023-12-20

## 2023-12-20 PROCEDURE — G0463 HOSPITAL OUTPT CLINIC VISIT: HCPCS

## 2023-12-20 PROCEDURE — G0439 PPPS, SUBSEQ VISIT: HCPCS | Performed by: INTERNAL MEDICINE

## 2023-12-20 PROCEDURE — 99214 OFFICE O/P EST MOD 30 MIN: CPT | Mod: 25 | Performed by: INTERNAL MEDICINE

## 2023-12-20 RX ORDER — ALLOPURINOL 100 MG/1
100 TABLET ORAL 2 TIMES DAILY
Qty: 180 TABLET | Refills: 4 | Status: SHIPPED | OUTPATIENT
Start: 2023-12-20

## 2023-12-20 RX ORDER — METOPROLOL SUCCINATE 100 MG/1
TABLET, EXTENDED RELEASE ORAL
Qty: 270 TABLET | Refills: 4 | Status: SHIPPED | OUTPATIENT
Start: 2023-12-20

## 2023-12-20 RX ORDER — WARFARIN SODIUM 4 MG/1
TABLET ORAL
Qty: 90 TABLET | Refills: 4 | Status: SHIPPED | OUTPATIENT
Start: 2023-12-20

## 2023-12-20 RX ORDER — SIMVASTATIN 40 MG
40 TABLET ORAL DAILY
Qty: 90 TABLET | Refills: 4 | Status: SHIPPED | OUTPATIENT
Start: 2023-12-20

## 2023-12-20 RX ORDER — POTASSIUM CHLORIDE 1500 MG/1
20 TABLET, EXTENDED RELEASE ORAL DAILY
Qty: 90 TABLET | Refills: 4 | Status: SHIPPED | OUTPATIENT
Start: 2023-12-20

## 2023-12-20 ASSESSMENT — PAIN SCALES - GENERAL: PAINLEVEL: MILD PAIN (3)

## 2023-12-20 ASSESSMENT — ENCOUNTER SYMPTOMS
HEMATOCHEZIA: 0
EYE PAIN: 0
FREQUENCY: 1
ABDOMINAL PAIN: 0
DYSURIA: 0
BRUISES/BLEEDS EASILY: 1
HEADACHES: 0
CONSTIPATION: 1
DIARRHEA: 0
SORE THROAT: 0
PALPITATIONS: 0
FEVER: 0
NERVOUS/ANXIOUS: 0
SHORTNESS OF BREATH: 1
COUGH: 0
DIZZINESS: 0
HEMATURIA: 0
CHILLS: 0
WEAKNESS: 0
NAUSEA: 0
MYALGIAS: 0
HEARTBURN: 0
ARTHRALGIAS: 1
PARESTHESIAS: 0
JOINT SWELLING: 0

## 2023-12-20 ASSESSMENT — ACTIVITIES OF DAILY LIVING (ADL): CURRENT_FUNCTION: NO ASSISTANCE NEEDED

## 2023-12-20 NOTE — PROGRESS NOTES
Assessment & Plan     ICD-10-CM    1. Benign essential hypertension  I10 metoprolol succinate ER (TOPROL XL) 100 MG 24 hr tablet      2. Mixed hyperlipidemia  E78.2 simvastatin (ZOCOR) 40 MG tablet      3. Chronic heart failure with preserved ejection fraction (H)  I50.32 potassium chloride ER (KLOR-CON M) 20 MEQ CR tablet      4. Permanent atrial fibrillation (H)  I48.21 metoprolol succinate ER (TOPROL XL) 100 MG 24 hr tablet     warfarin ANTICOAGULANT (COUMADIN) 4 MG tablet      5. Warfarin anticoagulation  Z79.01       6. Class 1 obesity due to excess calories with serious comorbidity and body mass index (BMI) of 34.0 to 34.9 in adult  E66.09     Z68.34       7. Stage 3a chronic kidney disease (H)  N18.31       8. Cardiac pacemaker in situ -- [employer] --   Z95.0       9. Spondylosis of lumbosacral region without myelopathy or radiculopathy  M47.817       10. Rheumatoid arthritis involving multiple sites with positive rheumatoid factor (H)  M05.79       11. Vaccine counseling  Z71.85       12. Encounter for Medicare annual wellness exam  Z00.00       13. Hyperuricemia  E79.0 allopurinol (ZYLOPRIM) 100 MG tablet      14. Anticoagulation monitoring, INR range 2-3  Z79.01 warfarin ANTICOAGULANT (COUMADIN) 4 MG tablet        Patient presents for Medicare annual wellness visit as well as follow-up multiple issues.    Chronic heart failure with preserved ejection fraction, appears stable.  Continues with metoprolol and a potassium supplement.  No oral diuretic at this time.  He does have some chronic swelling but tries to maintain with compression stockings.  Recently stable.  Medications refilled.  Continue close monitoring.    Cardiac pacemaker in situ, continues to follow with  cardiology.    Rheumatoid arthritis, multiple joint involvement positive rheumatoid factor.  Still having issues with intermittent low back pain.  Does take Tylenol as needed.  Was using Aleve for a while but  there was concerned about bruising and bleeding with his warfarin use.  Med list updated.    Hyperuricemia, doing well recently, no recent gout attacks.  Continues with allopurinol 100 mg twice daily.  Tolerating well.  Needs refills.    HYPERTENSION - Ongoing. Blood pressure is currently well controlled.  Medication side effects: None. Denies syncope or presyncope.  No changes for now. Continue current medications.   Medication list reviewed/updated. Refills completed as needed.      MIXED HYPERLIPIDEMIA.  Ongoing. LDL is at goal: Yes. Triglycerides are at goal: No.  Hopefully lifestyle modifications will improve cholesterol levels, otherwise we will need to consider additional medication dose adjustments or medication changes.  Medication side effects reported: None.   Continue current medications for now. Medication list reviewed/updated. Refills completed as needed.  Recent Labs   Lab Test 11/23/22  1410 06/29/21  0925   CHOL 157 131   HDL 48 39   LDL 74 63   TRIG 174* 144        OBESITY - Ongoing.  (See Encounter Diagnosis list above for obesity class / severity).  - Counseled on diet and exercise.   - Recommend Nutrition / Dietician appointment.  - Weight loss would improve Hypertension, Cholesterol.      Chronic Kidney Disease, Stage 3a (GFR 45-59), chronic, ongoing.   Kidney function has been slowly declining.  Encouraged NSAID avoidance.       Paroxysmal Atrial Fibrillation, chronic, ongoing.  Continues with Warfarin for oral anticoagulation.  Heart rates controlled with Metoprolol.  Tolerating well.  Denies excessive bruising or bleeding issues.  Medication list reviewed/updated. Refills completed as needed.     Vaccine counseling completed.  Encouraged routine / annual vaccinations.     Return in about 1 year (around 12/20/2024) for Annual Medicare Wellness Visit.    Caleb Parker MD  [facility]      SUBJECTIVE:   Kg is a 78 year old, presenting for the following:  Medicare  "Wellness Visit        12/20/2023     2:59 PM   Additional Questions   Roomed by Nghia Howe LPN   Accompanied by n/a       Are you in the first 12 months of your Medicare coverage?  No    History of Present Illness      He is not taking prescribed medications regularly due to Not applicable.  Healthy Habits:     In general, how would you rate your overall health?  Very good    Frequency of exercise:  6-7 days/week    Duration of exercise:  45-60 minutes    Do you usually eat at least 4 servings of fruit and vegetables a day, include whole grains    & fiber and avoid regularly eating high fat or \"junk\" foods?  Yes    Taking medications regularly:  Yes    Barriers to taking medications:  Not applicable    Medication side effects:  Not applicable    Ability to successfully perform activities of daily living:  No assistance needed    In the past 6 months, have you been bothered by leaking of urine? Yes    In general, how would you rate your overall mental or emotional health?  Very good    Additional concerns today:  Yes          Have you ever done Advance Care Planning? (For example, a Health Directive, POLST, or a discussion with a medical provider or your loved ones about your wishes): No, advance care planning information given to patient to review.  Patient declined advance care planning discussion at this time.       Fall risk  Fallen 2 or more times in the past year?: No  Any fall with injury in the past year?: No    Cognitive Screening   1) Repeat 3 items (Leader, Season, Table)    2) Clock draw: NORMAL  3) 3 item recall: Recalls 3 objects  Results: 3 items recalled: COGNITIVE IMPAIRMENT LESS LIKELY    Mini-CogTM Copyright HILDA Villa. Licensed by the author for use in [facility]; reprinted with permission (edvin@.[facility]). All rights reserved.      Do you have sleep apnea, excessive snoring or daytime drowsiness? : yes    Reviewed and updated as needed this visit by clinical staff   Tobacco  Allergies  " Meds  Problems  Med Hx  Surg Hx  Fam Hx  Soc   Hx        Reviewed and updated as needed this visit by Provider   Tobacco  Allergies  Meds  Problems  Med Hx  Surg Hx  Fam Hx         Social History     Tobacco Use    Smoking status: Former     Packs/day: 1     Types: Cigarettes     Start date:      Quit date: 1980     Years since quittin.9    Smokeless tobacco: Former     Types: Chew     Quit date: 1980   Substance Use Topics    Alcohol use: Yes     Comment: Maybe 1  beer per month             2022     1:14 PM   Alcohol Use   Prescreen: >3 drinks/day or >7 drinks/week? No     Do you have a current opioid prescription? No  Do you use any other controlled substances or medications that are not prescribed by a provider? None    Current providers sharing in care for this patient include:   Patient Care Team:  Caleb Parker MD as PCP - General (Internal Medicine)  Caleb Parker MD as Assigned PCP  Ryan Darby MD as Assigned Heart and Vascular Provider    The following health maintenance items are reviewed in Epic and correct as of today:  Health Maintenance   Topic Date Due    ZOSTER IMMUNIZATION (1 of 2) Never done    DTAP/TDAP/TD IMMUNIZATION (1 - Tdap) 1995    RSV VACCINE (Pregnancy & 60+) (1 - 1-dose 60+ series) Never done    HF ACTION PLAN  2021    BMP  2023    INFLUENZA VACCINE (1) 2023    COVID-19 Vaccine (5 - - season) 2023    ALT  2023    LIPID  2023    MICROALBUMIN  2023    CBC  2023    HEMOGLOBIN  2023    MEDICARE ANNUAL WELLNESS VISIT  2024    FALL RISK ASSESSMENT  2024    ADVANCE CARE PLANNING  2028    TSH W/FREE T4 REFLEX  Completed    PHQ-2 (once per calendar year)  Completed    Pneumococcal Vaccine: 65+ Years  Completed    URINALYSIS  Completed    IPV IMMUNIZATION  Aged Out    HPV IMMUNIZATION  Aged Out    MENINGITIS IMMUNIZATION  Aged Out    RSV MONOCLONAL ANTIBODY  Aged Out  "    Review of Systems   Constitutional:  Negative for chills and fever.   HENT:  Positive for congestion and hearing loss. Negative for ear pain and sore throat.    Eyes:  Negative for pain and visual disturbance.   Respiratory:  Positive for shortness of breath. Negative for cough.    Cardiovascular:  Negative for chest pain, palpitations and peripheral edema.   Gastrointestinal:  Positive for constipation. Negative for abdominal pain, diarrhea, heartburn, hematochezia and nausea.   Genitourinary:  Positive for frequency and impotence. Negative for dysuria, genital sores, hematuria, penile discharge and urgency.   Musculoskeletal:  Positive for arthralgias. Negative for joint swelling and myalgias.   Skin:  Negative for rash.   Allergic/Immunologic: Negative for immunocompromised state.   Neurological:  Negative for dizziness, weakness, headaches and paresthesias.   Hematological:  Bruises/bleeds easily.   Psychiatric/Behavioral:  Negative for mood changes. The patient is not nervous/anxious.          OBJECTIVE:   /64 (BP Location: Right arm, Patient Position: Sitting, Cuff Size: Adult Regular)   Pulse 60   Temp 98.3  F (36.8  C) (Temporal)   Resp 16   Ht 1.702 m (5' 7\")   Wt 100.2 kg (220 lb 12.8 oz)   SpO2 97%   BMI 34.58 kg/m   Estimated body mass index is 34.58 kg/m  as calculated from the following:    Height as of this encounter: 1.702 m (5' 7\").    Weight as of this encounter: 100.2 kg (220 lb 12.8 oz).  Physical Exam  Constitutional:       General: He is not in acute distress.     Appearance: He is well-developed. He is obese. He is not diaphoretic.   HENT:      Head: Normocephalic and atraumatic.   Eyes:      General: No scleral icterus.     Conjunctiva/sclera: Conjunctivae normal.   Neck:      Vascular: No carotid bruit.   Cardiovascular:      Rate and Rhythm: Normal rate and regular rhythm.      Pulses: Normal pulses.   Pulmonary:      Effort: Pulmonary effort is normal.      Breath sounds: " "Normal breath sounds.   Abdominal:      Palpations: Abdomen is soft.      Tenderness: There is no abdominal tenderness.      Comments: + abdominal obesity   Musculoskeletal:         General: No deformity.      Cervical back: Neck supple.      Right lower le+ Pitting Edema present.      Left lower le+ Pitting Edema present.   Lymphadenopathy:      Cervical: No cervical adenopathy.   Skin:     General: Skin is warm and dry.      Findings: No bruising or rash.      Comments: Right chest wall with pacemaker generator   Neurological:      Mental Status: He is alert and oriented to person, place, and time. Mental status is at baseline.      Comments: + Bilateral hearing loss.    Psychiatric:         Mood and Affect: Mood normal.         Behavior: Behavior normal.         Patient has been advised of split billing requirements and indicates understanding: Yes      COUNSELING:  Reviewed preventive health counseling, as reflected in patient instructions  Special attention given to:       Regular exercise       Healthy diet/nutrition       Vision screening       Hearing screening       Dental care       Bladder control       Fall risk prevention       Immunizations    BMI:   Estimated body mass index is 34.58 kg/m  as calculated from the following:    Height as of this encounter: 1.702 m (5' 7\").    Weight as of this encounter: 100.2 kg (220 lb 12.8 oz).   Weight management plan: Discussed healthy diet and exercise guidelines      He reports that he quit smoking about 43 years ago. His smoking use included cigarettes. He started smoking about 64 years ago. He smoked an average of 1 pack per day. He quit smokeless tobacco use about 43 years ago.  His smokeless tobacco use included chew.      Appropriate preventive services were discussed with this patient, including applicable screening as appropriate for fall prevention, nutrition, physical activity, Tobacco-use cessation, weight loss and cognition.  Checklist " reviewing preventive services available has been given to the patient.    Reviewed patients plan of care and provided an AVS. The Complex Care Plan (for patients with higher acuity and needing more deliberate coordination of services) for Kg meets the Care Plan requirement. This Care Plan has been established and reviewed with the Patient.          Caleb Parker MD  [facility]    Identified Health Risks:  I have reviewed Opioid Use Disorder and Substance Use Disorder risk factors and made any needed referrals. Information on urinary incontinence and treatment options given to patient.

## 2023-12-20 NOTE — NURSING NOTE
"Chief Complaint   Patient presents with    Medicare Wellness Visit       Initial /64 (BP Location: Right arm, Patient Position: Sitting, Cuff Size: Adult Regular)   Pulse 60   Temp 98.3  F (36.8  C) (Temporal)   Resp 16   Ht 1.702 m (5' 7\")   Wt 100.2 kg (220 lb 12.8 oz)   SpO2 97%   BMI 34.58 kg/m   Estimated body mass index is 34.58 kg/m  as calculated from the following:    Height as of this encounter: 1.702 m (5' 7\").    Weight as of this encounter: 100.2 kg (220 lb 12.8 oz).  Medication Review: complete    The next two questions are to help us understand your food security.  If you are feeling you need any assistance in this area, we have resources available to support you today.           No data to display                  Health Care Directive:  Patient does not have a Health Care Directive or Living Will: Discussed advance care planning with patient; however, patient declined at this time.    Nghia King      "

## 2023-12-20 NOTE — PATIENT INSTRUCTIONS
Blood pressure is well controlled.     Medications refilled.       Immunization History   Administered Date(s) Administered    COVID-19 Bivalent 12+ (Pfizer) 09/30/2022    COVID-19 MONOVALENT 12+ (Pfizer) 02/26/2021, 03/19/2021, 10/06/2021    Influenza (High Dose) 3 valent vaccine 11/03/2017    Influenza Vaccine 65+ (Fluzone HD) 11/23/2022    Pneumo Conj 13-V (2010&after) 11/03/2017    Pneumococcal 23 valent 11/23/2022    Td (Adult), Adsorbed 01/01/1995      Consider:  -- Yearly - COVID-19 Vaccination shot   -- Annual Flu / Influenza vaccination - Every Fall (Starting about October 1st)    Consider:  -- RSV vaccine for age 60+   (If Medicare insurance - get vaccine at the Pharmacy.     Private insurance may be able to get in clinic with clinic shot nurse.)  --- Check Cost $$$    -- Get your tetanus shot updated - from one of the local pharmacies, at your convenience -- Check cost/coverage.     -- Get the new shingles shot (2 in series) (Shingrix) - from one of the local pharmacies, at your convenience -- Check cost/coverage.       -- Pneumonia / Pneumococcal PCV vaccines are done / completed.        Return in approximately 1 year, or sooner as needed for follow-up with Dr. Parker.  - Annual Follow-up / Physical - Medicare Annual Wellness Visit     Clinic : [phone]  Appointment line: [phone]       Patient Education   Personalized Prevention Plan  You are due for the preventive services outlined below.  Your care team is available to assist you in scheduling these services.  If you have already completed any of these items, please share that information with your care team to update in your medical record.  Health Maintenance Due   Topic Date Due    Zoster (Shingles) Vaccine (1 of 2) Never done    Diptheria Tetanus Pertussis (DTAP/TDAP/TD) Vaccine (1 - Tdap) 01/02/1995    RSV VACCINE (Pregnancy & 60+) (1 - 1-dose 60+ series) Never done    Heart Failure Action Plan  07/03/2021    Basic Metabolic  Panel  05/23/2023    Flu Vaccine (1) 09/01/2023    COVID-19 Vaccine (5 - 2023-24 season) 09/01/2023    Liver Monitoring Lab  11/23/2023    Cholesterol Lab  11/23/2023    Kidney Microalbumin Urine Test  11/23/2023    Complete Blood Count  11/23/2023    Annual Wellness Visit  11/23/2023    Hemoglobin  11/23/2023     Preventive Health Recommendations  See your health care provider every year to  Review health changes.   Discuss preventive care.    Review your medicines if your doctor has prescribed any.  Talk with your health care provider about whether you should have a test to screen for prostate cancer (PSA).  Every 3 years, have a diabetes test (fasting glucose). If you are at risk for diabetes, you should have this test more often.  Every 5 years, have a cholesterol test. Have this test more often if you are at risk for high cholesterol or heart disease.   Every 10 years, have a colonoscopy. Or, have a yearly FIT test (stool test). These exams will check for colon cancer.  Talk to with your health care provider about screening for Abdominal Aortic Aneurysm if you have a family history of AAA or have a history of smoking.    Shots:   Get a flu shot each year.   Get a tetanus shot every 10 years.   Talk to your doctor about your pneumonia vaccines. There are now two you should receive - Pneumovax (PPSV 23) and Prevnar (PCV 13).  Talk to your pharmacist about a shingles vaccine.   Talk to your doctor about the hepatitis B vaccine.    Nutrition:   Eat at least 5 servings of fruits and vegetables each day.   Eat whole-grain bread, whole-wheat pasta and brown rice instead of white grains and rice.   Get adequate Calcium and Vitamin D.     Lifestyle  Exercise for at least 150 minutes a week (30 minutes a day, 5 days a week). This will help you control your weight and prevent disease.   Limit alcohol to one drink per day.   No smoking.   Wear sunscreen to prevent skin cancer.   See your dentist every six months for an  exam and cleaning.   See your eye doctor every 1 to 2 years to screen for conditions such as glaucoma, macular degeneration and cataracts.    Personalized Prevention Plan  You are due for the preventive services outlined below.  Your care team is available to assist you in scheduling these services.  If you have already completed any of these items, please share that information with your care team to update in your medical record.  Health Maintenance   Topic Date Due    ZOSTER IMMUNIZATION (1 of 2) Never done    DTAP/TDAP/TD IMMUNIZATION (1 - Tdap) 01/02/1995    RSV VACCINE (Pregnancy & 60+) (1 - 1-dose 60+ series) Never done    HF ACTION PLAN  07/03/2021    BMP  05/23/2023    INFLUENZA VACCINE (1) 09/01/2023    COVID-19 Vaccine (5 - 2023-24 season) 09/01/2023    ALT  11/23/2023    LIPID  11/23/2023    MICROALBUMIN  11/23/2023    CBC  11/23/2023    MEDICARE ANNUAL WELLNESS VISIT  11/23/2023    HEMOGLOBIN  11/23/2023    FALL RISK ASSESSMENT  12/20/2024    ADVANCE CARE PLANNING  12/20/2028    TSH W/FREE T4 REFLEX  Completed    PHQ-2 (once per calendar year)  Completed    Pneumococcal Vaccine: 65+ Years  Completed    URINALYSIS  Completed    IPV IMMUNIZATION  Aged Out    HPV IMMUNIZATION  Aged Out    MENINGITIS IMMUNIZATION  Aged Out    RSV MONOCLONAL ANTIBODY  Aged Out       Bladder Training: Care Instructions  Your Care Instructions     Bladder training is used to treat urge incontinence and stress incontinence. Urge incontinence means that the need to urinate comes on so fast that you can't get to a toilet in time. Stress incontinence means that you leak urine because of pressure on your bladder. For example, it may happen when you laugh, cough, or lift something heavy.  Bladder training can increase how long you can wait before you have to urinate. It can also help your bladder hold more urine. And it can give you better control over the urge to urinate.  It is important to remember that bladder training takes a  few weeks to a few months to make a difference. You may not see results right away, but don't give up.  Follow-up care is a key part of your treatment and safety. Be sure to make and go to all appointments, and call your doctor if you are having problems. It's also a good idea to know your test results and keep a list of the medicines you take.  How can you care for yourself at home?  Work with your doctor to come up with a bladder training program that is right for you. You may use one or more of the following methods.  Delayed urination  In the beginning, try to keep from urinating for 5 minutes after you first feel the need to go.  While you wait, take deep, slow breaths to relax. Kegel exercises can also help you delay the need to go to the bathroom.  After some practice, when you can easily wait 5 minutes to urinate, try to wait 10 minutes before you urinate.  Slowly increase the waiting period until you are able to control when you have to urinate.  Scheduled urination  Empty your bladder when you first wake up in the morning.  Schedule times throughout the day when you will urinate.  Start by going to the bathroom every hour, even if you don't need to go.  Slowly increase the time between trips to the bathroom.  When you have found a schedule that works well for you, keep doing it.  If you wake up during the night and have to urinate, do it. Apply your schedule to waking hours only.  Kegel exercises  These tighten and strengthen pelvic muscles, which can help you control the flow of urine. (If doing these exercises causes pain, stop doing them and talk with your doctor.) To do Kegel exercises:  Squeeze your muscles as if you were trying not to pass gas. Or squeeze your muscles as if you were stopping the flow of urine. Your belly, legs, and buttocks shouldn't move.  Hold the squeeze for 3 seconds, then relax for 5 to 10 seconds.  Start with 3 seconds, then add 1 second each week until you are able to squeeze  "for 10 seconds.  Repeat the exercise 10 times a session. Do 3 to 8 sessions a day.  When should you call for help?  Watch closely for changes in your health, and be sure to contact your doctor if:    Your incontinence is getting worse.     You do not get better as expected.   Where can you learn more?  Go to https://www.[employer].net/patiented  Enter V684 in the search box to learn more about \"Bladder Training: Care Instructions.\"  Current as of: February 28, 2023               Content Version: 13.8    3321-2886 [employer].   Care instructions adapted under license by your healthcare professional. If you have questions about a medical condition or this instruction, always ask your healthcare professional. [employer] disclaims any warranty or liability for your use of this information.         "

## 2024-01-09 ENCOUNTER — HOSPITAL ENCOUNTER (OUTPATIENT)
Dept: CARDIOLOGY | Facility: OTHER | Age: 79
Discharge: HOME | End: 2024-01-09
Attending: INTERNAL MEDICINE | Admitting: INTERNAL MEDICINE
Payer: MEDICARE

## 2024-01-09 DIAGNOSIS — I49.5 SICK SINUS SYNDROME (H): ICD-10-CM

## 2024-01-09 PROCEDURE — 93280 PM DEVICE PROGR EVAL DUAL: CPT | Performed by: INTERNAL MEDICINE

## 2024-01-09 PROCEDURE — 93280 PM DEVICE PROGR EVAL DUAL: CPT | Mod: 26 | Performed by: INTERNAL MEDICINE

## 2024-01-09 NOTE — PATIENT INSTRUCTIONS
It was a pleasure to see you in clinic today, please do not hesitate to call us for questions or concerns.  Your next automatic remote pacemaker check from home is scheduled for 4/12/2024.    Nadeen Toure RN    Electrophysiology Nurse Clinician  [facility]    During Business Hours Please Call:  [phone]  After Hours Please Call:  [phone] - select option #[phone] and ask for job code [phone]

## 2024-01-22 ENCOUNTER — ANTICOAGULATION THERAPY VISIT (OUTPATIENT)
Dept: ANTICOAGULATION | Facility: OTHER | Age: 79
End: 2024-01-22
Payer: MEDICARE

## 2024-01-22 DIAGNOSIS — Z79.01 LONG TERM CURRENT USE OF ANTICOAGULANT THERAPY: Primary | ICD-10-CM

## 2024-01-22 LAB — INR POINT OF CARE: 2.2 (ref 0.9–1.1)

## 2024-01-22 PROCEDURE — 36416 COLLJ CAPILLARY BLOOD SPEC: CPT | Mod: ZL

## 2024-01-22 NOTE — PROGRESS NOTES
ANTICOAGULATION MANAGEMENT     Kg Ferraro 78 year old male is on warfarin with therapeutic INR result. (Goal INR 2.0-3.0)    Recent labs: (last 7 days)     01/22/24  1134   INR 2.2*       ASSESSMENT     Source(s): In person     Warfarin doses taken: Warfarin taken as instructed  Diet: No new diet changes identified  New illness, injury, or hospitalization: No  Medication/supplement changes: None noted  Signs or symptoms of bleeding or clotting: No  Previous INR: Therapeutic last visit; previously outside of goal range  Additional findings: None     PLAN     Recommended plan for no diet, medication or health factor changes affecting INR     Dosing Instructions: Continue your current warfarin dose with next INR in 6 weeks       Summary  As of 1/22/2024      Full warfarin instructions:  4 mg every day   Next INR check:  3/4/2024               In person    Lab visit scheduled    Education provided: Please call back if any changes to your diet, medications or how you've been taking warfarin    Plan made per [facility] anticoagulation protocol    Lillian Mueller RN  Anticoagulation Clinic  1/22/2024    _______________________________________________________________________     Anticoagulation Episode Summary       Current INR goal:  2.0-3.0   TTR:  72.4% (1 y)   Target end date:  Indefinite   Send INR reminders to:  [facility]    Indications    Long-term (current) use of anticoagulants [Z79.01] [Z79.01]  Paroxysmal supraventricular tachycardia (Resolved) [I47.10]             Comments:               Anticoagulation Care Providers       Provider Role Specialty Phone number    Caleb Parker MD Referring Internal Medicine [phone]

## 2024-01-23 LAB
MDC_IDC_EPISODE_DTM: NORMAL
MDC_IDC_EPISODE_DURATION: 2 S
MDC_IDC_EPISODE_ID: NORMAL
MDC_IDC_EPISODE_TYPE: NORMAL
MDC_IDC_EPISODE_TYPE_INDUCED: NO
MDC_IDC_LEAD_CONNECTION_STATUS: NORMAL
MDC_IDC_LEAD_CONNECTION_STATUS: NORMAL
MDC_IDC_LEAD_IMPLANT_DT: NORMAL
MDC_IDC_LEAD_IMPLANT_DT: NORMAL
MDC_IDC_LEAD_LOCATION: NORMAL
MDC_IDC_LEAD_LOCATION: NORMAL
MDC_IDC_LEAD_LOCATION_DETAIL_1: NORMAL
MDC_IDC_LEAD_LOCATION_DETAIL_1: NORMAL
MDC_IDC_LEAD_MFG: NORMAL
MDC_IDC_LEAD_MFG: NORMAL
MDC_IDC_LEAD_MODEL: NORMAL
MDC_IDC_LEAD_MODEL: NORMAL
MDC_IDC_LEAD_POLARITY_TYPE: NORMAL
MDC_IDC_LEAD_POLARITY_TYPE: NORMAL
MDC_IDC_LEAD_SERIAL: NORMAL
MDC_IDC_LEAD_SERIAL: NORMAL
MDC_IDC_LEAD_SPECIAL_FUNCTION: NORMAL
MDC_IDC_LEAD_SPECIAL_FUNCTION: NORMAL
MDC_IDC_MSMT_BATTERY_DTM: NORMAL
MDC_IDC_MSMT_BATTERY_REMAINING_LONGEVITY: 80 MO
MDC_IDC_MSMT_BATTERY_RRT_TRIGGER: 2.62
MDC_IDC_MSMT_BATTERY_STATUS: NORMAL
MDC_IDC_MSMT_BATTERY_VOLTAGE: 2.98 V
MDC_IDC_MSMT_LEADCHNL_RA_IMPEDANCE_VALUE: 380 OHM
MDC_IDC_MSMT_LEADCHNL_RA_IMPEDANCE_VALUE: 475 OHM
MDC_IDC_MSMT_LEADCHNL_RV_IMPEDANCE_VALUE: 741 OHM
MDC_IDC_MSMT_LEADCHNL_RV_IMPEDANCE_VALUE: 741 OHM
MDC_IDC_MSMT_LEADCHNL_RV_PACING_THRESHOLD_AMPLITUDE: 1.25 V
MDC_IDC_MSMT_LEADCHNL_RV_PACING_THRESHOLD_PULSEWIDTH: 0.4 MS
MDC_IDC_MSMT_LEADCHNL_RV_SENSING_INTR_AMPL: 9.75 MV
MDC_IDC_PG_IMPLANT_DTM: NORMAL
MDC_IDC_PG_MFG: NORMAL
MDC_IDC_PG_MODEL: NORMAL
MDC_IDC_PG_SERIAL: NORMAL
MDC_IDC_PG_TYPE: NORMAL
MDC_IDC_SESS_CLINIC_NAME: NORMAL
MDC_IDC_SESS_DTM: NORMAL
MDC_IDC_SESS_TYPE: NORMAL
MDC_IDC_SET_BRADY_HYSTRATE: NORMAL
MDC_IDC_SET_BRADY_LOWRATE: 60 {BEATS}/MIN
MDC_IDC_SET_BRADY_MAX_SENSOR_RATE: 130 {BEATS}/MIN
MDC_IDC_SET_BRADY_MODE: NORMAL
MDC_IDC_SET_LEADCHNL_RA_SENSING_ANODE_ELECTRODE_1: NORMAL
MDC_IDC_SET_LEADCHNL_RA_SENSING_ANODE_LOCATION_1: NORMAL
MDC_IDC_SET_LEADCHNL_RA_SENSING_CATHODE_ELECTRODE_1: NORMAL
MDC_IDC_SET_LEADCHNL_RA_SENSING_CATHODE_LOCATION_1: NORMAL
MDC_IDC_SET_LEADCHNL_RA_SENSING_POLARITY: NORMAL
MDC_IDC_SET_LEADCHNL_RA_SENSING_SENSITIVITY: NORMAL
MDC_IDC_SET_LEADCHNL_RV_PACING_AMPLITUDE: 3 V
MDC_IDC_SET_LEADCHNL_RV_PACING_ANODE_ELECTRODE_1: NORMAL
MDC_IDC_SET_LEADCHNL_RV_PACING_ANODE_LOCATION_1: NORMAL
MDC_IDC_SET_LEADCHNL_RV_PACING_CAPTURE_MODE: NORMAL
MDC_IDC_SET_LEADCHNL_RV_PACING_CATHODE_ELECTRODE_1: NORMAL
MDC_IDC_SET_LEADCHNL_RV_PACING_CATHODE_LOCATION_1: NORMAL
MDC_IDC_SET_LEADCHNL_RV_PACING_POLARITY: NORMAL
MDC_IDC_SET_LEADCHNL_RV_PACING_PULSEWIDTH: 1 MS
MDC_IDC_SET_LEADCHNL_RV_SENSING_ANODE_ELECTRODE_1: NORMAL
MDC_IDC_SET_LEADCHNL_RV_SENSING_ANODE_LOCATION_1: NORMAL
MDC_IDC_SET_LEADCHNL_RV_SENSING_CATHODE_ELECTRODE_1: NORMAL
MDC_IDC_SET_LEADCHNL_RV_SENSING_CATHODE_LOCATION_1: NORMAL
MDC_IDC_SET_LEADCHNL_RV_SENSING_POLARITY: NORMAL
MDC_IDC_SET_LEADCHNL_RV_SENSING_SENSITIVITY: 0.9 MV
MDC_IDC_SET_ZONE_DETECTION_INTERVAL: 400 MS
MDC_IDC_SET_ZONE_STATUS: NORMAL
MDC_IDC_SET_ZONE_TYPE: NORMAL
MDC_IDC_SET_ZONE_VENDOR_TYPE: NORMAL
MDC_IDC_STAT_BRADY_AP_VP_PERCENT: 0 %
MDC_IDC_STAT_BRADY_AP_VS_PERCENT: 0 %
MDC_IDC_STAT_BRADY_AS_VP_PERCENT: 88.04 %
MDC_IDC_STAT_BRADY_AS_VS_PERCENT: 11.96 %
MDC_IDC_STAT_BRADY_DTM_END: NORMAL
MDC_IDC_STAT_BRADY_DTM_START: NORMAL
MDC_IDC_STAT_BRADY_RA_PERCENT_PACED: 0 %
MDC_IDC_STAT_BRADY_RV_PERCENT_PACED: 88.04 %
MDC_IDC_STAT_EPISODE_RECENT_COUNT: 0
MDC_IDC_STAT_EPISODE_RECENT_COUNT: 1
MDC_IDC_STAT_EPISODE_RECENT_COUNT_DTM_END: NORMAL
MDC_IDC_STAT_EPISODE_RECENT_COUNT_DTM_START: NORMAL
MDC_IDC_STAT_EPISODE_TOTAL_COUNT: 0
MDC_IDC_STAT_EPISODE_TOTAL_COUNT: 5
MDC_IDC_STAT_EPISODE_TOTAL_COUNT: NORMAL
MDC_IDC_STAT_EPISODE_TOTAL_COUNT_DTM_END: NORMAL
MDC_IDC_STAT_EPISODE_TOTAL_COUNT_DTM_START: NORMAL
MDC_IDC_STAT_EPISODE_TYPE: NORMAL
MDC_IDC_STAT_TACHYTHERAPY_RECENT_DTM_END: NORMAL
MDC_IDC_STAT_TACHYTHERAPY_RECENT_DTM_START: NORMAL
MDC_IDC_STAT_TACHYTHERAPY_TOTAL_DTM_END: NORMAL
MDC_IDC_STAT_TACHYTHERAPY_TOTAL_DTM_START: NORMAL

## 2024-03-14 ENCOUNTER — ANTICOAGULATION THERAPY VISIT (OUTPATIENT)
Dept: ANTICOAGULATION | Facility: OTHER | Age: 79
End: 2024-03-14
Attending: INTERNAL MEDICINE
Payer: MEDICARE

## 2024-03-14 DIAGNOSIS — Z79.01 LONG TERM CURRENT USE OF ANTICOAGULANT THERAPY: Primary | ICD-10-CM

## 2024-03-14 LAB — INR POINT OF CARE: 2.6 (ref 0.9–1.1)

## 2024-03-14 PROCEDURE — 36416 COLLJ CAPILLARY BLOOD SPEC: CPT | Mod: ZL

## 2024-03-14 NOTE — PROGRESS NOTES
ANTICOAGULATION MANAGEMENT     Kg Ferraro 79 year old male is on warfarin with therapeutic INR result. (Goal INR 2.0-3.0)    Recent labs: (last 7 days)     03/14/24  1138   INR 2.6*       ASSESSMENT     Source(s): In person     Warfarin doses taken: Warfarin taken as instructed  Diet: No new diet changes identified  New illness, injury, or hospitalization: No  Medication/supplement changes: None noted  Signs or symptoms of bleeding or clotting: No  Previous INR: Therapeutic last 2(+) visits  Additional findings: None     PLAN     Recommended plan for no diet, medication or health factor changes affecting INR     Dosing Instructions: Continue your current warfarin dose with next INR in 6 weeks       Summary  As of 3/14/2024      Full warfarin instructions:  4 mg every day   Next INR check:  4/25/2024               In person    Lab visit scheduled    Education provided: Please call back if any changes to your diet, medications or how you've been taking warfarin    Plan made per [facility] anticoagulation protocol      Carmen Cazares RN  Anticoagulation Clinic  3/14/2024    _______________________________________________________________________     Anticoagulation Episode Summary       Current INR goal:  2.0-3.0   TTR:  72.4% (1 y)   Target end date:  Indefinite   Send INR reminders to:  [facility]    Indications    Long-term (current) use of anticoagulants [Z79.01] [Z79.01]  Paroxysmal supraventricular tachycardia (Resolved) [I47.10]             Comments:               Anticoagulation Care Providers       Provider Role Specialty Phone number    Caleb Parker MD Referring Internal Medicine [phone]

## 2024-04-15 ENCOUNTER — ANCILLARY PROCEDURE (OUTPATIENT)
Dept: CARDIOLOGY | Facility: CLINIC | Age: 79
End: 2024-04-15
Attending: INTERNAL MEDICINE
Payer: MEDICARE

## 2024-04-15 DIAGNOSIS — I49.5 SICK SINUS SYNDROME (H): ICD-10-CM

## 2024-04-15 PROCEDURE — 93296 REM INTERROG EVL PM/IDS: CPT

## 2024-04-15 PROCEDURE — 93294 REM INTERROG EVL PM/LDLS PM: CPT | Performed by: INTERNAL MEDICINE

## 2024-05-07 LAB
MDC_IDC_LEAD_CONNECTION_STATUS: NORMAL
MDC_IDC_LEAD_CONNECTION_STATUS: NORMAL
MDC_IDC_LEAD_IMPLANT_DT: NORMAL
MDC_IDC_LEAD_IMPLANT_DT: NORMAL
MDC_IDC_LEAD_LOCATION: NORMAL
MDC_IDC_LEAD_LOCATION: NORMAL
MDC_IDC_LEAD_LOCATION_DETAIL_1: NORMAL
MDC_IDC_LEAD_LOCATION_DETAIL_1: NORMAL
MDC_IDC_LEAD_MFG: NORMAL
MDC_IDC_LEAD_MFG: NORMAL
MDC_IDC_LEAD_MODEL: NORMAL
MDC_IDC_LEAD_MODEL: NORMAL
MDC_IDC_LEAD_POLARITY_TYPE: NORMAL
MDC_IDC_LEAD_POLARITY_TYPE: NORMAL
MDC_IDC_LEAD_SERIAL: NORMAL
MDC_IDC_LEAD_SERIAL: NORMAL
MDC_IDC_LEAD_SPECIAL_FUNCTION: NORMAL
MDC_IDC_LEAD_SPECIAL_FUNCTION: NORMAL
MDC_IDC_MSMT_BATTERY_DTM: NORMAL
MDC_IDC_MSMT_BATTERY_REMAINING_LONGEVITY: 70 MO
MDC_IDC_MSMT_BATTERY_RRT_TRIGGER: 2.62
MDC_IDC_MSMT_BATTERY_STATUS: NORMAL
MDC_IDC_MSMT_BATTERY_VOLTAGE: 2.98 V
MDC_IDC_MSMT_LEADCHNL_RA_IMPEDANCE_VALUE: 380 OHM
MDC_IDC_MSMT_LEADCHNL_RA_IMPEDANCE_VALUE: 475 OHM
MDC_IDC_MSMT_LEADCHNL_RV_IMPEDANCE_VALUE: 570 OHM
MDC_IDC_MSMT_LEADCHNL_RV_IMPEDANCE_VALUE: 589 OHM
MDC_IDC_MSMT_LEADCHNL_RV_PACING_THRESHOLD_AMPLITUDE: 2.12 V
MDC_IDC_MSMT_LEADCHNL_RV_PACING_THRESHOLD_PULSEWIDTH: 0.4 MS
MDC_IDC_MSMT_LEADCHNL_RV_SENSING_INTR_AMPL: 7.38 MV
MDC_IDC_PG_IMPLANT_DTM: NORMAL
MDC_IDC_PG_MFG: NORMAL
MDC_IDC_PG_MODEL: NORMAL
MDC_IDC_PG_SERIAL: NORMAL
MDC_IDC_PG_TYPE: NORMAL
MDC_IDC_SESS_CLINIC_NAME: NORMAL
MDC_IDC_SESS_DTM: NORMAL
MDC_IDC_SESS_TYPE: NORMAL
MDC_IDC_SET_BRADY_HYSTRATE: NORMAL
MDC_IDC_SET_BRADY_LOWRATE: 60 {BEATS}/MIN
MDC_IDC_SET_BRADY_MAX_SENSOR_RATE: 130 {BEATS}/MIN
MDC_IDC_SET_BRADY_MODE: NORMAL
MDC_IDC_SET_LEADCHNL_RA_SENSING_ANODE_ELECTRODE_1: NORMAL
MDC_IDC_SET_LEADCHNL_RA_SENSING_ANODE_LOCATION_1: NORMAL
MDC_IDC_SET_LEADCHNL_RA_SENSING_CATHODE_ELECTRODE_1: NORMAL
MDC_IDC_SET_LEADCHNL_RA_SENSING_CATHODE_LOCATION_1: NORMAL
MDC_IDC_SET_LEADCHNL_RA_SENSING_POLARITY: NORMAL
MDC_IDC_SET_LEADCHNL_RA_SENSING_SENSITIVITY: NORMAL
MDC_IDC_SET_LEADCHNL_RV_PACING_AMPLITUDE: 3 V
MDC_IDC_SET_LEADCHNL_RV_PACING_ANODE_ELECTRODE_1: NORMAL
MDC_IDC_SET_LEADCHNL_RV_PACING_ANODE_LOCATION_1: NORMAL
MDC_IDC_SET_LEADCHNL_RV_PACING_CAPTURE_MODE: NORMAL
MDC_IDC_SET_LEADCHNL_RV_PACING_CATHODE_ELECTRODE_1: NORMAL
MDC_IDC_SET_LEADCHNL_RV_PACING_CATHODE_LOCATION_1: NORMAL
MDC_IDC_SET_LEADCHNL_RV_PACING_POLARITY: NORMAL
MDC_IDC_SET_LEADCHNL_RV_PACING_PULSEWIDTH: 1 MS
MDC_IDC_SET_LEADCHNL_RV_SENSING_ANODE_ELECTRODE_1: NORMAL
MDC_IDC_SET_LEADCHNL_RV_SENSING_ANODE_LOCATION_1: NORMAL
MDC_IDC_SET_LEADCHNL_RV_SENSING_CATHODE_ELECTRODE_1: NORMAL
MDC_IDC_SET_LEADCHNL_RV_SENSING_CATHODE_LOCATION_1: NORMAL
MDC_IDC_SET_LEADCHNL_RV_SENSING_POLARITY: NORMAL
MDC_IDC_SET_LEADCHNL_RV_SENSING_SENSITIVITY: 0.9 MV
MDC_IDC_SET_ZONE_DETECTION_INTERVAL: 400 MS
MDC_IDC_SET_ZONE_STATUS: NORMAL
MDC_IDC_SET_ZONE_TYPE: NORMAL
MDC_IDC_SET_ZONE_VENDOR_TYPE: NORMAL
MDC_IDC_STAT_BRADY_AP_VP_PERCENT: 0 %
MDC_IDC_STAT_BRADY_AP_VS_PERCENT: 0 %
MDC_IDC_STAT_BRADY_AS_VP_PERCENT: 85.32 %
MDC_IDC_STAT_BRADY_AS_VS_PERCENT: 14.68 %
MDC_IDC_STAT_BRADY_DTM_END: NORMAL
MDC_IDC_STAT_BRADY_DTM_START: NORMAL
MDC_IDC_STAT_BRADY_RA_PERCENT_PACED: 0 %
MDC_IDC_STAT_BRADY_RV_PERCENT_PACED: 85.32 %
MDC_IDC_STAT_EPISODE_RECENT_COUNT: 0
MDC_IDC_STAT_EPISODE_RECENT_COUNT_DTM_END: NORMAL
MDC_IDC_STAT_EPISODE_RECENT_COUNT_DTM_START: NORMAL
MDC_IDC_STAT_EPISODE_TOTAL_COUNT: 0
MDC_IDC_STAT_EPISODE_TOTAL_COUNT: 5
MDC_IDC_STAT_EPISODE_TOTAL_COUNT: NORMAL
MDC_IDC_STAT_EPISODE_TOTAL_COUNT_DTM_END: NORMAL
MDC_IDC_STAT_EPISODE_TOTAL_COUNT_DTM_START: NORMAL
MDC_IDC_STAT_EPISODE_TYPE: NORMAL
MDC_IDC_STAT_TACHYTHERAPY_RECENT_DTM_END: NORMAL
MDC_IDC_STAT_TACHYTHERAPY_RECENT_DTM_START: NORMAL
MDC_IDC_STAT_TACHYTHERAPY_TOTAL_DTM_END: NORMAL
MDC_IDC_STAT_TACHYTHERAPY_TOTAL_DTM_START: NORMAL

## 2024-05-20 ENCOUNTER — ANTICOAGULATION THERAPY VISIT (OUTPATIENT)
Dept: ANTICOAGULATION | Facility: OTHER | Age: 79
End: 2024-05-20
Attending: INTERNAL MEDICINE
Payer: MEDICARE

## 2024-05-20 DIAGNOSIS — Z79.01 LONG TERM CURRENT USE OF ANTICOAGULANT THERAPY: Primary | ICD-10-CM

## 2024-05-20 LAB — INR POINT OF CARE: 2.8 (ref 0.9–1.1)

## 2024-05-20 PROCEDURE — 36416 COLLJ CAPILLARY BLOOD SPEC: CPT | Mod: ZL

## 2024-05-20 NOTE — PROGRESS NOTES
ANTICOAGULATION MANAGEMENT     Kg Ferraro 79 year old male is on warfarin with therapeutic INR result. (Goal INR 2.0-3.0)    Recent labs: (last 7 days)     05/20/24  1128   INR 2.8*       ASSESSMENT     Source(s): In person     Warfarin doses taken: Warfarin taken as instructed  Diet: No new diet changes identified  New illness, injury, or hospitalization: No  Medication/supplement changes: None noted  Signs or symptoms of bleeding or clotting: No  Previous INR: Therapeutic last 2(+) visits  Additional findings: None     PLAN     Recommended plan for no diet, medication or health factor changes affecting INR     Dosing Instructions: Continue your current warfarin dose with next INR in 6 weeks       Summary  As of 5/20/2024      Full warfarin instructions:  4 mg every day   Next INR check:  7/1/2024               In person    Lab visit scheduled    Education provided: Please call back if any changes to your diet, medications or how you've been taking warfarin    Plan made per [facility]agulation protocol    Nancy Aguero RN  Anticoagulation Clinic  5/20/2024    _______________________________________________________________________     Anticoagulation Episode Summary       Current INR goal:  2.0-3.0   TTR:  72.4% (1 y)   Target end date:  Indefinite   Send INR reminders to:  [facility]    Indications    Long-term (current) use of anticoagulants [Z79.01] [Z79.01]  Paroxysmal supraventricular tachycardia (H24) (Resolved) [I47.10]             Comments:               Anticoagulation Care Providers       Provider Role Specialty Phone number    Caleb Parker MD Referring Internal Medicine [phone]

## 2024-07-15 ENCOUNTER — ANCILLARY PROCEDURE (OUTPATIENT)
Dept: CARDIOLOGY | Facility: CLINIC | Age: 79
End: 2024-07-15
Attending: INTERNAL MEDICINE
Payer: MEDICARE

## 2024-07-15 DIAGNOSIS — I49.5 SICK SINUS SYNDROME (H): ICD-10-CM

## 2024-07-15 PROCEDURE — 93296 REM INTERROG EVL PM/IDS: CPT

## 2024-07-15 PROCEDURE — 93294 REM INTERROG EVL PM/LDLS PM: CPT | Performed by: INTERNAL MEDICINE

## 2024-07-22 LAB
MDC_IDC_LEAD_CONNECTION_STATUS: NORMAL
MDC_IDC_LEAD_CONNECTION_STATUS: NORMAL
MDC_IDC_LEAD_IMPLANT_DT: NORMAL
MDC_IDC_LEAD_IMPLANT_DT: NORMAL
MDC_IDC_LEAD_LOCATION: NORMAL
MDC_IDC_LEAD_LOCATION: NORMAL
MDC_IDC_LEAD_LOCATION_DETAIL_1: NORMAL
MDC_IDC_LEAD_LOCATION_DETAIL_1: NORMAL
MDC_IDC_LEAD_MFG: NORMAL
MDC_IDC_LEAD_MFG: NORMAL
MDC_IDC_LEAD_MODEL: NORMAL
MDC_IDC_LEAD_MODEL: NORMAL
MDC_IDC_LEAD_POLARITY_TYPE: NORMAL
MDC_IDC_LEAD_POLARITY_TYPE: NORMAL
MDC_IDC_LEAD_SERIAL: NORMAL
MDC_IDC_LEAD_SERIAL: NORMAL
MDC_IDC_LEAD_SPECIAL_FUNCTION: NORMAL
MDC_IDC_LEAD_SPECIAL_FUNCTION: NORMAL
MDC_IDC_MSMT_BATTERY_DTM: NORMAL
MDC_IDC_MSMT_BATTERY_REMAINING_LONGEVITY: 66 MO
MDC_IDC_MSMT_BATTERY_RRT_TRIGGER: 2.62
MDC_IDC_MSMT_BATTERY_STATUS: NORMAL
MDC_IDC_MSMT_BATTERY_VOLTAGE: 2.97 V
MDC_IDC_MSMT_LEADCHNL_RV_IMPEDANCE_VALUE: 589 OHM
MDC_IDC_MSMT_LEADCHNL_RV_IMPEDANCE_VALUE: 589 OHM
MDC_IDC_MSMT_LEADCHNL_RV_PACING_THRESHOLD_AMPLITUDE: 0.88 V
MDC_IDC_MSMT_LEADCHNL_RV_PACING_THRESHOLD_PULSEWIDTH: 0.4 MS
MDC_IDC_MSMT_LEADCHNL_RV_SENSING_INTR_AMPL: 9 MV
MDC_IDC_PG_IMPLANT_DTM: NORMAL
MDC_IDC_PG_MFG: NORMAL
MDC_IDC_PG_MODEL: NORMAL
MDC_IDC_PG_SERIAL: NORMAL
MDC_IDC_PG_TYPE: NORMAL
MDC_IDC_SESS_CLINIC_NAME: NORMAL
MDC_IDC_SESS_DTM: NORMAL
MDC_IDC_SESS_TYPE: NORMAL
MDC_IDC_SET_BRADY_HYSTRATE: NORMAL
MDC_IDC_SET_BRADY_LOWRATE: 60 {BEATS}/MIN
MDC_IDC_SET_BRADY_MAX_SENSOR_RATE: 130 {BEATS}/MIN
MDC_IDC_SET_BRADY_MODE: NORMAL
MDC_IDC_SET_LEADCHNL_RA_SENSING_ANODE_ELECTRODE_1: NORMAL
MDC_IDC_SET_LEADCHNL_RA_SENSING_ANODE_LOCATION_1: NORMAL
MDC_IDC_SET_LEADCHNL_RA_SENSING_CATHODE_ELECTRODE_1: NORMAL
MDC_IDC_SET_LEADCHNL_RA_SENSING_CATHODE_LOCATION_1: NORMAL
MDC_IDC_SET_LEADCHNL_RA_SENSING_POLARITY: NORMAL
MDC_IDC_SET_LEADCHNL_RA_SENSING_SENSITIVITY: NORMAL
MDC_IDC_SET_LEADCHNL_RV_PACING_AMPLITUDE: 3 V
MDC_IDC_SET_LEADCHNL_RV_PACING_ANODE_ELECTRODE_1: NORMAL
MDC_IDC_SET_LEADCHNL_RV_PACING_ANODE_LOCATION_1: NORMAL
MDC_IDC_SET_LEADCHNL_RV_PACING_CAPTURE_MODE: NORMAL
MDC_IDC_SET_LEADCHNL_RV_PACING_CATHODE_ELECTRODE_1: NORMAL
MDC_IDC_SET_LEADCHNL_RV_PACING_CATHODE_LOCATION_1: NORMAL
MDC_IDC_SET_LEADCHNL_RV_PACING_POLARITY: NORMAL
MDC_IDC_SET_LEADCHNL_RV_PACING_PULSEWIDTH: 1 MS
MDC_IDC_SET_LEADCHNL_RV_SENSING_ANODE_ELECTRODE_1: NORMAL
MDC_IDC_SET_LEADCHNL_RV_SENSING_ANODE_LOCATION_1: NORMAL
MDC_IDC_SET_LEADCHNL_RV_SENSING_CATHODE_ELECTRODE_1: NORMAL
MDC_IDC_SET_LEADCHNL_RV_SENSING_CATHODE_LOCATION_1: NORMAL
MDC_IDC_SET_LEADCHNL_RV_SENSING_POLARITY: NORMAL
MDC_IDC_SET_LEADCHNL_RV_SENSING_SENSITIVITY: 0.9 MV
MDC_IDC_SET_ZONE_DETECTION_INTERVAL: 400 MS
MDC_IDC_SET_ZONE_STATUS: NORMAL
MDC_IDC_SET_ZONE_TYPE: NORMAL
MDC_IDC_SET_ZONE_VENDOR_TYPE: NORMAL
MDC_IDC_STAT_BRADY_AP_VP_PERCENT: 0 %
MDC_IDC_STAT_BRADY_AP_VS_PERCENT: 0 %
MDC_IDC_STAT_BRADY_AS_VP_PERCENT: 90.59 %
MDC_IDC_STAT_BRADY_AS_VS_PERCENT: 9.41 %
MDC_IDC_STAT_BRADY_DTM_END: NORMAL
MDC_IDC_STAT_BRADY_DTM_START: NORMAL
MDC_IDC_STAT_BRADY_RA_PERCENT_PACED: 0 %
MDC_IDC_STAT_BRADY_RV_PERCENT_PACED: 90.59 %
MDC_IDC_STAT_EPISODE_RECENT_COUNT: 0
MDC_IDC_STAT_EPISODE_RECENT_COUNT_DTM_END: NORMAL
MDC_IDC_STAT_EPISODE_RECENT_COUNT_DTM_START: NORMAL
MDC_IDC_STAT_EPISODE_TOTAL_COUNT: 0
MDC_IDC_STAT_EPISODE_TOTAL_COUNT: 5
MDC_IDC_STAT_EPISODE_TOTAL_COUNT: NORMAL
MDC_IDC_STAT_EPISODE_TOTAL_COUNT_DTM_END: NORMAL
MDC_IDC_STAT_EPISODE_TOTAL_COUNT_DTM_START: NORMAL
MDC_IDC_STAT_EPISODE_TYPE: NORMAL
MDC_IDC_STAT_TACHYTHERAPY_RECENT_DTM_END: NORMAL
MDC_IDC_STAT_TACHYTHERAPY_RECENT_DTM_START: NORMAL
MDC_IDC_STAT_TACHYTHERAPY_TOTAL_DTM_END: NORMAL
MDC_IDC_STAT_TACHYTHERAPY_TOTAL_DTM_START: NORMAL

## 2024-07-23 ENCOUNTER — TELEPHONE (OUTPATIENT)
Dept: INTERNAL MEDICINE | Facility: OTHER | Age: 79
End: 2024-07-23
Payer: MEDICARE

## 2024-07-24 ENCOUNTER — ANTICOAGULATION THERAPY VISIT (OUTPATIENT)
Dept: ANTICOAGULATION | Facility: OTHER | Age: 79
End: 2024-07-24
Payer: MEDICARE

## 2024-07-24 DIAGNOSIS — Z79.01 LONG TERM CURRENT USE OF ANTICOAGULANT THERAPY: Primary | ICD-10-CM

## 2024-07-24 LAB — INR POINT OF CARE: 3 (ref 0.9–1.1)

## 2024-07-24 PROCEDURE — 36416 COLLJ CAPILLARY BLOOD SPEC: CPT | Mod: ZL

## 2024-07-24 NOTE — PROGRESS NOTES
ANTICOAGULATION MANAGEMENT     Kg Ferraro 79 year old male is on warfarin with therapeutic INR result. (Goal INR 2.0-3.0)    Recent labs: (last 7 days)     07/24/24  1143   INR 3.0*       ASSESSMENT     Source(s): In person     Warfarin doses taken: Missed dose(s) may be affecting INR  Diet: No new diet changes identified  New illness, injury, or hospitalization: Yes: back pain in the last 1-2 weeks  Medication/supplement changes:  PRN tylenol and ibuprofen, educated patient to get provider approval of that pain medication due to the increased risk of a GI bleed with an NSAID  Signs or symptoms of bleeding or clotting: No  Previous INR: Therapeutic last 2(+) visits  Additional findings: Patient encouraged that if he feels like he needs 4-5+ days of ibuprofen to come back in 2-3 weeks to get INR checked     PLAN     Recommended plan for no diet, medication or health factor changes affecting INR     Dosing Instructions: Continue your current warfarin dose with next INR in 6 weeks       Summary  As of 7/24/2024      Full warfarin instructions:  4 mg every day   Next INR check:  9/4/2024               In person    Patient offered & declined to schedule next visit    Education provided: Please call back if any changes to your diet, medications or how you've been taking warfarin, Potential interaction between warfarin and ibuprofen, and Monitoring for bleeding signs and symptoms    Plan made per [facility]agulation protocol    Lillian Mueller RN  Anticoagulation Clinic  7/24/2024    _______________________________________________________________________     Anticoagulation Episode Summary       Current INR goal:  2.0-3.0   TTR:  84.0% (1 y)   Target end date:  Indefinite   Send INR reminders to:  [facility]    Indications    Long-term (current) use of anticoagulants [Z79.01] [Z79.01]  Paroxysmal supraventricular tachycardia (H24) (Resolved) [I47.10]             Comments:                Anticoagulation Care Providers       Provider Role Specialty Phone number    Caleb Parker MD Referring Internal Medicine [phone]

## 2024-09-23 ENCOUNTER — TELEPHONE (OUTPATIENT)
Dept: INTERNAL MEDICINE | Facility: OTHER | Age: 79
End: 2024-09-23
Payer: MEDICARE

## 2024-09-23 NOTE — TELEPHONE ENCOUNTER
ANTICOAGULATION     Kg Ferraro is overdue for an INR check.     Spoke with Kg and scheduled lab appointment on 10/3/24    Lillian Mueller, RN  9/23/2024  Anticoagulation Clinic  [facility] for routing messages: p [facility]  [facility] patient phone line: [phone]

## 2024-10-01 ENCOUNTER — ANTICOAGULATION THERAPY VISIT (OUTPATIENT)
Dept: ANTICOAGULATION | Facility: OTHER | Age: 79
End: 2024-10-01
Attending: INTERNAL MEDICINE
Payer: MEDICARE

## 2024-10-01 DIAGNOSIS — Z79.01 LONG TERM CURRENT USE OF ANTICOAGULANT THERAPY: Primary | ICD-10-CM

## 2024-10-01 LAB — INR POINT OF CARE: 3.1 (ref 0.9–1.1)

## 2024-10-01 PROCEDURE — 85610 PROTHROMBIN TIME: CPT | Mod: ZL,QW

## 2024-10-01 NOTE — PROGRESS NOTES
ANTICOAGULATION MANAGEMENT     Kg Ferraro 79 year old male is on warfarin with supratherapeutic INR result. (Goal INR 2.0-3.0)    Recent labs: (last 7 days)     10/01/24  1355   INR 3.1*       ASSESSMENT     Source(s): Chart Review and In person      Warfarin doses taken: Warfarin taken as instructed  Diet: No new diet changes identified  New illness, injury, or hospitalization: No  Medication/supplement changes:  Prn tylenol for right foot big tow nail pain  Signs or symptoms of bleeding or clotting: No  Previous INR: Therapeutic last 2(+) visits  Additional findings: Looked at toe nail, no redness, no fevers, no drainage, gave some foot care resources   Patient has been spraying peroxide on toenail       PLAN     Recommended plan for temporary change(s) affecting INR     Dosing Instructions: partial hold then continue your current warfarin dose with next INR in 2 weeks       Summary  As of 10/1/2024      Full warfarin instructions:  10/1: 2 mg; Otherwise 4 mg every day   Next INR check:  10/15/2024               In person    Lab visit scheduled    Education provided: Please call back if any changes to your diet, medications or how you've been taking warfarin    Plan made per [facility] anticoagulation protocol    Lillian Mueller, RN  Anticoagulation Clinic  10/1/2024    _______________________________________________________________________     Anticoagulation Episode Summary       Current INR goal:  2.0-3.0   TTR:  66.8% (1 y)   Target end date:  Indefinite   Send INR reminders to:  [facility]    Indications    Long-term (current) use of anticoagulants [Z79.01] [Z79.01]  Paroxysmal supraventricular tachycardia (H) (Resolved) [I47.10]             Comments:               Anticoagulation Care Providers       Provider Role Specialty Phone number    Caleb Parker MD Referring Internal Medicine [phone]

## 2024-10-15 ENCOUNTER — ANCILLARY PROCEDURE (OUTPATIENT)
Dept: CARDIOLOGY | Facility: CLINIC | Age: 79
End: 2024-10-15
Attending: INTERNAL MEDICINE
Payer: MEDICARE

## 2024-10-15 DIAGNOSIS — I49.5 SICK SINUS SYNDROME (H): ICD-10-CM

## 2024-10-15 PROCEDURE — 93294 REM INTERROG EVL PM/LDLS PM: CPT | Performed by: INTERNAL MEDICINE

## 2024-10-15 PROCEDURE — 93296 REM INTERROG EVL PM/IDS: CPT

## 2024-10-16 LAB
MDC_IDC_LEAD_CONNECTION_STATUS: NORMAL
MDC_IDC_LEAD_CONNECTION_STATUS: NORMAL
MDC_IDC_LEAD_IMPLANT_DT: NORMAL
MDC_IDC_LEAD_IMPLANT_DT: NORMAL
MDC_IDC_LEAD_LOCATION: NORMAL
MDC_IDC_LEAD_LOCATION: NORMAL
MDC_IDC_LEAD_LOCATION_DETAIL_1: NORMAL
MDC_IDC_LEAD_LOCATION_DETAIL_1: NORMAL
MDC_IDC_LEAD_MFG: NORMAL
MDC_IDC_LEAD_MFG: NORMAL
MDC_IDC_LEAD_MODEL: NORMAL
MDC_IDC_LEAD_MODEL: NORMAL
MDC_IDC_LEAD_POLARITY_TYPE: NORMAL
MDC_IDC_LEAD_POLARITY_TYPE: NORMAL
MDC_IDC_LEAD_SERIAL: NORMAL
MDC_IDC_LEAD_SERIAL: NORMAL
MDC_IDC_LEAD_SPECIAL_FUNCTION: NORMAL
MDC_IDC_LEAD_SPECIAL_FUNCTION: NORMAL
MDC_IDC_MSMT_BATTERY_DTM: NORMAL
MDC_IDC_MSMT_BATTERY_REMAINING_LONGEVITY: 62 MO
MDC_IDC_MSMT_BATTERY_RRT_TRIGGER: 2.62
MDC_IDC_MSMT_BATTERY_STATUS: NORMAL
MDC_IDC_MSMT_BATTERY_VOLTAGE: 2.97 V
MDC_IDC_MSMT_LEADCHNL_RA_IMPEDANCE_VALUE: 380 OHM
MDC_IDC_MSMT_LEADCHNL_RA_IMPEDANCE_VALUE: 475 OHM
MDC_IDC_MSMT_LEADCHNL_RA_SENSING_INTR_AMPL: 0.38 MV
MDC_IDC_MSMT_LEADCHNL_RA_SENSING_INTR_AMPL: 2 MV
MDC_IDC_MSMT_LEADCHNL_RV_IMPEDANCE_VALUE: 589 OHM
MDC_IDC_MSMT_LEADCHNL_RV_IMPEDANCE_VALUE: 589 OHM
MDC_IDC_MSMT_LEADCHNL_RV_PACING_THRESHOLD_AMPLITUDE: 1.5 V
MDC_IDC_MSMT_LEADCHNL_RV_PACING_THRESHOLD_PULSEWIDTH: 0.4 MS
MDC_IDC_MSMT_LEADCHNL_RV_SENSING_INTR_AMPL: 7.88 MV
MDC_IDC_MSMT_LEADCHNL_RV_SENSING_INTR_AMPL: 7.88 MV
MDC_IDC_PG_IMPLANT_DTM: NORMAL
MDC_IDC_PG_MFG: NORMAL
MDC_IDC_PG_MODEL: NORMAL
MDC_IDC_PG_SERIAL: NORMAL
MDC_IDC_PG_TYPE: NORMAL
MDC_IDC_SESS_CLINIC_NAME: NORMAL
MDC_IDC_SESS_DTM: NORMAL
MDC_IDC_SESS_TYPE: NORMAL
MDC_IDC_SET_BRADY_HYSTRATE: NORMAL
MDC_IDC_SET_BRADY_LOWRATE: 60 {BEATS}/MIN
MDC_IDC_SET_BRADY_MAX_SENSOR_RATE: 130 {BEATS}/MIN
MDC_IDC_SET_BRADY_MODE: NORMAL
MDC_IDC_SET_LEADCHNL_RA_SENSING_ANODE_ELECTRODE_1: NORMAL
MDC_IDC_SET_LEADCHNL_RA_SENSING_ANODE_LOCATION_1: NORMAL
MDC_IDC_SET_LEADCHNL_RA_SENSING_CATHODE_ELECTRODE_1: NORMAL
MDC_IDC_SET_LEADCHNL_RA_SENSING_CATHODE_LOCATION_1: NORMAL
MDC_IDC_SET_LEADCHNL_RA_SENSING_POLARITY: NORMAL
MDC_IDC_SET_LEADCHNL_RA_SENSING_SENSITIVITY: NORMAL
MDC_IDC_SET_LEADCHNL_RV_PACING_AMPLITUDE: 3 V
MDC_IDC_SET_LEADCHNL_RV_PACING_ANODE_ELECTRODE_1: NORMAL
MDC_IDC_SET_LEADCHNL_RV_PACING_ANODE_LOCATION_1: NORMAL
MDC_IDC_SET_LEADCHNL_RV_PACING_CAPTURE_MODE: NORMAL
MDC_IDC_SET_LEADCHNL_RV_PACING_CATHODE_ELECTRODE_1: NORMAL
MDC_IDC_SET_LEADCHNL_RV_PACING_CATHODE_LOCATION_1: NORMAL
MDC_IDC_SET_LEADCHNL_RV_PACING_POLARITY: NORMAL
MDC_IDC_SET_LEADCHNL_RV_PACING_PULSEWIDTH: 1 MS
MDC_IDC_SET_LEADCHNL_RV_SENSING_ANODE_ELECTRODE_1: NORMAL
MDC_IDC_SET_LEADCHNL_RV_SENSING_ANODE_LOCATION_1: NORMAL
MDC_IDC_SET_LEADCHNL_RV_SENSING_CATHODE_ELECTRODE_1: NORMAL
MDC_IDC_SET_LEADCHNL_RV_SENSING_CATHODE_LOCATION_1: NORMAL
MDC_IDC_SET_LEADCHNL_RV_SENSING_POLARITY: NORMAL
MDC_IDC_SET_LEADCHNL_RV_SENSING_SENSITIVITY: 0.9 MV
MDC_IDC_SET_ZONE_DETECTION_INTERVAL: 400 MS
MDC_IDC_SET_ZONE_STATUS: NORMAL
MDC_IDC_SET_ZONE_TYPE: NORMAL
MDC_IDC_SET_ZONE_VENDOR_TYPE: NORMAL
MDC_IDC_STAT_BRADY_AP_VP_PERCENT: 0 %
MDC_IDC_STAT_BRADY_AP_VS_PERCENT: 0 %
MDC_IDC_STAT_BRADY_AS_VP_PERCENT: 95.84 %
MDC_IDC_STAT_BRADY_AS_VS_PERCENT: 4.16 %
MDC_IDC_STAT_BRADY_DTM_END: NORMAL
MDC_IDC_STAT_BRADY_DTM_START: NORMAL
MDC_IDC_STAT_BRADY_RV_PERCENT_PACED: 95.84 %
MDC_IDC_STAT_EPISODE_RECENT_COUNT: 0
MDC_IDC_STAT_EPISODE_RECENT_COUNT_DTM_END: NORMAL
MDC_IDC_STAT_EPISODE_RECENT_COUNT_DTM_START: NORMAL
MDC_IDC_STAT_EPISODE_TOTAL_COUNT: 0
MDC_IDC_STAT_EPISODE_TOTAL_COUNT: 5
MDC_IDC_STAT_EPISODE_TOTAL_COUNT: NORMAL
MDC_IDC_STAT_EPISODE_TOTAL_COUNT_DTM_END: NORMAL
MDC_IDC_STAT_EPISODE_TOTAL_COUNT_DTM_START: NORMAL
MDC_IDC_STAT_EPISODE_TYPE: NORMAL
MDC_IDC_STAT_TACHYTHERAPY_RECENT_DTM_END: NORMAL
MDC_IDC_STAT_TACHYTHERAPY_RECENT_DTM_START: NORMAL
MDC_IDC_STAT_TACHYTHERAPY_TOTAL_DTM_END: NORMAL
MDC_IDC_STAT_TACHYTHERAPY_TOTAL_DTM_START: NORMAL

## 2024-10-17 ENCOUNTER — ANTICOAGULATION THERAPY VISIT (OUTPATIENT)
Dept: ANTICOAGULATION | Facility: OTHER | Age: 79
End: 2024-10-17
Attending: INTERNAL MEDICINE
Payer: MEDICARE

## 2024-10-17 DIAGNOSIS — Z79.01 LONG TERM CURRENT USE OF ANTICOAGULANT THERAPY: Primary | ICD-10-CM

## 2024-10-17 LAB — INR POINT OF CARE: 2.1 (ref 0.9–1.1)

## 2024-10-17 PROCEDURE — 85610 PROTHROMBIN TIME: CPT | Mod: ZL,QW

## 2024-10-17 NOTE — PROGRESS NOTES
ANTICOAGULATION MANAGEMENT     Kg Ferraro 79 year old male is on warfarin with therapeutic INR result. (Goal INR 2.0-3.0)    Recent labs: (last 7 days)     10/17/24  1258   INR 2.1*       ASSESSMENT     Source(s): Chart Review and In person      Warfarin doses taken: Warfarin taken as instructed  Diet: No new diet changes identified  New illness, injury, or hospitalization: No  Medication/supplement changes: None noted  Signs or symptoms of bleeding or clotting: No  Previous INR: Supratherapeutic  Additional findings: None       PLAN     Recommended plan for no diet, medication or health factor changes affecting INR     Dosing Instructions: Continue your current warfarin dose with next INR in 2 weeks       Summary  As of 10/17/2024      Full warfarin instructions:  4 mg every day   Next INR check:  10/31/2024               In person    Patient offered & declined to schedule next visit    Education provided: Please call back if any changes to your diet, medications or how you've been taking warfarin    Plan made per [facility] anticoagulation protocol    Lillian Mueller RN  Anticoagulation Clinic  10/17/2024    _______________________________________________________________________     Anticoagulation Episode Summary       Current INR goal:  2.0-3.0   TTR:  66.3% (1 y)   Target end date:  Indefinite   Send INR reminders to:  [facility]    Indications    Long-term (current) use of anticoagulants [Z79.01] [Z79.01]  Paroxysmal supraventricular tachycardia (H) (Resolved) [I47.10]             Comments:               Anticoagulation Care Providers       Provider Role Specialty Phone number    Caleb Parker MD Referring Internal Medicine [phone]

## 2024-11-15 ENCOUNTER — ANTICOAGULATION THERAPY VISIT (OUTPATIENT)
Dept: ANTICOAGULATION | Facility: OTHER | Age: 79
End: 2024-11-15
Attending: INTERNAL MEDICINE
Payer: MEDICARE

## 2024-11-15 DIAGNOSIS — Z79.01 LONG TERM CURRENT USE OF ANTICOAGULANT THERAPY: Primary | ICD-10-CM

## 2024-11-15 LAB — INR POINT OF CARE: 2.7 (ref 0.9–1.1)

## 2024-11-15 PROCEDURE — 85610 PROTHROMBIN TIME: CPT | Mod: ZL,QW

## 2024-11-15 NOTE — PROGRESS NOTES
ANTICOAGULATION MANAGEMENT     Kg Ferraro 79 year old male is on warfarin with therapeutic INR result. (Goal INR 2.0-3.0)    Recent labs: (last 7 days)     11/15/24  1345   INR 2.7*       ASSESSMENT     Source(s): Chart Review and In person      Warfarin doses taken: Warfarin taken as instructed  Diet: No new diet changes identified  Medication/supplement changes: None noted  New illness, injury, or hospitalization: No  Signs or symptoms of bleeding or clotting: No  Previous result: Therapeutic last visit; previously outside of goal range  Additional findings: None       PLAN     Recommended plan for no diet, medication or health factor changes affecting INR     Dosing Instructions: Continue your current warfarin dose with next INR in 4 weeks. Patient elected to go 4 weeks.     Summary  As of 11/15/2024      Full warfarin instructions:  4 mg every day   Next INR check:  12/13/2024               In person    Lab visit scheduled    Education provided: Please call back if any changes to your diet, medications or how you've been taking warfarin    Plan made per [facility] anticoagulation protocol    Carlos Vargas RN  11/15/2024  Anticoagulation Clinic  [facility] for routing messages: p [facility]          _______________________________________________________________________     Anticoagulation Episode Summary       Current INR goal:  2.0-3.0   TTR:  74.1% (1 y)   Target end date:  Indefinite   Send INR reminders to:  [facility]    Indications    Long-term (current) use of anticoagulants [Z79.01] [Z79.01]  Paroxysmal supraventricular tachycardia (H) (Resolved) [I47.10]             Comments:  --             Anticoagulation Care Providers       Provider Role Specialty Phone number    Caleb Parker MD Referring Internal Medicine [phone]

## 2024-12-20 PROBLEM — D68.69 HYPERCOAGULABLE STATE DUE TO PERMANENT ATRIAL FIBRILLATION (H): Status: ACTIVE | Noted: 2024-12-20

## 2024-12-20 PROBLEM — I48.21 HYPERCOAGULABLE STATE DUE TO PERMANENT ATRIAL FIBRILLATION (H): Status: ACTIVE | Noted: 2024-12-20

## 2025-01-13 DIAGNOSIS — I50.32 CHRONIC HEART FAILURE WITH PRESERVED EJECTION FRACTION (H): ICD-10-CM

## 2025-01-15 ENCOUNTER — TELEPHONE (OUTPATIENT)
Dept: INTERNAL MEDICINE | Facility: OTHER | Age: 80
End: 2025-01-15
Payer: MEDICARE

## 2025-01-15 RX ORDER — POTASSIUM CHLORIDE 1500 MG/1
TABLET, EXTENDED RELEASE ORAL DAILY
Qty: 30 TABLET | Refills: 0 | Status: SHIPPED | OUTPATIENT
Start: 2025-01-15

## 2025-01-15 NOTE — TELEPHONE ENCOUNTER
[id number] sent Rx request for the following:      Requested Prescriptions   Pending Prescriptions Disp Refills    potassium chloride ayana ER (KLOR-CON M20) 20 MEQ CR tablet [Pharmacy Med Name: Potassium Chloride Ayana ER 20 MEQ Oral Tablet Extended Release] 30 tablet 0     Sig: Take 1 tablet by mouth once daily       Potassium Supplements Protocol Failed - 1/15/2025  8:53 AM        Failed - Recent (12 mo) or future (90 days) visit within the authorizing provider's department     The patient must have completed an in-person or virtual visit within the past 12 months or has a future visit scheduled within the next 90 days with the authorizing provider s specialty.  Urgent care and e-visits do not qualify as an office visit for this protocol.          Failed - Normal serum potassium in past 12 months     Last Prescription Date:   12/20/2023  Last Fill Qty/Refills:         90, R-4    Last Office Visit:              12/20/23   Future Office visit:           none     Unable to complete prescription refill per RN Medication Refill Policy.   Desean Gavin RN on 1/15/2025 at 8:55 AM  Will route to PCP to review and approve, and scheduling for annual medicare visit.

## 2025-01-15 NOTE — TELEPHONE ENCOUNTER
Attempted to reach patient.  No answer and voicemail is not set up.  Anaya Alaniz on 1/15/2025 at 2:20 PM

## 2025-01-15 NOTE — TELEPHONE ENCOUNTER
Patient returned call and an appointment was made with Dr. Parker for 1.22.2025.  Anaya Alaniz on 1/15/2025 at 3:30 PM

## 2025-01-21 ENCOUNTER — ANCILLARY PROCEDURE (OUTPATIENT)
Dept: CARDIOLOGY | Facility: CLINIC | Age: 80
End: 2025-01-21
Attending: INTERNAL MEDICINE
Payer: MEDICARE

## 2025-01-21 DIAGNOSIS — I49.5 SICK SINUS SYNDROME (H): ICD-10-CM

## 2025-01-21 DIAGNOSIS — I48.21 PERMANENT ATRIAL FIBRILLATION (H): ICD-10-CM

## 2025-01-21 DIAGNOSIS — Z79.01 ANTICOAGULATION MONITORING, INR RANGE 2-3: ICD-10-CM

## 2025-01-21 PROCEDURE — 93296 REM INTERROG EVL PM/IDS: CPT

## 2025-01-22 ENCOUNTER — ANTICOAGULATION THERAPY VISIT (OUTPATIENT)
Dept: ANTICOAGULATION | Facility: OTHER | Age: 80
End: 2025-01-22
Attending: INTERNAL MEDICINE
Payer: MEDICARE

## 2025-01-22 ENCOUNTER — OFFICE VISIT (OUTPATIENT)
Dept: INTERNAL MEDICINE | Facility: OTHER | Age: 80
End: 2025-01-22
Attending: INTERNAL MEDICINE
Payer: MEDICARE

## 2025-01-22 VITALS
HEIGHT: 67 IN | TEMPERATURE: 98.4 F | SYSTOLIC BLOOD PRESSURE: 136 MMHG | WEIGHT: 202.3 LBS | RESPIRATION RATE: 16 BRPM | OXYGEN SATURATION: 93 % | HEART RATE: 74 BPM | DIASTOLIC BLOOD PRESSURE: 88 MMHG | BODY MASS INDEX: 31.75 KG/M2

## 2025-01-22 DIAGNOSIS — Z79.01 ANTICOAGULATION MONITORING, INR RANGE 2-3: ICD-10-CM

## 2025-01-22 DIAGNOSIS — E79.0 HYPERURICEMIA: ICD-10-CM

## 2025-01-22 DIAGNOSIS — Z71.85 VACCINE COUNSELING: ICD-10-CM

## 2025-01-22 DIAGNOSIS — I10 BENIGN ESSENTIAL HYPERTENSION: ICD-10-CM

## 2025-01-22 DIAGNOSIS — I50.32 CHRONIC HEART FAILURE WITH PRESERVED EJECTION FRACTION (H): ICD-10-CM

## 2025-01-22 DIAGNOSIS — Z79.01 LONG TERM CURRENT USE OF ANTICOAGULANT THERAPY: Primary | ICD-10-CM

## 2025-01-22 DIAGNOSIS — N18.31 STAGE 3A CHRONIC KIDNEY DISEASE (H): ICD-10-CM

## 2025-01-22 DIAGNOSIS — I48.21 HYPERCOAGULABLE STATE DUE TO PERMANENT ATRIAL FIBRILLATION (H): ICD-10-CM

## 2025-01-22 DIAGNOSIS — D68.69 HYPERCOAGULABLE STATE DUE TO PERMANENT ATRIAL FIBRILLATION (H): ICD-10-CM

## 2025-01-22 DIAGNOSIS — E78.2 MIXED HYPERLIPIDEMIA: ICD-10-CM

## 2025-01-22 DIAGNOSIS — Z00.00 MEDICARE ANNUAL WELLNESS VISIT, SUBSEQUENT: Primary | ICD-10-CM

## 2025-01-22 DIAGNOSIS — E66.811 CLASS 1 OBESITY DUE TO EXCESS CALORIES WITH SERIOUS COMORBIDITY AND BODY MASS INDEX (BMI) OF 31.0 TO 31.9 IN ADULT: ICD-10-CM

## 2025-01-22 DIAGNOSIS — Z95.0 CARDIAC PACEMAKER IN SITU: ICD-10-CM

## 2025-01-22 DIAGNOSIS — M05.79 RHEUMATOID ARTHRITIS INVOLVING MULTIPLE SITES WITH POSITIVE RHEUMATOID FACTOR (H): ICD-10-CM

## 2025-01-22 DIAGNOSIS — E66.09 CLASS 1 OBESITY DUE TO EXCESS CALORIES WITH SERIOUS COMORBIDITY AND BODY MASS INDEX (BMI) OF 31.0 TO 31.9 IN ADULT: ICD-10-CM

## 2025-01-22 LAB — INR POINT OF CARE: 2.3 (ref 0.9–1.1)

## 2025-01-22 PROCEDURE — G0008 ADMIN INFLUENZA VIRUS VAC: HCPCS

## 2025-01-22 PROCEDURE — G0463 HOSPITAL OUTPT CLINIC VISIT: HCPCS | Mod: 25

## 2025-01-22 PROCEDURE — 90480 ADMN SARSCOV2 VAC 1/ONLY CMP: CPT

## 2025-01-22 RX ORDER — WARFARIN SODIUM 4 MG/1
TABLET ORAL
Qty: 30 TABLET | Refills: 0 | OUTPATIENT
Start: 2025-01-22

## 2025-01-22 RX ORDER — SIMVASTATIN 40 MG
40 TABLET ORAL DAILY
Qty: 90 TABLET | Refills: 4 | Status: SHIPPED | OUTPATIENT
Start: 2025-01-22

## 2025-01-22 RX ORDER — WARFARIN SODIUM 4 MG/1
TABLET ORAL
Qty: 90 TABLET | Refills: 4 | Status: SHIPPED | OUTPATIENT
Start: 2025-01-22

## 2025-01-22 RX ORDER — ALLOPURINOL 100 MG/1
100 TABLET ORAL 2 TIMES DAILY
Qty: 180 TABLET | Refills: 4 | Status: SHIPPED | OUTPATIENT
Start: 2025-01-22

## 2025-01-22 RX ORDER — POTASSIUM CHLORIDE 1500 MG/1
20 TABLET, EXTENDED RELEASE ORAL DAILY
Qty: 90 TABLET | Refills: 4 | Status: SHIPPED | OUTPATIENT
Start: 2025-01-22

## 2025-01-22 RX ORDER — METOPROLOL SUCCINATE 100 MG/1
TABLET, EXTENDED RELEASE ORAL
Qty: 270 TABLET | Refills: 4 | Status: SHIPPED | OUTPATIENT
Start: 2025-01-22

## 2025-01-22 SDOH — HEALTH STABILITY: PHYSICAL HEALTH: ON AVERAGE, HOW MANY DAYS PER WEEK DO YOU ENGAGE IN MODERATE TO STRENUOUS EXERCISE (LIKE A BRISK WALK)?: 3 DAYS

## 2025-01-22 SDOH — HEALTH STABILITY: PHYSICAL HEALTH: ON AVERAGE, HOW MANY MINUTES DO YOU ENGAGE IN EXERCISE AT THIS LEVEL?: 10 MIN

## 2025-01-22 ASSESSMENT — ENCOUNTER SYMPTOMS
CONSTIPATION: 1
ARTHRALGIAS: 0
MYALGIAS: 0
WHEEZING: 0
LIGHT-HEADEDNESS: 0
DIARRHEA: 0
ABDOMINAL PAIN: 0
HEMATURIA: 0
DIZZINESS: 0
FEVER: 0
BRUISES/BLEEDS EASILY: 1
CHILLS: 0
CONFUSION: 0
WOUND: 0
NAUSEA: 0
DYSURIA: 0
AGITATION: 0
SHORTNESS OF BREATH: 0
VOMITING: 0
COUGH: 0

## 2025-01-22 ASSESSMENT — PAIN SCALES - GENERAL: PAINLEVEL_OUTOF10: NO PAIN (0)

## 2025-01-22 ASSESSMENT — SOCIAL DETERMINANTS OF HEALTH (SDOH): HOW OFTEN DO YOU GET TOGETHER WITH FRIENDS OR RELATIVES?: MORE THAN THREE TIMES A WEEK

## 2025-01-22 NOTE — PROGRESS NOTES
Assessment & Plan     ICD-10-CM    1. Medicare annual wellness visit, subsequent  Z00.00       2. Vaccine counseling  Z71.85       3. Chronic heart failure with preserved ejection fraction (H)  I50.32 potassium chloride sabino ER (KLOR-CON M20) 20 MEQ CR tablet      4. Benign essential hypertension  I10 metoprolol succinate ER (TOPROL XL) 100 MG 24 hr tablet     TSH with free T4 reflex     CBC with Platelets & Differential     Comprehensive metabolic panel      5. Cardiac pacemaker in situ -- [employer] ([email or web address])  Z95.0       6. Hypercoagulable state due to permanent atrial fibrillation (H)  D68.69 metoprolol succinate ER (TOPROL XL) 100 MG 24 hr tablet    I48.21 warfarin ANTICOAGULANT (COUMADIN) 4 MG tablet      7. Anticoagulation monitoring, INR range 2-3  Z79.01 warfarin ANTICOAGULANT (COUMADIN) 4 MG tablet      8. Mixed hyperlipidemia  E78.2 simvastatin (ZOCOR) 40 MG tablet     Lipid Profile      9. Rheumatoid arthritis involving multiple sites with positive rheumatoid factor (H)  M05.79       10. Stage 3a chronic kidney disease (H)  N18.31       11. Hyperuricemia  E79.0 allopurinol (ZYLOPRIM) 100 MG tablet     Uric acid      12. Class 1 obesity due to excess calories with serious comorbidity and body mass index (BMI) of 31.0 to 31.9 in adult  E66.811     E66.09     Z68.31          Patient presents for Medicare annual wellness visit as well as follow-up multiple issues.    Heart failure with preserved ejection fraction.  Continues with oral potassium replacement.  No significant swelling issues.  Doing well with his medications.  No changes for now. Refill sent to pharmacy.     Pacemaker - stable. Ongoing. No recent issues. Continues with Cardiology follow-up.     Rheumatoid arthritis - ongoing, no longer using injectable immunosuppression medication due to issues with insurance covering medication.  Continue stable at this time.  Continue close monitoring.    Hyperuricemia, no recent gout attacks.  Doing  well with allopurinol.  Continue current medications.    HYPERTENSION - Ongoing. Blood pressure is currently well controlled.  Medication side effects: None. Denies syncope or presyncope.  Continue current medications.   Medication list reviewed/updated. Refills completed as needed.      MIXED HYPERLIPIDEMIA.  Ongoing. LDL is at goal: Yes. Triglycerides are at goal: No.  Hopefully lifestyle modifications will improve cholesterol levels, otherwise will consider additional medication dose adjustments or medication changes.  Medication side effects reported: None.   Continue current medications for now. Medication list reviewed/updated. Refills completed as needed.  Recent Labs   Lab Test 11/23/22  1410 06/29/21  0925   CHOL 157 131   HDL 48 39   LDL 74 63   TRIG 174* 144        OBESITY - Ongoing.  (See Encounter Diagnosis list above for obesity class / severity).  - Encourage continued maintenance / improvement in diet and exercise.   - Consider Nutrition / Dietician appointment.  - Weight loss would improve Hypertension, Cholesterol.      Atrial Fibrillation - Type: Permanent Atrial Fibrillation, chronic, ongoing.  + Hypercoagulable state due to atrial fibrillation.  Continues with warfarin (COUMADIN) for oral anticoagulation.  Heart rates are controlled with metoprolol.  Tolerating well.  Denies excessive bleeding issues.  Easy bruising. Medication list reviewed/updated. Refills completed as needed.     Chronic Kidney Disease, Stage 3a (GFR 45-59), chronic, ongoing.  Kidney function had been slowly declining.  Encourage NSAID avoidance.    Recent Labs   Lab Test 11/23/22 1410 06/29/21  0925   CR 1.22* 1.33*   GFRESTIMATED 60* 52*        Vaccine counseling completed.  Encourage routine / annual vaccinations.    The longitudinal plan of care for the diagnosis(es)/condition(s) as documented were addressed during this visit. Due to the added complexity in care, I will continue to support Kg in the subsequent  "management and with ongoing continuity of care.             BMI  Estimated body mass index is 31.68 kg/m  as calculated from the following:    Height as of this encounter: 1.702 m (5' 7\").    Weight as of this encounter: 91.8 kg (202 lb 4.8 oz).     Counseling  Appropriate preventive services were addressed with this patient via screening, questionnaire, or discussion as appropriate for fall prevention, nutrition, physical activity, Tobacco-use cessation, social engagement, weight loss and cognition.  Checklist reviewing preventive services available has been given to the patient.  Reviewed patient's diet, addressing concerns and/or questions.   He is at risk for lack of exercise and has been provided with information to increase physical activity for the benefit of his well-being.   The patient was instructed to see the dentist every 6 months.         Return in about 1 year (around 1/22/2026) for Annual Medicare Wellness Visit.    Review of Systems   Constitutional:  Negative for chills and fever.   HENT:  Negative for congestion and hearing loss.    Eyes:  Negative for visual disturbance.   Respiratory:  Negative for cough, shortness of breath and wheezing.    Cardiovascular:  Negative for chest pain.   Gastrointestinal:  Positive for constipation (Intermittent - improved with prune juice). Negative for abdominal pain, diarrhea, nausea and vomiting.   Endocrine: Negative for cold intolerance and heat intolerance.   Genitourinary:  Negative for dysuria and hematuria.        Nocturia - Average of about 2x nightly.    Musculoskeletal:  Negative for arthralgias and myalgias.   Skin:  Negative for rash and wound.   Allergic/Immunologic: Negative for immunocompromised state.   Neurological:  Negative for dizziness and light-headedness.   Hematological:  Bruises/bleeds easily.   Psychiatric/Behavioral:  Negative for agitation and confusion.        Preventive Care Visit  [facility] Keshav, " MD, Internal Medicine  Jan 22, 2025      Shellie Saul is a 79 year old, presenting for the following:  Medicare Visit        1/22/2025     8:51 AM   Additional Questions   Roomed by Eddie GARCIA LPN           History of Present Illness       Reason for visit:  Annual Wellness   He is taking medications regularly.          Health Care Directive  Patient does not have a Health Care Directive: Discussed advance care planning with patient; however, patient declined at this time.      1/22/2025   General Health   How would you rate your overall physical health? Good   Feel stress (tense, anxious, or unable to sleep) Not at all         1/22/2025   Nutrition   Diet: I don't know         1/22/2025   Exercise   Days per week of moderate/strenous exercise 3 days   Average minutes spent exercising at this level 10 min         1/22/2025   Social Factors   Frequency of gathering with friends or relatives More than three times a week   Worry food won't last until get money to buy more No   Food not last or not have enough money for food? No   Do you have housing? (Housing is defined as stable permanent housing and does not include staying ouside in a car, in a tent, in an abandoned building, in an overnight shelter, or couch-surfing.) Yes   Are you worried about losing your housing? No   Lack of transportation? No   Unable to get utilities (heat,electricity)? No         1/22/2025   Fall Risk   Fallen 2 or more times in the past year? No   Trouble with walking or balance? No          1/22/2025   Activities of Daily Living- Home Safety   Needs help with the following daily activites None of the above   Safety concerns in the home None of the above         1/22/2025   Dental   Dentist two times every year? (!) NO         1/22/2025   Hearing Screening   Hearing concerns? None of the above         1/22/2025   Driving Risk Screening   Patient/family members have concerns about driving No         1/22/2025   General  Alertness/Fatigue Screening   Have you been more tired than usual lately? No         2025   Urinary Incontinence Screening   Bothered by leaking urine in past 6 months No         2025   TB Screening   Were you born outside of the [place]? No         Today's PHQ-2 Score:       2025     9:11 AM   PHQ-2 (  [employer])   Q1: Little interest or pleasure in doing things 0    Q2: Feeling down, depressed or hopeless 0    PHQ-2 Score 0    Q1: Little interest or pleasure in doing things Not at all   Q2: Feeling down, depressed or hopeless Not at all   PHQ-2 Score 0       Proxy-reported           2025   Substance Use   Alcohol more than 3/day or more than 7/wk No   Do you have a current opioid prescription? No   How severe/bad is pain from 1 to 10? 0/10 (No Pain)   Do you use any other substances recreationally? No     Social History     Tobacco Use    Smoking status: Former     Current packs/day: 0.00     Average packs/day: 1 pack/day for 20.0 years (20.0 ttl pk-yrs)     Types: Cigarettes     Start date:      Quit date: 1980     Years since quittin.0    Smokeless tobacco: Former     Types: Chew     Quit date: 1980   Vaping Use    Vaping status: Never Used   Substance Use Topics    Alcohol use: Yes     Comment: Maybe 1  beer per month    Drug use: Never                 Reviewed and updated as needed this visit by Provider   Tobacco  Allergies  Meds  Problems  Med Hx  Surg Hx  Fam Hx     Sexual Activity            Current providers sharing in care for this patient include:  Patient Care Team:  Caleb Parker MD as PCP - General (Internal Medicine)  Caleb Parker MD as Assigned PCP    The following health maintenance items are reviewed in Epic and correct as of today:  Health Maintenance   Topic Date Due    ZOSTER IMMUNIZATION (1 of 2) Never done    DTAP/TDAP/TD IMMUNIZATION (1 - Tdap) 1995    RSV VACCINE (1 - 1-dose 75+ series) Never done    URIC ACID  2018    HF ACTION  "PLAN  07/03/2021    BMP  05/23/2023    ALT  11/23/2023    LIPID  11/23/2023    MICROALBUMIN  11/23/2023    CBC  11/23/2023    HEMOGLOBIN  11/23/2023    MEDICARE ANNUAL WELLNESS VISIT  01/22/2026    FALL RISK ASSESSMENT  01/22/2026    ADVANCE CARE PLANNING  01/22/2030    TSH W/FREE T4 REFLEX  Completed    PHQ-2 (once per calendar year)  Completed    INFLUENZA VACCINE  Completed    Pneumococcal Vaccine: 50+ Years  Completed    URINALYSIS  Completed    COVID-19 Vaccine  Completed    HPV IMMUNIZATION  Aged Out    MENINGITIS IMMUNIZATION  Aged Out    RSV MONOCLONAL ANTIBODY  Aged Out            Objective    Exam  /88   Pulse 74   Temp 98.4  F (36.9  C)   Resp 16   Ht 1.702 m (5' 7\")   Wt 91.8 kg (202 lb 4.8 oz)   SpO2 93%   BMI 31.68 kg/m     Estimated body mass index is 31.68 kg/m  as calculated from the following:    Height as of this encounter: 1.702 m (5' 7\").    Weight as of this encounter: 91.8 kg (202 lb 4.8 oz).    Physical Exam  Constitutional:       General: He is not in acute distress.     Appearance: Normal appearance. He is well-developed. He is obese. He is not diaphoretic.   Eyes:      General: No scleral icterus.     Conjunctiva/sclera: Conjunctivae normal.   Neck:      Vascular: No carotid bruit.   Cardiovascular:      Rate and Rhythm: Normal rate and regular rhythm.      Pulses: Normal pulses.   Pulmonary:      Effort: Pulmonary effort is normal.      Breath sounds: Normal breath sounds.   Abdominal:      Palpations: Abdomen is soft.      Tenderness: There is no abdominal tenderness.   Musculoskeletal:         General: No deformity.      Cervical back: Neck supple.      Right lower leg: No edema.      Left lower leg: No edema.   Skin:     General: Skin is warm and dry.      Findings: No bruising or rash.      Comments: Right chest wall with pacemaker generator   Neurological:      Mental Status: He is alert and oriented to person, place, and time. Mental status is at baseline.      " Comments: + Bilateral hearing loss.    Psychiatric:         Mood and Affect: Mood normal.         Behavior: Behavior normal.               1/22/2025   Mini Cog   Clock Draw Score 2 Normal   3 Item Recall 2 objects recalled   Mini Cog Total Score 4              Signed Electronically by: Caleb Parker MD

## 2025-01-22 NOTE — NURSING NOTE
"Chief Complaint   Patient presents with    Medicare Visit       Initial /88   Pulse 74   Temp 98.4  F (36.9  C)   Resp 16   Ht 1.702 m (5' 7\")   Wt 91.8 kg (202 lb 4.8 oz)   SpO2 93%   BMI 31.68 kg/m   Estimated body mass index is 31.68 kg/m  as calculated from the following:    Height as of this encounter: 1.702 m (5' 7\").    Weight as of this encounter: 91.8 kg (202 lb 4.8 oz).  Medication Review: complete    The next two questions are to help us understand your food security.  If you are feeling you need any assistance in this area, we have resources available to support you today.          1/22/2025   SDOH- Food Insecurity   Within the past 12 months, did you worry that your food would run out before you got money to buy more? N   Within the past 12 months, did the food you bought just not last and you didn t have money to get more? N         Health Care Directive:  Patient does not have a Health Care Directive: Discussed advance care planning with patient; however, patient declined at this time.    Isamar Stiles LPN      "

## 2025-01-22 NOTE — PATIENT INSTRUCTIONS
Blood pressure is well controlled.     Medications refilled.     --> Labs only appointment -- in about 2 weeks.         -- Flu shot and COVID shot 1/22/2025 -- given in clinic today.         Return in approximately 1 year, or sooner as needed for follow-up with Dr. Parker.  - Annual Follow-up / Physical - Medicare Annual Wellness Visit     Clinic : [phone]  Appointment line: [phone]

## 2025-01-22 NOTE — PROGRESS NOTES
ANTICOAGULATION MANAGEMENT     Kg Ferraro 79 year old male is on warfarin with therapeutic INR result. (Goal INR 2.0-3.0)    Recent labs: (last 7 days)     01/22/25  1006   INR 2.3*       ASSESSMENT     Source(s): Chart Review and In person      Warfarin doses taken: Warfarin taken as instructed  Diet: No new diet changes identified  New illness, injury, or hospitalization: No  Medication/supplement changes: None noted  Signs or symptoms of bleeding or clotting: No  Previous INR: Therapeutic last 2(+) visits  Additional findings:  got flu and covid vaccines today, patient and wife advised to get INR check sooner if patient has an immune response (fever) to vaccines       PLAN     Recommended plan for no diet, medication or health factor changes affecting INR     Dosing Instructions: Continue your current warfarin dose with next INR in 6 weeks       Summary  As of 1/22/2025      Full warfarin instructions:  4 mg every day   Next INR check:  3/5/2025               In person    Lab visit scheduled    Education provided: Please call back if any changes to your diet, medications or how you've been taking warfarin    Plan made per [facility] anticoagulation protocol    Lillian Mueller RN  Anticoagulation Clinic  1/22/2025    _______________________________________________________________________     Anticoagulation Episode Summary       Current INR goal:  2.0-3.0   TTR:  80.7% (1 y)   Target end date:  Indefinite   Send INR reminders to:  [facility]    Indications    Long-term (current) use of anticoagulants [Z79.01] [Z79.01]  Paroxysmal supraventricular tachycardia (Resolved) [I47.10]             Comments:  --             Anticoagulation Care Providers       Provider Role Specialty Phone number    Caleb Parker MD Referring Internal Medicine [phone]

## 2025-02-05 LAB
MDC_IDC_EPISODE_DTM: NORMAL
MDC_IDC_EPISODE_DURATION: 0 S
MDC_IDC_EPISODE_DURATION: 1 S
MDC_IDC_EPISODE_DURATION: 2 S
MDC_IDC_EPISODE_DURATION: 3 S
MDC_IDC_EPISODE_ID: NORMAL
MDC_IDC_EPISODE_TYPE: NORMAL
MDC_IDC_LEAD_CONNECTION_STATUS: NORMAL
MDC_IDC_LEAD_CONNECTION_STATUS: NORMAL
MDC_IDC_LEAD_IMPLANT_DT: NORMAL
MDC_IDC_LEAD_IMPLANT_DT: NORMAL
MDC_IDC_LEAD_LOCATION: NORMAL
MDC_IDC_LEAD_LOCATION: NORMAL
MDC_IDC_LEAD_LOCATION_DETAIL_1: NORMAL
MDC_IDC_LEAD_LOCATION_DETAIL_1: NORMAL
MDC_IDC_LEAD_MFG: NORMAL
MDC_IDC_LEAD_MFG: NORMAL
MDC_IDC_LEAD_MODEL: NORMAL
MDC_IDC_LEAD_MODEL: NORMAL
MDC_IDC_LEAD_POLARITY_TYPE: NORMAL
MDC_IDC_LEAD_POLARITY_TYPE: NORMAL
MDC_IDC_LEAD_SERIAL: NORMAL
MDC_IDC_LEAD_SERIAL: NORMAL
MDC_IDC_LEAD_SPECIAL_FUNCTION: NORMAL
MDC_IDC_LEAD_SPECIAL_FUNCTION: NORMAL
MDC_IDC_MSMT_BATTERY_DTM: NORMAL
MDC_IDC_MSMT_BATTERY_REMAINING_LONGEVITY: 61 MO
MDC_IDC_MSMT_BATTERY_RRT_TRIGGER: 2.62
MDC_IDC_MSMT_BATTERY_STATUS: NORMAL
MDC_IDC_MSMT_BATTERY_VOLTAGE: 2.97 V
MDC_IDC_MSMT_LEADCHNL_RA_IMPEDANCE_VALUE: 380 OHM
MDC_IDC_MSMT_LEADCHNL_RA_IMPEDANCE_VALUE: 475 OHM
MDC_IDC_MSMT_LEADCHNL_RV_IMPEDANCE_VALUE: 608 OHM
MDC_IDC_MSMT_LEADCHNL_RV_IMPEDANCE_VALUE: 627 OHM
MDC_IDC_MSMT_LEADCHNL_RV_PACING_THRESHOLD_AMPLITUDE: 1 V
MDC_IDC_MSMT_LEADCHNL_RV_PACING_THRESHOLD_PULSEWIDTH: 0.4 MS
MDC_IDC_MSMT_LEADCHNL_RV_SENSING_INTR_AMPL: 7.5 MV
MDC_IDC_PG_IMPLANT_DTM: NORMAL
MDC_IDC_PG_MFG: NORMAL
MDC_IDC_PG_MODEL: NORMAL
MDC_IDC_PG_SERIAL: NORMAL
MDC_IDC_PG_TYPE: NORMAL
MDC_IDC_SESS_CLINIC_NAME: NORMAL
MDC_IDC_SESS_DTM: NORMAL
MDC_IDC_SESS_TYPE: NORMAL
MDC_IDC_SET_BRADY_HYSTRATE: NORMAL
MDC_IDC_SET_BRADY_LOWRATE: 60 {BEATS}/MIN
MDC_IDC_SET_BRADY_MAX_SENSOR_RATE: 130 {BEATS}/MIN
MDC_IDC_SET_BRADY_MODE: NORMAL
MDC_IDC_SET_LEADCHNL_RA_SENSING_ANODE_ELECTRODE_1: NORMAL
MDC_IDC_SET_LEADCHNL_RA_SENSING_ANODE_LOCATION_1: NORMAL
MDC_IDC_SET_LEADCHNL_RA_SENSING_CATHODE_ELECTRODE_1: NORMAL
MDC_IDC_SET_LEADCHNL_RA_SENSING_CATHODE_LOCATION_1: NORMAL
MDC_IDC_SET_LEADCHNL_RA_SENSING_POLARITY: NORMAL
MDC_IDC_SET_LEADCHNL_RA_SENSING_SENSITIVITY: NORMAL
MDC_IDC_SET_LEADCHNL_RV_PACING_AMPLITUDE: 3 V
MDC_IDC_SET_LEADCHNL_RV_PACING_ANODE_ELECTRODE_1: NORMAL
MDC_IDC_SET_LEADCHNL_RV_PACING_ANODE_LOCATION_1: NORMAL
MDC_IDC_SET_LEADCHNL_RV_PACING_CAPTURE_MODE: NORMAL
MDC_IDC_SET_LEADCHNL_RV_PACING_CATHODE_ELECTRODE_1: NORMAL
MDC_IDC_SET_LEADCHNL_RV_PACING_CATHODE_LOCATION_1: NORMAL
MDC_IDC_SET_LEADCHNL_RV_PACING_POLARITY: NORMAL
MDC_IDC_SET_LEADCHNL_RV_PACING_PULSEWIDTH: 1 MS
MDC_IDC_SET_LEADCHNL_RV_SENSING_ANODE_ELECTRODE_1: NORMAL
MDC_IDC_SET_LEADCHNL_RV_SENSING_ANODE_LOCATION_1: NORMAL
MDC_IDC_SET_LEADCHNL_RV_SENSING_CATHODE_ELECTRODE_1: NORMAL
MDC_IDC_SET_LEADCHNL_RV_SENSING_CATHODE_LOCATION_1: NORMAL
MDC_IDC_SET_LEADCHNL_RV_SENSING_POLARITY: NORMAL
MDC_IDC_SET_LEADCHNL_RV_SENSING_SENSITIVITY: 0.9 MV
MDC_IDC_SET_ZONE_DETECTION_INTERVAL: 400 MS
MDC_IDC_SET_ZONE_STATUS: NORMAL
MDC_IDC_SET_ZONE_TYPE: NORMAL
MDC_IDC_SET_ZONE_VENDOR_TYPE: NORMAL
MDC_IDC_STAT_BRADY_AP_VP_PERCENT: 0 %
MDC_IDC_STAT_BRADY_AP_VS_PERCENT: 0 %
MDC_IDC_STAT_BRADY_AS_VP_PERCENT: 89.56 %
MDC_IDC_STAT_BRADY_AS_VS_PERCENT: 10.44 %
MDC_IDC_STAT_BRADY_DTM_END: NORMAL
MDC_IDC_STAT_BRADY_DTM_START: NORMAL
MDC_IDC_STAT_BRADY_RA_PERCENT_PACED: 0 %
MDC_IDC_STAT_BRADY_RV_PERCENT_PACED: 89.56 %
MDC_IDC_STAT_EPISODE_RECENT_COUNT: 0
MDC_IDC_STAT_EPISODE_RECENT_COUNT: 0
MDC_IDC_STAT_EPISODE_RECENT_COUNT: 14
MDC_IDC_STAT_EPISODE_RECENT_COUNT_DTM_END: NORMAL
MDC_IDC_STAT_EPISODE_RECENT_COUNT_DTM_START: NORMAL
MDC_IDC_STAT_EPISODE_TOTAL_COUNT: 0
MDC_IDC_STAT_EPISODE_TOTAL_COUNT: 19
MDC_IDC_STAT_EPISODE_TOTAL_COUNT: NORMAL
MDC_IDC_STAT_EPISODE_TOTAL_COUNT_DTM_END: NORMAL
MDC_IDC_STAT_EPISODE_TOTAL_COUNT_DTM_START: NORMAL
MDC_IDC_STAT_EPISODE_TYPE: NORMAL
MDC_IDC_STAT_TACHYTHERAPY_RECENT_DTM_END: NORMAL
MDC_IDC_STAT_TACHYTHERAPY_RECENT_DTM_START: NORMAL
MDC_IDC_STAT_TACHYTHERAPY_TOTAL_DTM_END: NORMAL
MDC_IDC_STAT_TACHYTHERAPY_TOTAL_DTM_START: NORMAL

## 2025-02-25 ENCOUNTER — ANTICOAGULATION THERAPY VISIT (OUTPATIENT)
Dept: ANTICOAGULATION | Facility: OTHER | Age: 80
End: 2025-02-25
Attending: INTERNAL MEDICINE
Payer: MEDICARE

## 2025-02-25 DIAGNOSIS — Z79.01 LONG TERM CURRENT USE OF ANTICOAGULANT THERAPY: Primary | ICD-10-CM

## 2025-02-25 LAB — INR POINT OF CARE: 2.7 (ref 0.9–1.1)

## 2025-02-25 PROCEDURE — 36416 COLLJ CAPILLARY BLOOD SPEC: CPT | Mod: ZL

## 2025-02-25 PROCEDURE — 85610 PROTHROMBIN TIME: CPT | Mod: ZL,QW

## 2025-02-25 NOTE — PROGRESS NOTES
ANTICOAGULATION MANAGEMENT     Kg Ferraro 79 year old male is on warfarin with therapeutic INR result. (Goal INR 2.0-3.0)    Recent labs: (last 7 days)     02/25/25  1214   INR 2.7*       ASSESSMENT     Source(s): Chart Review and In person      Warfarin doses taken: Warfarin taken as instructed  Diet: No new diet changes identified  New illness, injury, or hospitalization: No  Medication/supplement changes: None noted  Signs or symptoms of bleeding or clotting: No  Previous INR: Therapeutic last 2(+) visits  Additional findings: None       PLAN     Recommended plan for no diet, medication or health factor changes affecting INR     Dosing Instructions: Continue your current warfarin dose with next INR in 6 weeks       Summary  As of 2/25/2025      Full warfarin instructions:  4 mg every day   Next INR check:  4/8/2025               In person    Patient offered & declined to schedule next visit    Education provided: Please call back if any changes to your diet, medications or how you've been taking warfarin    Plan made per [facility] anticoagulation protocol    Lillian Mueller RN  Anticoagulation Clinic  2/25/2025    _______________________________________________________________________     Anticoagulation Episode Summary       Current INR goal:  2.0-3.0   TTR:  80.7% (1 y)   Target end date:  Indefinite   Send INR reminders to:  [facility]    Indications    Long-term (current) use of anticoagulants [Z79.01] [Z79.01]  Paroxysmal supraventricular tachycardia (Resolved) [I47.10]             Comments:  --             Anticoagulation Care Providers       Provider Role Specialty Phone number    Caleb Parker MD Referring Internal Medicine [phone]

## 2025-04-22 ENCOUNTER — ANTICOAGULATION THERAPY VISIT (OUTPATIENT)
Dept: ANTICOAGULATION | Facility: OTHER | Age: 80
End: 2025-04-22
Attending: INTERNAL MEDICINE
Payer: MEDICARE

## 2025-04-22 DIAGNOSIS — Z79.01 LONG TERM CURRENT USE OF ANTICOAGULANT THERAPY: Primary | ICD-10-CM

## 2025-04-22 LAB — INR POINT OF CARE: 1.6 (ref 0.9–1.1)

## 2025-04-22 PROCEDURE — 85610 PROTHROMBIN TIME: CPT | Mod: ZL,QW

## 2025-04-23 ENCOUNTER — ANCILLARY PROCEDURE (OUTPATIENT)
Dept: CARDIOLOGY | Facility: CLINIC | Age: 80
End: 2025-04-23
Attending: INTERNAL MEDICINE
Payer: MEDICARE

## 2025-04-23 DIAGNOSIS — I49.5 SICK SINUS SYNDROME (H): ICD-10-CM

## 2025-04-23 PROCEDURE — 93294 REM INTERROG EVL PM/LDLS PM: CPT | Performed by: INTERNAL MEDICINE

## 2025-04-23 PROCEDURE — 93296 REM INTERROG EVL PM/IDS: CPT

## 2025-04-27 LAB
MDC_IDC_EPISODE_DTM: NORMAL
MDC_IDC_EPISODE_DTM: NORMAL
MDC_IDC_EPISODE_DURATION: 0 S
MDC_IDC_EPISODE_DURATION: 2 S
MDC_IDC_EPISODE_ID: NORMAL
MDC_IDC_EPISODE_ID: NORMAL
MDC_IDC_EPISODE_TYPE: NORMAL
MDC_IDC_EPISODE_TYPE: NORMAL
MDC_IDC_LEAD_CONNECTION_STATUS: NORMAL
MDC_IDC_LEAD_CONNECTION_STATUS: NORMAL
MDC_IDC_LEAD_IMPLANT_DT: NORMAL
MDC_IDC_LEAD_IMPLANT_DT: NORMAL
MDC_IDC_LEAD_LOCATION: NORMAL
MDC_IDC_LEAD_LOCATION: NORMAL
MDC_IDC_LEAD_LOCATION_DETAIL_1: NORMAL
MDC_IDC_LEAD_LOCATION_DETAIL_1: NORMAL
MDC_IDC_LEAD_MFG: NORMAL
MDC_IDC_LEAD_MFG: NORMAL
MDC_IDC_LEAD_MODEL: NORMAL
MDC_IDC_LEAD_MODEL: NORMAL
MDC_IDC_LEAD_POLARITY_TYPE: NORMAL
MDC_IDC_LEAD_POLARITY_TYPE: NORMAL
MDC_IDC_LEAD_SERIAL: NORMAL
MDC_IDC_LEAD_SERIAL: NORMAL
MDC_IDC_LEAD_SPECIAL_FUNCTION: NORMAL
MDC_IDC_LEAD_SPECIAL_FUNCTION: NORMAL
MDC_IDC_MSMT_BATTERY_DTM: NORMAL
MDC_IDC_MSMT_BATTERY_REMAINING_LONGEVITY: 60 MO
MDC_IDC_MSMT_BATTERY_RRT_TRIGGER: 2.62
MDC_IDC_MSMT_BATTERY_STATUS: NORMAL
MDC_IDC_MSMT_BATTERY_VOLTAGE: 2.97 V
MDC_IDC_MSMT_LEADCHNL_RA_IMPEDANCE_VALUE: 380 OHM
MDC_IDC_MSMT_LEADCHNL_RA_IMPEDANCE_VALUE: 475 OHM
MDC_IDC_MSMT_LEADCHNL_RV_IMPEDANCE_VALUE: 570 OHM
MDC_IDC_MSMT_LEADCHNL_RV_IMPEDANCE_VALUE: 570 OHM
MDC_IDC_MSMT_LEADCHNL_RV_PACING_THRESHOLD_AMPLITUDE: 0.75 V
MDC_IDC_MSMT_LEADCHNL_RV_PACING_THRESHOLD_PULSEWIDTH: 0.4 MS
MDC_IDC_MSMT_LEADCHNL_RV_SENSING_INTR_AMPL: 8.62 MV
MDC_IDC_PG_IMPLANT_DTM: NORMAL
MDC_IDC_PG_MFG: NORMAL
MDC_IDC_PG_MODEL: NORMAL
MDC_IDC_PG_SERIAL: NORMAL
MDC_IDC_PG_TYPE: NORMAL
MDC_IDC_SESS_CLINIC_NAME: NORMAL
MDC_IDC_SESS_DTM: NORMAL
MDC_IDC_SESS_TYPE: NORMAL
MDC_IDC_SET_BRADY_HYSTRATE: NORMAL
MDC_IDC_SET_BRADY_LOWRATE: 60 {BEATS}/MIN
MDC_IDC_SET_BRADY_MAX_SENSOR_RATE: 130 {BEATS}/MIN
MDC_IDC_SET_BRADY_MODE: NORMAL
MDC_IDC_SET_LEADCHNL_RA_SENSING_ANODE_ELECTRODE_1: NORMAL
MDC_IDC_SET_LEADCHNL_RA_SENSING_ANODE_LOCATION_1: NORMAL
MDC_IDC_SET_LEADCHNL_RA_SENSING_CATHODE_ELECTRODE_1: NORMAL
MDC_IDC_SET_LEADCHNL_RA_SENSING_CATHODE_LOCATION_1: NORMAL
MDC_IDC_SET_LEADCHNL_RA_SENSING_POLARITY: NORMAL
MDC_IDC_SET_LEADCHNL_RA_SENSING_SENSITIVITY: NORMAL
MDC_IDC_SET_LEADCHNL_RV_PACING_AMPLITUDE: 3 V
MDC_IDC_SET_LEADCHNL_RV_PACING_ANODE_ELECTRODE_1: NORMAL
MDC_IDC_SET_LEADCHNL_RV_PACING_ANODE_LOCATION_1: NORMAL
MDC_IDC_SET_LEADCHNL_RV_PACING_CAPTURE_MODE: NORMAL
MDC_IDC_SET_LEADCHNL_RV_PACING_CATHODE_ELECTRODE_1: NORMAL
MDC_IDC_SET_LEADCHNL_RV_PACING_CATHODE_LOCATION_1: NORMAL
MDC_IDC_SET_LEADCHNL_RV_PACING_POLARITY: NORMAL
MDC_IDC_SET_LEADCHNL_RV_PACING_PULSEWIDTH: 1 MS
MDC_IDC_SET_LEADCHNL_RV_SENSING_ANODE_ELECTRODE_1: NORMAL
MDC_IDC_SET_LEADCHNL_RV_SENSING_ANODE_LOCATION_1: NORMAL
MDC_IDC_SET_LEADCHNL_RV_SENSING_CATHODE_ELECTRODE_1: NORMAL
MDC_IDC_SET_LEADCHNL_RV_SENSING_CATHODE_LOCATION_1: NORMAL
MDC_IDC_SET_LEADCHNL_RV_SENSING_POLARITY: NORMAL
MDC_IDC_SET_LEADCHNL_RV_SENSING_SENSITIVITY: 0.9 MV
MDC_IDC_SET_ZONE_DETECTION_INTERVAL: 400 MS
MDC_IDC_SET_ZONE_STATUS: NORMAL
MDC_IDC_SET_ZONE_TYPE: NORMAL
MDC_IDC_SET_ZONE_VENDOR_TYPE: NORMAL
MDC_IDC_STAT_BRADY_AP_VP_PERCENT: 0 %
MDC_IDC_STAT_BRADY_AP_VS_PERCENT: 0 %
MDC_IDC_STAT_BRADY_AS_VP_PERCENT: 82.5 %
MDC_IDC_STAT_BRADY_AS_VS_PERCENT: 17.5 %
MDC_IDC_STAT_BRADY_DTM_END: NORMAL
MDC_IDC_STAT_BRADY_DTM_START: NORMAL
MDC_IDC_STAT_BRADY_RA_PERCENT_PACED: 0 %
MDC_IDC_STAT_BRADY_RV_PERCENT_PACED: 82.5 %
MDC_IDC_STAT_EPISODE_RECENT_COUNT: 0
MDC_IDC_STAT_EPISODE_RECENT_COUNT: 0
MDC_IDC_STAT_EPISODE_RECENT_COUNT: 2
MDC_IDC_STAT_EPISODE_RECENT_COUNT_DTM_END: NORMAL
MDC_IDC_STAT_EPISODE_RECENT_COUNT_DTM_START: NORMAL
MDC_IDC_STAT_EPISODE_TOTAL_COUNT: 0
MDC_IDC_STAT_EPISODE_TOTAL_COUNT: 21
MDC_IDC_STAT_EPISODE_TOTAL_COUNT: NORMAL
MDC_IDC_STAT_EPISODE_TOTAL_COUNT_DTM_END: NORMAL
MDC_IDC_STAT_EPISODE_TOTAL_COUNT_DTM_START: NORMAL
MDC_IDC_STAT_EPISODE_TYPE: NORMAL
MDC_IDC_STAT_TACHYTHERAPY_RECENT_DTM_END: NORMAL
MDC_IDC_STAT_TACHYTHERAPY_RECENT_DTM_START: NORMAL
MDC_IDC_STAT_TACHYTHERAPY_TOTAL_DTM_END: NORMAL
MDC_IDC_STAT_TACHYTHERAPY_TOTAL_DTM_START: NORMAL

## 2025-05-20 ENCOUNTER — ANTICOAGULATION THERAPY VISIT (OUTPATIENT)
Dept: ANTICOAGULATION | Facility: OTHER | Age: 80
End: 2025-05-20
Attending: INTERNAL MEDICINE
Payer: MEDICARE

## 2025-05-20 DIAGNOSIS — Z79.01 LONG TERM CURRENT USE OF ANTICOAGULANT THERAPY: Primary | ICD-10-CM

## 2025-05-20 LAB — INR POINT OF CARE: 1.5 (ref 0.9–1.1)

## 2025-05-20 PROCEDURE — 85610 PROTHROMBIN TIME: CPT | Mod: ZL,QW
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
## 2025-07-24 ENCOUNTER — ANCILLARY PROCEDURE (OUTPATIENT)
Dept: CARDIOLOGY | Facility: CLINIC | Age: 80
End: 2025-07-24
Attending: INTERNAL MEDICINE
Payer: MEDICARE

## 2025-07-24 DIAGNOSIS — I49.5 SICK SINUS SYNDROME (H): ICD-10-CM

## 2025-07-24 PROCEDURE — 93294 REM INTERROG EVL PM/LDLS PM: CPT | Performed by: INTERNAL MEDICINE

## 2025-07-24 PROCEDURE — 93296 REM INTERROG EVL PM/IDS: CPT

## 2025-07-28 LAB
MDC_IDC_LEAD_CONNECTION_STATUS: NORMAL
MDC_IDC_LEAD_CONNECTION_STATUS: NORMAL
MDC_IDC_LEAD_IMPLANT_DT: NORMAL
MDC_IDC_LEAD_IMPLANT_DT: NORMAL
MDC_IDC_LEAD_LOCATION: NORMAL
MDC_IDC_LEAD_LOCATION: NORMAL
MDC_IDC_LEAD_LOCATION_DETAIL_1: NORMAL
MDC_IDC_LEAD_LOCATION_DETAIL_1: NORMAL
MDC_IDC_LEAD_MFG: NORMAL
MDC_IDC_LEAD_MFG: NORMAL
MDC_IDC_LEAD_MODEL: NORMAL
MDC_IDC_LEAD_MODEL: NORMAL
MDC_IDC_LEAD_POLARITY_TYPE: NORMAL
MDC_IDC_LEAD_POLARITY_TYPE: NORMAL
MDC_IDC_LEAD_SERIAL: NORMAL
MDC_IDC_LEAD_SERIAL: NORMAL
MDC_IDC_LEAD_SPECIAL_FUNCTION: NORMAL
MDC_IDC_LEAD_SPECIAL_FUNCTION: NORMAL
MDC_IDC_MSMT_BATTERY_DTM: NORMAL
MDC_IDC_MSMT_BATTERY_REMAINING_LONGEVITY: 49 MO
MDC_IDC_MSMT_BATTERY_RRT_TRIGGER: 2.62
MDC_IDC_MSMT_BATTERY_STATUS: NORMAL
MDC_IDC_MSMT_BATTERY_VOLTAGE: 2.96 V
MDC_IDC_MSMT_LEADCHNL_RA_IMPEDANCE_VALUE: 380 OHM
MDC_IDC_MSMT_LEADCHNL_RA_IMPEDANCE_VALUE: 475 OHM
MDC_IDC_MSMT_LEADCHNL_RA_SENSING_INTR_AMPL: 0.38 MV
MDC_IDC_MSMT_LEADCHNL_RA_SENSING_INTR_AMPL: 2 MV
MDC_IDC_MSMT_LEADCHNL_RV_IMPEDANCE_VALUE: 551 OHM
MDC_IDC_MSMT_LEADCHNL_RV_IMPEDANCE_VALUE: 570 OHM
MDC_IDC_MSMT_LEADCHNL_RV_PACING_THRESHOLD_AMPLITUDE: 0.88 V
MDC_IDC_MSMT_LEADCHNL_RV_PACING_THRESHOLD_PULSEWIDTH: 0.4 MS
MDC_IDC_MSMT_LEADCHNL_RV_SENSING_INTR_AMPL: 7.88 MV
MDC_IDC_MSMT_LEADCHNL_RV_SENSING_INTR_AMPL: 7.88 MV
MDC_IDC_PG_IMPLANT_DTM: NORMAL
MDC_IDC_PG_MFG: NORMAL
MDC_IDC_PG_MODEL: NORMAL
MDC_IDC_PG_SERIAL: NORMAL
MDC_IDC_PG_TYPE: NORMAL
MDC_IDC_SESS_CLINIC_NAME: NORMAL
MDC_IDC_SESS_DTM: NORMAL
MDC_IDC_SESS_TYPE: NORMAL
MDC_IDC_SET_BRADY_HYSTRATE: NORMAL
MDC_IDC_SET_BRADY_LOWRATE: 60 {BEATS}/MIN
MDC_IDC_SET_BRADY_MAX_SENSOR_RATE: 130 {BEATS}/MIN
MDC_IDC_SET_BRADY_MODE: NORMAL
MDC_IDC_SET_LEADCHNL_RA_SENSING_ANODE_ELECTRODE_1: NORMAL
MDC_IDC_SET_LEADCHNL_RA_SENSING_ANODE_LOCATION_1: NORMAL
MDC_IDC_SET_LEADCHNL_RA_SENSING_CATHODE_ELECTRODE_1: NORMAL
MDC_IDC_SET_LEADCHNL_RA_SENSING_CATHODE_LOCATION_1: NORMAL
MDC_IDC_SET_LEADCHNL_RA_SENSING_POLARITY: NORMAL
MDC_IDC_SET_LEADCHNL_RA_SENSING_SENSITIVITY: NORMAL
MDC_IDC_SET_LEADCHNL_RV_PACING_AMPLITUDE: 3 V
MDC_IDC_SET_LEADCHNL_RV_PACING_ANODE_ELECTRODE_1: NORMAL
MDC_IDC_SET_LEADCHNL_RV_PACING_ANODE_LOCATION_1: NORMAL
MDC_IDC_SET_LEADCHNL_RV_PACING_CAPTURE_MODE: NORMAL
MDC_IDC_SET_LEADCHNL_RV_PACING_CATHODE_ELECTRODE_1: NORMAL
MDC_IDC_SET_LEADCHNL_RV_PACING_CATHODE_LOCATION_1: NORMAL
MDC_IDC_SET_LEADCHNL_RV_PACING_POLARITY: NORMAL
MDC_IDC_SET_LEADCHNL_RV_PACING_PULSEWIDTH: 1 MS
MDC_IDC_SET_LEADCHNL_RV_SENSING_ANODE_ELECTRODE_1: NORMAL
MDC_IDC_SET_LEADCHNL_RV_SENSING_ANODE_LOCATION_1: NORMAL
MDC_IDC_SET_LEADCHNL_RV_SENSING_CATHODE_ELECTRODE_1: NORMAL
MDC_IDC_SET_LEADCHNL_RV_SENSING_CATHODE_LOCATION_1: NORMAL
MDC_IDC_SET_LEADCHNL_RV_SENSING_POLARITY: NORMAL
MDC_IDC_SET_LEADCHNL_RV_SENSING_SENSITIVITY: 0.9 MV
MDC_IDC_SET_ZONE_DETECTION_INTERVAL: 400 MS
MDC_IDC_SET_ZONE_STATUS: NORMAL
MDC_IDC_SET_ZONE_TYPE: NORMAL
MDC_IDC_SET_ZONE_VENDOR_TYPE: NORMAL
MDC_IDC_STAT_BRADY_AP_VP_PERCENT: 0 %
MDC_IDC_STAT_BRADY_AP_VS_PERCENT: 0 %
MDC_IDC_STAT_BRADY_AS_VP_PERCENT: 82.19 %
MDC_IDC_STAT_BRADY_AS_VS_PERCENT: 17.81 %
MDC_IDC_STAT_BRADY_DTM_END: NORMAL
MDC_IDC_STAT_BRADY_DTM_START: NORMAL
MDC_IDC_STAT_BRADY_RA_PERCENT_PACED: 0 %
MDC_IDC_STAT_BRADY_RV_PERCENT_PACED: 82.19 %
MDC_IDC_STAT_EPISODE_RECENT_COUNT: 0
MDC_IDC_STAT_EPISODE_RECENT_COUNT_DTM_END: NORMAL
MDC_IDC_STAT_EPISODE_RECENT_COUNT_DTM_START: NORMAL
MDC_IDC_STAT_EPISODE_TOTAL_COUNT: 0
MDC_IDC_STAT_EPISODE_TOTAL_COUNT: 21
MDC_IDC_STAT_EPISODE_TOTAL_COUNT: NORMAL
MDC_IDC_STAT_EPISODE_TOTAL_COUNT_DTM_END: NORMAL
MDC_IDC_STAT_EPISODE_TOTAL_COUNT_DTM_START: NORMAL
MDC_IDC_STAT_EPISODE_TYPE: NORMAL
MDC_IDC_STAT_TACHYTHERAPY_RECENT_DTM_END: NORMAL
MDC_IDC_STAT_TACHYTHERAPY_RECENT_DTM_START: NORMAL
MDC_IDC_STAT_TACHYTHERAPY_TOTAL_DTM_END: NORMAL
MDC_IDC_STAT_TACHYTHERAPY_TOTAL_DTM_START: NORMAL

## 2025-07-31 ENCOUNTER — ANTICOAGULATION THERAPY VISIT (OUTPATIENT)
Dept: ANTICOAGULATION | Facility: OTHER | Age: 80
End: 2025-07-31
Attending: INTERNAL MEDICINE
Payer: MEDICARE

## 2025-07-31 DIAGNOSIS — Z79.01 LONG TERM CURRENT USE OF ANTICOAGULANT THERAPY: Primary | ICD-10-CM

## 2025-07-31 LAB — INR POINT OF CARE: 2.5 (ref 0.9–1.1)

## 2025-07-31 PROCEDURE — 85610 PROTHROMBIN TIME: CPT | Mod: ZL,QW

## 2025-07-31 NOTE — PROGRESS NOTES
ANTICOAGULATION MANAGEMENT     Kg Ferraro 80 year old male is on warfarin with therapeutic INR result. (Goal INR 2.0-3.0)    Recent labs: (last 7 days)     07/31/25  1424   INR 2.5*       ASSESSMENT     Source(s): Chart Review and In person     Warfarin doses taken: Warfarin taken as instructed  Diet: No new diet changes identified  Medication/supplement changes: None noted  New illness, injury, or hospitalization: No  Signs or symptoms of bleeding or clotting: No  Previous result: Therapeutic last visit; previously outside of goal range  Additional findings: None       PLAN     Recommended plan for no diet, medication or health factor changes affecting INR     Dosing Instructions: Continue your current warfarin dose with next INR in 6 weeks       Summary  As of 7/31/2025      Full warfarin instructions:  4 mg every day   Next INR check:  9/11/2025               In Person    Lab visit scheduled    Education provided: Please call back if any changes to your diet, medications or how you've been taking warfarin    Plan made per [facility] anticoagulation protocol    Nancy Aguero RN  7/31/2025  Anticoagulation Clinic  [employer] for routing messages: p  ANTICOAGULATION NURSE  [facility] patient phone line: [phone]        _______________________________________________________________________     Anticoagulation Episode Summary       Current INR goal:  2.0-3.0   TTR:  63.9% (1 y)   Target end date:  Indefinite   Send INR reminders to:   ANTICOAGULATION NURSE    Indications    Long-term (current) use of anticoagulants [Z79.01] [Z79.01]  Paroxysmal supraventricular tachycardia (Resolved) [I47.10]             Comments:  --             Anticoagulation Care Providers       Provider Role Specialty Phone number    Caleb Parker MD Referring Internal Medicine [phone]               Bi-Rhombic Flap Text: The defect edges were debeveled with a #15 scalpel blade.  Given the location of the defect and the proximity to free margins a bi-rhombic flap was deemed most appropriate.  Using a sterile surgical marker, an appropriate rhombic flap was drawn incorporating the defect. The area thus outlined was incised deep to adipose tissue with a #15 scalpel blade.  The skin margins were undermined to an appropriate distance in all directions utilizing iris scissors.

## 2025-07-31 NOTE — PROGRESS NOTES
Patient was very upset that he was having to wait for his protime appointment. Even though patient wasn't arrived to his 2 pm appointment until 2:04 pm, he and his wife state that had been sitting in the lobby waiting to be seen since 12:30 pm.  Apologized and explained to patient and his wife that had I known he was sitting in the lobby waiting, I could have seen him much sooner.  I only called him back because he had just been checked in about 10 minutes prior at 2:04 pm and he stopped Mary Enamorado who then told me he had been waiting this long. Informed my supervisor of what happened.  Not sure why he wasn't checked in sooner.   Nancy Aguero RN on 7/31/2025 at 2:49 PM

## 2025-08-04 LAB
MDC_IDC_LEAD_CONNECTION_STATUS: NORMAL
MDC_IDC_LEAD_CONNECTION_STATUS: NORMAL
MDC_IDC_LEAD_IMPLANT_DT: NORMAL
MDC_IDC_LEAD_IMPLANT_DT: NORMAL
MDC_IDC_LEAD_LOCATION: NORMAL
MDC_IDC_LEAD_LOCATION: NORMAL
MDC_IDC_LEAD_LOCATION_DETAIL_1: NORMAL
MDC_IDC_LEAD_LOCATION_DETAIL_1: NORMAL
MDC_IDC_LEAD_MFG: NORMAL
MDC_IDC_LEAD_MFG: NORMAL
MDC_IDC_LEAD_MODEL: NORMAL
MDC_IDC_LEAD_MODEL: NORMAL
MDC_IDC_LEAD_POLARITY_TYPE: NORMAL
MDC_IDC_LEAD_POLARITY_TYPE: NORMAL
MDC_IDC_LEAD_SERIAL: NORMAL
MDC_IDC_LEAD_SERIAL: NORMAL
MDC_IDC_LEAD_SPECIAL_FUNCTION: NORMAL
MDC_IDC_LEAD_SPECIAL_FUNCTION: NORMAL
MDC_IDC_MSMT_BATTERY_DTM: NORMAL
MDC_IDC_MSMT_BATTERY_REMAINING_LONGEVITY: 49 MO
MDC_IDC_MSMT_BATTERY_RRT_TRIGGER: 2.62
MDC_IDC_MSMT_BATTERY_STATUS: NORMAL
MDC_IDC_MSMT_BATTERY_VOLTAGE: 2.96 V
MDC_IDC_MSMT_LEADCHNL_RA_IMPEDANCE_VALUE: 380 OHM
MDC_IDC_MSMT_LEADCHNL_RA_IMPEDANCE_VALUE: 475 OHM
MDC_IDC_MSMT_LEADCHNL_RA_SENSING_INTR_AMPL: 0.38 MV
MDC_IDC_MSMT_LEADCHNL_RA_SENSING_INTR_AMPL: 2 MV
MDC_IDC_MSMT_LEADCHNL_RV_IMPEDANCE_VALUE: 551 OHM
MDC_IDC_MSMT_LEADCHNL_RV_IMPEDANCE_VALUE: 570 OHM
MDC_IDC_MSMT_LEADCHNL_RV_PACING_THRESHOLD_AMPLITUDE: 0.88 V
MDC_IDC_MSMT_LEADCHNL_RV_PACING_THRESHOLD_PULSEWIDTH: 0.4 MS
MDC_IDC_MSMT_LEADCHNL_RV_SENSING_INTR_AMPL: 7.88 MV
MDC_IDC_MSMT_LEADCHNL_RV_SENSING_INTR_AMPL: 7.88 MV
MDC_IDC_PG_IMPLANT_DTM: NORMAL
MDC_IDC_PG_MFG: NORMAL
MDC_IDC_PG_MODEL: NORMAL
MDC_IDC_PG_SERIAL: NORMAL
MDC_IDC_PG_TYPE: NORMAL
MDC_IDC_SESS_CLINIC_NAME: NORMAL
MDC_IDC_SESS_DTM: NORMAL
MDC_IDC_SESS_TYPE: NORMAL
MDC_IDC_SET_BRADY_HYSTRATE: NORMAL
MDC_IDC_SET_BRADY_LOWRATE: 60 {BEATS}/MIN
MDC_IDC_SET_BRADY_MAX_SENSOR_RATE: 130 {BEATS}/MIN
MDC_IDC_SET_BRADY_MODE: NORMAL
MDC_IDC_SET_LEADCHNL_RA_SENSING_ANODE_ELECTRODE_1: NORMAL
MDC_IDC_SET_LEADCHNL_RA_SENSING_ANODE_LOCATION_1: NORMAL
MDC_IDC_SET_LEADCHNL_RA_SENSING_CATHODE_ELECTRODE_1: NORMAL
MDC_IDC_SET_LEADCHNL_RA_SENSING_CATHODE_LOCATION_1: NORMAL
MDC_IDC_SET_LEADCHNL_RA_SENSING_POLARITY: NORMAL
MDC_IDC_SET_LEADCHNL_RA_SENSING_SENSITIVITY: NORMAL
MDC_IDC_SET_LEADCHNL_RV_PACING_AMPLITUDE: 3 V
MDC_IDC_SET_LEADCHNL_RV_PACING_ANODE_ELECTRODE_1: NORMAL
MDC_IDC_SET_LEADCHNL_RV_PACING_ANODE_LOCATION_1: NORMAL
MDC_IDC_SET_LEADCHNL_RV_PACING_CAPTURE_MODE: NORMAL
MDC_IDC_SET_LEADCHNL_RV_PACING_CATHODE_ELECTRODE_1: NORMAL
MDC_IDC_SET_LEADCHNL_RV_PACING_CATHODE_LOCATION_1: NORMAL
MDC_IDC_SET_LEADCHNL_RV_PACING_POLARITY: NORMAL
MDC_IDC_SET_LEADCHNL_RV_PACING_PULSEWIDTH: 1 MS
MDC_IDC_SET_LEADCHNL_RV_SENSING_ANODE_ELECTRODE_1: NORMAL
MDC_IDC_SET_LEADCHNL_RV_SENSING_ANODE_LOCATION_1: NORMAL
MDC_IDC_SET_LEADCHNL_RV_SENSING_CATHODE_ELECTRODE_1: NORMAL
MDC_IDC_SET_LEADCHNL_RV_SENSING_CATHODE_LOCATION_1: NORMAL
MDC_IDC_SET_LEADCHNL_RV_SENSING_POLARITY: NORMAL
MDC_IDC_SET_LEADCHNL_RV_SENSING_SENSITIVITY: 0.9 MV
MDC_IDC_SET_ZONE_DETECTION_INTERVAL: 400 MS
MDC_IDC_SET_ZONE_STATUS: NORMAL
MDC_IDC_SET_ZONE_TYPE: NORMAL
MDC_IDC_SET_ZONE_VENDOR_TYPE: NORMAL
MDC_IDC_STAT_BRADY_AP_VP_PERCENT: 0 %
MDC_IDC_STAT_BRADY_AP_VS_PERCENT: 0 %
MDC_IDC_STAT_BRADY_AS_VP_PERCENT: 82.19 %
MDC_IDC_STAT_BRADY_AS_VS_PERCENT: 17.81 %
MDC_IDC_STAT_BRADY_DTM_END: NORMAL
MDC_IDC_STAT_BRADY_DTM_START: NORMAL
MDC_IDC_STAT_BRADY_RA_PERCENT_PACED: 0 %
MDC_IDC_STAT_BRADY_RV_PERCENT_PACED: 82.19 %
MDC_IDC_STAT_EPISODE_RECENT_COUNT: 0
MDC_IDC_STAT_EPISODE_RECENT_COUNT_DTM_END: NORMAL
MDC_IDC_STAT_EPISODE_RECENT_COUNT_DTM_START: NORMAL
MDC_IDC_STAT_EPISODE_TOTAL_COUNT: 0
MDC_IDC_STAT_EPISODE_TOTAL_COUNT: 21
MDC_IDC_STAT_EPISODE_TOTAL_COUNT: NORMAL
MDC_IDC_STAT_EPISODE_TOTAL_COUNT_DTM_END: NORMAL
MDC_IDC_STAT_EPISODE_TOTAL_COUNT_DTM_START: NORMAL
MDC_IDC_STAT_EPISODE_TYPE: NORMAL
MDC_IDC_STAT_TACHYTHERAPY_RECENT_DTM_END: NORMAL
MDC_IDC_STAT_TACHYTHERAPY_RECENT_DTM_START: NORMAL
MDC_IDC_STAT_TACHYTHERAPY_TOTAL_DTM_END: NORMAL
MDC_IDC_STAT_TACHYTHERAPY_TOTAL_DTM_START: NORMAL

## (undated) DEVICE — SOL WATER 1500ML

## (undated) DEVICE — PREP CHLORAPREP 26ML TINTED ORANGE  260815

## (undated) DEVICE — PROBE COVER SAFERSONIC LONG 50/BX B204538

## (undated) DEVICE — SU SILK 2-0 SH 30" K833H

## (undated) DEVICE — PACK MAJOR LAPAROTOMY LF SBA15MLFCA

## (undated) DEVICE — Device

## (undated) DEVICE — KIT WRENCH 5873W

## (undated) DEVICE — DRAPE SLEEVE MEDT STERILE 10FT 6177

## (undated) DEVICE — GLOVE PROTEXIS POWDER FREE SMT 7.5  2D72PT75X

## (undated) DEVICE — SU VICRYL 3-0 SH 27" UND J416H

## (undated) DEVICE — GLOVE BIOGEL INDICATOR 7.5 LF 41675

## (undated) DEVICE — DRAPE C-ARM PACK 9"

## (undated) DEVICE — ADH SKIN LIQUIBAND DOME LQB003T

## (undated) DEVICE — SU MONOCRYL 4-0 PS-2 27" UND Y426H

## (undated) DEVICE — STYLET KIT

## (undated) DEVICE — LIGHT HANDLES PLASTIC

## (undated) RX ORDER — LIDOCAINE HYDROCHLORIDE 10 MG/ML
INJECTION, SOLUTION EPIDURAL; INFILTRATION; INTRACAUDAL; PERINEURAL
Status: DISPENSED
Start: 2021-08-11

## (undated) RX ORDER — PROPOFOL 10 MG/ML
INJECTION, EMULSION INTRAVENOUS
Status: DISPENSED
Start: 2020-05-21

## (undated) RX ORDER — BUPIVACAINE HYDROCHLORIDE AND EPINEPHRINE 5; 5 MG/ML; UG/ML
INJECTION, SOLUTION EPIDURAL; INTRACAUDAL; PERINEURAL
Status: DISPENSED
Start: 2020-05-21

## (undated) RX ORDER — LIDOCAINE HYDROCHLORIDE 20 MG/ML
INJECTION, SOLUTION EPIDURAL; INFILTRATION; INTRACAUDAL; PERINEURAL
Status: DISPENSED
Start: 2020-05-21

## (undated) RX ORDER — LIDOCAINE HYDROCHLORIDE 10 MG/ML
INJECTION, SOLUTION EPIDURAL; INFILTRATION; INTRACAUDAL; PERINEURAL
Status: DISPENSED
Start: 2021-08-12

## (undated) RX ORDER — SODIUM CHLORIDE, SODIUM LACTATE, POTASSIUM CHLORIDE, CALCIUM CHLORIDE 600; 310; 30; 20 MG/100ML; MG/100ML; MG/100ML; MG/100ML
INJECTION, SOLUTION INTRAVENOUS
Status: DISPENSED
Start: 2020-05-21

## (undated) RX ORDER — EPHEDRINE SULFATE 50 MG/ML
INJECTION, SOLUTION INTRAVENOUS
Status: DISPENSED
Start: 2020-05-21

## (undated) RX ORDER — SODIUM CHLORIDE 9 MG/ML
INJECTION, SOLUTION INTRAVENOUS
Status: DISPENSED
Start: 2020-05-20

## (undated) RX ORDER — PHENYLEPHRINE HCL IN 0.9% NACL 1 MG/10 ML
SYRINGE (ML) INTRAVENOUS
Status: DISPENSED
Start: 2020-05-21

## (undated) RX ORDER — CEFAZOLIN SODIUM 1 G/3ML
INJECTION, POWDER, FOR SOLUTION INTRAMUSCULAR; INTRAVENOUS
Status: DISPENSED
Start: 2020-05-21

## (undated) RX ORDER — CALCIUM GLUCONATE 94 MG/ML
INJECTION, SOLUTION INTRAVENOUS
Status: DISPENSED
Start: 2020-05-20

## (undated) RX ORDER — NOREPINEPHRINE BITARTRATE 0.06 MG/ML
INJECTION, SOLUTION INTRAVENOUS
Status: DISPENSED
Start: 2021-08-03

## (undated) RX ORDER — FENTANYL CITRATE 50 UG/ML
INJECTION, SOLUTION INTRAMUSCULAR; INTRAVENOUS
Status: DISPENSED
Start: 2020-05-21